# Patient Record
Sex: FEMALE | Race: ASIAN | NOT HISPANIC OR LATINO | Employment: OTHER | URBAN - METROPOLITAN AREA
[De-identification: names, ages, dates, MRNs, and addresses within clinical notes are randomized per-mention and may not be internally consistent; named-entity substitution may affect disease eponyms.]

---

## 2017-03-27 ENCOUNTER — GENERIC CONVERSION - ENCOUNTER (OUTPATIENT)
Dept: OTHER | Facility: OTHER | Age: 77
End: 2017-03-27

## 2017-04-04 ENCOUNTER — ALLSCRIPTS OFFICE VISIT (OUTPATIENT)
Dept: OTHER | Facility: OTHER | Age: 77
End: 2017-04-04

## 2017-05-22 ENCOUNTER — GENERIC CONVERSION - ENCOUNTER (OUTPATIENT)
Dept: OTHER | Facility: OTHER | Age: 77
End: 2017-05-22

## 2017-06-29 ENCOUNTER — GENERIC CONVERSION - ENCOUNTER (OUTPATIENT)
Dept: OTHER | Facility: OTHER | Age: 77
End: 2017-06-29

## 2017-07-13 ENCOUNTER — GENERIC CONVERSION - ENCOUNTER (OUTPATIENT)
Dept: OTHER | Facility: OTHER | Age: 77
End: 2017-07-13

## 2017-08-01 ENCOUNTER — GENERIC CONVERSION - ENCOUNTER (OUTPATIENT)
Dept: OTHER | Facility: OTHER | Age: 77
End: 2017-08-01

## 2017-08-05 LAB
A/G RATIO (HISTORICAL): 1.4 (CALC) (ref 1–2.5)
ALBUMIN SERPL BCP-MCNC: 4.4 G/DL (ref 3.6–5.1)
ALP SERPL-CCNC: 57 U/L (ref 33–130)
ALT SERPL W P-5'-P-CCNC: 35 U/L (ref 6–29)
AST SERPL W P-5'-P-CCNC: 29 U/L (ref 10–35)
BASOPHILS # BLD AUTO: 1 %
BASOPHILS # BLD AUTO: 39 CELLS/UL (ref 0–200)
BILIRUB SERPL-MCNC: 1 MG/DL (ref 0.2–1.2)
BUN SERPL-MCNC: 17 MG/DL (ref 7–25)
BUN/CREA RATIO (HISTORICAL): ABNORMAL (CALC) (ref 6–22)
CALCIUM (ADJUSTED FOR ALBUMIN) (HISTORICAL): 9.8 MG/DL (CALC) (ref 8.6–10.2)
CALCIUM SERPL-MCNC: 9.8 MG/DL (ref 8.6–10.4)
CHLORIDE SERPL-SCNC: 102 MMOL/L (ref 98–110)
CHOLEST SERPL-MCNC: 181 MG/DL (ref 125–200)
CHOLEST/HDLC SERPL: 1.4 (CALC)
CO2 SERPL-SCNC: 27 MMOL/L (ref 20–31)
CREAT SERPL-MCNC: 0.72 MG/DL (ref 0.6–0.93)
DEPRECATED RDW RBC AUTO: 13.2 % (ref 11–15)
EGFR AFRICAN AMERICAN (HISTORICAL): 94 ML/MIN/1.73M2
EGFR-AMERICAN CALC (HISTORICAL): 81 ML/MIN/1.73M2
EOSINOPHIL # BLD AUTO: 199 CELLS/UL (ref 15–500)
EOSINOPHIL # BLD AUTO: 5.1 %
EST. AVERAGE GLUCOSE BLD GHB EST-MCNC: 134 (CALC)
EST. AVERAGE GLUCOSE BLD GHB EST-MCNC: 7.4 (CALC)
GAMMA GLOBULIN (HISTORICAL): 3.1 G/DL (CALC) (ref 1.9–3.7)
GLUCOSE (HISTORICAL): 135 MG/DL (ref 65–99)
HBA1C MFR BLD HPLC: 6.3 % OF TOTAL HGB
HCT VFR BLD AUTO: 40 % (ref 35–45)
HDLC SERPL-MCNC: 125 MG/DL
HGB BLD-MCNC: 13.1 G/DL (ref 11.7–15.5)
LDL CHOLESTEROL (HISTORICAL): 31 MG/DL (CALC)
LYMPHOCYTES # BLD AUTO: 1357 CELLS/UL (ref 850–3900)
LYMPHOCYTES # BLD AUTO: 34.8 %
MCH RBC QN AUTO: 32.5 PG (ref 27–33)
MCHC RBC AUTO-ENTMCNC: 32.8 G/DL (ref 32–36)
MCV RBC AUTO: 99.3 FL (ref 80–100)
MONOCYTES # BLD AUTO: 390 CELLS/UL (ref 200–950)
MONOCYTES (HISTORICAL): 10 %
NEUTROPHILS # BLD AUTO: 1915 CELLS/UL (ref 1500–7800)
NEUTROPHILS # BLD AUTO: 49.1 %
NON-HDL-CHOL (CHOL-HDL) (HISTORICAL): 56 MG/DL (CALC)
PLATELET # BLD AUTO: 182 THOUSAND/UL (ref 140–400)
PMV BLD AUTO: 10.4 FL (ref 7.5–12.5)
POTASSIUM SERPL-SCNC: 4.2 MMOL/L (ref 3.5–5.3)
RBC # BLD AUTO: 4.03 MILLION/UL (ref 3.8–5.1)
SODIUM SERPL-SCNC: 139 MMOL/L (ref 135–146)
TOTAL PROTEIN (HISTORICAL): 7.5 G/DL (ref 6.1–8.1)
TRIGL SERPL-MCNC: 126 MG/DL
WBC # BLD AUTO: 3.9 THOUSAND/UL (ref 3.8–10.8)

## 2017-08-07 ENCOUNTER — GENERIC CONVERSION - ENCOUNTER (OUTPATIENT)
Dept: OTHER | Facility: OTHER | Age: 77
End: 2017-08-07

## 2017-08-09 ENCOUNTER — ALLSCRIPTS OFFICE VISIT (OUTPATIENT)
Dept: OTHER | Facility: OTHER | Age: 77
End: 2017-08-09

## 2017-08-25 ENCOUNTER — GENERIC CONVERSION - ENCOUNTER (OUTPATIENT)
Dept: OTHER | Facility: OTHER | Age: 77
End: 2017-08-25

## 2017-09-05 ENCOUNTER — GENERIC CONVERSION - ENCOUNTER (OUTPATIENT)
Dept: OTHER | Facility: OTHER | Age: 77
End: 2017-09-05

## 2017-10-11 ENCOUNTER — GENERIC CONVERSION - ENCOUNTER (OUTPATIENT)
Dept: OTHER | Facility: OTHER | Age: 77
End: 2017-10-11

## 2017-12-01 ENCOUNTER — TRANSCRIBE ORDERS (OUTPATIENT)
Dept: ADMINISTRATIVE | Facility: HOSPITAL | Age: 77
End: 2017-12-01

## 2017-12-01 ENCOUNTER — APPOINTMENT (OUTPATIENT)
Dept: LAB | Facility: MEDICAL CENTER | Age: 77
End: 2017-12-01
Payer: COMMERCIAL

## 2017-12-01 DIAGNOSIS — I10 ESSENTIAL (PRIMARY) HYPERTENSION: ICD-10-CM

## 2017-12-01 DIAGNOSIS — E78.00 PURE HYPERCHOLESTEROLEMIA: ICD-10-CM

## 2017-12-01 DIAGNOSIS — E11.9 TYPE 2 DIABETES MELLITUS WITHOUT COMPLICATIONS (HCC): ICD-10-CM

## 2017-12-01 LAB
ALBUMIN SERPL BCP-MCNC: 3.6 G/DL (ref 3.5–5)
ALP SERPL-CCNC: 54 U/L (ref 46–116)
ALT SERPL W P-5'-P-CCNC: 27 U/L (ref 12–78)
ANION GAP SERPL CALCULATED.3IONS-SCNC: 7 MMOL/L (ref 4–13)
AST SERPL W P-5'-P-CCNC: 23 U/L (ref 5–45)
BASOPHILS # BLD AUTO: 0.03 THOUSANDS/ΜL (ref 0–0.1)
BASOPHILS NFR BLD AUTO: 1 % (ref 0–1)
BILIRUB SERPL-MCNC: 0.86 MG/DL (ref 0.2–1)
BUN SERPL-MCNC: 14 MG/DL (ref 5–25)
CALCIUM SERPL-MCNC: 9.3 MG/DL (ref 8.3–10.1)
CHLORIDE SERPL-SCNC: 102 MMOL/L (ref 100–108)
CHOLEST SERPL-MCNC: 166 MG/DL (ref 50–200)
CO2 SERPL-SCNC: 29 MMOL/L (ref 21–32)
CREAT SERPL-MCNC: 0.71 MG/DL (ref 0.6–1.3)
EOSINOPHIL # BLD AUTO: 0.08 THOUSAND/ΜL (ref 0–0.61)
EOSINOPHIL NFR BLD AUTO: 3 % (ref 0–6)
ERYTHROCYTE [DISTWIDTH] IN BLOOD BY AUTOMATED COUNT: 13.8 % (ref 11.6–15.1)
EST. AVERAGE GLUCOSE BLD GHB EST-MCNC: 126 MG/DL
GFR SERPL CREATININE-BSD FRML MDRD: 83 ML/MIN/1.73SQ M
GLUCOSE P FAST SERPL-MCNC: 113 MG/DL (ref 65–99)
HBA1C MFR BLD: 6 % (ref 4.2–6.3)
HCT VFR BLD AUTO: 39 % (ref 34.8–46.1)
HDLC SERPL-MCNC: 120 MG/DL (ref 40–60)
HGB BLD-MCNC: 13.1 G/DL (ref 11.5–15.4)
LDLC SERPL CALC-MCNC: 30 MG/DL (ref 0–100)
LYMPHOCYTES # BLD AUTO: 1.13 THOUSANDS/ΜL (ref 0.6–4.47)
LYMPHOCYTES NFR BLD AUTO: 39 % (ref 14–44)
MCH RBC QN AUTO: 34.5 PG (ref 26.8–34.3)
MCHC RBC AUTO-ENTMCNC: 33.6 G/DL (ref 31.4–37.4)
MCV RBC AUTO: 103 FL (ref 82–98)
MONOCYTES # BLD AUTO: 0.28 THOUSAND/ΜL (ref 0.17–1.22)
MONOCYTES NFR BLD AUTO: 10 % (ref 4–12)
NEUTROPHILS # BLD AUTO: 1.37 THOUSANDS/ΜL (ref 1.85–7.62)
NEUTS SEG NFR BLD AUTO: 47 % (ref 43–75)
NRBC BLD AUTO-RTO: 0 /100 WBCS
PLATELET # BLD AUTO: 194 THOUSANDS/UL (ref 149–390)
PMV BLD AUTO: 10.2 FL (ref 8.9–12.7)
POTASSIUM SERPL-SCNC: 3.5 MMOL/L (ref 3.5–5.3)
PROT SERPL-MCNC: 7.5 G/DL (ref 6.4–8.2)
RBC # BLD AUTO: 3.8 MILLION/UL (ref 3.81–5.12)
SODIUM SERPL-SCNC: 138 MMOL/L (ref 136–145)
TRIGL SERPL-MCNC: 81 MG/DL
TSH SERPL DL<=0.05 MIU/L-ACNC: 1.42 UIU/ML (ref 0.36–3.74)
WBC # BLD AUTO: 2.89 THOUSAND/UL (ref 4.31–10.16)

## 2017-12-01 PROCEDURE — 80053 COMPREHEN METABOLIC PANEL: CPT

## 2017-12-01 PROCEDURE — 85025 COMPLETE CBC W/AUTO DIFF WBC: CPT

## 2017-12-01 PROCEDURE — 83036 HEMOGLOBIN GLYCOSYLATED A1C: CPT

## 2017-12-01 PROCEDURE — 84443 ASSAY THYROID STIM HORMONE: CPT

## 2017-12-01 PROCEDURE — 36415 COLL VENOUS BLD VENIPUNCTURE: CPT

## 2017-12-01 PROCEDURE — 80061 LIPID PANEL: CPT

## 2017-12-02 ENCOUNTER — GENERIC CONVERSION - ENCOUNTER (OUTPATIENT)
Dept: OTHER | Facility: OTHER | Age: 77
End: 2017-12-02

## 2017-12-04 DIAGNOSIS — E78.00 PURE HYPERCHOLESTEROLEMIA: ICD-10-CM

## 2017-12-04 DIAGNOSIS — Z12.31 ENCOUNTER FOR SCREENING MAMMOGRAM FOR MALIGNANT NEOPLASM OF BREAST: ICD-10-CM

## 2017-12-04 DIAGNOSIS — I10 ESSENTIAL (PRIMARY) HYPERTENSION: ICD-10-CM

## 2017-12-04 DIAGNOSIS — D72.819 DECREASED WHITE BLOOD CELL COUNT: ICD-10-CM

## 2017-12-04 DIAGNOSIS — E11.9 TYPE 2 DIABETES MELLITUS WITHOUT COMPLICATIONS (HCC): ICD-10-CM

## 2017-12-06 ENCOUNTER — ALLSCRIPTS OFFICE VISIT (OUTPATIENT)
Dept: OTHER | Facility: OTHER | Age: 77
End: 2017-12-06

## 2017-12-18 ENCOUNTER — TRANSCRIBE ORDERS (OUTPATIENT)
Dept: ADMINISTRATIVE | Facility: HOSPITAL | Age: 77
End: 2017-12-18

## 2017-12-18 ENCOUNTER — APPOINTMENT (OUTPATIENT)
Dept: LAB | Facility: MEDICAL CENTER | Age: 77
End: 2017-12-18
Payer: COMMERCIAL

## 2017-12-18 DIAGNOSIS — Z15.89 SUSCEPTIBILITY TO RESTLESS LEGS SYNDROME DUE TO SEQUENCE VARIATION AT CHROMOSOME 12Q12-Q21: Primary | ICD-10-CM

## 2017-12-18 DIAGNOSIS — Z95.0 CARDIAC PACEMAKER IN SITU: ICD-10-CM

## 2017-12-18 DIAGNOSIS — E78.00 PURE HYPERCHOLESTEROLEMIA: ICD-10-CM

## 2017-12-18 DIAGNOSIS — E11.9 TYPE 2 DIABETES MELLITUS WITHOUT COMPLICATIONS (HCC): ICD-10-CM

## 2017-12-18 DIAGNOSIS — I48.92 ATRIAL FLUTTER, UNSPECIFIED TYPE (HCC): ICD-10-CM

## 2017-12-18 DIAGNOSIS — Z15.89 SUSCEPTIBILITY TO RESTLESS LEGS SYNDROME DUE TO SEQUENCE VARIATION AT CHROMOSOME 12Q12-Q21: ICD-10-CM

## 2017-12-18 DIAGNOSIS — I10 ESSENTIAL HYPERTENSION, MALIGNANT: ICD-10-CM

## 2017-12-18 DIAGNOSIS — I10 ESSENTIAL (PRIMARY) HYPERTENSION: ICD-10-CM

## 2017-12-18 LAB
ALBUMIN SERPL BCP-MCNC: 3.7 G/DL (ref 3.5–5)
ALP SERPL-CCNC: 54 U/L (ref 46–116)
ALT SERPL W P-5'-P-CCNC: 33 U/L (ref 12–78)
ANION GAP SERPL CALCULATED.3IONS-SCNC: 6 MMOL/L (ref 4–13)
AST SERPL W P-5'-P-CCNC: 28 U/L (ref 5–45)
BASOPHILS # BLD AUTO: 0.03 THOUSANDS/ΜL (ref 0–0.1)
BASOPHILS NFR BLD AUTO: 1 % (ref 0–1)
BILIRUB SERPL-MCNC: 0.9 MG/DL (ref 0.2–1)
BUN SERPL-MCNC: 14 MG/DL (ref 5–25)
CALCIUM SERPL-MCNC: 9.8 MG/DL (ref 8.3–10.1)
CHLORIDE SERPL-SCNC: 102 MMOL/L (ref 100–108)
CHOLEST SERPL-MCNC: 183 MG/DL (ref 50–200)
CO2 SERPL-SCNC: 28 MMOL/L (ref 21–32)
CREAT SERPL-MCNC: 0.65 MG/DL (ref 0.6–1.3)
EOSINOPHIL # BLD AUTO: 0.09 THOUSAND/ΜL (ref 0–0.61)
EOSINOPHIL NFR BLD AUTO: 3 % (ref 0–6)
ERYTHROCYTE [DISTWIDTH] IN BLOOD BY AUTOMATED COUNT: 13.1 % (ref 11.6–15.1)
GFR SERPL CREATININE-BSD FRML MDRD: 86 ML/MIN/1.73SQ M
GLUCOSE P FAST SERPL-MCNC: 136 MG/DL (ref 65–99)
HCT VFR BLD AUTO: 40.2 % (ref 34.8–46.1)
HDLC SERPL-MCNC: 117 MG/DL (ref 40–60)
HGB BLD-MCNC: 13.5 G/DL (ref 11.5–15.4)
LDLC SERPL CALC-MCNC: 36 MG/DL (ref 0–100)
LYMPHOCYTES # BLD AUTO: 1.12 THOUSANDS/ΜL (ref 0.6–4.47)
LYMPHOCYTES NFR BLD AUTO: 34 % (ref 14–44)
MCH RBC QN AUTO: 34.4 PG (ref 26.8–34.3)
MCHC RBC AUTO-ENTMCNC: 33.6 G/DL (ref 31.4–37.4)
MCV RBC AUTO: 102 FL (ref 82–98)
MONOCYTES # BLD AUTO: 0.33 THOUSAND/ΜL (ref 0.17–1.22)
MONOCYTES NFR BLD AUTO: 10 % (ref 4–12)
NEUTROPHILS # BLD AUTO: 1.76 THOUSANDS/ΜL (ref 1.85–7.62)
NEUTS SEG NFR BLD AUTO: 52 % (ref 43–75)
NRBC BLD AUTO-RTO: 0 /100 WBCS
PLATELET # BLD AUTO: 199 THOUSANDS/UL (ref 149–390)
PMV BLD AUTO: 9.9 FL (ref 8.9–12.7)
POTASSIUM SERPL-SCNC: 4.4 MMOL/L (ref 3.5–5.3)
PROT SERPL-MCNC: 7.8 G/DL (ref 6.4–8.2)
RBC # BLD AUTO: 3.93 MILLION/UL (ref 3.81–5.12)
SODIUM SERPL-SCNC: 136 MMOL/L (ref 136–145)
TRIGL SERPL-MCNC: 152 MG/DL
WBC # BLD AUTO: 3.33 THOUSAND/UL (ref 4.31–10.16)

## 2017-12-18 PROCEDURE — 80053 COMPREHEN METABOLIC PANEL: CPT

## 2017-12-18 PROCEDURE — 36415 COLL VENOUS BLD VENIPUNCTURE: CPT

## 2017-12-18 PROCEDURE — 85025 COMPLETE CBC W/AUTO DIFF WBC: CPT

## 2017-12-18 PROCEDURE — 80061 LIPID PANEL: CPT

## 2017-12-27 ENCOUNTER — GENERIC CONVERSION - ENCOUNTER (OUTPATIENT)
Dept: FAMILY MEDICINE CLINIC | Facility: CLINIC | Age: 77
End: 2017-12-27

## 2018-01-02 NOTE — PROGRESS NOTES
Assessment   1  Controlled diabetes mellitus (250 00) (E11 9)   2  Hypertension (401 9) (I10)   3  Decreased white blood cell count (288 50) (D72 819)   4  Need for influenza vaccination (V04 81) (Z23)    Plan   Atrial flutter with controlled response    · Metoprolol Tartrate 25 MG Oral Tablet; Take 1 tablet twice daily  Controlled diabetes mellitus    · MetFORMIN HCl  MG Oral Tablet Extended Release 24 Hour; TAKE 1    TABLET BY MOUTH EVERY DAY  Controlled diabetes mellitus, Hypercholesterolemia, Hypertension    · (1) CBC/PLT/DIFF; Status:Resulted - Requires Verification;   Done: 99QGB0127 09:28AM   · (1) COMPREHENSIVE METABOLIC PANEL; Status:Active; Requested for:06Apr2018;    · (1) HEMOGLOBIN A1C; Status:Active; Requested for:06Apr2018;    · (1) LIPID PANEL, FASTING; Status:Active; Requested for:06Apr2018;    · (1) TSH WITH FT4 REFLEX; Status:Active; Requested RZQ:26QIP9312; Controlled diabetes mellitus, Hypertension    · Losartan Potassium 50 MG Oral Tablet; TAKE HALF A TABLET DAILY  Decreased white blood cell count    · (1) CBC/PLT/DIFF; Status:Active; Requested for:06Wjz3758;   Hypercholesterolemia    · Simvastatin 20 MG Oral Tablet; Take 1 tablet daily  Need for influenza vaccination    · Fluzone High-Dose 0 5 ML Intramuscular Suspension Prefilled Syringe  PMH: Lower leg edema    · Furosemide 20 MG Oral Tablet; One tablet as needed for swelling    Discussion/Summary      Patient is a 68year old female with type 2 diabetes  Her diabetes control is good  Cholesterol also was good with a very high HDL  She does follow with Cardiology regarding her atrial flutter  I renewed her medications for diabetes, hypertension and and she asked for a water pill to take for when she flies to [facility]  She will be leaving in January and visits her relatives for 2 months  On her CBC, her white blood cell count was mildly decreased so I asked her to go for a repeat CBC in 2 weeks  She was given a flu shot today   I will see her back in 4 months  Fasting blood work orders are in the EHR to be done prior to that visit  The treatment plan was reviewed with the patient/guardian  The patient/guardian understands and agrees with the treatment plan      Chief Complaint   pt here for her f/u to her chronic medical conditions    Patient is here today for follow up of chronic conditions described in HPI  History of Present Illness   Patient is seen for follow p of diabetes  The patient states she has been doing well with her Type II Diabetes control since the last visit  Symptoms: Due For: pTzwX5b is 6 0  Review of Systems        Constitutional: No fever, no chills, feels well, no tiredness, no recent weight gain or weight loss  Active Problems   1  Arrhythmia, sinus node (427 81) (I49 9)   2  Artificial pacemaker (V45 01) (Z95 0)   3  ASD (atrial septal defect) (745 5) (Q21 1)   4  Atrial flutter with controlled response (427 32) (I48 92)   5  Atrial Septal Defect   6  Bilateral nonexudative age-related macular degeneration (362 51) (H35 3130)   7  Cataract, bilateral (366 9) (H26 9)   8  Controlled diabetes mellitus (250 00) (E11 9)   9  Hypercholesterolemia (272 0) (E78 00)   10  Hypertension (401 9) (I10)   11  Insomnia (780 52) (G47 00)   12  Osteopenia (733 90) (M85 80)   13  Seborrhea (706 3) (L21 9)    Past Medical History   1  History of Abdominal pain (789 00) (R10 9)   2  History of Breast self examination education, encounter for (V65 49) (Z71 89)   3  History of Denial Of Any Significant Medical History   4  History of Depression screen (V79 0) (Z13 89)   5  History of Encounter for annual routine gynecological examination (V72 31) (Z01 419)   6  History of Encounter for Medicare annual wellness exam (V70 0) (Z00 00)   7  History of Encounter for screening mammogram for malignant neoplasm of breast     (V76 12) (Z12 31)   8  History of  4 (V22 2) (Z33 1)   9   History of allergic rhinitis (V12 69) (Z87 09)   10  History of allergic rhinitis (V12 69) (Z87 09)   11  History of atrial flutter (V12 59) (Z86 79)   12  History of fatigue (V13 89) (Z87 898)   13  History of self breast exam   14  History of self breast exam   15  History of vaginal discharge (V13 29) (Z87 42)   16  History of Hornet/Wasp/Bee Sting (989 5)   17  History of Injury caused by lifting with sudden strenuous movement, initial encounter      (E927 0) (X50 0XXA)   18  History of Lower leg edema (782 3) (R60 0)   19  History of Medicare annual wellness visit, subsequent (V70 0) (Z00 00)   20  History of Neck pain (723 1) (M54 2)   21  History of Need for pneumococcal vaccine (V03 82) (Z23)   22  History of Need for prophylactic vaccination and inoculation against influenza (V04 81)      (Z23)   23  History of Need for revaccination (V05 9) (Z23)   24  History of Need for vaccine for TD (tetanus-diphtheria) (V06 5) (Z23)   25  History of Preop examination (V72 84) (Z01 818)   26  History of Screening for breast cancer (V76 10) (Z12 31)   27  History of Screening for depression (V79 0) (Z13 89)   28  History of Screening for other and unspecified genitourinary condition (V81 6) (Z13 89)   29  History of Screening for other and unspecified genitourinary condition (V81 6) (Z13 89)   30  History of Special screening for other neurological conditions (V80 09) (Z13 89)   31  History of Special screening for other neurological conditions (V80 09) (Z13 89)   32  History of Strain of lumbar region, initial encounter (847 2) (S39 012A)   33  History of Subconjunctival hemorrhage, unspecified laterality (372 72) (H11 30)    Surgical History   1  History of Eye Surgery   2  History of Pacemaker Placement   3  History of Repair Of Atrial Secundum Septal Defect   4  History of Total Abdominal Hysterectomy    Family History   Mother    1  Family history of   Father    2  Family history unknown (V49 89) (Z78 9)  Brother    3   Family history of gout (V18 19) (Z82 69)    Social History    · Being A Social Drinker   · Caffeine Use   · Denied: History of Drug use   · Four children   · Quaker   · Never A Smoker   · Uses Safety Equipment - Seatbelts   ·   The social history was reviewed and updated today  The social history was reviewed and is unchanged  Current Meds    1  FreeStyle Lancets Miscellaneous; USE AS DIRECTED; Therapy: 61FYH2505 to (Last RN:68ABP9781)  Requested for: 10NJJ6657 Ordered   2  FreeStyle Lite Test In [employer]; Test Four Times per Week as directed; Therapy: 54THE5771 to (Last Rx:17Jun2014)  Requested for: 73DOT7138 Ordered   3  Ketoconazole 2 % External Shampoo; Use to shampoo hair twice a week; Therapy: 41BGL8740 to (Last Rx:86Dvw2584)  Requested for: 22Coo7080 Ordered   4  Losartan Potassium 50 MG Oral Tablet; TAKE HALF A TABLET DAILY; Therapy: 40Ihg4845 to (Evaluate:01Oct2017)  Requested for: 04Apr2017; Last     Rx:99Lbs0915 Ordered   5  MetFORMIN HCl  MG Oral Tablet Extended Release 24 Hour; Take 1 tablet by     mouth  daily; Therapy: 70HZL2081 to (Evaluate:30Mar2018)  Requested for: 04Apr2017; Last     Rx:43Sdp5692 Ordered   6  Metoprolol Tartrate 25 MG Oral Tablet; Take 1 tablet twice daily; Therapy: 33LKQ8246 to (Evaluate:17Mar2018)  Requested for: 56Zps5455; Last     Rx:13Bdg5334 Ordered   7  Multivitamins CAPS; Therapy: (Estiven Backers) to Recorded   8  Simvastatin 20 MG Oral Tablet; TAKE 1 TABLET DAILY AS DIRECTED; Therapy: 91Szl2035 to (Evaluate:30Mar2018)  Requested for: 03Fzl4030; Last     Rx:73Bzp0539 Ordered   9  Sotalol HCl - 80 MG Oral Tablet; Take 1 tablet twice daily; Therapy: 65RCZ0759 to (Evaluate:17Mar2018)  Requested for: 92Rbq0083; Last     Rx:24Ghg2658 Ordered   10  Xarelto 20 MG Oral Tablet; Take 1 tablet daily; Therapy: 77Avt3150 to Recorded     The medication list was reviewed and updated today  Allergies   1  Penicillins  2   Shellfish  Denied 3  Iodine OINT    Vitals   Vital Signs    Recorded: 80NVN4797 11:13AM   Temperature 96 3 F, Tympanic   Heart Rate 68   Pulse Quality Normal   Respiration Quality Normal   Respiration 16   Systolic 900, LUE, Sitting   Diastolic 80, LUE, Sitting   Height 4 ft 10 3 in   Weight 120 lb 8 oz   BMI Calculated 24 93   BSA Calculated 1 47   O2 Saturation 99   Pain Scale 0     Physical Exam        Constitutional      General appearance: No acute distress, well appearing and well nourished  Pulmonary      Respiratory effort: No increased work of breathing or signs of respiratory distress  Auscultation of lungs: Clear to auscultation  Cardiovascular      Auscultation of heart: Normal rate and rhythm, normal S1 and S2, without murmurs  Examination of extremities for edema and/or varicosities: Normal        Carotid pulses: Normal        Lymphatic      Palpation of lymph nodes in neck: No lymphadenopathy  Musculoskeletal      Gait and station: Normal        Psychiatric      Orientation to person, place, and time: Normal        Mood and affect: Normal           Results/Data   (1) CBC/PLT/DIFF 06YAD7883 09:28AM Hamilton Mabry      Test Name Result Flag Reference   WBC COUNT 3 33 Thousand/uL L 4 31-10 16   RBC COUNT 3 93 Million/uL  3 81-5 12   HEMOGLOBIN 13 5 g/dL  11 5-15 4   HEMATOCRIT 40 2 %  34 8-46  1    fL H 82-98   MCH 34 4 pg H 26 8-34 3   MCHC 33 6 g/dL  31 4-37 4   RDW 13 1 %  11 6-15 1   MPV 9 9 fL  8 9-12 7   PLATELET COUNT 742 Thousands/uL  149-390   nRBC AUTOMATED 0 /100 WBCs     NEUTROPHILS RELATIVE PERCENT 52 %  43-75   LYMPHOCYTES RELATIVE PERCENT 34 %  14-44   MONOCYTES RELATIVE PERCENT 10 %  4-12   EOSINOPHILS RELATIVE PERCENT 3 %  0-6   BASOPHILS RELATIVE PERCENT 1 %  0-1   NEUTROPHILS ABSOLUTE COUNT 1 76 Thousands/? ??L L 1 85-7 62   LYMPHOCYTES ABSOLUTE COUNT 1 12 Thousands/? ??L  0 60-4 47   MONOCYTES ABSOLUTE COUNT 0 33 Thousand/? ??L  0 17-1 22   EOSINOPHILS ABSOLUTE COUNT 0 09 Thousand/? ??L  0 00-0 61   BASOPHILS ABSOLUTE COUNT 0 03 Thousands/? ??L  0 00-0 10        Health Management   Controlled diabetes mellitus   (every) HEMOGLOBIN A1c; every 6 months; Last 93CFX9657; Next Due: 82Xco5997; Overdue  (every) MICROALBUMIN, RANDOM URINE (W/CREATININE); every 1 year; Last 39IFP5110; Next    Due: 31ICU5120; Active  *VB - Eye Exam; every 1 year; Last 63Nph5244; Next Due: 57Lcy0542; Active  *VB - Foot Exam; every 1 year; Last 54KJX0765; Next Due: 14IKJ2470; Overdue  History of Screening for breast cancer   Digital Bilateral Screening Mammogram With CAD; every 1 year; Last 49ZCM9536; Next Due:    99TSF0691; Overdue  Osteopenia   * Dexa Scan Axial Skel (Hip, Pelvis, Spine); every 2 years; Last 08EXP7740; Next Due: 28EGR5283; Overdue  Health Maintenance   Medicare Annual Wellness Visit; every 1 year; Last 61IOE3228; Next Due: 28DTH8286;  Overdue    Future Appointments      Date/Time Provider Specialty Site   04/06/2018 09:15 AM Mei Shoemaker DO Family Medicine [address]    Electronically signed by : Colletta Goring, DO; Jan 2 2018  1:22AM EST                       (Author)

## 2018-01-10 NOTE — RESULT NOTES
Verified Results  (Q) CBC (INCLUDES DIFF/PLT) (REFL) 17VBE5210 10:09AM Elizabet Wyldfire     Test Name Result Flag Reference   WHITE BLOOD CELL COUNT 3 9 Thousand/uL  3 8-10 8   RED BLOOD CELL COUNT 4 03 Million/uL  3 80-5 10   HEMOGLOBIN 13 1 g/dL  11 7-15 5   HEMATOCRIT 40 0 %  35 0-45 0   MCV 99 3 fL  80 0-100 0   MCH 32 5 pg  27 0-33 0   MCHC 32 8 g/dL  32 0-36 0   RDW 13 2 %  11 0-15 0   PLATELET COUNT 107 Thousand/uL  140-400   MPV 10 4 fL  7 5-12 5   ABSOLUTE NEUTROPHILS 1915 cells/uL  5756-7900   ABSOLUTE LYMPHOCYTES 1357 cells/uL  850-3900   ABSOLUTE MONOCYTES 390 cells/uL  200-950   ABSOLUTE EOSINOPHILS 199 cells/uL     ABSOLUTE BASOPHILS 39 cells/uL  0-200   NEUTROPHILS 49 1 %     LYMPHOCYTES 34 8 %     MONOCYTES 10 0 %     EOSINOPHILS 5 1 %     BASOPHILS 1 0 %       (Q) COMPREHENSIVE METABOLIC PNL W/ADJUSTED CALCIUM 38Efu3487 10:09AM Elizabet Wyldfire     Test Name Result Flag Reference   GLUCOSE 135 mg/dL H 65-99   Fasting reference interval     For someone without known diabetes, a glucose  value >125 mg/dL indicates that they may have  diabetes and this should be confirmed with a  follow-up test    UREA NITROGEN (BUN) 17 mg/dL  7-25   CREATININE 0 72 mg/dL  0 60-0 93   For patients >52years of age, the reference limit  for Creatinine is approximately 13% higher for people  identified as -American  eGFR NON-AFR   AMERICAN 81 mL/min/1 73m2  > OR = 60   eGFR AFRICAN AMERICAN 94 mL/min/1 73m2  > OR = 60   BUN/CREATININE RATIO   4-71   NOT APPLICABLE (calc)   SODIUM 139 mmol/L  135-146   POTASSIUM 4 2 mmol/L  3 5-5 3   CHLORIDE 102 mmol/L     CARBON DIOXIDE 27 mmol/L  20-31   CALCIUM 9 8 mg/dL  8 6-10 4   CALCIUM (ADJUSTED FOR$ALBUMIN) 9 8 mg/dL (calc)  8 6-10 2   PROTEIN, TOTAL 7 5 g/dL  6 1-8 1   ALBUMIN 4 4 g/dL  3 6-5 1   GLOBULIN 3 1 g/dL (calc)  1 9-3 7   ALBUMIN/GLOBULIN RATIO 1 4 (calc)  1 0-2 5   BILIRUBIN, TOTAL 1 0 mg/dL  0 2-1 2   ALKALINE PHOSPHATASE 57 U/L     AST 29 U/L 10-35   ALT 35 U/L H 6-29     (Q) HEMOGLOBIN A1c WITH eAG 27Wbr3456 10:09AM Ian Abreu   REPORT COMMENT:  FASTING:YES     Test Name Result Flag Reference   HEMOGLOBIN A1c 6 3 % of total Hgb H <5 7   For someone without known diabetes, a hemoglobin   A1c value between 5 7% and 6 4% is consistent with  prediabetes and should be confirmed with a   follow-up test      For someone with known diabetes, a value <7%  indicates that their diabetes is well controlled  A1c  targets should be individualized based on duration of  diabetes, age, comorbid conditions, and other  considerations  This assay result is consistent with an increased risk  of diabetes  Currently, no consensus exists regarding use of  hemoglobin A1c for diagnosis of diabetes for children  eAG (mg/dL) 134 (calc)     eAG (mmol/L) 7 4 (calc)       (Q) LIPID PANEL WITH REFLEX TO DIRECT LDL 79CHU1804 10:09AM Ian Abreu     Test Name Result Flag Reference   CHOLESTEROL, TOTAL 181 mg/dL  125-200   HDL CHOLESTEROL 125 mg/dL  > OR = 46   Verified by repeat analysis  TRIGLICERIDES 285 mg/dL  <150   LDL-CHOLESTEROL 31 mg/dL (calc)  <130   Desirable range <100 mg/dL for patients with CHD or  diabetes and <70 mg/dL for diabetic patients with  known heart disease  CHOL/HDLC RATIO 1 4 (calc)  < OR = 5 0   NON HDL CHOLESTEROL 56 mg/dL (calc)     Target for non-HDL cholesterol is 30 mg/dL higher than   LDL cholesterol target  Plan  Controlled diabetes mellitus    · (Q) HEMOGLOBIN A1c ; every 6 months;  Last 72KPS2405; Next 46VKI0677;  Status:Active

## 2018-01-12 VITALS
BODY MASS INDEX: 24.39 KG/M2 | DIASTOLIC BLOOD PRESSURE: 64 MMHG | HEART RATE: 87 BPM | TEMPERATURE: 97.6 F | WEIGHT: 124.25 LBS | HEIGHT: 60 IN | RESPIRATION RATE: 16 BRPM | OXYGEN SATURATION: 99 % | SYSTOLIC BLOOD PRESSURE: 118 MMHG

## 2018-01-13 ENCOUNTER — ALLSCRIPTS OFFICE VISIT (OUTPATIENT)
Dept: OTHER | Facility: OTHER | Age: 78
End: 2018-01-13

## 2018-01-13 LAB — S PYO AG THROAT QL: NEGATIVE

## 2018-01-13 NOTE — RESULT NOTES
Verified Results  (Q) CBC (INCLUDES DIFF/PLT) (REFL) [id number] 08:50AM Yunier Ley     Test Name Result Flag Reference   WHITE BLOOD CELL COUNT 4 0 Thousand/uL  3 8-10 8   RED BLOOD CELL COUNT 3 70 Million/uL L 3 80-5 10   HEMOGLOBIN 12 6 g/dL  11 7-15 5   HEMATOCRIT 37 7 %  35 0-45 0    7 fL H 80 0-100 0   MCH 33 9 pg H 27 0-33 0   MCHC 33 4 g/dL  32 0-36 0   RDW 15 1 % H 11 0-15 0   PLATELET COUNT 812 Thousand/uL  140-400   MPV 8 8 fL  7 5-12 5   ABSOLUTE NEUTROPHILS 2104 cells/uL  9231-5428   ABSOLUTE LYMPHOCYTES 1396 cells/uL  850-3900   ABSOLUTE MONOCYTES 312 cells/uL  200-950   ABSOLUTE EOSINOPHILS 168 cells/uL     ABSOLUTE BASOPHILS 20 cells/uL  0-200   NEUTROPHILS 52 6 %     LYMPHOCYTES 34 9 %     MONOCYTES 7 8 %     EOSINOPHILS 4 2 %     BASOPHILS 0 5 %       (Q) COMPREHENSIVE METABOLIC PNL W/ADJUSTED CALCIUM [id number] 08:50AM Yunier Ley     Test Name Result Flag Reference   GLUCOSE 111 mg/dL H 65-99   Fasting reference interval   UREA NITROGEN (BUN) 17 mg/dL  7-25   CREATININE 0 66 mg/dL  0 60-0 93   For patients >52years of age, the reference limit  for Creatinine is approximately 13% higher for people  identified as -American  eGFR NON-AFR   AMERICAN 86 mL/min/1 73m2  > OR = 60   eGFR AFRICAN AMERICAN 99 mL/min/1 73m2  > OR = 60   BUN/CREATININE RATIO   6-64   NOT APPLICABLE (calc)   SODIUM 138 mmol/L  135-146   POTASSIUM 4 0 mmol/L  3 5-5 3   CHLORIDE 101 mmol/L     CARBON DIOXIDE 30 mmol/L  20-31   CALCIUM 9 6 mg/dL  8 6-10 4   CALCIUM (ADJUSTED FOR$ALBUMIN) 9 9 mg/dL (calc)  8 6-10 2   PROTEIN, TOTAL 7 1 g/dL  6 1-8 1   ALBUMIN 4 0 g/dL  3 6-5 1   GLOBULIN 3 1 g/dL (calc)  1 9-3 7   ALBUMIN/GLOBULIN RATIO 1 3 (calc)  1 0-2 5   BILIRUBIN, TOTAL 0 9 mg/dL  0 2-1 2   ALKALINE PHOSPHATASE 62 U/L     AST 22 U/L  10-35   ALT 27 U/L  6-29     (Q) HEMOGLOBIN A1c WITH eAG [id number] 08:50AM Yunier Ley   REPORT COMMENT:  FASTING:YES     Test Name Result Flag Reference HEMOGLOBIN A1c 6 3 % of total Hgb H <5 7   For someone without known diabetes, a hemoglobin   A1c value between 5 7% and 6 4% is consistent with  prediabetes and should be confirmed with a   follow-up test      For someone with known diabetes, a value <7%  indicates that their diabetes is well controlled  A1c  targets should be individualized based on duration of  diabetes, age, comorbid conditions, and other  considerations  This assay result is consistent with an increased risk  of diabetes  Currently, no consensus exists regarding use of  hemoglobin A1c for diagnosis of diabetes for children  eAG (mg/dL) 134 (calc)     eAG (mmol/L) 7 4 (calc)       (Q) MICROALBUMIN, RANDOM URINE (W/CREATININE) [id number] 08:50AM Sarai Richie     Test Name Result Flag Reference   CREATININE, RANDOM URINE 12 mg/dL L    MICROALBUMIN <0 2 mg/dL     Reference Range  Not established   MICROALBUMIN/CREATININE$RATIO, RANDOM URINE NOTE mcg/mg creat  <30   The microalbumin value is less than 0 2 mg/dL  therefore we are unable to calculate excretion  and/or creatinine ratio  The ADA defines abnormalities in albumin  excretion as follows:     Category         Result (mcg/mg creatinine)     Normal                    <30  Microalbuminuria            Clinical albuminuria   > OR = 300     The ADA recommends that at least two of three  specimens collected within a 3-6 month period be  abnormal before considering a patient to be  within a diagnostic category  (Q) TSH, 3RD GENERATION W/REFLEX TO FT4 [id number] 08:50AM Sarai Quiroz     Test Name Result Flag Reference   TSH W/REFLEX TO FT4 1 99 mIU/L  0 40-4 50       Plan  DMII (diabetes mellitus, type 2)    · (Q) HEMOGLOBIN A1c ; every 6 months; Last 52FIZ7106; Next 29BZC3694;  Status:Active   · (Q) MICROALBUMIN, RANDOM URINE (W/CREATININE) ; every 1 year;  Last  47DBN1327; Next 52ZVY4345; Status:Active

## 2018-01-13 NOTE — RESULT NOTES
Verified Results  (Q) COMPREHENSIVE METABOLIC PNL W/ADJUSTED CALCIUM 93COB0837 08:53AM Deonte Park     Test Name Result Flag Reference   GLUCOSE 133 mg/dL H 65-99   Fasting reference interval   UREA NITROGEN (BUN) 14 mg/dL  7-25   CREATININE 0 67 mg/dL  0 60-0 93   For patients >52years of age, the reference limit  for Creatinine is approximately 13% higher for people  identified as -American  eGFR NON-AFR  AMERICAN 86 mL/min/1 73m2  > OR = 60   eGFR AFRICAN AMERICAN 100 mL/min/1 73m2  > OR = 60   BUN/CREATININE RATIO   3-46   NOT APPLICABLE (calc)   AST 30 U/L  10-35   ALT 30 U/L H 6-29   PROTEIN, TOTAL 7 3 g/dL  6 1-8 1   ALBUMIN 4 2 g/dL  3 6-5 1   GLOBULIN 3 1 g/dL (calc)  1 9-3 7   ALBUMIN/GLOBULIN RATIO 1 4 (calc)  1 0-2 5   BILIRUBIN, TOTAL 0 8 mg/dL  0 2-1 2   ALKALINE PHOSPHATASE 57 U/L     SODIUM 138 mmol/L  135-146   POTASSIUM 4 2 mmol/L  3 5-5 3   CHLORIDE 101 mmol/L     CARBON DIOXIDE 26 mmol/L  20-31   CALCIUM 9 7 mg/dL  8 6-10 4   CALCIUM (ADJUSTED FOR$ALBUMIN) 9 8 mg/dL (calc)  8 6-10 2     (Q) HEMOGLOBIN A1c WITH eAG 21Nov2016 08:53AM Deonte Park   REPORT COMMENT:  FASTING:YES     Test Name Result Flag Reference   HEMOGLOBIN A1c 6 4 % of total Hgb H <5 7   According to ADA guidelines, hemoglobin A1c <7 0%  represents optimal control in non-pregnant diabetic  patients  Different metrics may apply to specific  patient populations  Standards of Medical Care in    Diabetes Care  2013;36:s11-s66     For the purpose of screening for the presence of  diabetes  <5 7%       Consistent with the absence of diabetes  5 7-6 4%    Consistent with increased risk for diabetes              (prediabetes)  >or=6 5%    Consistent with diabetes     This assay result is consistent with a higher risk  of diabetes  Currently, no consensus exists for use of hemoglobin  A1c for diagnosis of diabetes for children     eAG (mg/dL) 137 (calc)     eAG (mmol/L) 7 6 (calc)       (Q) LIPID PANEL WITH REFLEX TO DIRECT LDL [id number] 08:53AM Roseanne Ready     Test Name Result Flag Reference   CHOLESTEROL, TOTAL 179 mg/dL  125-200   HDL CHOLESTEROL 112 mg/dL  > OR = 46   TRIGLICERIDES 012 mg/dL  <150   LDL-CHOLESTEROL 43 mg/dL (calc)  <130   Desirable range <100 mg/dL for patients with CHD or  diabetes and <70 mg/dL for diabetic patients with  known heart disease  CHOL/HDLC RATIO 1 6 (calc)  < OR = 5 0   NON HDL CHOLESTEROL 67 mg/dL (calc)     Target for non-HDL cholesterol is 30 mg/dL higher than   LDL cholesterol target  (Q) TSH, 3RD GENERATION W/REFLEX TO FT4 [id number] 08:53AM Roseanne Ready     Test Name Result Flag Reference   TSH W/REFLEX TO FT4 2 98 mIU/L  0 40-4 50       Plan  DMII (diabetes mellitus, type 2)    · (Q) HEMOGLOBIN A1c ; every 6 months;  Last 10QFF9715; Next 01XWH7401;  Status:Active

## 2018-01-14 VITALS
HEIGHT: 60 IN | SYSTOLIC BLOOD PRESSURE: 128 MMHG | WEIGHT: 123.56 LBS | BODY MASS INDEX: 24.26 KG/M2 | HEART RATE: 86 BPM | TEMPERATURE: 98 F | OXYGEN SATURATION: 98 % | DIASTOLIC BLOOD PRESSURE: 80 MMHG

## 2018-01-14 NOTE — PROGRESS NOTES
Assessment   1  Acute bronchitis (466 0) (J20 9)   2  Sore throat (462) (J02 9)    Plan   Acute bronchitis    · Sulfamethoxazole-Trimethoprim 800-160 MG Oral Tablet (Bactrim DS); TAKE 1    TABLET Every twelve hours  Sore throat    · Rapid StrepA- POC; Source:Throat; Status:Complete - Retrospective By Protocol    Authorization;   Done: 95XZT4952 10:44AM    Discussion/Summary      Patient is a 68-year-old female acute bronchitis - rapid strep today negative  Continue supportive care  Maintain hydration  Take over-the-counter Mucinex for symptom relief  Start treatment Bactrim DS b i d  for 7 days  Follow up if any symptoms persist       Chief Complaint   1  Sore Throat  Pt c/o ST with trouble swallowing  Pt states it is a sharp pain right down the middle with PND, cough and headaches  Pt denies fevers, N/V/D and body aches or chills  Pt states her pain scale for her throat pain is a 5/10  History of Present Illness   Sore Throat:    Elissa Stoddard presents with complaints of gradual onset of constant episodes of mild sore throat, non-radiating  Episodes started 2 days ago  Associated symptoms include nasal congestion-- and-- cough, but-- no fever,-- no chills,-- no neck stiffness,-- no ear pain,-- no facial pain-- and-- no rash  Review of Systems        Constitutional: feeling poorly-- and-- feeling tired  ENT: sore throat-- and-- nasal discharge  Cardiovascular: no chest pain-- and-- no palpitations  Respiratory: cough, but-- no shortness of breath during exertion  Gastrointestinal: no nausea-- and-- no diarrhea  Active Problems   1  Arrhythmia, sinus node (427 81) (I49 9)   2  Artificial pacemaker (V45 01) (Z95 0)   3  ASD (atrial septal defect) (745 5) (Q21 1)   4  Atrial flutter with controlled response (427 32) (I48 92)   5  Atrial Septal Defect   6  Bilateral nonexudative age-related macular degeneration (362 51) (H35 3130)   7  Cataract, bilateral (366 9) (H26 9)   8  Controlled diabetes mellitus (250 00) (E11 9)   9  Decreased white blood cell count (288 50) (D72 819)   10  Encounter for screening mammogram for breast cancer (V76 12) (Z12 31)   11  Hypercholesterolemia (272 0) (E78 00)   12  Hypertension (401 9) (I10)   13  Insomnia (780 52) (G47 00)   14  Medicare annual wellness visit, subsequent (V70 0) (Z00 00)   15  Need for influenza vaccination (V04 81) (Z23)   16  Osteopenia (733 90) (M85 80)   17  Seborrhea (706 3) (L21 9)    Past Medical History   1  History of Abdominal pain (789 00) (R10 9)   2  History of Breast self examination education, encounter for (V65 49) (Z71 89)   3  History of Denial Of Any Significant Medical History   4  History of Depression screen (V79 0) (Z13 89)   5  History of Encounter for annual routine gynecological examination (V72 31) (Z01 419)   6  History of Encounter for Medicare annual wellness exam (V70 0) (Z00 00)   7  History of Encounter for screening mammogram for malignant neoplasm of breast     (V76 12) (Z12 31)   8  History of  4 (V22 2) (Z33 1)   9  History of allergic rhinitis (V12 69) (Z87 09)   10  History of allergic rhinitis (V12 69) (Z87 09)   11  History of atrial flutter (V12 59) (Z86 79)   12  History of fatigue (V13 89) (Z87 898)   13  History of self breast exam   14  History of self breast exam   15  History of sick sinus syndrome (V12 59) (Z86 79)   16  History of vaginal discharge (V13 29) (Z87 42)   17  History of Hornet/Wasp/Bee Sting (989 5)   18  History of Injury caused by lifting with sudden strenuous movement, initial encounter      (E927 0) (X50 0XXA)   19  History of Lower leg edema (782 3) (R60 0)   20  History of Neck pain (723 1) (M54 2)   21  History of Need for pneumococcal vaccine (V03 82) (Z23)   22  History of Need for prophylactic vaccination and inoculation against influenza (V04 81)      (Z23)   23  History of Need for revaccination (V05 9) (Z23)   24   History of Need for vaccine for TD (tetanus-diphtheria) (V06 5) (Z23)   25  History of Preop examination (V72 84) (Z01 818)   26  History of Screening for breast cancer (V76 10) (Z12 31)   27  History of Screening for depression (V79 0) (Z13 89)   28  History of Screening for other and unspecified genitourinary condition (V81 6) (Z13 89)   29  History of Screening for other and unspecified genitourinary condition (V81 6) (Z13 89)   30  History of Special screening for other neurological conditions (V80 09) (Z13 89)   31  History of Special screening for other neurological conditions (V80 09) (Z13 89)   32  History of Strain of lumbar region, initial encounter (847 2) (S39 012A)   33  History of Subconjunctival hemorrhage, unspecified laterality (372 72) (H11 30)  Active Problems And Past Medical History Reviewed: The active problems and past medical history were reviewed and updated today  Family History   Mother    1  Family history of    2  Denied: Family history of substance abuse   3  Denied: Family history of Mental health problem  Father    4  Denied: Family history of substance abuse   5  Family history unknown (V49 89) (Z78 9)   6  Denied: Family history of Mental health problem  Brother    7  Family history of gout (V18 19) (Z82 69)  Family History Reviewed: The family history was reviewed and updated today  Social History    · Being A Social Drinker   · Caffeine Use   · Denied: History of Drug use   · Four children   · [employer]   · Never A Smoker   · Uses Safety Equipment - Seatbelts   ·   The social history was reviewed and updated today  The social history was reviewed and is unchanged  Surgical History   1  History of Eye Surgery   2  History of Pacemaker Placement   3  History of Repair Of Atrial Secundum Septal Defect   4  History of Total Abdominal Hysterectomy  Surgical History Reviewed: The surgical history was reviewed and updated today  Current Meds    1   FreeStyle Lancets Miscellaneous; USE AS DIRECTED; Therapy: 21WSL6167 to (Last TY:01DZF8174)  Requested for: 17RPN3865 Ordered   2  FreeStyle Lite Test In [employer]; Test Four Times per Week as directed; Therapy: 93WEB8271 to (Last Rx:17Jun2014)  Requested for: 39ZJQ1104 Ordered   3  Furosemide 20 MG Oral Tablet; One tablet as needed for swelling; Therapy: 36TKK0076 to (Last Rx:88Mcj9146)  Requested for: 94Ykg0544 Ordered   4  Ketoconazole 2 % External Shampoo; Use to shampoo hair twice a week; Therapy: 45EGQ8765 to (Last Rx:82Rcc7012)  Requested for: 97Cfr1337 Ordered   5  Losartan Potassium 50 MG Oral Tablet; TAKE HALF A TABLET DAILY; Therapy: 88Rfb6568 to (Evaluate:04Jun2018)  Requested for: 70CXC7293; Last     Rx:07Rop5758 Ordered   6  MetFORMIN HCl  MG Oral Tablet Extended Release 24 Hour; TAKE 1 TABLET BY     MOUTH EVERY DAY; Therapy: 12RJC4267 to (January Pope)  Requested for: 86TFE3237; Last     Rx:14Iwr8485 Ordered   7  Metoprolol Tartrate 25 MG Oral Tablet; Take 1 tablet twice daily; Therapy: 39EHK7941 to (January Pope)  Requested for: 43CHN7428; Last     Rx:13Ihc1543 Ordered   8  Multivitamins CAPS; Therapy: (Gaby Herring) to Recorded   9  Simvastatin 20 MG Oral Tablet; Take 1 tablet daily; Therapy: 61COY6937 to (January Pope)  Requested for: 84JKK9550; Last     Rx:96Gzt2674 Ordered   10  Sotalol HCl - 80 MG Oral Tablet; Take 1 tablet twice daily; Therapy: 53COB2493 to (Evaluate:17Mar2018)  Requested for: 05Dvl3480; Last      Rx:96Khd3747 Ordered   11  Xarelto 20 MG Oral Tablet; Take 1 tablet daily; Therapy: 43Kmi6789 to Recorded     The medication list was reviewed and updated today  Allergies   1  Penicillins  2  Shellfish  Denied    3   Iodine OINT    Vitals    Recorded: 68UXV4298 10:33AM   Temperature 98 8 F, Tympanic   Heart Rate 86   Pulse Quality Normal   Respiration Quality Normal   Respiration 15   Systolic 742, LUE, Sitting Diastolic 80, LUE, Sitting   Height 4 ft 10 3 in   Weight 122 lb 8 oz   BMI Calculated 25 34   BSA Calculated 1 48   O2 Saturation 97   Pain Scale 5     Physical Exam        Constitutional      General appearance: No acute distress, well appearing and well nourished  Eyes      Conjunctiva and lids: No swelling, erythema or discharge  Pupils and irises: Equal, round and reactive to light  Ears, Nose, Mouth, and Throat      External inspection of ears and nose: Normal        Nasal mucosa, septum, and turbinates: Normal without edema or erythema  Oropharynx: Normal with no erythema, edema, exudate or lesions  Pulmonary      Respiratory effort: No increased work of breathing or signs of respiratory distress  Auscultation of lungs: Clear to auscultation  Cardiovascular      Auscultation of heart: Normal rate and rhythm, normal S1 and S2, without murmurs  Examination of extremities for edema and/or varicosities: Normal        Abdomen      Abdomen: Non-tender, no masses  Liver and spleen: No hepatomegaly or splenomegaly  Musculoskeletal      Gait and station: Normal        Inspection/palpation of joints, bones, and muscles: Normal        Skin      Skin and subcutaneous tissue: Normal without rashes or lesions  Neurologic      Cranial nerves: Cranial nerves 2-12 intact  Sensation: No sensory loss  Psychiatric      Orientation to person, place, and time: Normal        Mood and affect: Normal           Results/Data   Rapid StrepA- POC 12QME4041 10:44AM Lazarus Em      Test Name Result Flag Reference   Rapid Strep Negative          Future Appointments      Date/Time Provider Specialty Site   04/06/2018 09:15 AM Robert Scott DO [facility] [address]     Signatures    Electronically signed by :  Tila Perez DO; Jan 13 2018 12:52PM EST                       (Author)

## 2018-01-15 NOTE — MISCELLANEOUS
Message   Recorded as Task   Date: 09/15/2016 08:34 AM, Created By: Juan Jose Munroe   Task Name: Go to Result   Assigned To: Anisha White   Regarding Patient: Lele Moore, Status: Active   Comment:    Juan Jose Munroe - 15 Sep 2016 8:34 AM     TASK CREATED  please let pt know her DEXA shows stable osteopenia, she does not need the Fosamax anymore but should continue her CA, vit D and stay active        Active Problems    1  Arrhythmia, sinus node (427 81) (I49 5)   2  Artificial pacemaker (V45 01) (Z95 0)   3  ASD (atrial septal defect) (745 5) (Q21 1)   4  Atrial flutter with controlled response (427 32) (I48 92)   5  Atrial Septal Defect   6  Bilateral nonexudative age-related macular degeneration (362 51) (H35 31)   7  Cataract, bilateral (366 9) (H26 9)   8  DMII (diabetes mellitus, type 2) (250 00) (E11 9)   9  Encounter for annual routine gynecological examination (V72 31) (Z01 419)   10  Encounter for screening mammogram for malignant neoplasm of breast (V76 12)    (Z12 31)   11  History of self breast exam   12  Hypercholesterolemia (272 0) (E78 0)   13  Hypertension (401 9) (I10)   14  Injury caused by lifting with sudden strenuous movement, initial encounter (E927 0)    (T73  2PLD,L37 1)   15  Insomnia (780 52) (G47 00)   16  Medicare annual wellness visit, subsequent (V70 0) (Z00 00)   17  Osteopenia (733 90) (M85 80)   18  Preop examination (V72 84) (Z01 818)   19  Screening for depression (V79 0) (Z13 89)   20  Screening for other and unspecified genitourinary condition (V81 6) (Z13 89)   21  Special screening for other neurological conditions (V80 09) (Z13 89)   22  Strain of lumbar region, initial encounter (847 2) (S39 012A)    Current Meds   1  Alendronate Sodium 35 MG Oral Tablet; TAKE 1 TABLET WEEKLY ON AN EMPTY   STOMACH 30-60 MINUTES BEFORE BREAKFAST WITH 8-12 OUNCES OF WATER  DO NOT LIE DOWN AFTER TAKING PILL;    Therapy: 11AKI5568 to (Evaluate:88Fnh5214)  Requested for: 86QEN8305; Last   Rx:18Jun2015 Ordered   2  FreeStyle Lancets Miscellaneous; USE AS DIRECTED; Therapy: 88GMH9468 to (Last PK:71OHG1690)  Requested for: 58YUQ7495 Ordered   3  FreeStyle Lite Test In [employer]; Test Four Times per Week as directed; Therapy: 74WBS0798 to (Last Rx:17Jun2014)  Requested for: 70FWZ8596 Ordered   4  Losartan Potassium 50 MG Oral Tablet; TAKE HALF A TABLET DAILY; Therapy: 84Vlk7580 to (Evaluate:60Gcc6365)  Requested for: 52KKQ4658; Last   Rx:64Uft4385 Ordered   5  MetFORMIN HCl  MG Oral Tablet Extended Release 24 Hour; Take 1 tablet by   mouth  daily; Therapy: 37YVU1997 to (Evaluate:22Qti7844)  Requested for: 29Skm2478; Last   Rx:42Ibc3616 Ordered   6  Metoprolol Tartrate 25 MG Oral Tablet; Therapy: 71NDW9059 to Recorded   7  Multivitamins CAPS; Therapy: (Iveth Main) to Recorded   8  Simvastatin 20 MG Oral Tablet; TAKE 1 TABLET DAILY AS DIRECTED; Therapy: 96TVS5434 to (Taffy Hidden)  Requested for: 64UEO7595; Last   Rx:97Fna5926 Ordered   9  Sotalol HCl - 80 MG Oral Tablet; Therapy: 09IXK9754 to Recorded   10  Xarelto 20 MG Oral Tablet; Take 1 tablet daily; Therapy: 83Agv1091 to Recorded    Allergies    1  Penicillins    2  Shellfish  Denied    3   Iodine OINT    Signatures   Electronically signed by : Theresa July, ; Sep 15 2016  9:03AM EST                       (Author)

## 2018-01-22 VITALS
HEART RATE: 86 BPM | TEMPERATURE: 98.8 F | SYSTOLIC BLOOD PRESSURE: 152 MMHG | DIASTOLIC BLOOD PRESSURE: 80 MMHG | RESPIRATION RATE: 15 BRPM | BODY MASS INDEX: 25.72 KG/M2 | HEIGHT: 58 IN | WEIGHT: 122.5 LBS | OXYGEN SATURATION: 97 %

## 2018-01-22 VITALS
WEIGHT: 120.5 LBS | HEIGHT: 58 IN | TEMPERATURE: 96.3 F | HEART RATE: 68 BPM | SYSTOLIC BLOOD PRESSURE: 122 MMHG | BODY MASS INDEX: 25.3 KG/M2 | RESPIRATION RATE: 16 BRPM | OXYGEN SATURATION: 99 % | DIASTOLIC BLOOD PRESSURE: 80 MMHG

## 2018-01-23 NOTE — RESULT NOTES
Verified Results  (1) CBC/PLT/DIFF 23MYA2719 09:28AM Lavonne Howe     Test Name Result Flag Reference   WBC COUNT 3 33 Thousand/uL L 4 31-10 16   RBC COUNT 3 93 Million/uL  3 81-5 12   HEMOGLOBIN 13 5 g/dL  11 5-15 4   HEMATOCRIT 40 2 %  34 8-46  1    fL H 82-98   MCH 34 4 pg H 26 8-34 3   MCHC 33 6 g/dL  31 4-37 4   RDW 13 1 %  11 6-15 1   MPV 9 9 fL  8 9-12 7   PLATELET COUNT 892 Thousands/uL  149-390   nRBC AUTOMATED 0 /100 WBCs     NEUTROPHILS RELATIVE PERCENT 52 %  43-75   LYMPHOCYTES RELATIVE PERCENT 34 %  14-44   MONOCYTES RELATIVE PERCENT 10 %  4-12   EOSINOPHILS RELATIVE PERCENT 3 %  0-6   BASOPHILS RELATIVE PERCENT 1 %  0-1   NEUTROPHILS ABSOLUTE COUNT 1 76 Thousands/? ??L L 1 85-7 62   LYMPHOCYTES ABSOLUTE COUNT 1 12 Thousands/? ??L  0 60-4 47   MONOCYTES ABSOLUTE COUNT 0 33 Thousand/? ??L  0 17-1 22   EOSINOPHILS ABSOLUTE COUNT 0 09 Thousand/? ??L  0 00-0 61   BASOPHILS ABSOLUTE COUNT 0 03 Thousands/? ??L  0 00-0 10       Plan  Atrial flutter with controlled response    · Metoprolol Tartrate 25 MG Oral Tablet; Take 1 tablet twice daily  Controlled diabetes mellitus    · MetFORMIN HCl  MG Oral Tablet Extended Release 24 Hour; TAKE 1  TABLET BY MOUTH EVERY DAY  Controlled diabetes mellitus, Hypercholesterolemia, Hypertension    · (1) CBC/PLT/DIFF; Status:Complete;   Done: 19EVU7712 09:28AM   · (1) COMPREHENSIVE METABOLIC PANEL; Status:Active; Requested for:06Apr2018;    · (1) HEMOGLOBIN A1C; Status:Active; Requested for:88Pjs7716;    · (1) LIPID PANEL, FASTING; Status:Active; Requested for:68Wsi3285;    · (1) TSH WITH FT4 REFLEX; Status:Active; Requested SJT:24YIJ4086; Controlled diabetes mellitus, Hypertension    · Losartan Potassium 50 MG Oral Tablet; TAKE HALF A TABLET DAILY  Decreased white blood cell count    · (1) CBC/PLT/DIFF; Status:Active; Requested for:40Zho1207;   Encounter for screening mammogram for breast cancer    · * MAMMO SCREENING BILATERAL W CAD; Status:Active;  Requested for:71Cqh3969;   Hypercholesterolemia    · Simvastatin 20 MG Oral Tablet; Take 1 tablet daily  Need for influenza vaccination    · Fluzone High-Dose 0 5 ML Intramuscular Suspension Prefilled Syringe  PMH: Lower leg edema    · Furosemide 20 MG Oral Tablet;  One tablet as needed for swelling

## 2018-01-23 NOTE — PROGRESS NOTES
Assessment   1  Controlled diabetes mellitus (250 00) (E11 9)  2  Hypertension (401 9) (I10)  3  Decreased white blood cell count (288 50) (D72 819)  4  Need for influenza vaccination (V04 81) (Z23)  5  Medicare annual wellness visit, subsequent (V70 0) (Z00 00)1      1 Amended By: Marla Correa; Jan 02 2018 5:01 AM EST    Plan  Atrial flutter with controlled response    · Renew: Metoprolol Tartrate 25 MG Oral Tablet; Take 1 tablet twice daily  Controlled diabetes mellitus    · Renew: MetFORMIN HCl  MG Oral Tablet Extended Release 24 Hour; TAKE 1  TABLET BY MOUTH EVERY DAY  Controlled diabetes mellitus, Hypercholesterolemia, Hypertension    · (1) CBC/PLT/DIFF; Status:Resulted - Requires Verification;   Done: 74DQI9580 09:28AM   · (1) COMPREHENSIVE METABOLIC PANEL; Status:Active; Requested for:06Apr2018;    · (1) HEMOGLOBIN A1C; Status:Active; Requested for:06Apr2018;    · (1) LIPID PANEL, FASTING; Status:Active; Requested for:06Apr2018;    · (1) TSH WITH FT4 REFLEX; Status:Active; Requested [facility]:[id number]; Controlled diabetes mellitus, Hypertension    · Renew: Losartan Potassium 50 MG Oral Tablet; TAKE HALF A TABLET DAILY  Decreased white blood cell count    · (1) CBC/PLT/DIFF; Status:Active; Requested for:85Cdr2988;   Hypercholesterolemia    · Renew: Simvastatin 20 MG Oral Tablet; Take 1 tablet daily  Need for influenza vaccination    · Administered: Fluzone High-Dose 0 5 ML Intramuscular Suspension Prefilled Syringe  PMH: Lower leg edema    · Renew: Furosemide 20 MG Oral Tablet; One tablet as needed for swelling    Discussion/Summary    Patient is seen for Medicare Wellness and is up to date on screening exams and immunizations  She does have a living will  She was born [birth year], so I did not order a Hep C antibody  Impression: Subsequent Annual Wellness Visit, with preventive exam as well as age and risk appropriate counseling completed       Cardiovascular screening and counseling: the risks and benefits of screening were discussed and screening is current  Immunizations: the risks and benefits of influenza vaccination were discussed with the patient and influenza vaccine is up to date this year  Advance Directive Planning: complete and up to date  Patient Discussion: plan discussed with the patient, follow-up visit needed in one year  Chief Complaint  Pt presents for a subsequent AWV  History of Present Illness  HPI: Patient is here for wellness visit  She had a visit from nurse from [place]  They told her that she should have a Tdap - I told patient that she may be responsible for the bill  Welcome to [employer] and Wellness Visits: The patient is being seen for the subsequent annual wellness visit  Medicare Screening and Risk Factors   Hospitalizations: no previous hospitalizations and she has been hospitalized 0 times  Medicare Screening Tests Risk Questions   Abdominal aortic aneurysm risk assessment: none indicated  Osteoporosis risk assessment:  descent, female gender and the patient's weight is too low (<127 lbs)  HIV risk assessment: none indicated  Drug and Alcohol Use: The patient has never smoked cigarettes and has never used smokeless tobacco  The patient reports occasional alcohol use  Alcohol concern:   The patient has no concerns about alcohol abuse  She has never used illicit drugs  Diet and Physical Activity: Current diet includes well balanced meals, 1 servings of fruit per day, 3-4 servings of vegetables per day, 1 servings of meat per day, 1 servings of whole grains per day, 1 servings of simple carbohydrates per day and 1 cups of coffee per day  She exercises daily  Exercise: walking, stretching  Mood Disorder and Cognitive Impairment Screening: PHQ-9 Depression Scale   Over the past 2 weeks, how often have you been bothered by the following problems? 1 ) Little interest or pleasure in doing things? Not at all    2 ) Feeling down, depressed or hopeless? Not at all    3 ) Trouble falling asleep or sleeping too much? Half the days or more  4 ) Feeling tired or having little energy? Not at all    5 ) Poor appetite or overeating? Not at all    6 ) Feeling bad about yourself, or that you are a failure, or have let yourself or your family down? Not at all    7 ) Trouble concentrating on things, such as reading a newspaper or watching television? Not at all    8 ) Moving or speaking so slowly that other people could have noticed, or the opposite, moving or speaking faster than usual? Not at all  TOTAL SCORE: 2  How difficult have these problems made it for you to do your work, take care of things at home, or get along with people? Not at all  Depression screening score was 2     negative for symptoms  She denies feeling down, depressed, or hopeless over the past two weeks  She denies feeling little interest or pleasure in doing things over the past two weeks  Cognitive impairment screening: denies difficulty learning/retaining new information, denies difficulty handling complex tasks, denies difficulty with reasoning, denies difficulty with spatial ability and orientation, denies difficulty with language and denies difficulty with behavior  Functional Ability/Level of Safety: Hearing is normal bilaterally, normal in the right ear and normal in the left ear  She denies hearing difficulties  She does not use a hearing aid  The patient is currently able to do activities of daily living without limitations, able to do instrumental activities of daily living without limitations, able to participate in social activities without limitations and able to drive without limitations   Activities of daily living details: does not need help using the phone, no transportation help needed, does not need help shopping, no meal preparation help needed, does not need help doing housework, does not need help doing laundry, does not need help managing medications and does not need help managing money  Fall risk factors: The patient fell 0 times in the past 12 months  Injury History: polypharmacy, no alcohol use, no mobility impairment, no antidepressant use, no deconditioning, no postural hypotension, no sedative use, no visual impairment, no urinary incontinence, no antihypertensive use, no cognitive impairment, up and go test was normal and no previous fall  Home safety risk factors:  no unfamiliar surroundings, no loose rugs, no poor household lighting, no uneven floors, no household clutter, grab bars in the bathroom and handrails on the stairs  Advance Directives: Advance directives: living will and durable power of  for health care directives  end of life decisions were reviewed with the patient and I agree with the patient's decisions  Co-Managers and Medical Equipment/Suppliers: See Patient Care Team   Reviewed Updated [address]:   Last Medicare Wellness Visit Information was reviewed, patient interviewed and updates made to the record today  Patient Care Team    Care Team Member Role Specialty Office Number   [facility] DO  Obstetrics/Gynecology [phone]   Sam Galvan 1679  Cardiology [phone]   Abelardo Cage MD  Ophthalmology [phone]     Active Problems   1  Arrhythmia, sinus node (427 81) (I49 9)  2  Artificial pacemaker (V45 01) (Z95 0)  3  ASD (atrial septal defect) (745 5) (Q21 1)  4  Atrial flutter with controlled response (427 32) (I48 92)  5  Atrial Septal Defect  6  Bilateral nonexudative age-related macular degeneration (362 51) (H35 3130)  7  Cataract, bilateral (366 9) (H26 9)  8  Controlled diabetes mellitus (250 00) (E11 9)  9  Encounter for screening mammogram for breast cancer (V76 12) (Z12 31)  10  Hypercholesterolemia (272 0) (E78 00)  11  Hypertension (401 9) (I10)  12  Insomnia (780 52) (G47 00)  13  Osteopenia (733 90) (M85 80)  14   Seborrhea (706 3) (L21 9)    Past Medical History    · History of Abdominal pain (789 00) (R10 9)   · History of Breast self examination education, encounter for (V65 49) (Z71 89)   · History of Denial Of Any Significant Medical History   · History of Depression screen (V79 0) (Z13 89)   · History of Encounter for annual routine gynecological examination (V72 31) (Z01 419)   · History of Encounter for Medicare annual wellness exam (V70 0) (Z00 00)   · History of Encounter for screening mammogram for malignant neoplasm of breast  (V76 12) (Z12 31)   · History of  4 (V22 2) (Z33 1)   · History of allergic rhinitis (V12 69) (Z87 09)   · History of allergic rhinitis (V12 69) (Z87 09)   · History of atrial flutter (V12 59) (Z86 79)   · History of fatigue (V13 89) (Q39 121)   · History of self breast exam   · History of self breast exam   · History of vaginal discharge (V13 29) (Z87 42)   · History of Hornet/Wasp/Bee Sting (989 5)   · History of Injury caused by lifting with sudden strenuous movement, initial encounter  (E927 0) (X50 0XXA)   · History of Lower leg edema (782 3) (R60 0)   · History of Medicare annual wellness visit, subsequent (V70 0) (Z00 00)   · History of Neck pain (723 1) (M54 2)   · History of Need for pneumococcal vaccine (V03 82) (Z23)   · History of Need for prophylactic vaccination and inoculation against influenza (V04 81)  (Z23)   · History of Need for revaccination (V05 9) (Z23)   · History of Need for vaccine for TD (tetanus-diphtheria) (V06 5) (Z23)   · History of Preop examination (V72 84) (Z01 818)   · History of Screening for breast cancer (V76 10) (Z12 31)   · History of Screening for depression (V79 0) (Z13 89)   · History of Screening for other and unspecified genitourinary condition (V81 6) (Z13 89)   · History of Screening for other and unspecified genitourinary condition (V81 6) (Z13 89)   · History of Special screening for other neurological conditions (V80 09) (Z13 89)   · History of Special screening for other neurological conditions (V80 09) (Z13 89) · History of Strain of lumbar region, initial encounter (847 2) (S39 012A)   · History of Subconjunctival hemorrhage, unspecified laterality (372 72) (H11 30)    Surgical History    · History of Eye Surgery   · History of Pacemaker Placement   · History of Repair Of Atrial Secundum Septal Defect   · History of Total Abdominal Hysterectomy    Family History  Mother    · Family history of    · Denied: Family history of substance abuse   · Denied: Family history of Mental health problem  Father    · Denied: Family history of substance abuse   · Family history unknown (V49 89) (Z78 9)   · Denied: Family history of Mental health problem  Brother    · Family history of gout (V18 19) (Z82 69)    Social History    · Being A Social Drinker   · Caffeine Use   · Denied: History of Drug use   · Four children   · Uatsdin   · Never A Smoker   · Uses Safety Equipment - Seatbelts   ·     Current Meds  1  FreeStyle Lancets Miscellaneous; USE AS DIRECTED; Therapy: 64WXL9285 to (Last LF:55MQS9945)  Requested for: 45WJO3035 Ordered  2  FreeStyle Lite Test In [employer]; Test Four Times per Week as directed; Therapy: 08HZP8650 to (Last Rx:2014)  Requested for: 18ZZL3496 Ordered  3  Ketoconazole 2 % External Shampoo; Use to shampoo hair twice a week; Therapy: 93OJW0609 to (Last Rx:12Zvk6839)  Requested for: 27Aio4036 Ordered  4  Losartan Potassium 50 MG Oral Tablet; TAKE HALF A TABLET DAILY; Therapy: 04Xng0515 to (Evaluate:2017)  Requested for: 2017; Last   Rx:93Kah2999 Ordered  5  MetFORMIN HCl  MG Oral Tablet Extended Release 24 Hour; Take 1 tablet by   mouth  daily; Therapy: 38AIC7549 to (Evaluate:2018)  Requested for: 53Vfq6839; Last   Rx:49Vos3349 Ordered  6  Metoprolol Tartrate 25 MG Oral Tablet; Take 1 tablet twice daily; Therapy: 53IWA7510 to (Evaluate:2018)  Requested for: 94Xrn5329; Last   Rx:35Cno3306 Ordered  7  Multivitamins CAPS;    Therapy: (Estiven Backers) to Recorded  8  Simvastatin 20 MG Oral Tablet; TAKE 1 TABLET DAILY AS DIRECTED; Therapy: 80Rwl5822 to (Evaluate:30Mar2018)  Requested for: 04Apr2017; Last   Rx:04Apr2017 Ordered  9  Sotalol HCl - 80 MG Oral Tablet; Take 1 tablet twice daily; Therapy: 82UYG9914 to (Evaluate:17Mar2018)  Requested for: 18Sep2017; Last   Rx:61Cif0300 Ordered  10  Xarelto 20 MG Oral Tablet; Take 1 tablet daily; Therapy: 55Zfv5969 to Recorded    Allergies   1  Penicillins   2  Shellfish  Denied   3  Iodine OINT    Immunizations   ** Printed in Appendix #1 below  Vitals  Signs    Temperature: 96 3 F, Tympanic  Heart Rate: 68  Pulse Quality: Normal  Respiration Quality: Normal  Respiration: 16  Systolic: 665, LUE, Sitting  Diastolic: 80, LUE, Sitting  Height: 4 ft 10 3 in  Weight: 120 lb 8 oz  BMI Calculated: 24 93  BSA Calculated: 1 47  O2 Saturation: 99  Pain Scale: 0    Results/Data  (1) CBC/PLT/DIFF 32LYA0884 09:28AM Karen Chamber     Test Name Result Flag Reference   WBC COUNT 3 33 Thousand/uL L 4 31-10 16   RBC COUNT 3 93 Million/uL  3 81-5 12   HEMOGLOBIN 13 5 g/dL  11 5-15 4   HEMATOCRIT 40 2 %  34 8-46  1    fL H 82-98   MCH 34 4 pg H 26 8-34 3   MCHC 33 6 g/dL  31 4-37 4   RDW 13 1 %  11 6-15 1   MPV 9 9 fL  8 9-12 7   PLATELET COUNT 618 Thousands/uL  149-390   nRBC AUTOMATED 0 /100 WBCs     NEUTROPHILS RELATIVE PERCENT 52 %  43-75   LYMPHOCYTES RELATIVE PERCENT 34 %  14-44   MONOCYTES RELATIVE PERCENT 10 %  4-12   EOSINOPHILS RELATIVE PERCENT 3 %  0-6   BASOPHILS RELATIVE PERCENT 1 %  0-1   NEUTROPHILS ABSOLUTE COUNT 1 76 Thousands/? ??L L 1 85-7 62   LYMPHOCYTES ABSOLUTE COUNT 1 12 Thousands/? ??L  0 60-4 47   MONOCYTES ABSOLUTE COUNT 0 33 Thousand/? ??L  0 17-1 22   EOSINOPHILS ABSOLUTE COUNT 0 09 Thousand/? ??L  0 00-0 61   BASOPHILS ABSOLUTE COUNT 0 03 Thousands/? ??L  0 00-0 10       Health Management  Controlled diabetes mellitus   (every) HEMOGLOBIN A1c; every 6 months;  Last 04Aug2017; Next Due: 14EUT0313; Overdue  (every) MICROALBUMIN, RANDOM URINE (W/CREATININE); every 1 year; Last  88TRM6528; Next Due: 25XKI1518; Active  *VB - Eye Exam; every 1 year; Last 72Psn0295; Next Due: 06Dry3932; Active  *VB - Foot Exam; every 1 year; Last 98HBO3233; Next Due: 57ZAZ9774; Overdue  History of Screening for breast cancer   Digital Bilateral Screening Mammogram With CAD; every 1 year; Last 93HOR9379; Next  Due: 40HRM5411; Overdue  Osteopenia   * Dexa Scan Axial Skel (Hip, Pelvis, Spine); every 2 years; Last 53QXK9592; Next Due:  08JJM3173; Overdue  Health Maintenance   Medicare Annual Wellness Visit; every 1 year; Last 77OUQ4862; Next Due: 66OZM8423;   Overdue    Future Appointments    Date/Time Provider Specialty Site   2018 09:15 AM Lavonne Howe DO Family Medicine [address]   Electronically signed by : Abhishek Vasquez DO; 2018  5:01AM EST                       (Author)    Appendix #1     Patient: Yoly Forrest ; : 1940; MRN: [id number]      1 2 3 4 5    Influenza  27-Nov-2012 01-Oct-2013 25-Sep-2014 06-YCO-7966 2016    PCV  2015        PPSV  2004        Td/DT  09-Nov-2015 07-Mar-2017       Zoster  02-Aug-2012

## 2018-01-23 NOTE — RESULT NOTES
Verified Results  (1) CBC/PLT/DIFF 13FOA1451 09:03AM Jay Morin   TW Order Number: [id number]     Test Name Result Flag Reference   WBC COUNT 2 89 Thousand/uL L 4 31-10 16   RBC COUNT 3 80 Million/uL L 3 81-5 12   HEMOGLOBIN 13 1 g/dL  11 5-15 4   HEMATOCRIT 39 0 %  34 8-46  1    fL H 82-98   MCH 34 5 pg H 26 8-34 3   MCHC 33 6 g/dL  31 4-37 4   RDW 13 8 %  11 6-15 1   MPV 10 2 fL  8 9-12 7   PLATELET COUNT 008 Thousands/uL  149-390   nRBC AUTOMATED 0 /100 WBCs     NEUTROPHILS RELATIVE PERCENT 47 %  43-75   LYMPHOCYTES RELATIVE PERCENT 39 %  14-44   MONOCYTES RELATIVE PERCENT 10 %  4-12   EOSINOPHILS RELATIVE PERCENT 3 %  0-6   BASOPHILS RELATIVE PERCENT 1 %  0-1   NEUTROPHILS ABSOLUTE COUNT 1 37 Thousands/? ??L L 1 85-7 62   LYMPHOCYTES ABSOLUTE COUNT 1 13 Thousands/? ??L  0 60-4 47   MONOCYTES ABSOLUTE COUNT 0 28 Thousand/? ??L  0 17-1 22   EOSINOPHILS ABSOLUTE COUNT 0 08 Thousand/? ??L  0 00-0 61   BASOPHILS ABSOLUTE COUNT 0 03 Thousands/? ??L  0 00-0 10     (1) COMPREHENSIVE METABOLIC PANEL 15HHA5957 88:56RO Jay Morin    Order Number: [id number]     Test Name Result Flag Reference   SODIUM 138 mmol/L  136-145   POTASSIUM 3 5 mmol/L  3 5-5 3   CHLORIDE 102 mmol/L  100-108   CARBON DIOXIDE 29 mmol/L  21-32   ANION GAP (CALC) 7 mmol/L  4-13   BLOOD UREA NITROGEN 14 mg/dL  5-25   CREATININE 0 71 mg/dL  0 60-1 30   Standardized to IDMS reference method   CALCIUM 9 3 mg/dL  8 3-10 1   BILI, TOTAL 0 86 mg/dL  0 20-1 00   ALK PHOSPHATAS 54 U/L     ALT (SGPT) 27 U/L  12-78   Specimen collection should occur prior to Sulfasalazine and/or Sulfapyridine administration due to the potential for falsely depressed results  AST(SGOT) 23 U/L  5-45   Specimen collection should occur prior to Sulfasalazine administration due to the potential for falsely depressed results     ALBUMIN 3 6 g/dL  3 5-5 0   TOTAL PROTEIN 7 5 g/dL  6 4-8 2   eGFR 83 ml/min/1 73sq m     National Kidney Disease Education Program recommendations are as follows:  GFR calculation is accurate only with a steady state creatinine  Chronic Kidney disease less than 60 ml/min/1 73 sq  meters  Kidney failure less than 15 ml/min/1 73 sq  meters  GLUCOSE FASTING 113 mg/dL H 65-99   Specimen collection should occur prior to Sulfasalazine administration due to the potential for falsely depressed results  Specimen collection should occur prior to Sulfapyridine administration due to the potential for falsely elevated results  (1) HEMOGLOBIN A1C 04GPO0209 09:03AM Linette Haynes   [employer] Order Number: [id number]     Test Name Result Flag Reference   HEMOGLOBIN A1C 6 0 %  4 2-6 3   EST  AVG  GLUCOSE 126 mg/dl       (1) LIPID PANEL, FASTING 80Cix1328 09:03AM Linette Haynes   [facility] Order Number: [id number]     Test Name Result Flag Reference   CHOLESTEROL 166 mg/dL     HDL,DIRECT 120 mg/dL H 40-60   Specimen collection should occur prior to Metamizole administration due to the potential for falsley depressed results  LDL CHOLESTEROL CALCULATED 30 mg/dL  0-100   Triglyceride:        Normal <150 mg/dl   Borderline High 150-199 mg/dl   High 200-499 mg/dl   Very High >499 mg/dl      Cholesterol:       Desirable <200 mg/dl    Borderline High 200-239 mg/dl    High >239 mg/dl      HDL Cholesterol:       High>59 mg/dL    Low <41 mg/dL      This screening LDL is a calculated result  It does not have the accuracy of the Direct Measured LDL in the monitoring of patients with hyperlipidemia and/or statin therapy  Direct Measure LDL (DBL251) must be ordered separately in these patients  TRIGLYCERIDES 81 mg/dL  <=150   Specimen collection should occur prior to N-Acetylcysteine or Metamizole administration due to the potential for falsely depressed results       (1) TSH WITH FT4 REFLEX 57SVJ3343 09:03AM Linette Haynes   [facility] Order Number: [id number]     Test Name Result Flag Reference   TSH 1 420 uIU/mL  0 358-3 740   Patients undergoing fluorescein dye angiography may retain small amounts of fluorescein in the body for 48-72 hours post procedure  Samples containing fluorescein can produce falsely depressed TSH values  If the patient had this procedure,a specimen should be resubmitted post fluorescein clearance  The recommended reference ranges for TSH during pregnancy are as follows:  First trimester 0 1 to 2 5 uIU/mL  Second trimester  0 2 to 3 0 uIU/mL  Third trimester 0 3 to 3 0 uIU/m       Plan  Controlled diabetes mellitus    · (Q) HEMOGLOBIN A1c ; every 6 months;  Last 91IPR3396; Next 01BOS0891;  Status:Active

## 2018-03-19 DIAGNOSIS — I48.92 ATRIAL FLUTTER WITH CONTROLLED RESPONSE (HCC): Primary | ICD-10-CM

## 2018-03-20 RX ORDER — SOTALOL HYDROCHLORIDE 80 MG/1
40 TABLET ORAL 2 TIMES DAILY
Qty: 90 TABLET | Refills: 1 | Status: SHIPPED | OUTPATIENT
Start: 2018-03-20 | End: 2018-04-05

## 2018-03-20 NOTE — TELEPHONE ENCOUNTER
I have a prescription request from [facility] for patient's sotalol  They request submitted says that patient takes 1 pill twice a day  But when I look at the cardiologist's letter its is 1/2 tablet twice a day  Please check with patient what she is taking

## 2018-04-04 ENCOUNTER — APPOINTMENT (OUTPATIENT)
Dept: LAB | Facility: MEDICAL CENTER | Age: 78
End: 2018-04-04
Payer: COMMERCIAL

## 2018-04-04 DIAGNOSIS — E78.00 PURE HYPERCHOLESTEROLEMIA: ICD-10-CM

## 2018-04-04 DIAGNOSIS — D72.819 DECREASED WHITE BLOOD CELL COUNT: ICD-10-CM

## 2018-04-04 DIAGNOSIS — E11.9 TYPE 2 DIABETES MELLITUS WITHOUT COMPLICATIONS (HCC): ICD-10-CM

## 2018-04-04 DIAGNOSIS — I10 ESSENTIAL (PRIMARY) HYPERTENSION: ICD-10-CM

## 2018-04-04 LAB
ALBUMIN SERPL BCP-MCNC: 3.7 G/DL (ref 3.5–5)
ALP SERPL-CCNC: 57 U/L (ref 46–116)
ALT SERPL W P-5'-P-CCNC: 37 U/L (ref 12–78)
ANION GAP SERPL CALCULATED.3IONS-SCNC: 4 MMOL/L (ref 4–13)
AST SERPL W P-5'-P-CCNC: 29 U/L (ref 5–45)
BASOPHILS # BLD AUTO: 0.02 THOUSANDS/ΜL (ref 0–0.1)
BASOPHILS NFR BLD AUTO: 0 % (ref 0–1)
BILIRUB SERPL-MCNC: 1.03 MG/DL (ref 0.2–1)
BUN SERPL-MCNC: 14 MG/DL (ref 5–25)
CALCIUM SERPL-MCNC: 9.1 MG/DL (ref 8.3–10.1)
CHLORIDE SERPL-SCNC: 105 MMOL/L (ref 100–108)
CHOLEST SERPL-MCNC: 173 MG/DL (ref 50–200)
CO2 SERPL-SCNC: 29 MMOL/L (ref 21–32)
CREAT SERPL-MCNC: 0.72 MG/DL (ref 0.6–1.3)
EOSINOPHIL # BLD AUTO: 0.1 THOUSAND/ΜL (ref 0–0.61)
EOSINOPHIL NFR BLD AUTO: 2 % (ref 0–6)
ERYTHROCYTE [DISTWIDTH] IN BLOOD BY AUTOMATED COUNT: 15.2 % (ref 11.6–15.1)
EST. AVERAGE GLUCOSE BLD GHB EST-MCNC: 126 MG/DL
GFR SERPL CREATININE-BSD FRML MDRD: 81 ML/MIN/1.73SQ M
GLUCOSE P FAST SERPL-MCNC: 127 MG/DL (ref 65–99)
HBA1C MFR BLD: 6 % (ref 4.2–6.3)
HCT VFR BLD AUTO: 37.5 % (ref 34.8–46.1)
HDLC SERPL-MCNC: 117 MG/DL (ref 40–60)
HGB BLD-MCNC: 12.4 G/DL (ref 11.5–15.4)
LDLC SERPL CALC-MCNC: 29 MG/DL (ref 0–100)
LYMPHOCYTES # BLD AUTO: 1.63 THOUSANDS/ΜL (ref 0.6–4.47)
LYMPHOCYTES NFR BLD AUTO: 35 % (ref 14–44)
MCH RBC QN AUTO: 34.1 PG (ref 26.8–34.3)
MCHC RBC AUTO-ENTMCNC: 33.1 G/DL (ref 31.4–37.4)
MCV RBC AUTO: 103 FL (ref 82–98)
MONOCYTES # BLD AUTO: 0.34 THOUSAND/ΜL (ref 0.17–1.22)
MONOCYTES NFR BLD AUTO: 7 % (ref 4–12)
NEUTROPHILS # BLD AUTO: 2.51 THOUSANDS/ΜL (ref 1.85–7.62)
NEUTS SEG NFR BLD AUTO: 56 % (ref 43–75)
NRBC BLD AUTO-RTO: 0 /100 WBCS
PLATELET # BLD AUTO: 167 THOUSANDS/UL (ref 149–390)
PMV BLD AUTO: 10.4 FL (ref 8.9–12.7)
POTASSIUM SERPL-SCNC: 3.9 MMOL/L (ref 3.5–5.3)
PROT SERPL-MCNC: 7.7 G/DL (ref 6.4–8.2)
RBC # BLD AUTO: 3.64 MILLION/UL (ref 3.81–5.12)
SODIUM SERPL-SCNC: 138 MMOL/L (ref 136–145)
TRIGL SERPL-MCNC: 133 MG/DL
TSH SERPL DL<=0.05 MIU/L-ACNC: 2.72 UIU/ML (ref 0.36–3.74)
WBC # BLD AUTO: 4.61 THOUSAND/UL (ref 4.31–10.16)

## 2018-04-04 PROCEDURE — 84443 ASSAY THYROID STIM HORMONE: CPT

## 2018-04-04 PROCEDURE — 80061 LIPID PANEL: CPT

## 2018-04-04 PROCEDURE — 36415 COLL VENOUS BLD VENIPUNCTURE: CPT

## 2018-04-04 PROCEDURE — 85025 COMPLETE CBC W/AUTO DIFF WBC: CPT

## 2018-04-04 PROCEDURE — 83036 HEMOGLOBIN GLYCOSYLATED A1C: CPT

## 2018-04-04 PROCEDURE — 80053 COMPREHEN METABOLIC PANEL: CPT

## 2018-04-05 RX ORDER — FUROSEMIDE 20 MG/1
20 TABLET ORAL AS NEEDED
COMMUNITY
Start: 2015-11-09 | End: 2018-10-12

## 2018-04-05 RX ORDER — SOTALOL HYDROCHLORIDE 80 MG/1
1 TABLET ORAL 2 TIMES DAILY
COMMUNITY
Start: 2014-05-23 | End: 2018-08-28 | Stop reason: SDUPTHER

## 2018-04-05 RX ORDER — LANCETS 28 GAUGE
EACH MISCELLANEOUS DAILY
COMMUNITY
Start: 2014-05-30

## 2018-04-05 RX ORDER — KETOCONAZOLE 20 MG/ML
2 SHAMPOO TOPICAL 2 TIMES WEEKLY
COMMUNITY
Start: 2017-08-09 | End: 2018-04-25 | Stop reason: ALTCHOICE

## 2018-04-06 ENCOUNTER — OFFICE VISIT (OUTPATIENT)
Dept: FAMILY MEDICINE CLINIC | Facility: CLINIC | Age: 78
End: 2018-04-06
Payer: COMMERCIAL

## 2018-04-06 VITALS
HEIGHT: 57 IN | TEMPERATURE: 96.7 F | RESPIRATION RATE: 16 BRPM | SYSTOLIC BLOOD PRESSURE: 156 MMHG | OXYGEN SATURATION: 98 % | WEIGHT: 122.13 LBS | DIASTOLIC BLOOD PRESSURE: 80 MMHG | BODY MASS INDEX: 26.35 KG/M2 | HEART RATE: 67 BPM

## 2018-04-06 DIAGNOSIS — I10 ESSENTIAL HYPERTENSION: Primary | ICD-10-CM

## 2018-04-06 DIAGNOSIS — E11.9 TYPE 2 DIABETES MELLITUS WITHOUT COMPLICATION, WITHOUT LONG-TERM CURRENT USE OF INSULIN (HCC): ICD-10-CM

## 2018-04-06 DIAGNOSIS — E78.2 MIXED HYPERLIPIDEMIA: ICD-10-CM

## 2018-04-06 DIAGNOSIS — E78.00 PURE HYPERCHOLESTEROLEMIA: ICD-10-CM

## 2018-04-06 DIAGNOSIS — M81.6 LOCALIZED OSTEOPOROSIS WITHOUT CURRENT PATHOLOGICAL FRACTURE: ICD-10-CM

## 2018-04-06 DIAGNOSIS — E11.9 TYPE 2 DIABETES MELLITUS WITHOUT COMPLICATIONS (HCC): ICD-10-CM

## 2018-04-06 DIAGNOSIS — I10 ESSENTIAL (PRIMARY) HYPERTENSION: ICD-10-CM

## 2018-04-06 DIAGNOSIS — I48.92 ATRIAL FLUTTER WITH CONTROLLED RESPONSE (HCC): ICD-10-CM

## 2018-04-06 PROBLEM — I49.5 SICK SINUS SYNDROME (HCC): Status: ACTIVE | Noted: 2017-04-05

## 2018-04-06 PROCEDURE — 3725F SCREEN DEPRESSION PERFORMED: CPT | Performed by: FAMILY MEDICINE

## 2018-04-06 PROCEDURE — 99214 OFFICE O/P EST MOD 30 MIN: CPT | Performed by: FAMILY MEDICINE

## 2018-04-06 RX ORDER — LOSARTAN POTASSIUM 100 MG/1
100 TABLET ORAL DAILY
Qty: 90 TABLET | Refills: 1 | Status: SHIPPED | OUTPATIENT
Start: 2018-04-06 | End: 2018-07-25 | Stop reason: SDUPTHER

## 2018-04-06 NOTE — PROGRESS NOTES
Assessment/Plan:  Essential Hypertension - her blood pressure is elevated today and wonder if this is a cause for her headaches  I increased her Losartan  I will see her back in a month to recheck  Her diabetes is well controlled with HGBa1C of 6 0  She will continue present mediaction  Her cholesterol is 173 with an LDL of 26  She will continue present medication  She is following with Dr Stella Pleitez for her vision  - macular degeneration and chorionretopathy  She is due for a DEXA this year to check up on the osteoporosis of her forearm  She [facility] - regarding her a flutter, pacemaker  No problem-specific Assessment & Plan notes found for this encounter  Diagnoses and all orders for this visit:    Essential hypertension  -     losartan (COZAAR) 100 MG tablet; Take 1 tablet (100 mg total) by mouth daily    Atrial flutter with controlled response (HCC)    Type 2 diabetes mellitus without complication, without long-term current use of insulin (HCC)    Localized osteoporosis without current pathological fracture    Mixed hyperlipidemia          Subjective:      Patient ID: Ruthann Giordano is a 68 y o  female  Patient is here for follow up of diabetes and blood pressure  She complains of headaches  Has headache all the time  She cannot tell me anyplace in particular - it is the whole head  Feels congestion in her sinuses  The following portions of the patient's history were reviewed and updated as appropriate: allergies, current medications, past family history, past medical history, past social history, past surgical history and problem list     Review of Systems   HENT: Positive for congestion  Respiratory: Negative for cough, chest tightness and shortness of breath  Cardiovascular: Negative for chest pain, palpitations and leg swelling  Gastrointestinal: Negative  Skin: Negative            Objective:      /80 (BP Location: Left arm, Patient Position: Sitting, Cuff Size: Adult)   Pulse 67   Temp (!) 96 7 °F (35 9 °C) (Tympanic)   Resp 16   Ht 4' 9" (1 448 m)   Wt 55 4 kg (122 lb 2 oz)   SpO2 98%   Breastfeeding? No   BMI 26 43 kg/m²          Physical Exam   Constitutional: She is oriented to person, place, and time  She appears well-developed and well-nourished  Neck: Carotid bruit is not present  No thyromegaly present  Cardiovascular: Normal rate, regular rhythm and normal heart sounds  Pulses:       Dorsalis pedis pulses are 1+ on the right side, and 1+ on the left side  Posterior tibial pulses are 1+ on the right side, and 1+ on the left side  BP when I took it was 180/86   Pulmonary/Chest: Effort normal and breath sounds normal    Musculoskeletal: She exhibits no edema  Feet:   Right Foot:   Skin Integrity: Negative for ulcer, skin breakdown, erythema, warmth, callus or dry skin  Left Foot:   Skin Integrity: Negative for ulcer, skin breakdown, erythema, warmth, callus or dry skin  Lymphadenopathy:     She has no cervical adenopathy  Neurological: She is alert and oriented to person, place, and time  Skin: Skin is warm and dry  Psychiatric: She has a normal mood and affect  Nursing note and vitals reviewed  Patient's shoes and socks removed  Right Foot/Ankle   Right Foot Inspection  Skin Exam: skin normal and skin intact no dry skin, no warmth, no callus, no erythema, no maceration, no abnormal color, no pre-ulcer, no ulcer and no callus                          Toe Exam: ROM and strength within normal limits  Sensory       Monofilament testing: intact  Vascular    The right DP pulse is 1+  The right PT pulse is 1+       Left Foot/Ankle  Left Foot Inspection  Skin Exam: skin normal and skin intactno dry skin, no warmth, no erythema, no maceration, normal color, no pre-ulcer, no ulcer and no callus                         Toe Exam: ROM and strength within normal limits                   Sensory Monofilament: intact  Vascular    The left DP pulse is 1+  The left PT pulse is 1+  Assign Risk Category:  No deformity present;  No loss of protective sensation;        Risk: 0

## 2018-04-06 NOTE — PATIENT INSTRUCTIONS
Use the Flonase nasal spray every day for sinus congestion  Increasing Losartan to 100 mg daily (take two of the 50 mg tabs that you have at home)  The new RX I sent was for the 100 mg tablet and then you will only take one of those

## 2018-04-25 ENCOUNTER — OFFICE VISIT (OUTPATIENT)
Dept: FAMILY MEDICINE CLINIC | Facility: CLINIC | Age: 78
End: 2018-04-25
Payer: COMMERCIAL

## 2018-04-25 VITALS
OXYGEN SATURATION: 98 % | DIASTOLIC BLOOD PRESSURE: 80 MMHG | WEIGHT: 121.5 LBS | TEMPERATURE: 98.7 F | SYSTOLIC BLOOD PRESSURE: 132 MMHG | HEIGHT: 60 IN | BODY MASS INDEX: 23.85 KG/M2 | HEART RATE: 73 BPM

## 2018-04-25 DIAGNOSIS — M81.0 AGE-RELATED OSTEOPOROSIS WITHOUT CURRENT PATHOLOGICAL FRACTURE: ICD-10-CM

## 2018-04-25 DIAGNOSIS — E78.2 MIXED HYPERLIPIDEMIA: ICD-10-CM

## 2018-04-25 DIAGNOSIS — I10 ESSENTIAL HYPERTENSION: ICD-10-CM

## 2018-04-25 DIAGNOSIS — I48.92 ATRIAL FLUTTER WITH CONTROLLED RESPONSE (HCC): ICD-10-CM

## 2018-04-25 DIAGNOSIS — E11.9 TYPE 2 DIABETES MELLITUS WITHOUT COMPLICATION, WITHOUT LONG-TERM CURRENT USE OF INSULIN (HCC): Primary | ICD-10-CM

## 2018-04-25 PROBLEM — L21.9 SEBORRHEA: Status: ACTIVE | Noted: 2017-08-09

## 2018-04-25 PROCEDURE — 99213 OFFICE O/P EST LOW 20 MIN: CPT | Performed by: FAMILY MEDICINE

## 2018-04-25 NOTE — PROGRESS NOTES
Assessment/Plan:    Hypertension  Patient's blood pressure is much better on increased medication  Her headaches have lessened  She will return in three months for her regular check up o f diabetes  She was given an order for a DEXA scan  She is due to see her opthalmologist soon and then we can better now her true disease state  Diagnoses and all orders for this visit:    Type 2 diabetes mellitus without complication, without long-term current use of insulin (HCC)  -     CBC and differential; Future  -     Comprehensive metabolic panel; Future  -     HEMOGLOBIN A1C W/ EAG ESTIMATION; Future  -     Lipid Panel with Direct LDL reflex; Future    Essential hypertension  -     CBC and differential; Future  -     Comprehensive metabolic panel; Future  -     HEMOGLOBIN A1C W/ EAG ESTIMATION; Future  -     Lipid Panel with Direct LDL reflex; Future    Age-related osteoporosis without current pathological fracture    Atrial flutter with controlled response ([facility] [address] )    Mixed hyperlipidemia          Subjective:      Patient ID: Paulina Velásquez is a 68 y o  female  Patient is here for follow up of blood pressure  I had increased her Losartan at last visit  She did check it at home this morning was 132/78  The following portions of the patient's history were reviewed and updated as appropriate: allergies, current medications, past family history, past medical history, past social history, past surgical history and problem list     Review of Systems   Constitutional: Negative for chills and fever  HENT: Negative  Respiratory: Negative  Cardiovascular: Negative for chest pain and palpitations  Neurological: Positive for headaches (have lessened)  Psychiatric/Behavioral: Negative            Objective:      /80 (BP Location: Left arm, Patient Position: Sitting, Cuff Size: Adult)   Pulse 73   Temp 98 7 °F (37 1 °C) (Tympanic)   Ht 5' (1 524 m)   Wt 55 1 kg (121 lb 8 oz)   SpO2 98%   BMI 23 73 kg/m²          Physical Exam   Constitutional: She is oriented to person, place, and time  She appears well-developed and well-nourished  Neck: No thyromegaly present  Cardiovascular: Normal rate and regular rhythm  Pulmonary/Chest: Effort normal and breath sounds normal    Musculoskeletal: She exhibits no edema  Lymphadenopathy:     She has no cervical adenopathy  Neurological: She is alert and oriented to person, place, and time  Skin: Skin is warm and dry  Psychiatric: She has a normal mood and affect

## 2018-06-22 ENCOUNTER — TRANSCRIBE ORDERS (OUTPATIENT)
Dept: ADMINISTRATIVE | Facility: HOSPITAL | Age: 78
End: 2018-06-22

## 2018-06-22 ENCOUNTER — APPOINTMENT (OUTPATIENT)
Dept: LAB | Facility: MEDICAL CENTER | Age: 78
End: 2018-06-22
Payer: COMMERCIAL

## 2018-06-22 DIAGNOSIS — I48.92 ATRIAL FLUTTER, UNSPECIFIED TYPE (HCC): ICD-10-CM

## 2018-06-22 DIAGNOSIS — I48.92 ATRIAL FLUTTER, UNSPECIFIED TYPE (HCC): Primary | ICD-10-CM

## 2018-06-22 DIAGNOSIS — Q21.1 OSTIUM SECUNDUM TYPE ATRIAL SEPTAL DEFECT: ICD-10-CM

## 2018-06-22 DIAGNOSIS — I49.5 SINOATRIAL NODE DYSFUNCTION (HCC): ICD-10-CM

## 2018-06-22 DIAGNOSIS — Z95.0 CARDIAC PACEMAKER IN SITU: ICD-10-CM

## 2018-06-22 DIAGNOSIS — E78.00 PURE HYPERCHOLESTEROLEMIA: ICD-10-CM

## 2018-06-22 DIAGNOSIS — I48.0 PAROXYSMAL ATRIAL FIBRILLATION (HCC): ICD-10-CM

## 2018-06-22 LAB
ALBUMIN SERPL BCP-MCNC: 3.5 G/DL (ref 3.5–5)
ALP SERPL-CCNC: 56 U/L (ref 46–116)
ALT SERPL W P-5'-P-CCNC: 42 U/L (ref 12–78)
ANION GAP SERPL CALCULATED.3IONS-SCNC: 8 MMOL/L (ref 4–13)
AST SERPL W P-5'-P-CCNC: 31 U/L (ref 5–45)
BILIRUB SERPL-MCNC: 0.96 MG/DL (ref 0.2–1)
BUN SERPL-MCNC: 15 MG/DL (ref 5–25)
CALCIUM SERPL-MCNC: 9.2 MG/DL (ref 8.3–10.1)
CHLORIDE SERPL-SCNC: 101 MMOL/L (ref 100–108)
CHOLEST SERPL-MCNC: 165 MG/DL (ref 50–200)
CO2 SERPL-SCNC: 29 MMOL/L (ref 21–32)
CREAT SERPL-MCNC: 0.77 MG/DL (ref 0.6–1.3)
GFR SERPL CREATININE-BSD FRML MDRD: 75 ML/MIN/1.73SQ M
GLUCOSE P FAST SERPL-MCNC: 114 MG/DL (ref 65–99)
HDLC SERPL-MCNC: 121 MG/DL (ref 40–60)
LDLC SERPL CALC-MCNC: 17 MG/DL (ref 0–100)
NONHDLC SERPL-MCNC: 44 MG/DL
POTASSIUM SERPL-SCNC: 4 MMOL/L (ref 3.5–5.3)
PROT SERPL-MCNC: 7.5 G/DL (ref 6.4–8.2)
SODIUM SERPL-SCNC: 138 MMOL/L (ref 136–145)
TRIGL SERPL-MCNC: 134 MG/DL

## 2018-06-22 PROCEDURE — 80053 COMPREHEN METABOLIC PANEL: CPT

## 2018-06-22 PROCEDURE — 80061 LIPID PANEL: CPT

## 2018-06-22 PROCEDURE — 36415 COLL VENOUS BLD VENIPUNCTURE: CPT

## 2018-07-23 ENCOUNTER — TRANSCRIBE ORDERS (OUTPATIENT)
Dept: ADMINISTRATIVE | Facility: HOSPITAL | Age: 78
End: 2018-07-23

## 2018-07-23 ENCOUNTER — APPOINTMENT (OUTPATIENT)
Dept: LAB | Facility: MEDICAL CENTER | Age: 78
End: 2018-07-23
Payer: COMMERCIAL

## 2018-07-23 DIAGNOSIS — E11.9 TYPE 2 DIABETES MELLITUS WITHOUT COMPLICATION, WITHOUT LONG-TERM CURRENT USE OF INSULIN (HCC): ICD-10-CM

## 2018-07-23 DIAGNOSIS — I10 ESSENTIAL HYPERTENSION: ICD-10-CM

## 2018-07-23 DIAGNOSIS — R53.83 FATIGUE, UNSPECIFIED TYPE: ICD-10-CM

## 2018-07-23 LAB
ALBUMIN SERPL BCP-MCNC: 3.5 G/DL (ref 3.5–5)
ALP SERPL-CCNC: 52 U/L (ref 46–116)
ALT SERPL W P-5'-P-CCNC: 37 U/L (ref 12–78)
ANION GAP SERPL CALCULATED.3IONS-SCNC: 3 MMOL/L (ref 4–13)
AST SERPL W P-5'-P-CCNC: 29 U/L (ref 5–45)
BASOPHILS # BLD AUTO: 0.03 THOUSANDS/ΜL (ref 0–0.1)
BASOPHILS NFR BLD AUTO: 1 % (ref 0–1)
BILIRUB SERPL-MCNC: 1.2 MG/DL (ref 0.2–1)
BUN SERPL-MCNC: 13 MG/DL (ref 5–25)
CALCIUM SERPL-MCNC: 8.8 MG/DL (ref 8.3–10.1)
CHLORIDE SERPL-SCNC: 104 MMOL/L (ref 100–108)
CHOLEST SERPL-MCNC: 161 MG/DL (ref 50–200)
CO2 SERPL-SCNC: 29 MMOL/L (ref 21–32)
CREAT SERPL-MCNC: 0.78 MG/DL (ref 0.6–1.3)
EOSINOPHIL # BLD AUTO: 0.1 THOUSAND/ΜL (ref 0–0.61)
EOSINOPHIL NFR BLD AUTO: 3 % (ref 0–6)
ERYTHROCYTE [DISTWIDTH] IN BLOOD BY AUTOMATED COUNT: 15 % (ref 11.6–15.1)
EST. AVERAGE GLUCOSE BLD GHB EST-MCNC: 134 MG/DL
GFR SERPL CREATININE-BSD FRML MDRD: 74 ML/MIN/1.73SQ M
GLUCOSE P FAST SERPL-MCNC: 116 MG/DL (ref 65–99)
HBA1C MFR BLD: 6.3 % (ref 4.2–6.3)
HCT VFR BLD AUTO: 35.5 % (ref 34.8–46.1)
HDLC SERPL-MCNC: 104 MG/DL (ref 40–60)
HGB BLD-MCNC: 11.6 G/DL (ref 11.5–15.4)
IMM GRANULOCYTES # BLD AUTO: 0.01 THOUSAND/UL (ref 0–0.2)
IMM GRANULOCYTES NFR BLD AUTO: 0 % (ref 0–2)
LDLC SERPL CALC-MCNC: 28 MG/DL (ref 0–100)
LYMPHOCYTES # BLD AUTO: 1.43 THOUSANDS/ΜL (ref 0.6–4.47)
LYMPHOCYTES NFR BLD AUTO: 40 % (ref 14–44)
MCH RBC QN AUTO: 34.7 PG (ref 26.8–34.3)
MCHC RBC AUTO-ENTMCNC: 32.7 G/DL (ref 31.4–37.4)
MCV RBC AUTO: 106 FL (ref 82–98)
MONOCYTES # BLD AUTO: 0.36 THOUSAND/ΜL (ref 0.17–1.22)
MONOCYTES NFR BLD AUTO: 10 % (ref 4–12)
NEUTROPHILS # BLD AUTO: 1.68 THOUSANDS/ΜL (ref 1.85–7.62)
NEUTS SEG NFR BLD AUTO: 46 % (ref 43–75)
NRBC BLD AUTO-RTO: 0 /100 WBCS
PLATELET # BLD AUTO: 173 THOUSANDS/UL (ref 149–390)
PMV BLD AUTO: 10.4 FL (ref 8.9–12.7)
POTASSIUM SERPL-SCNC: 3.6 MMOL/L (ref 3.5–5.3)
PROT SERPL-MCNC: 7.2 G/DL (ref 6.4–8.2)
RBC # BLD AUTO: 3.34 MILLION/UL (ref 3.81–5.12)
SODIUM SERPL-SCNC: 136 MMOL/L (ref 136–145)
TRIGL SERPL-MCNC: 146 MG/DL
WBC # BLD AUTO: 3.61 THOUSAND/UL (ref 4.31–10.16)

## 2018-07-23 PROCEDURE — 80061 LIPID PANEL: CPT

## 2018-07-23 PROCEDURE — 36415 COLL VENOUS BLD VENIPUNCTURE: CPT

## 2018-07-23 PROCEDURE — 82728 ASSAY OF FERRITIN: CPT | Performed by: FAMILY MEDICINE

## 2018-07-23 PROCEDURE — 83540 ASSAY OF IRON: CPT

## 2018-07-23 PROCEDURE — 80053 COMPREHEN METABOLIC PANEL: CPT

## 2018-07-23 PROCEDURE — 85025 COMPLETE CBC W/AUTO DIFF WBC: CPT

## 2018-07-23 PROCEDURE — 83550 IRON BINDING TEST: CPT

## 2018-07-23 PROCEDURE — 83036 HEMOGLOBIN GLYCOSYLATED A1C: CPT

## 2018-07-25 ENCOUNTER — OFFICE VISIT (OUTPATIENT)
Dept: FAMILY MEDICINE CLINIC | Facility: CLINIC | Age: 78
End: 2018-07-25
Payer: COMMERCIAL

## 2018-07-25 VITALS
DIASTOLIC BLOOD PRESSURE: 64 MMHG | BODY MASS INDEX: 23.62 KG/M2 | WEIGHT: 120.3 LBS | HEART RATE: 71 BPM | SYSTOLIC BLOOD PRESSURE: 130 MMHG | TEMPERATURE: 97.5 F | RESPIRATION RATE: 15 BRPM | HEIGHT: 60 IN | OXYGEN SATURATION: 98 %

## 2018-07-25 DIAGNOSIS — E78.2 MIXED HYPERLIPIDEMIA: ICD-10-CM

## 2018-07-25 DIAGNOSIS — E11.9 TYPE 2 DIABETES MELLITUS WITHOUT COMPLICATION, WITHOUT LONG-TERM CURRENT USE OF INSULIN (HCC): Primary | ICD-10-CM

## 2018-07-25 DIAGNOSIS — I10 ESSENTIAL HYPERTENSION: ICD-10-CM

## 2018-07-25 DIAGNOSIS — R53.83 FATIGUE, UNSPECIFIED TYPE: ICD-10-CM

## 2018-07-25 LAB
FERRITIN SERPL-MCNC: 81 NG/ML (ref 8–388)
IRON SATN MFR SERPL: 58 %
IRON SERPL-MCNC: 204 UG/DL (ref 50–170)
IRON SERPL-MCNC: 204 UG/DL (ref 50–170)
TIBC SERPL-MCNC: 350 UG/DL (ref 250–450)

## 2018-07-25 PROCEDURE — 3078F DIAST BP <80 MM HG: CPT | Performed by: FAMILY MEDICINE

## 2018-07-25 PROCEDURE — 3075F SYST BP GE 130 - 139MM HG: CPT | Performed by: FAMILY MEDICINE

## 2018-07-25 PROCEDURE — 99214 OFFICE O/P EST MOD 30 MIN: CPT | Performed by: FAMILY MEDICINE

## 2018-07-25 RX ORDER — BLOOD-GLUCOSE METER
1 KIT MISCELLANEOUS DAILY
Qty: 1 EACH | Refills: 0 | Status: SHIPPED | OUTPATIENT
Start: 2018-07-25 | End: 2020-04-27 | Stop reason: SDUPTHER

## 2018-07-25 RX ORDER — LOSARTAN POTASSIUM 100 MG/1
100 TABLET ORAL DAILY
Qty: 90 TABLET | Refills: 1 | Status: SHIPPED | OUTPATIENT
Start: 2018-07-25 | End: 2018-08-30 | Stop reason: SDUPTHER

## 2018-07-25 RX ORDER — SIMVASTATIN 10 MG
10 TABLET ORAL
Start: 2018-07-25 | End: 2019-03-27 | Stop reason: SDUPTHER

## 2018-07-25 NOTE — ASSESSMENT & PLAN NOTE
Lab Results   Component Value Date    HGBA1C 6 3 07/23/2018     Is slightly higher, but is still good control

## 2018-07-25 NOTE — PROGRESS NOTES
Assessment/Plan:  Patient is seen for diabetes and blood rpessure control  Type 2 diabetes mellitus without complication, without long-term current use of insulin ([facility])  Lab Results   Component Value Date    HGBA1C 6 3 07/23/2018     Is slightly higher, but is still good control  Diagnoses and all orders for this visit:    Type 2 diabetes mellitus without complication, without long-term current use of insulin (HCC)  -     glucose monitoring kit (FREESTYLE) monitoring kit; 1 each by Does not apply route daily E11 9 Patient is type 2 diabetic  Test once daily    Essential hypertension  -     losartan (COZAAR) 100 MG tablet; Take 1 tablet (100 mg total) by mouth daily    Mixed hyperlipidemia  -     simvastatin (ZOCOR) 10 mg tablet; Take 1 tablet (10 mg total) by mouth daily at bedtime    Fatigue, unspecified type  -     Iron; Future  -     Iron Saturation %; Future  -     Ferritin          Subjective:      Patient ID: Arian Riggs is a 68 y o  female  Patient is here for diabetes check up  She had a fall - she fell when getting up from sleep  Probably moved too fast         The following portions of the patient's history were reviewed and updated as appropriate: allergies, current medications, past family history, past medical history, past social history, past surgical history and problem list     Review of Systems   Constitutional: Positive for fatigue  Negative for chills and fever  HENT: Negative for congestion and sore throat  Respiratory: Negative for chest tightness  Cardiovascular: Negative for chest pain and palpitations  Gastrointestinal: Negative for abdominal pain, constipation, diarrhea and nausea  Genitourinary: Negative for difficulty urinating  Skin: Negative  Neurological: Negative for dizziness and headaches  Psychiatric/Behavioral: Negative            Objective:      /64 (BP Location: Left arm, Patient Position: Sitting, Cuff Size: Adult)   Pulse 71   Temp 97 5 °F (36 4 °C) (Tympanic)   Resp 15   Ht 5' (1 524 m)   Wt 54 6 kg (120 lb 4 8 oz)   SpO2 98%   Breastfeeding? No   BMI 23 49 kg/m²          Physical Exam   Constitutional: She is oriented to person, place, and time  She appears well-developed  No distress  Neck: Carotid bruit is not present  No thyromegaly present  Cardiovascular: Normal rate, regular rhythm and normal heart sounds  Pulses are no weak pulses  Pulmonary/Chest: Effort normal and breath sounds normal    Musculoskeletal: She exhibits no edema  Feet:   Right Foot:   Skin Integrity: Positive for callus  Left Foot:   Skin Integrity: Positive for callus  Lymphadenopathy:     She has no cervical adenopathy  Neurological: She is alert and oriented to person, place, and time  Skin: Skin is warm and dry  Psychiatric: She has a normal mood and affect  Nursing note and vitals reviewed  Patient's shoes and socks removed  Right Foot/Ankle   Right Foot Inspection  Skin Exam: callus and callus                            Sensory       Monofilament testing: intact      Left Foot/Ankle  Left Foot Inspection  Skin Exam: callus                                         Sensory       Monofilament: intact    Assign Risk Category:  Deformity present; No loss of protective sensation;  No weak pulses       Risk: 1   foot

## 2018-08-28 DIAGNOSIS — I48.92 ATRIAL FLUTTER, UNSPECIFIED TYPE (HCC): Primary | ICD-10-CM

## 2018-08-28 RX ORDER — METOPROLOL TARTRATE 50 MG/1
50 TABLET, FILM COATED ORAL 2 TIMES DAILY
Qty: 180 TABLET | Refills: 1 | Status: SHIPPED | OUTPATIENT
Start: 2018-08-28 | End: 2018-08-30 | Stop reason: SDUPTHER

## 2018-08-28 RX ORDER — SOTALOL HYDROCHLORIDE 80 MG/1
80 TABLET ORAL 2 TIMES DAILY
Qty: 180 TABLET | Refills: 1 | Status: SHIPPED | OUTPATIENT
Start: 2018-08-28 | End: 2019-03-27 | Stop reason: SDUPTHER

## 2018-08-29 DIAGNOSIS — I48.92 ATRIAL FLUTTER, UNSPECIFIED TYPE (HCC): ICD-10-CM

## 2018-08-29 DIAGNOSIS — I10 ESSENTIAL HYPERTENSION: ICD-10-CM

## 2018-08-29 RX ORDER — LOSARTAN POTASSIUM 100 MG/1
100 TABLET ORAL DAILY
Qty: 90 TABLET | Refills: 1 | Status: CANCELLED | OUTPATIENT
Start: 2018-08-29

## 2018-08-29 RX ORDER — METOPROLOL TARTRATE 50 MG/1
50 TABLET, FILM COATED ORAL 2 TIMES DAILY
Qty: 180 TABLET | Refills: 1 | Status: CANCELLED | OUTPATIENT
Start: 2018-08-29

## 2018-08-29 NOTE — TELEPHONE ENCOUNTER
I sent Losartan at the end of July and metoprolol yesterday to [facility]  Have they not received them?

## 2018-08-30 DIAGNOSIS — I48.92 ATRIAL FLUTTER, UNSPECIFIED TYPE (HCC): ICD-10-CM

## 2018-08-30 DIAGNOSIS — I10 ESSENTIAL HYPERTENSION: ICD-10-CM

## 2018-08-30 RX ORDER — METOPROLOL TARTRATE 50 MG/1
50 TABLET, FILM COATED ORAL 2 TIMES DAILY
Qty: 180 TABLET | Refills: 0 | Status: SHIPPED | OUTPATIENT
Start: 2018-08-30 | End: 2019-03-27 | Stop reason: SDUPTHER

## 2018-08-30 RX ORDER — LOSARTAN POTASSIUM 50 MG/1
50 TABLET ORAL DAILY
Qty: 90 TABLET | Refills: 1 | Status: SHIPPED | OUTPATIENT
Start: 2018-08-30 | End: 2019-03-27 | Stop reason: SDUPTHER

## 2018-08-30 RX ORDER — LOSARTAN POTASSIUM 50 MG/1
TABLET ORAL
Qty: 90 TABLET | Refills: 1 | OUTPATIENT
Start: 2018-08-30

## 2018-08-30 NOTE — TELEPHONE ENCOUNTER
I did call patient - she was not at home - she said that she would caitlyn back to let me know what dose of Losartan she is taking - this request has two different doses

## 2018-10-12 ENCOUNTER — ANNUAL EXAM (OUTPATIENT)
Dept: OBGYN CLINIC | Facility: CLINIC | Age: 78
End: 2018-10-12
Payer: COMMERCIAL

## 2018-10-12 VITALS
WEIGHT: 119 LBS | HEIGHT: 59 IN | SYSTOLIC BLOOD PRESSURE: 150 MMHG | DIASTOLIC BLOOD PRESSURE: 84 MMHG | BODY MASS INDEX: 23.99 KG/M2

## 2018-10-12 DIAGNOSIS — Z12.31 ENCOUNTER FOR SCREENING MAMMOGRAM FOR BREAST CANCER: ICD-10-CM

## 2018-10-12 DIAGNOSIS — Z01.419 ENCOUNTER FOR ANNUAL ROUTINE GYNECOLOGICAL EXAMINATION: Primary | ICD-10-CM

## 2018-10-12 DIAGNOSIS — M85.80 OSTEOPENIA, UNSPECIFIED LOCATION: ICD-10-CM

## 2018-10-12 PROCEDURE — S0612 ANNUAL GYNECOLOGICAL EXAMINA: HCPCS | Performed by: OBSTETRICS & GYNECOLOGY

## 2018-10-12 RX ORDER — LANSOPRAZOLE 30 MG/1
30 CAPSULE, DELAYED RELEASE ORAL DAILY
COMMUNITY
End: 2020-09-15 | Stop reason: SDUPTHER

## 2018-10-12 NOTE — PROGRESS NOTES
Assessment/Plan:    Mild osteopenia - DEXA ordered, she is quite active, exercises regularly    Mammogram reviewed with her and RX given so she can schedule  Discussed self breast exams    Colonoscopy is up to date    No problem-specific Assessment & Plan notes found for this encounter  Diagnoses and all orders for this visit:    Encounter for annual routine gynecological examination    Encounter for screening mammogram for breast cancer  -     Mammo screening bilateral w cad; Future    Osteopenia, unspecified location  -     DXA bone density spine hip and pelvis; Future    Other orders  -     lansoprazole (PREVACID) 30 mg capsule; Take 30 mg by mouth daily          Subjective:      Patient ID: Talat Lo is a 68 y o  female  Pt here for yearly  She is due for a mammogram, does not have dense breast tissue  She is also due for a DEXA  She finished her Fosamax and had improvement in her osteopenia and fracture risk  She has no gyn complaints  She prefers to come in every 2 years  The following portions of the patient's history were reviewed and updated as appropriate: allergies, current medications, past family history, past medical history, past social history, past surgical history and problem list     Review of Systems   Constitutional: Negative  HENT: Negative  Eyes: Negative  Respiratory: Negative  Cardiovascular: Negative  Gastrointestinal: Negative  Endocrine: Negative  Genitourinary: Negative  Musculoskeletal: Negative  Skin: Negative  Allergic/Immunologic: Negative  Neurological: Negative  Hematological: Negative  Psychiatric/Behavioral: Negative  Objective:      /84 (BP Location: Left arm, Patient Position: Sitting, Cuff Size: Standard)   Ht 4' 11" (1 499 m)   Wt 54 kg (119 lb)   BMI 24 04 kg/m²          Physical Exam   Constitutional: She appears well-developed  Neck: No tracheal deviation present  No thyromegaly present  Cardiovascular: Normal rate and regular rhythm  Pulmonary/Chest: Effort normal and breath sounds normal  Right breast exhibits no inverted nipple, no mass, no nipple discharge, no skin change and no tenderness  Left breast exhibits no inverted nipple, no mass, no nipple discharge, no skin change and no tenderness  Breasts are symmetrical    Examined seated and supine   Abdominal: Soft  She exhibits no distension and no mass  There is no tenderness  Genitourinary: Rectum normal, vagina normal and uterus normal  No labial fusion  There is no rash, tenderness, lesion or injury on the right labia  There is no rash, tenderness, lesion or injury on the left labia  Cervix exhibits no motion tenderness, no discharge and no friability  Right adnexum displays no mass, no tenderness and no fullness  Left adnexum displays no mass, no tenderness and no fullness  Vitals reviewed

## 2018-10-26 ENCOUNTER — TELEPHONE (OUTPATIENT)
Dept: OBGYN CLINIC | Facility: CLINIC | Age: 78
End: 2018-10-26

## 2018-11-07 LAB — SEVERITY (EYE EXAM): NORMAL

## 2018-11-09 ENCOUNTER — TELEPHONE (OUTPATIENT)
Dept: FAMILY MEDICINE CLINIC | Facility: CLINIC | Age: 78
End: 2018-11-09

## 2018-11-09 NOTE — TELEPHONE ENCOUNTER
Called pt to follow up from ER visit  Pt stated she doesn't think she needs to come in right now, she does have an appointment scheduled for 11/27  Pt stated wants to wait for the appointment on 11/27 and will call if needs to come in sooner  Pt did state she thinks she is supposed to have blood work done before she comes into office on 11/27  Pt wants to go to [facility] for the blood work  Advised pt I didn't see any orders placed and would send a message to Dr Yeison Horta  Advised I would call her back and let her know when orders are placed or let her know if she doesn't need it for this appointment once I hear back from Dr Yeison Horta  Please advise

## 2018-11-09 NOTE — TELEPHONE ENCOUNTER
----- Message from Shiva Orozco sent at 11/5/2018  8:35 AM EST -----  Regarding: ER Follow Up  [facility] ER 11/4/18 for Chest pain, unspecified type (Primary Dx); Hypertensive emergency; Nausea;    Diaphoresis

## 2018-11-27 ENCOUNTER — OFFICE VISIT (OUTPATIENT)
Dept: FAMILY MEDICINE CLINIC | Facility: CLINIC | Age: 78
End: 2018-11-27
Payer: COMMERCIAL

## 2018-11-27 VITALS
TEMPERATURE: 98.6 F | BODY MASS INDEX: 24.07 KG/M2 | OXYGEN SATURATION: 98 % | RESPIRATION RATE: 14 BRPM | WEIGHT: 119.4 LBS | SYSTOLIC BLOOD PRESSURE: 134 MMHG | DIASTOLIC BLOOD PRESSURE: 74 MMHG | HEIGHT: 59 IN | HEART RATE: 83 BPM

## 2018-11-27 DIAGNOSIS — Z23 NEED FOR VACCINATION AGAINST STREPTOCOCCUS PNEUMONIAE: ICD-10-CM

## 2018-11-27 DIAGNOSIS — I10 ESSENTIAL HYPERTENSION: ICD-10-CM

## 2018-11-27 DIAGNOSIS — Z79.01 LONG TERM CURRENT USE OF ANTICOAGULANT THERAPY: ICD-10-CM

## 2018-11-27 DIAGNOSIS — E11.9 TYPE 2 DIABETES MELLITUS WITHOUT COMPLICATION, WITHOUT LONG-TERM CURRENT USE OF INSULIN (HCC): ICD-10-CM

## 2018-11-27 DIAGNOSIS — Z23 NEED FOR PROPHYLACTIC VACCINATION AND INOCULATION AGAINST INFLUENZA: Primary | ICD-10-CM

## 2018-11-27 DIAGNOSIS — E78.2 MIXED HYPERLIPIDEMIA: ICD-10-CM

## 2018-11-27 DIAGNOSIS — R74.01 TRANSAMINITIS: ICD-10-CM

## 2018-11-27 PROBLEM — Z79.899 LONG TERM CURRENT USE OF ANTIARRHYTHMIC DRUG: Status: ACTIVE | Noted: 2018-06-27

## 2018-11-27 LAB
ALBUMIN SERPL BCP-MCNC: 3.9 G/DL (ref 3.5–5)
ALP SERPL-CCNC: 61 U/L (ref 46–116)
ALT SERPL W P-5'-P-CCNC: 73 U/L (ref 12–78)
ANION GAP SERPL CALCULATED.3IONS-SCNC: 7 MMOL/L (ref 4–13)
AST SERPL W P-5'-P-CCNC: 44 U/L (ref 5–45)
BASOPHILS # BLD AUTO: 0.03 THOUSANDS/ΜL (ref 0–0.1)
BASOPHILS NFR BLD AUTO: 1 % (ref 0–1)
BILIRUB SERPL-MCNC: 1.16 MG/DL (ref 0.2–1)
BUN SERPL-MCNC: 15 MG/DL (ref 5–25)
CALCIUM ALBUM COR SERPL-MCNC: 10.3 MG/DL (ref 8.3–10.1)
CALCIUM SERPL-MCNC: 10.2 MG/DL (ref 8.3–10.1)
CHLORIDE SERPL-SCNC: 105 MMOL/L (ref 100–108)
CO2 SERPL-SCNC: 25 MMOL/L (ref 21–32)
CREAT SERPL-MCNC: 0.78 MG/DL (ref 0.6–1.3)
CREAT UR-MCNC: 59.8 MG/DL
EOSINOPHIL # BLD AUTO: 0.08 THOUSAND/ΜL (ref 0–0.61)
EOSINOPHIL NFR BLD AUTO: 2 % (ref 0–6)
ERYTHROCYTE [DISTWIDTH] IN BLOOD BY AUTOMATED COUNT: 12.8 % (ref 11.6–15.1)
GFR SERPL CREATININE-BSD FRML MDRD: 74 ML/MIN/1.73SQ M
GLUCOSE P FAST SERPL-MCNC: 162 MG/DL (ref 65–99)
HCT VFR BLD AUTO: 40.4 % (ref 34.8–46.1)
HGB BLD-MCNC: 13.6 G/DL (ref 11.5–15.4)
IMM GRANULOCYTES # BLD AUTO: 0.01 THOUSAND/UL (ref 0–0.2)
IMM GRANULOCYTES NFR BLD AUTO: 0 % (ref 0–2)
LYMPHOCYTES # BLD AUTO: 1.37 THOUSANDS/ΜL (ref 0.6–4.47)
LYMPHOCYTES NFR BLD AUTO: 34 % (ref 14–44)
MCH RBC QN AUTO: 34.5 PG (ref 26.8–34.3)
MCHC RBC AUTO-ENTMCNC: 33.7 G/DL (ref 31.4–37.4)
MCV RBC AUTO: 103 FL (ref 82–98)
MICROALBUMIN UR-MCNC: 6.6 MG/L (ref 0–20)
MICROALBUMIN/CREAT 24H UR: 11 MG/G CREATININE (ref 0–30)
MONOCYTES # BLD AUTO: 0.32 THOUSAND/ΜL (ref 0.17–1.22)
MONOCYTES NFR BLD AUTO: 8 % (ref 4–12)
NEUTROPHILS # BLD AUTO: 2.26 THOUSANDS/ΜL (ref 1.85–7.62)
NEUTS SEG NFR BLD AUTO: 55 % (ref 43–75)
NRBC BLD AUTO-RTO: 0 /100 WBCS
PLATELET # BLD AUTO: 186 THOUSANDS/UL (ref 149–390)
PMV BLD AUTO: 11.4 FL (ref 8.9–12.7)
POTASSIUM SERPL-SCNC: 4.1 MMOL/L (ref 3.5–5.3)
PROT SERPL-MCNC: 7.9 G/DL (ref 6.4–8.2)
RBC # BLD AUTO: 3.94 MILLION/UL (ref 3.81–5.12)
SODIUM SERPL-SCNC: 137 MMOL/L (ref 136–145)
WBC # BLD AUTO: 4.07 THOUSAND/UL (ref 4.31–10.16)

## 2018-11-27 PROCEDURE — G0008 ADMIN INFLUENZA VIRUS VAC: HCPCS | Performed by: FAMILY MEDICINE

## 2018-11-27 PROCEDURE — 82570 ASSAY OF URINE CREATININE: CPT | Performed by: FAMILY MEDICINE

## 2018-11-27 PROCEDURE — 80053 COMPREHEN METABOLIC PANEL: CPT | Performed by: FAMILY MEDICINE

## 2018-11-27 PROCEDURE — 4040F PNEUMOC VAC/ADMIN/RCVD: CPT | Performed by: FAMILY MEDICINE

## 2018-11-27 PROCEDURE — 83036 HEMOGLOBIN GLYCOSYLATED A1C: CPT | Performed by: FAMILY MEDICINE

## 2018-11-27 PROCEDURE — 90662 IIV NO PRSV INCREASED AG IM: CPT | Performed by: FAMILY MEDICINE

## 2018-11-27 PROCEDURE — 99214 OFFICE O/P EST MOD 30 MIN: CPT | Performed by: FAMILY MEDICINE

## 2018-11-27 PROCEDURE — 82043 UR ALBUMIN QUANTITATIVE: CPT | Performed by: FAMILY MEDICINE

## 2018-11-27 PROCEDURE — 36415 COLL VENOUS BLD VENIPUNCTURE: CPT | Performed by: FAMILY MEDICINE

## 2018-11-27 PROCEDURE — 85025 COMPLETE CBC W/AUTO DIFF WBC: CPT | Performed by: FAMILY MEDICINE

## 2018-11-27 RX ORDER — METFORMIN HYDROCHLORIDE 500 MG/1
500 TABLET, FILM COATED, EXTENDED RELEASE ORAL
Qty: 90 TABLET | Refills: 1 | Status: SHIPPED | OUTPATIENT
Start: 2018-11-27 | End: 2018-12-07 | Stop reason: SDUPTHER

## 2018-11-27 NOTE — PROGRESS NOTES
Assessment/Plan:  Patient is a 77-year-old female seen for chronic medical conditions  She was seen in the emergency room in early the member with dizziness and elevated blood pressure  Type 2 diabetes mellitus without complication, without long-term current use of insulin (HCC)  Lab Results   Component Value Date    HGBA1C 6 3 07/23/2018       HGBA1C in ER this month was 6 6  Blood sugar control is still fine and will continue present medications  Need to schedule pneumovax  Patient has had nausea and vomiting  The liver enzymes were mildly elevated when she was in the emergency room  We will check an ultrasound of the abdomen to rule out gallbladder disease  She did see Cardiology at [facility] after her trip to the emergency room  There was no change in her medications  Her blood pressure is controlled today  I will see her in four months for a regular check up  Diagnoses and all orders for this visit:    Need for prophylactic vaccination and inoculation against influenza  -     influenza vaccine, 6986-1308, high-dose, PF 0 5 mL, for patients 65 yr+ (FLUZONE HIGH-DOSE)    Need for vaccination against Streptococcus pneumoniae  -     Cancel: PNEUMOCOCCAL POLYSACCHARIDE VACCINE 23-VALENT =>3YO SQ IM    Type 2 diabetes mellitus without complication, without long-term current use of insulin (HCC)  -     metFORMIN (GLUMETZA) 500 MG (MOD) 24 hr tablet; Take 1 tablet (500 mg total) by mouth daily with breakfast  -     CBC and differential  -     Comprehensive metabolic panel  -     Hemoglobin A1C  -     Microalbumin / creatinine urine ratio    Essential hypertension  -     CBC and differential    Mixed hyperlipidemia  -     CBC and differential    Long term current use of anticoagulant therapy  -     CBC and differential    Transaminitis  -     US abdomen complete;  Future  -     CBC and differential    Other orders  -     Cancel: influenza vaccine, [id number], high-dose, PF 0 5 mL, for patients 65 yr+ (FLUZONE HIGH-DOSE)          Subjective:   Chief Complaint   Patient presents with    Follow-up     4 month           Patient ID: Megan Pérez is a 68 y o  female  Patient is here for follow up to diabetes  At the beginning of November - patient went to ER for pressure, dizzy, and elevated BP  Had episode in middle of October where she was dizzy, nauseous and then vomited  Then same thing happened again  The following portions of the patient's history were reviewed and updated as appropriate: allergies, current medications, past family history, past medical history, past social history, past surgical history and problem list     Review of Systems   Constitutional: Negative for chills and fever  HENT: Negative for congestion and sore throat  Respiratory: Negative for chest tightness  Cardiovascular: Negative for chest pain and palpitations  Gastrointestinal: Positive for nausea and vomiting  Negative for abdominal pain, constipation and diarrhea  Genitourinary: Negative for difficulty urinating  Skin: Negative  Neurological: Positive for dizziness  Negative for headaches  Psychiatric/Behavioral: Negative  Objective:      /74 (BP Location: Right arm, Patient Position: Sitting, Cuff Size: Adult)   Pulse 83   Temp 98 6 °F (37 °C) (Tympanic)   Resp 14   Ht 4' 11" (1 499 m)   Wt 54 2 kg (119 lb 6 4 oz)   SpO2 98%   BMI 24 12 kg/m²          Physical Exam   Constitutional: She is oriented to person, place, and time  She appears well-developed  No distress  Neck: Carotid bruit is not present  No thyromegaly present  Cardiovascular: Normal rate, regular rhythm and normal heart sounds  Pulmonary/Chest: Effort normal and breath sounds normal    Abdominal: Soft  There is no tenderness  Musculoskeletal: She exhibits no edema  Lymphadenopathy:     She has no cervical adenopathy  Neurological: She is alert and oriented to person, place, and time     Skin: Skin is warm and dry  Psychiatric: She has a normal mood and affect  Nursing note and vitals reviewed

## 2018-11-28 LAB
EST. AVERAGE GLUCOSE BLD GHB EST-MCNC: 148 MG/DL
HBA1C MFR BLD: 6.8 % (ref 4.2–6.3)

## 2018-12-03 DIAGNOSIS — E11.9 TYPE 2 DIABETES MELLITUS WITHOUT COMPLICATION, WITHOUT LONG-TERM CURRENT USE OF INSULIN (HCC): Primary | ICD-10-CM

## 2018-12-04 RX ORDER — METFORMIN HYDROCHLORIDE 500 MG/1
TABLET, EXTENDED RELEASE ORAL
Qty: 90 TABLET | Refills: 3 | Status: SHIPPED | OUTPATIENT
Start: 2018-12-04 | End: 2019-03-27 | Stop reason: SDUPTHER

## 2018-12-05 ENCOUNTER — HOSPITAL ENCOUNTER (OUTPATIENT)
Dept: ULTRASOUND IMAGING | Facility: MEDICAL CENTER | Age: 78
Discharge: HOME | End: 2018-12-05
Payer: COMMERCIAL

## 2018-12-05 DIAGNOSIS — R74.01 TRANSAMINITIS: ICD-10-CM

## 2018-12-05 PROCEDURE — 76700 US EXAM ABDOM COMPLETE: CPT

## 2018-12-10 ENCOUNTER — HOSPITAL ENCOUNTER (OUTPATIENT)
Dept: BONE DENSITY | Facility: MEDICAL CENTER | Age: 78
Discharge: HOME | End: 2018-12-10
Payer: COMMERCIAL

## 2018-12-10 ENCOUNTER — HOSPITAL ENCOUNTER (OUTPATIENT)
Dept: MAMMOGRAPHY | Facility: MEDICAL CENTER | Age: 78
Discharge: HOME | End: 2018-12-10
Payer: COMMERCIAL

## 2018-12-10 VITALS — HEIGHT: 59 IN | WEIGHT: 119 LBS | BODY MASS INDEX: 23.99 KG/M2

## 2018-12-10 DIAGNOSIS — M85.80 OSTEOPENIA, UNSPECIFIED LOCATION: ICD-10-CM

## 2018-12-10 DIAGNOSIS — Z12.31 ENCOUNTER FOR SCREENING MAMMOGRAM FOR BREAST CANCER: ICD-10-CM

## 2018-12-10 PROCEDURE — 77067 SCR MAMMO BI INCL CAD: CPT

## 2018-12-10 PROCEDURE — 77080 DXA BONE DENSITY AXIAL: CPT

## 2018-12-13 ENCOUNTER — HOSPITAL ENCOUNTER (OUTPATIENT)
Dept: MAMMOGRAPHY | Facility: MEDICAL CENTER | Age: 78
Discharge: HOME | End: 2018-12-13

## 2018-12-13 VITALS — BODY MASS INDEX: 23.99 KG/M2 | HEIGHT: 59 IN | WEIGHT: 119 LBS

## 2018-12-13 DIAGNOSIS — Z12.39 SCREENING BREAST EXAMINATION: ICD-10-CM

## 2018-12-21 ENCOUNTER — TELEPHONE (OUTPATIENT)
Dept: OBGYN CLINIC | Facility: CLINIC | Age: 78
End: 2018-12-21

## 2018-12-21 NOTE — TELEPHONE ENCOUNTER
----- Message from Cherelle Tamez DO sent at 12/21/2018  7:01 AM EST -----  Mild osteopenia, continue Ca, vit D and exercise

## 2018-12-26 ENCOUNTER — APPOINTMENT (OUTPATIENT)
Dept: LAB | Facility: MEDICAL CENTER | Age: 78
End: 2018-12-26
Payer: COMMERCIAL

## 2018-12-26 ENCOUNTER — TRANSCRIBE ORDERS (OUTPATIENT)
Dept: ADMINISTRATIVE | Facility: HOSPITAL | Age: 78
End: 2018-12-26

## 2018-12-26 DIAGNOSIS — I10 ESSENTIAL HYPERTENSION, MALIGNANT: ICD-10-CM

## 2018-12-26 DIAGNOSIS — I48.92 ATRIAL FLUTTER, UNSPECIFIED TYPE (HCC): ICD-10-CM

## 2018-12-26 DIAGNOSIS — I49.5 SINOATRIAL NODE DYSFUNCTION (HCC): Primary | ICD-10-CM

## 2018-12-26 DIAGNOSIS — E78.5 HYPERLIPIDEMIA, UNSPECIFIED HYPERLIPIDEMIA TYPE: ICD-10-CM

## 2018-12-26 DIAGNOSIS — Z95.0 CARDIAC PACEMAKER IN SITU: ICD-10-CM

## 2018-12-26 DIAGNOSIS — Q93.88 CHROMOSOME 12Q15-Q21.1 MICRODELETION SYNDROME: ICD-10-CM

## 2018-12-26 DIAGNOSIS — Z79.01 LONG TERM (CURRENT) USE OF ANTICOAGULANTS: ICD-10-CM

## 2018-12-26 LAB
ALBUMIN SERPL BCP-MCNC: 3.5 G/DL (ref 3.5–5)
ALP SERPL-CCNC: 60 U/L (ref 46–116)
ALT SERPL W P-5'-P-CCNC: 49 U/L (ref 12–78)
ANION GAP SERPL CALCULATED.3IONS-SCNC: 5 MMOL/L (ref 4–13)
AST SERPL W P-5'-P-CCNC: 34 U/L (ref 5–45)
BILIRUB SERPL-MCNC: 0.95 MG/DL (ref 0.2–1)
BUN SERPL-MCNC: 14 MG/DL (ref 5–25)
CALCIUM SERPL-MCNC: 9.5 MG/DL (ref 8.3–10.1)
CHLORIDE SERPL-SCNC: 104 MMOL/L (ref 100–108)
CHOLEST SERPL-MCNC: 184 MG/DL (ref 50–200)
CO2 SERPL-SCNC: 28 MMOL/L (ref 21–32)
CREAT SERPL-MCNC: 0.74 MG/DL (ref 0.6–1.3)
ERYTHROCYTE [DISTWIDTH] IN BLOOD BY AUTOMATED COUNT: 13.7 % (ref 11.6–15.1)
GFR SERPL CREATININE-BSD FRML MDRD: 78 ML/MIN/1.73SQ M
GLUCOSE SERPL-MCNC: 116 MG/DL (ref 65–140)
HCT VFR BLD AUTO: 38.9 % (ref 34.8–46.1)
HDLC SERPL-MCNC: 127 MG/DL (ref 40–60)
HGB BLD-MCNC: 12.7 G/DL (ref 11.5–15.4)
LDLC SERPL CALC-MCNC: 31 MG/DL (ref 0–100)
MCH RBC QN AUTO: 34.2 PG (ref 26.8–34.3)
MCHC RBC AUTO-ENTMCNC: 32.6 G/DL (ref 31.4–37.4)
MCV RBC AUTO: 105 FL (ref 82–98)
NONHDLC SERPL-MCNC: 57 MG/DL
PLATELET # BLD AUTO: 168 THOUSANDS/UL (ref 149–390)
PMV BLD AUTO: 10.7 FL (ref 8.9–12.7)
POTASSIUM SERPL-SCNC: 4.3 MMOL/L (ref 3.5–5.3)
PROT SERPL-MCNC: 7.6 G/DL (ref 6.4–8.2)
RBC # BLD AUTO: 3.71 MILLION/UL (ref 3.81–5.12)
SODIUM SERPL-SCNC: 137 MMOL/L (ref 136–145)
TRIGL SERPL-MCNC: 132 MG/DL
WBC # BLD AUTO: 3.23 THOUSAND/UL (ref 4.31–10.16)

## 2018-12-26 PROCEDURE — 36415 COLL VENOUS BLD VENIPUNCTURE: CPT

## 2018-12-26 PROCEDURE — 85027 COMPLETE CBC AUTOMATED: CPT

## 2018-12-26 PROCEDURE — 80053 COMPREHEN METABOLIC PANEL: CPT

## 2018-12-26 PROCEDURE — 80061 LIPID PANEL: CPT

## 2018-12-27 ENCOUNTER — TELEPHONE (OUTPATIENT)
Dept: OBGYN CLINIC | Facility: CLINIC | Age: 78
End: 2018-12-27

## 2018-12-27 NOTE — TELEPHONE ENCOUNTER
----- Message from Claudeen Cables, DO sent at 12/21/2018  7:01 AM EST -----  Mild osteopenia, continue Ca, vit D and exercise

## 2019-01-03 DIAGNOSIS — I48.92 ATRIAL FLUTTER, UNSPECIFIED TYPE (HCC): Primary | ICD-10-CM

## 2019-03-27 ENCOUNTER — OFFICE VISIT (OUTPATIENT)
Dept: FAMILY MEDICINE CLINIC | Facility: CLINIC | Age: 79
End: 2019-03-27
Payer: COMMERCIAL

## 2019-03-27 VITALS
TEMPERATURE: 96.2 F | WEIGHT: 123.9 LBS | SYSTOLIC BLOOD PRESSURE: 140 MMHG | HEIGHT: 59 IN | OXYGEN SATURATION: 99 % | BODY MASS INDEX: 24.98 KG/M2 | DIASTOLIC BLOOD PRESSURE: 90 MMHG | HEART RATE: 89 BPM

## 2019-03-27 DIAGNOSIS — E78.2 MIXED HYPERLIPIDEMIA: ICD-10-CM

## 2019-03-27 DIAGNOSIS — I48.92 ATRIAL FLUTTER, UNSPECIFIED TYPE (HCC): ICD-10-CM

## 2019-03-27 DIAGNOSIS — Z00.00 MEDICARE ANNUAL WELLNESS VISIT, SUBSEQUENT: Primary | ICD-10-CM

## 2019-03-27 DIAGNOSIS — Z95.0 PACEMAKER: ICD-10-CM

## 2019-03-27 DIAGNOSIS — E11.36 CATARACT, DIABETIC (HCC): ICD-10-CM

## 2019-03-27 DIAGNOSIS — I10 ESSENTIAL HYPERTENSION: ICD-10-CM

## 2019-03-27 DIAGNOSIS — H35.3130 BILATERAL NONEXUDATIVE AGE-RELATED MACULAR DEGENERATION, UNSPECIFIED STAGE: ICD-10-CM

## 2019-03-27 DIAGNOSIS — H26.9 CATARACT OF BOTH EYES, UNSPECIFIED CATARACT TYPE: ICD-10-CM

## 2019-03-27 DIAGNOSIS — I49.5 SSS (SICK SINUS SYNDROME) (HCC): ICD-10-CM

## 2019-03-27 DIAGNOSIS — E11.39 TYPE 2 DIABETES MELLITUS WITH OTHER OPHTHALMIC COMPLICATION, WITHOUT LONG-TERM CURRENT USE OF INSULIN (HCC): ICD-10-CM

## 2019-03-27 LAB — SL AMB POCT HEMOGLOBIN AIC: 6.3 (ref ?–6.5)

## 2019-03-27 PROCEDURE — 1170F FXNL STATUS ASSESSED: CPT | Performed by: FAMILY MEDICINE

## 2019-03-27 PROCEDURE — G0439 PPPS, SUBSEQ VISIT: HCPCS | Performed by: FAMILY MEDICINE

## 2019-03-27 PROCEDURE — 1125F AMNT PAIN NOTED PAIN PRSNT: CPT | Performed by: FAMILY MEDICINE

## 2019-03-27 PROCEDURE — 83036 HEMOGLOBIN GLYCOSYLATED A1C: CPT | Performed by: FAMILY MEDICINE

## 2019-03-27 PROCEDURE — 99214 OFFICE O/P EST MOD 30 MIN: CPT | Performed by: FAMILY MEDICINE

## 2019-03-27 PROCEDURE — 1160F RVW MEDS BY RX/DR IN RCRD: CPT | Performed by: FAMILY MEDICINE

## 2019-03-27 RX ORDER — METFORMIN HYDROCHLORIDE 500 MG/1
500 TABLET, EXTENDED RELEASE ORAL DAILY
Qty: 90 TABLET | Refills: 1 | Status: SHIPPED | OUTPATIENT
Start: 2019-03-27 | End: 2019-09-17 | Stop reason: SDUPTHER

## 2019-03-27 RX ORDER — LOSARTAN POTASSIUM 50 MG/1
50 TABLET ORAL DAILY
Qty: 90 TABLET | Refills: 1 | Status: SHIPPED | OUTPATIENT
Start: 2019-03-27 | End: 2019-07-31

## 2019-03-27 RX ORDER — SIMVASTATIN 10 MG
10 TABLET ORAL
Qty: 90 TABLET | Refills: 1 | Status: SHIPPED | OUTPATIENT
Start: 2019-03-27 | End: 2019-09-17 | Stop reason: SDUPTHER

## 2019-03-27 RX ORDER — METOPROLOL TARTRATE 50 MG/1
50 TABLET, FILM COATED ORAL 2 TIMES DAILY
Qty: 180 TABLET | Refills: 1 | Status: SHIPPED | OUTPATIENT
Start: 2019-03-27 | End: 2019-09-17 | Stop reason: SDUPTHER

## 2019-03-27 RX ORDER — SOTALOL HYDROCHLORIDE 80 MG/1
80 TABLET ORAL 2 TIMES DAILY
Qty: 180 TABLET | Refills: 1 | Status: SHIPPED | OUTPATIENT
Start: 2019-03-27 | End: 2019-09-17 | Stop reason: SDUPTHER

## 2019-03-27 NOTE — PROGRESS NOTES
Assessment and Plan:  Patient is seen for well medicare visit  Problem List Items Addressed This Visit        Endocrine    Type 2 diabetes mellitus without complication, without long-term current use of insulin ([facility] )       Cardiovascular and Mediastinum    Atrial flutter ([facility] )    Essential hypertension       Other    Hyperlipidemia      Other Visit Diagnoses     Medicare annual wellness visit, subsequent    -  Primary        Health Maintenance Due   Topic Date Due    SLP PLAN OF CARE  1940    BMI: Followup Plan  12/03/1958    HEPATITIS B VACCINES (1 of 3 - Risk 3-dose series) 12/03/1959    Fall Risk  12/03/2005    Urinary Incontinence Screening  12/03/2005    Pneumococcal PPSV23/PCV13 65+ Years / Low and Medium Risk (2 of 2 - PPSV23) 07/08/2016    DTaP,Tdap,and Td Vaccines (1 - Tdap) 03/08/2017    Medicare Annual Wellness Visit (AWV)  12/06/2018         HPI:  Patient Active Problem List   Diagnosis    Atrial flutter ([facility] )    Atrial septal defect    Type 2 diabetes mellitus without complication, without long-term current use of insulin (HCC)    SSS (sick sinus syndrome) ([facility] )    Pacemaker    Age-related osteoporosis without current pathological fracture    Bilateral nonexudative age-related macular degeneration    Cataract, bilateral    Essential hypertension    Insomnia    Seborrhea    Hyperlipidemia    Long term current use of antiarrhythmic drug    Long term current use of anticoagulant therapy    Transaminitis     Past Medical History:   Diagnosis Date    Allergic rhinitis     last assessed - [id number]    Arthritis     lower back    Atrial flutter ([facility] )     last assessed - [id number]    Diabetes mellitus ([facility] )     Type II    Fatigue     last assessed - [id number]    Heart murmur     tricupid reguritation, SSS    Hyperlipidemia     Hypertension     Irregular heart beat     Cardioversion, pacemaker, severe tricuspid regurgitation, atrial septal defect      Lower leg edema last assessed - [id number]    Osteoporosis     Shortness of breath     prior to heart surgery    Sick sinus syndrome [facility])     last assessed - [id number]    Sting from hornet, wasp, or bee     last assessed - [id number]    Subconjunctival hemorrhage     unspecified laterality; last assessed - [id number]     Past Surgical History:   Procedure Laterality Date    ASD REPAIR, SECUNDUM      CARDIAC PACEMAKER PLACEMENT      CARDIAC SURGERY      Atrial septal defect repaired, pacemaker    COLONOSCOPY N/A 4/21/2016    Procedure: COLONOSCOPY;  Surgeon: Ariane Morrow MD;  Location: [facility]; Service:     COLONOSCOPY W/ BIOPSIES AND POLYPECTOMY      ESOPHAGOGASTRODUODENOSCOPY      EYE SURGERY      HYSTERECTOMY      at age 40 or 45    OOPHORECTOMY Left     TOTAL ABDOMINAL HYSTERECTOMY      VEIN SURGERY Left     vericose vein left leg    WISDOM TOOTH EXTRACTION       Family History   Problem Relation Age of Onset    No Known Problems Mother     Gout Brother     No Known Problems Father     Alcohol abuse Neg Hx     Substance Abuse Neg Hx      Social History     Tobacco Use   Smoking Status Never Smoker   Smokeless Tobacco Never Used     Social History     Substance and Sexual Activity   Alcohol Use Yes    Comment: occas none recently; social drinker - per [employer]      Social History     Substance and Sexual Activity   Drug Use No         Current Outpatient Medications   Medication Sig Dispense Refill    Calcium Citrate 250 MG TABS Take 1 tablet by mouth 2 (two) times a day   Cholecalciferol (VITAMIN D-3 PO) Take 1,000 Units by mouth daily   glucose blood (FREESTYLE LITE) test strip by In Vitro route 4 (four) times a day      glucose monitoring kit (FREESTYLE) monitoring kit 1 each by Does not apply route daily E11 9 Patient is type 2 diabetic   Test once daily 1 each 0    Lancets (FREESTYLE) lancets by Does not apply route daily      lansoprazole (PREVACID) 30 mg capsule Take 30 mg by mouth daily      losartan (COZAAR) 50 mg tablet Take 1 tablet (50 mg total) by mouth daily 90 tablet 1    Magnesium 250 MG TABS Take 1 tablet by mouth daily   metFORMIN (GLUCOPHAGE-XR) 500 mg 24 hr tablet TAKE 1 TABLET DAILY 90 tablet 3    metoprolol tartrate (LOPRESSOR) 25 mg tablet TAKE 1 TABLET TWICE A  tablet 3    metoprolol tartrate (LOPRESSOR) 50 mg tablet Take 1 tablet (50 mg total) by mouth 2 (two) times a day 180 tablet 0    metoprolol tartrate 50 mg TABS 50 mg, hydrochlorothiazide 25 mg TABS 25 mg Take 0 5 tablets by mouth 2 (two) times a day   Multiple Vitamins-Minerals (CENTRUM SILVER PO) Take 1 tablet by mouth daily   rivaroxaban (XARELTO) 20 mg tablet Take 1 tablet by mouth daily      simvastatin (ZOCOR) 10 mg tablet Take 1 tablet (10 mg total) by mouth daily at bedtime      sotalol (BETAPACE) 80 mg tablet Take 1 tablet (80 mg total) by mouth 2 (two) times a day 180 tablet 1     No current facility-administered medications for this visit  Allergies   Allergen Reactions    Penicillins Anaphylaxis    Shellfish Allergy Anaphylaxis and Hives     Immunization History   Administered Date(s) Administered    DT (pediatric) 11/09/2015    INFLUENZA 11/09/2015, 11/29/2016, 12/06/2017    Influenza Split High Dose Preservative Free IM 09/25/2014, 11/09/2015, 11/29/2016, 12/06/2017    Influenza TIV (IM) 11/27/2012, 10/01/2013    Influenza, high dose seasonal 0 5 mL 11/27/2018    Pneumococcal Conjugate 13-Valent 07/08/2015    Pneumococcal Polysaccharide PPV23 01/01/2004    TD (adult) Preservative Free 03/07/2017    Td (adult), adsorbed 11/09/2015    Tdap 03/07/2017    Zoster 08/02/2012       Patient Care Team:  Caro Snider DO as PCP - General  DO Norman Buitrago MD Dyanna , DO    Medicare Screening Tests and Risk Assessments:  Sarah is here for her Subsequent Wellness visit    Last Medicare Wellness visit information reviewed, patient interviewed and updates made to the record today  Health Risk Assessment:  Patient rates overall health as good  Patient feels that their physical health rating is Same  Eyesight was rated as Same  Hearing was rated as Same  Patient feels that their emotional and mental health rating is Same  Pain experienced by patient in the last 7 days has been None  Emotional/Mental Health:  Patient has been feeling nervous/anxious  PHQ-9 Depression Screening:    Frequency of the following problems over the past two weeks:      1  Little interest or pleasure in doing things: 0 - not at all      2  Feeling down, depressed, or hopeless: 0 - not at all  PHQ-2 Score: 0          Broken Bones/Falls: Fall Risk Assessment:    In the past year, patient has experienced: No history of falling in past year          Bladder/Bowel:  Patient has leaked urine accidently in the last six months  Patient reports no loss of bowel control  Immunizations:  Patient has had a flu vaccination within the last year  Patient has received a pneumonia shot  Patient has received a shingles shot  Patient has received tetanus/diphtheria shot  Home Safety:  Patient does not have trouble with stairs inside or outside of their home  Patient currently reports that there are no safety hazards present in home, working smoke alarms, working carbon monoxide detectors  Preventative Screenings:   Breast cancer screening performed, colon cancer screen completed, cholesterol screen completed, glaucoma eye exam completed,     Nutrition:  Current diet: Regular with servings of the following:    Medications:  Patient is currently taking over-the-counter supplements  Patient is able to manage medications  Lifestyle Choices:  Patient reports no tobacco use  Patient has not smoked or used tobacco in the past   Patient reports alcohol use  Patient drives a vehicle  Patient wears seat belt          Activities of Daily Living:  Can get out of bed by his or her self, able to dress self, able to make own meals, able to do own shopping, able to bathe self, can do own laundry/housekeeping, can manage own money, pay bills and track expenses    Previous Hospitalizations:  Hospitalization or ED visit in past 12 months  Number of hospitalizations within the last year: 1-2        Advanced Directives:  Patient has decided on a power of   Patient has spoken to designated power of   Patient has completed advanced directive  Preventative Screening/Counseling:      Cardiovascular:      General: Risks and Benefits Discussed and Screening Current          Diabetes:      General: Risks and Benefits Discussed and Screening Current          Breast Cancer:      General: Screening Current          Cervical Cancer:      General: Screening Not Indicated          Osteoporosis:      General: Screening Current      Comments: Osteopenia on scan in December 2018  AAA:      General: Screening Current          Glaucoma:      General: Screening Current          HIV:      General: Screening Not Indicated          Hepatitis C:      General: Screening Not Indicated        Advanced Directives:   Patient has living will for healthcare, has durable POA for healthcare, patient has an advanced directive  End of life assessment reviewed with patient  Physical Exam:  Review of Systems   Gastrointestinal: Negative for bowel incontinence  Genitourinary: Positive for urgency  Psychiatric/Behavioral: The patient is not nervous/anxious  Vitals:    03/27/19 0930   BP: 140/90   BP Location: Left arm   Patient Position: Sitting   Pulse: 89   Temp: (!) 96 2 °F (35 7 °C)   TempSrc: Tympanic   SpO2: 99%   Weight: 56 2 kg (123 lb 14 4 oz)   Height: 4' 10 5" (1 486 m)   Body mass index is 25 45 kg/m²      Physical Exam

## 2019-03-28 ENCOUNTER — TELEPHONE (OUTPATIENT)
Dept: FAMILY MEDICINE CLINIC | Facility: CLINIC | Age: 79
End: 2019-03-28

## 2019-03-28 NOTE — TELEPHONE ENCOUNTER
Call patient  We called [facility] and the medications that was dispensed to her on December 3rd and March 27th were not involved in the recall

## 2019-03-28 NOTE — TELEPHONE ENCOUNTER
Salma came into the office today and dropped off her bottle of Losartan 50 mg she stated you wanted to see the bottle for the lot number  Sarah does have the medication  She does not need bottle back we can shred label and recycle bottle  I have pt's bottle

## 2019-05-09 ENCOUNTER — ANESTHESIA EVENT (OUTPATIENT)
Dept: GASTROENTEROLOGY | Facility: HOSPITAL | Age: 79
End: 2019-05-09
Payer: COMMERCIAL

## 2019-05-10 ENCOUNTER — ANESTHESIA (OUTPATIENT)
Dept: GASTROENTEROLOGY | Facility: HOSPITAL | Age: 79
End: 2019-05-10
Payer: COMMERCIAL

## 2019-05-10 ENCOUNTER — HOSPITAL ENCOUNTER (OUTPATIENT)
Facility: HOSPITAL | Age: 79
Setting detail: OUTPATIENT SURGERY
Discharge: HOME | End: 2019-05-10
Attending: INTERNAL MEDICINE | Admitting: INTERNAL MEDICINE
Payer: COMMERCIAL

## 2019-05-10 VITALS
DIASTOLIC BLOOD PRESSURE: 58 MMHG | SYSTOLIC BLOOD PRESSURE: 123 MMHG | HEART RATE: 60 BPM | WEIGHT: 120 LBS | BODY MASS INDEX: 25.19 KG/M2 | TEMPERATURE: 98.3 F | OXYGEN SATURATION: 95 % | RESPIRATION RATE: 18 BRPM | HEIGHT: 58 IN

## 2019-05-10 DIAGNOSIS — Z86.010 HISTORY OF COLONIC POLYPS: ICD-10-CM

## 2019-05-10 DIAGNOSIS — Z12.11 ENCOUNTER FOR SCREENING FOR MALIGNANT NEOPLASM OF COLON: ICD-10-CM

## 2019-05-10 LAB
GLUCOSE SERPL-MCNC: 139 MG/DL (ref 65–140)
GLUCOSE SERPL-MCNC: 164 MG/DL (ref 65–140)

## 2019-05-10 PROCEDURE — 88305 TISSUE EXAM BY PATHOLOGIST: CPT | Performed by: PATHOLOGY

## 2019-05-10 PROCEDURE — 82948 REAGENT STRIP/BLOOD GLUCOSE: CPT

## 2019-05-10 RX ORDER — PROPOFOL 10 MG/ML
INJECTION, EMULSION INTRAVENOUS AS NEEDED
Status: DISCONTINUED | OUTPATIENT
Start: 2019-05-10 | End: 2019-05-10 | Stop reason: SURG

## 2019-05-10 RX ORDER — MEPERIDINE HYDROCHLORIDE 50 MG/ML
12.5 INJECTION INTRAMUSCULAR; INTRAVENOUS; SUBCUTANEOUS AS NEEDED
Status: DISCONTINUED | OUTPATIENT
Start: 2019-05-10 | End: 2019-05-10 | Stop reason: HOSPADM

## 2019-05-10 RX ORDER — ALBUTEROL SULFATE 2.5 MG/3ML
2.5 SOLUTION RESPIRATORY (INHALATION) ONCE AS NEEDED
Status: DISCONTINUED | OUTPATIENT
Start: 2019-05-10 | End: 2019-05-10 | Stop reason: HOSPADM

## 2019-05-10 RX ORDER — SODIUM CHLORIDE 9 MG/ML
125 INJECTION, SOLUTION INTRAVENOUS CONTINUOUS
Status: DISCONTINUED | OUTPATIENT
Start: 2019-05-10 | End: 2019-05-10 | Stop reason: HOSPADM

## 2019-05-10 RX ADMIN — PROPOFOL 50 MG: 10 INJECTION, EMULSION INTRAVENOUS at 11:33

## 2019-05-10 RX ADMIN — PROPOFOL 50 MG: 10 INJECTION, EMULSION INTRAVENOUS at 11:29

## 2019-05-10 RX ADMIN — PROPOFOL 50 MG: 10 INJECTION, EMULSION INTRAVENOUS at 11:16

## 2019-05-10 RX ADMIN — SODIUM CHLORIDE 125 ML/HR: 0.9 INJECTION, SOLUTION INTRAVENOUS at 10:10

## 2019-05-10 RX ADMIN — PROPOFOL 50 MG: 10 INJECTION, EMULSION INTRAVENOUS at 11:22

## 2019-05-10 RX ADMIN — PROPOFOL 50 MG: 10 INJECTION, EMULSION INTRAVENOUS at 11:17

## 2019-05-10 RX ADMIN — PROPOFOL 50 MG: 10 INJECTION, EMULSION INTRAVENOUS at 11:37

## 2019-05-24 ENCOUNTER — TELEPHONE (OUTPATIENT)
Dept: FAMILY MEDICINE CLINIC | Facility: CLINIC | Age: 79
End: 2019-05-24

## 2019-07-31 ENCOUNTER — OFFICE VISIT (OUTPATIENT)
Dept: FAMILY MEDICINE CLINIC | Facility: CLINIC | Age: 79
End: 2019-07-31
Payer: COMMERCIAL

## 2019-07-31 VITALS
WEIGHT: 120.8 LBS | HEIGHT: 58 IN | DIASTOLIC BLOOD PRESSURE: 76 MMHG | RESPIRATION RATE: 16 BRPM | TEMPERATURE: 96.5 F | HEART RATE: 62 BPM | SYSTOLIC BLOOD PRESSURE: 136 MMHG | BODY MASS INDEX: 25.36 KG/M2 | OXYGEN SATURATION: 99 %

## 2019-07-31 DIAGNOSIS — E78.2 MIXED HYPERLIPIDEMIA: ICD-10-CM

## 2019-07-31 DIAGNOSIS — Z23 NEED FOR VACCINATION AGAINST STREPTOCOCCUS PNEUMONIAE: ICD-10-CM

## 2019-07-31 DIAGNOSIS — Z79.01 LONG TERM CURRENT USE OF ANTICOAGULANT THERAPY: ICD-10-CM

## 2019-07-31 DIAGNOSIS — Z95.0 PACEMAKER: ICD-10-CM

## 2019-07-31 DIAGNOSIS — I10 ESSENTIAL HYPERTENSION: ICD-10-CM

## 2019-07-31 DIAGNOSIS — E11.9 TYPE 2 DIABETES MELLITUS WITHOUT COMPLICATION, WITHOUT LONG-TERM CURRENT USE OF INSULIN (HCC): Primary | ICD-10-CM

## 2019-07-31 PROCEDURE — 4040F PNEUMOC VAC/ADMIN/RCVD: CPT | Performed by: FAMILY MEDICINE

## 2019-07-31 PROCEDURE — 3075F SYST BP GE 130 - 139MM HG: CPT | Performed by: FAMILY MEDICINE

## 2019-07-31 PROCEDURE — G0009 ADMIN PNEUMOCOCCAL VACCINE: HCPCS | Performed by: FAMILY MEDICINE

## 2019-07-31 PROCEDURE — 1160F RVW MEDS BY RX/DR IN RCRD: CPT | Performed by: FAMILY MEDICINE

## 2019-07-31 PROCEDURE — 99214 OFFICE O/P EST MOD 30 MIN: CPT | Performed by: FAMILY MEDICINE

## 2019-07-31 PROCEDURE — 90732 PPSV23 VACC 2 YRS+ SUBQ/IM: CPT | Performed by: FAMILY MEDICINE

## 2019-07-31 PROCEDURE — 1036F TOBACCO NON-USER: CPT | Performed by: FAMILY MEDICINE

## 2019-07-31 RX ORDER — LOSARTAN POTASSIUM 25 MG/1
25 TABLET ORAL DAILY
Qty: 90 TABLET | Refills: 1
Start: 2019-07-31 | End: 2019-09-17 | Stop reason: SDUPTHER

## 2019-07-31 NOTE — PROGRESS NOTES
Assessment/Plan:  Patient is seen for diabetes  She checks sugars at home once a week  BS was 115  Has eye appt on 8/2  She has appt to see Dr Iwona Phillips in August regarding her atrial flutter and atrial septal defect  Patient has a pacemaker  She takes Xarelto  Patient had colonoscopy in April - had biopsies - tubular adenoma - due back in three years  She did not any blood prior to this visit and so I gave her orders to she goes blood for the cardiologist next week  Patient is going to [facility] in October and so I will see her back towards the end of December  She was given a pneumonia vaccine this visit  Diagnoses and all orders for this visit:    Type 2 diabetes mellitus without complication, without long-term current use of insulin (HCC)  -     HEMOGLOBIN A1C W/ EAG ESTIMATION; Future  -     TSH, 3rd generation with Free T4 reflex; Future  -     CBC and differential; Future  -     Microalbumin / creatinine urine ratio; Future    Essential hypertension  -     Comprehensive metabolic panel; Future  -     TSH, 3rd generation with Free T4 reflex; Future  -     CBC and differential; Future  -     losartan (COZAAR) 25 mg tablet; Take 1 tablet (25 mg total) by mouth daily    Mixed hyperlipidemia  -     Lipid Panel with Direct LDL reflex; Future  -     TSH, 3rd generation with Free T4 reflex; Future  -     CBC and differential; Future    Pacemaker    Long term current use of anticoagulant therapy    Need for vaccination against Streptococcus pneumoniae  -     PNEUMOCOCCAL POLYSACCHARIDE VACCINE 23-VALENT =>1YO SQ IM          Subjective:   Chief Complaint   Patient presents with    Follow-up     4 month check        Patient ID: Renee Woods is a 66 y o  female  Patient is seen for follow up of chronic medical conditions  She is feeling well  She is compliant with her medications  She does check her sugars a couple times a week home  Planning on traveling in October        The following portions of the patient's history were reviewed and updated as appropriate: allergies, current medications, past family history, past medical history, past social history, past surgical history and problem list     Review of Systems   Constitutional: Negative for chills and fever  HENT: Negative for congestion and sore throat  Respiratory: Negative for chest tightness  Cardiovascular: Negative for chest pain and palpitations  Gastrointestinal: Negative for abdominal pain, constipation, diarrhea and nausea  Genitourinary: Negative for difficulty urinating  Skin: Negative  Neurological: Negative for dizziness and headaches  Psychiatric/Behavioral: Negative  Objective:      /76 (BP Location: Left arm, Patient Position: Sitting)   Pulse 62   Temp (!) 96 5 °F (35 8 °C) (Tympanic)   Resp 16   Ht 4' 10" (1 473 m)   Wt 54 8 kg (120 lb 12 8 oz)   SpO2 99%   BMI 25 25 kg/m²          Physical Exam   Constitutional: She is oriented to person, place, and time  She appears well-developed  No distress  Neck: Carotid bruit is not present  No thyromegaly present  Cardiovascular: Normal rate, regular rhythm and normal heart sounds  Pulmonary/Chest: Effort normal and breath sounds normal    Musculoskeletal: She exhibits no edema  Lymphadenopathy:     She has no cervical adenopathy  Neurological: She is alert and oriented to person, place, and time  Skin: Skin is warm and dry  Psychiatric: She has a normal mood and affect  Nursing note and vitals reviewed

## 2019-08-05 ENCOUNTER — TRANSCRIBE ORDERS (OUTPATIENT)
Dept: ADMINISTRATIVE | Facility: HOSPITAL | Age: 79
End: 2019-08-05

## 2019-08-05 ENCOUNTER — APPOINTMENT (OUTPATIENT)
Dept: LAB | Facility: MEDICAL CENTER | Age: 79
End: 2019-08-05
Payer: COMMERCIAL

## 2019-08-05 DIAGNOSIS — E78.2 MIXED HYPERLIPIDEMIA: ICD-10-CM

## 2019-08-05 DIAGNOSIS — I10 ESSENTIAL HYPERTENSION: ICD-10-CM

## 2019-08-05 DIAGNOSIS — E11.9 TYPE 2 DIABETES MELLITUS WITHOUT COMPLICATION, WITHOUT LONG-TERM CURRENT USE OF INSULIN (HCC): ICD-10-CM

## 2019-08-05 DIAGNOSIS — Z79.899 ENCOUNTER FOR LONG-TERM (CURRENT) USE OF OTHER MEDICATIONS: ICD-10-CM

## 2019-08-05 DIAGNOSIS — Z79.01 LONG TERM (CURRENT) USE OF ANTICOAGULANTS: ICD-10-CM

## 2019-08-05 DIAGNOSIS — Z79.899 ENCOUNTER FOR LONG-TERM (CURRENT) USE OF OTHER MEDICATIONS: Primary | ICD-10-CM

## 2019-08-05 LAB
ALBUMIN SERPL BCP-MCNC: 3.7 G/DL (ref 3.5–5)
ALP SERPL-CCNC: 69 U/L (ref 46–116)
ALT SERPL W P-5'-P-CCNC: 82 U/L (ref 12–78)
ANION GAP SERPL CALCULATED.3IONS-SCNC: 5 MMOL/L (ref 4–13)
AST SERPL W P-5'-P-CCNC: 48 U/L (ref 5–45)
BASOPHILS # BLD AUTO: 0.02 THOUSANDS/ΜL (ref 0–0.1)
BASOPHILS NFR BLD AUTO: 1 % (ref 0–1)
BILIRUB SERPL-MCNC: 0.91 MG/DL (ref 0.2–1)
BUN SERPL-MCNC: 15 MG/DL (ref 5–25)
CALCIUM SERPL-MCNC: 9.6 MG/DL (ref 8.3–10.1)
CHLORIDE SERPL-SCNC: 104 MMOL/L (ref 100–108)
CHOLEST SERPL-MCNC: 173 MG/DL (ref 50–200)
CO2 SERPL-SCNC: 30 MMOL/L (ref 21–32)
CREAT SERPL-MCNC: 0.78 MG/DL (ref 0.6–1.3)
CREAT UR-MCNC: 57.5 MG/DL
EOSINOPHIL # BLD AUTO: 0.08 THOUSAND/ΜL (ref 0–0.61)
EOSINOPHIL NFR BLD AUTO: 2 % (ref 0–6)
ERYTHROCYTE [DISTWIDTH] IN BLOOD BY AUTOMATED COUNT: 13.4 % (ref 11.6–15.1)
EST. AVERAGE GLUCOSE BLD GHB EST-MCNC: 143 MG/DL
GFR SERPL CREATININE-BSD FRML MDRD: 73 ML/MIN/1.73SQ M
GLUCOSE P FAST SERPL-MCNC: 112 MG/DL (ref 65–99)
HBA1C MFR BLD: 6.6 % (ref 4.2–6.3)
HCT VFR BLD AUTO: 38.5 % (ref 34.8–46.1)
HDLC SERPL-MCNC: 115 MG/DL (ref 40–60)
HGB BLD-MCNC: 12.8 G/DL (ref 11.5–15.4)
IMM GRANULOCYTES # BLD AUTO: 0.01 THOUSAND/UL (ref 0–0.2)
IMM GRANULOCYTES NFR BLD AUTO: 0 % (ref 0–2)
LDLC SERPL CALC-MCNC: 37 MG/DL (ref 0–100)
LYMPHOCYTES # BLD AUTO: 1.29 THOUSANDS/ΜL (ref 0.6–4.47)
LYMPHOCYTES NFR BLD AUTO: 38 % (ref 14–44)
MCH RBC QN AUTO: 34.9 PG (ref 26.8–34.3)
MCHC RBC AUTO-ENTMCNC: 33.2 G/DL (ref 31.4–37.4)
MCV RBC AUTO: 105 FL (ref 82–98)
MICROALBUMIN UR-MCNC: <5 MG/L (ref 0–20)
MICROALBUMIN/CREAT 24H UR: <9 MG/G CREATININE (ref 0–30)
MONOCYTES # BLD AUTO: 0.39 THOUSAND/ΜL (ref 0.17–1.22)
MONOCYTES NFR BLD AUTO: 12 % (ref 4–12)
NEUTROPHILS # BLD AUTO: 1.58 THOUSANDS/ΜL (ref 1.85–7.62)
NEUTS SEG NFR BLD AUTO: 47 % (ref 43–75)
NRBC BLD AUTO-RTO: 0 /100 WBCS
PLATELET # BLD AUTO: 167 THOUSANDS/UL (ref 149–390)
PMV BLD AUTO: 10.2 FL (ref 8.9–12.7)
POTASSIUM SERPL-SCNC: 4.3 MMOL/L (ref 3.5–5.3)
PROT SERPL-MCNC: 7.7 G/DL (ref 6.4–8.2)
RBC # BLD AUTO: 3.67 MILLION/UL (ref 3.81–5.12)
SODIUM SERPL-SCNC: 139 MMOL/L (ref 136–145)
T4 FREE SERPL-MCNC: 0.79 NG/DL (ref 0.76–1.46)
TRIGL SERPL-MCNC: 105 MG/DL
TSH SERPL DL<=0.05 MIU/L-ACNC: 3.91 UIU/ML (ref 0.36–3.74)
WBC # BLD AUTO: 3.37 THOUSAND/UL (ref 4.31–10.16)

## 2019-08-05 PROCEDURE — 85025 COMPLETE CBC W/AUTO DIFF WBC: CPT

## 2019-08-05 PROCEDURE — 80061 LIPID PANEL: CPT

## 2019-08-05 PROCEDURE — 36415 COLL VENOUS BLD VENIPUNCTURE: CPT

## 2019-08-05 PROCEDURE — 84443 ASSAY THYROID STIM HORMONE: CPT

## 2019-08-05 PROCEDURE — 82043 UR ALBUMIN QUANTITATIVE: CPT

## 2019-08-05 PROCEDURE — 80053 COMPREHEN METABOLIC PANEL: CPT

## 2019-08-05 PROCEDURE — 84439 ASSAY OF FREE THYROXINE: CPT

## 2019-08-05 PROCEDURE — 82570 ASSAY OF URINE CREATININE: CPT

## 2019-08-05 PROCEDURE — 83036 HEMOGLOBIN GLYCOSYLATED A1C: CPT

## 2019-09-17 DIAGNOSIS — E11.39 TYPE 2 DIABETES MELLITUS WITH OTHER OPHTHALMIC COMPLICATION, WITHOUT LONG-TERM CURRENT USE OF INSULIN (HCC): ICD-10-CM

## 2019-09-17 DIAGNOSIS — I10 ESSENTIAL HYPERTENSION: ICD-10-CM

## 2019-09-17 DIAGNOSIS — E78.2 MIXED HYPERLIPIDEMIA: ICD-10-CM

## 2019-09-17 DIAGNOSIS — I48.92 ATRIAL FLUTTER, UNSPECIFIED TYPE (HCC): ICD-10-CM

## 2019-09-17 RX ORDER — SIMVASTATIN 10 MG
10 TABLET ORAL
Qty: 90 TABLET | Refills: 1 | Status: SHIPPED | OUTPATIENT
Start: 2019-09-17 | End: 2020-04-22

## 2019-09-17 RX ORDER — LOSARTAN POTASSIUM 25 MG/1
25 TABLET ORAL DAILY
Qty: 90 TABLET | Refills: 1 | Status: SHIPPED | OUTPATIENT
Start: 2019-09-17 | End: 2020-04-22

## 2019-09-17 RX ORDER — SOTALOL HYDROCHLORIDE 80 MG/1
80 TABLET ORAL 2 TIMES DAILY
Qty: 180 TABLET | Refills: 1 | Status: SHIPPED | OUTPATIENT
Start: 2019-09-17 | End: 2020-04-22

## 2019-09-17 RX ORDER — METFORMIN HYDROCHLORIDE 500 MG/1
500 TABLET, EXTENDED RELEASE ORAL DAILY
Qty: 90 TABLET | Refills: 1 | Status: SHIPPED | OUTPATIENT
Start: 2019-09-17 | End: 2020-04-22

## 2019-12-18 ENCOUNTER — OFFICE VISIT (OUTPATIENT)
Dept: FAMILY MEDICINE CLINIC | Facility: CLINIC | Age: 79
End: 2019-12-18
Payer: COMMERCIAL

## 2019-12-18 VITALS
SYSTOLIC BLOOD PRESSURE: 140 MMHG | DIASTOLIC BLOOD PRESSURE: 80 MMHG | TEMPERATURE: 97.5 F | BODY MASS INDEX: 24.44 KG/M2 | OXYGEN SATURATION: 97 % | WEIGHT: 121.2 LBS | HEART RATE: 69 BPM | HEIGHT: 59 IN

## 2019-12-18 DIAGNOSIS — I10 ESSENTIAL HYPERTENSION: ICD-10-CM

## 2019-12-18 DIAGNOSIS — E11.9 TYPE 2 DIABETES MELLITUS WITHOUT COMPLICATION, WITHOUT LONG-TERM CURRENT USE OF INSULIN (HCC): Primary | ICD-10-CM

## 2019-12-18 DIAGNOSIS — R74.01 TRANSAMINITIS: ICD-10-CM

## 2019-12-18 DIAGNOSIS — Z95.0 PACEMAKER: ICD-10-CM

## 2019-12-18 DIAGNOSIS — E78.2 MIXED HYPERLIPIDEMIA: ICD-10-CM

## 2019-12-18 PROCEDURE — 99214 OFFICE O/P EST MOD 30 MIN: CPT | Performed by: FAMILY MEDICINE

## 2019-12-18 NOTE — PROGRESS NOTES
Assessment/Plan:    Patient is 55-year-old female seen for follow-up chronic medical conditions  She recently returned from [facility]  She will have blood work to check her diabetes  Diagnoses and all orders for this visit:    Type 2 diabetes mellitus without complication, without long-term current use of insulin (HCC)  -     Comprehensive metabolic panel; Future  -     HEMOGLOBIN A1C W/ EAG ESTIMATION; Future  -     Lipid Panel with Direct LDL reflex; Future  -     Comprehensive metabolic panel; Future  -     CBC and differential; Future  -     TSH, 3rd generation with Free T4 reflex; Future  -     HEMOGLOBIN A1C W/ EAG ESTIMATION; Future    Pacemaker    Essential hypertension  -     Comprehensive metabolic panel; Future  -     CBC and differential; Future  -     TSH, 3rd generation with Free T4 reflex; Future    Mixed hyperlipidemia  -     Lipid Panel with Direct LDL reflex; Future    Transaminitis  -     Comprehensive metabolic panel; Future          Subjective:   Chief Complaint   Patient presents with    Follow-up     5 month  Patient ID: Rafal Blanco is a 78 y o  female  Patient is here for follow up of chronic medical conditions  Patient was traveling - has some jet lag  Was in [facility]  The following portions of the patient's history were reviewed and updated as appropriate: allergies, current medications, past family history, past medical history, past social history, past surgical history and problem list     Review of Systems   Constitutional: Negative for chills and fever  HENT: Negative for congestion and sore throat  Respiratory: Negative for chest tightness  Cardiovascular: Negative for chest pain and palpitations  Gastrointestinal: Negative for abdominal pain, constipation, diarrhea and nausea  Genitourinary: Negative for difficulty urinating  Skin: Negative  Neurological: Negative for dizziness and headaches  Psychiatric/Behavioral: Negative  Objective:      /80 (BP Location: Left arm, Patient Position: Sitting, Cuff Size: Adult)   Pulse 69   Temp 97 5 °F (36 4 °C) (Tympanic)   Ht 4' 10 9" (1 496 m)   Wt 55 kg (121 lb 3 2 oz)   SpO2 97%   BMI 24 56 kg/m²          Physical Exam   Constitutional: She is oriented to person, place, and time  She appears well-developed  No distress  Neck: Carotid bruit is not present  No thyromegaly present  Cardiovascular: Normal rate and normal heart sounds  An irregularly irregular rhythm present  Pulmonary/Chest: Effort normal and breath sounds normal    Musculoskeletal: She exhibits no edema  Lymphadenopathy:     She has no cervical adenopathy  Neurological: She is alert and oriented to person, place, and time  Skin: Skin is warm and dry  Psychiatric: She has a normal mood and affect  Nursing note and vitals reviewed  BMI Counseling: Body mass index is 24 56 kg/m²   The BMI is normal

## 2019-12-19 ENCOUNTER — APPOINTMENT (OUTPATIENT)
Dept: LAB | Facility: MEDICAL CENTER | Age: 79
End: 2019-12-19
Payer: COMMERCIAL

## 2019-12-19 DIAGNOSIS — E11.9 TYPE 2 DIABETES MELLITUS WITHOUT COMPLICATION, WITHOUT LONG-TERM CURRENT USE OF INSULIN (HCC): ICD-10-CM

## 2019-12-19 DIAGNOSIS — R74.01 TRANSAMINITIS: ICD-10-CM

## 2019-12-19 LAB
ALBUMIN SERPL BCP-MCNC: 3.8 G/DL (ref 3.5–5)
ALP SERPL-CCNC: 68 U/L (ref 46–116)
ALT SERPL W P-5'-P-CCNC: 48 U/L (ref 12–78)
ANION GAP SERPL CALCULATED.3IONS-SCNC: 3 MMOL/L (ref 4–13)
AST SERPL W P-5'-P-CCNC: 31 U/L (ref 5–45)
BILIRUB SERPL-MCNC: 0.82 MG/DL (ref 0.2–1)
BUN SERPL-MCNC: 15 MG/DL (ref 5–25)
CALCIUM SERPL-MCNC: 9.7 MG/DL (ref 8.3–10.1)
CHLORIDE SERPL-SCNC: 102 MMOL/L (ref 100–108)
CO2 SERPL-SCNC: 31 MMOL/L (ref 21–32)
CREAT SERPL-MCNC: 0.74 MG/DL (ref 0.6–1.3)
EST. AVERAGE GLUCOSE BLD GHB EST-MCNC: 131 MG/DL
GFR SERPL CREATININE-BSD FRML MDRD: 77 ML/MIN/1.73SQ M
GLUCOSE P FAST SERPL-MCNC: 144 MG/DL (ref 65–99)
HBA1C MFR BLD: 6.2 % (ref 4.2–6.3)
POTASSIUM SERPL-SCNC: 4.2 MMOL/L (ref 3.5–5.3)
PROT SERPL-MCNC: 8.2 G/DL (ref 6.4–8.2)
SODIUM SERPL-SCNC: 136 MMOL/L (ref 136–145)

## 2019-12-19 PROCEDURE — 36415 COLL VENOUS BLD VENIPUNCTURE: CPT

## 2019-12-19 PROCEDURE — 83036 HEMOGLOBIN GLYCOSYLATED A1C: CPT

## 2019-12-19 PROCEDURE — 80053 COMPREHEN METABOLIC PANEL: CPT

## 2019-12-27 ENCOUNTER — TELEPHONE (OUTPATIENT)
Dept: FAMILY MEDICINE CLINIC | Facility: CLINIC | Age: 79
End: 2019-12-27

## 2020-02-11 LAB
LEFT EYE DIABETIC RETINOPATHY: NORMAL
RIGHT EYE DIABETIC RETINOPATHY: NORMAL

## 2020-02-12 ENCOUNTER — TRANSCRIBE ORDERS (OUTPATIENT)
Dept: LAB | Facility: MEDICAL CENTER | Age: 80
End: 2020-02-12

## 2020-02-12 ENCOUNTER — APPOINTMENT (OUTPATIENT)
Dept: LAB | Facility: MEDICAL CENTER | Age: 80
End: 2020-02-12
Payer: COMMERCIAL

## 2020-02-12 DIAGNOSIS — Z79.01 LONG TERM (CURRENT) USE OF ANTICOAGULANTS: ICD-10-CM

## 2020-02-12 DIAGNOSIS — I49.5 SICK SINUS SYNDROME (HCC): ICD-10-CM

## 2020-02-12 DIAGNOSIS — Q21.1 ASD (ATRIAL SEPTAL DEFECT): ICD-10-CM

## 2020-02-12 DIAGNOSIS — Z79.899 ENCOUNTER FOR LONG-TERM (CURRENT) USE OF OTHER MEDICATIONS: ICD-10-CM

## 2020-02-12 DIAGNOSIS — E78.00 PURE HYPERCHOLESTEROLEMIA: ICD-10-CM

## 2020-02-12 DIAGNOSIS — Z95.0 PACEMAKER: ICD-10-CM

## 2020-02-12 DIAGNOSIS — I48.92 ATRIAL FLUTTER, UNSPECIFIED TYPE (HCC): Primary | ICD-10-CM

## 2020-02-12 DIAGNOSIS — I10 ESSENTIAL HYPERTENSION, BENIGN: ICD-10-CM

## 2020-02-12 DIAGNOSIS — E11.8 DIABETIC COMPLICATION (HCC): ICD-10-CM

## 2020-02-12 DIAGNOSIS — I48.92 ATRIAL FLUTTER, UNSPECIFIED TYPE (HCC): ICD-10-CM

## 2020-02-12 LAB
ALBUMIN SERPL BCP-MCNC: 3.7 G/DL (ref 3.5–5)
ALP SERPL-CCNC: 64 U/L (ref 46–116)
ALT SERPL W P-5'-P-CCNC: 53 U/L (ref 12–78)
ANION GAP SERPL CALCULATED.3IONS-SCNC: 2 MMOL/L (ref 4–13)
AST SERPL W P-5'-P-CCNC: 40 U/L (ref 5–45)
BILIRUB SERPL-MCNC: 0.97 MG/DL (ref 0.2–1)
BUN SERPL-MCNC: 13 MG/DL (ref 5–25)
CALCIUM SERPL-MCNC: 9.8 MG/DL (ref 8.3–10.1)
CHLORIDE SERPL-SCNC: 106 MMOL/L (ref 100–108)
CHOLEST SERPL-MCNC: 152 MG/DL (ref 50–200)
CO2 SERPL-SCNC: 29 MMOL/L (ref 21–32)
CREAT SERPL-MCNC: 0.82 MG/DL (ref 0.6–1.3)
ERYTHROCYTE [DISTWIDTH] IN BLOOD BY AUTOMATED COUNT: 14.5 % (ref 11.6–15.1)
GFR SERPL CREATININE-BSD FRML MDRD: 68 ML/MIN/1.73SQ M
GLUCOSE P FAST SERPL-MCNC: 91 MG/DL (ref 65–99)
HCT VFR BLD AUTO: 39.2 % (ref 34.8–46.1)
HDLC SERPL-MCNC: 109 MG/DL
HGB BLD-MCNC: 12.6 G/DL (ref 11.5–15.4)
LDLC SERPL CALC-MCNC: 25 MG/DL (ref 0–100)
MCH RBC QN AUTO: 34.4 PG (ref 26.8–34.3)
MCHC RBC AUTO-ENTMCNC: 32.1 G/DL (ref 31.4–37.4)
MCV RBC AUTO: 107 FL (ref 82–98)
NONHDLC SERPL-MCNC: 43 MG/DL
PLATELET # BLD AUTO: 132 THOUSANDS/UL (ref 149–390)
PMV BLD AUTO: 10.4 FL (ref 8.9–12.7)
POTASSIUM SERPL-SCNC: 3.9 MMOL/L (ref 3.5–5.3)
PROT SERPL-MCNC: 7.5 G/DL (ref 6.4–8.2)
RBC # BLD AUTO: 3.66 MILLION/UL (ref 3.81–5.12)
SODIUM SERPL-SCNC: 137 MMOL/L (ref 136–145)
TRIGL SERPL-MCNC: 92 MG/DL
WBC # BLD AUTO: 4.68 THOUSAND/UL (ref 4.31–10.16)

## 2020-02-12 PROCEDURE — 85027 COMPLETE CBC AUTOMATED: CPT

## 2020-02-12 PROCEDURE — 36415 COLL VENOUS BLD VENIPUNCTURE: CPT

## 2020-02-12 PROCEDURE — 80053 COMPREHEN METABOLIC PANEL: CPT

## 2020-02-12 PROCEDURE — 80061 LIPID PANEL: CPT

## 2020-03-03 ENCOUNTER — APPOINTMENT (OUTPATIENT)
Dept: LAB | Facility: MEDICAL CENTER | Age: 80
End: 2020-03-03
Payer: COMMERCIAL

## 2020-03-03 ENCOUNTER — APPOINTMENT (OUTPATIENT)
Dept: RADIOLOGY | Facility: CLINIC | Age: 80
End: 2020-03-03
Payer: COMMERCIAL

## 2020-03-03 ENCOUNTER — OFFICE VISIT (OUTPATIENT)
Dept: FAMILY MEDICINE CLINIC | Facility: CLINIC | Age: 80
End: 2020-03-03
Payer: COMMERCIAL

## 2020-03-03 VITALS
OXYGEN SATURATION: 99 % | RESPIRATION RATE: 16 BRPM | TEMPERATURE: 97.6 F | BODY MASS INDEX: 26.41 KG/M2 | SYSTOLIC BLOOD PRESSURE: 132 MMHG | DIASTOLIC BLOOD PRESSURE: 88 MMHG | HEIGHT: 58 IN | WEIGHT: 125.8 LBS | HEART RATE: 89 BPM

## 2020-03-03 DIAGNOSIS — R06.00 DOE (DYSPNEA ON EXERTION): ICD-10-CM

## 2020-03-03 DIAGNOSIS — R06.00 DOE (DYSPNEA ON EXERTION): Primary | ICD-10-CM

## 2020-03-03 LAB
ALBUMIN SERPL BCP-MCNC: 3.9 G/DL (ref 3.5–5)
ALP SERPL-CCNC: 84 U/L (ref 46–116)
ALT SERPL W P-5'-P-CCNC: 53 U/L (ref 12–78)
ANION GAP SERPL CALCULATED.3IONS-SCNC: 6 MMOL/L (ref 4–13)
AST SERPL W P-5'-P-CCNC: 38 U/L (ref 5–45)
BILIRUB SERPL-MCNC: 0.71 MG/DL (ref 0.2–1)
BUN SERPL-MCNC: 9 MG/DL (ref 5–25)
CALCIUM SERPL-MCNC: 10 MG/DL (ref 8.3–10.1)
CHLORIDE SERPL-SCNC: 108 MMOL/L (ref 100–108)
CO2 SERPL-SCNC: 28 MMOL/L (ref 21–32)
CREAT SERPL-MCNC: 0.84 MG/DL (ref 0.6–1.3)
ERYTHROCYTE [DISTWIDTH] IN BLOOD BY AUTOMATED COUNT: 13.7 % (ref 11.6–15.1)
GFR SERPL CREATININE-BSD FRML MDRD: 66 ML/MIN/1.73SQ M
GLUCOSE SERPL-MCNC: 118 MG/DL (ref 65–140)
HCT VFR BLD AUTO: 41.9 % (ref 34.8–46.1)
HGB BLD-MCNC: 13.5 G/DL (ref 11.5–15.4)
MCH RBC QN AUTO: 34.4 PG (ref 26.8–34.3)
MCHC RBC AUTO-ENTMCNC: 32.2 G/DL (ref 31.4–37.4)
MCV RBC AUTO: 107 FL (ref 82–98)
NT-PROBNP SERPL-MCNC: 1112 PG/ML
PLATELET # BLD AUTO: 126 THOUSANDS/UL (ref 149–390)
PMV BLD AUTO: 10.8 FL (ref 8.9–12.7)
POTASSIUM SERPL-SCNC: 4.4 MMOL/L (ref 3.5–5.3)
PROT SERPL-MCNC: 8 G/DL (ref 6.4–8.2)
RBC # BLD AUTO: 3.93 MILLION/UL (ref 3.81–5.12)
SODIUM SERPL-SCNC: 142 MMOL/L (ref 136–145)
WBC # BLD AUTO: 4.39 THOUSAND/UL (ref 4.31–10.16)

## 2020-03-03 PROCEDURE — 85027 COMPLETE CBC AUTOMATED: CPT

## 2020-03-03 PROCEDURE — 71046 X-RAY EXAM CHEST 2 VIEWS: CPT

## 2020-03-03 PROCEDURE — 36415 COLL VENOUS BLD VENIPUNCTURE: CPT

## 2020-03-03 PROCEDURE — 4040F PNEUMOC VAC/ADMIN/RCVD: CPT | Performed by: FAMILY MEDICINE

## 2020-03-03 PROCEDURE — 2022F DILAT RTA XM EVC RTNOPTHY: CPT | Performed by: FAMILY MEDICINE

## 2020-03-03 PROCEDURE — 1036F TOBACCO NON-USER: CPT | Performed by: FAMILY MEDICINE

## 2020-03-03 PROCEDURE — 93000 ELECTROCARDIOGRAM COMPLETE: CPT | Performed by: FAMILY MEDICINE

## 2020-03-03 PROCEDURE — 99214 OFFICE O/P EST MOD 30 MIN: CPT | Performed by: FAMILY MEDICINE

## 2020-03-03 PROCEDURE — 3075F SYST BP GE 130 - 139MM HG: CPT | Performed by: FAMILY MEDICINE

## 2020-03-03 PROCEDURE — 80053 COMPREHEN METABOLIC PANEL: CPT

## 2020-03-03 PROCEDURE — 1160F RVW MEDS BY RX/DR IN RCRD: CPT | Performed by: FAMILY MEDICINE

## 2020-03-03 PROCEDURE — 3008F BODY MASS INDEX DOCD: CPT | Performed by: FAMILY MEDICINE

## 2020-03-03 PROCEDURE — 83880 ASSAY OF NATRIURETIC PEPTIDE: CPT

## 2020-03-03 PROCEDURE — 3079F DIAST BP 80-89 MM HG: CPT | Performed by: FAMILY MEDICINE

## 2020-03-03 NOTE — PROGRESS NOTES
Assessment/Plan:   1  MARKHAM (dyspnea on exertion)  Reviewed patient's symptoms today  At this time, is unclear as to the exact cause of her dyspnea on exertion  At this time, reviewed the differential with her  She does have a significant cardiac history sick sinus syndrome as well as an atrial septal defect  States that he has been taking regularly states that she is overdue for her cardiology  Her EKG today appear to show a ventricular paced rhythm however no significant abnormalities  Given her symptoms today as well as the acute nature of her symptoms, will check chest x-ray, will also check blood work including CMP, CBC, as well as BNP  Will call and schedule appointment Cardiology for this patient today  If any of her symptoms should worsen, she must go directly to the ED   - NT-BNP PRO; Future  - Comprehensive metabolic panel; Future  - CBC and Platelet; Future  - XR chest pa & lateral; Future           There are no diagnoses linked to this encounter  Subjective:       Chief Complaint   Patient presents with    Shortness of Breath     feels fatigue nd SOB after walking a short distance started about 3 weeks ago       Patient ID: Francisco Cerda is a 78 y o  female  Shortness of Breath   This is a new problem  Episode onset: 3 weeks  The problem occurs intermittently  The problem has been gradually worsening  Associated symptoms include abdominal pain  Pertinent negatives include no chest pain, ear pain, fever, headaches, rhinorrhea, sore throat, sputum production or wheezing  Associated symptoms comments: Epigastric abdominal pain  Treatments tried: Gasex  Review of Systems   Constitutional: Negative for activity change, chills, fatigue and fever  HENT: Negative for congestion, ear pain, rhinorrhea, sinus pressure and sore throat  Eyes: Negative for redness, itching and visual disturbance  Respiratory: Positive for shortness of breath   Negative for cough, sputum production and wheezing  Cardiovascular: Negative for chest pain and palpitations  Gastrointestinal: Positive for abdominal pain  Negative for diarrhea and nausea  Endocrine: Negative for cold intolerance and heat intolerance  Genitourinary: Negative for dysuria, flank pain and frequency  Musculoskeletal: Negative for arthralgias, back pain, gait problem and myalgias  Skin: Negative for color change  Allergic/Immunologic: Negative for environmental allergies  Neurological: Negative for dizziness, numbness and headaches  Psychiatric/Behavioral: Negative for behavioral problems and sleep disturbance  The following portions of the patient's history were reviewed and updated as appropriate : past family history, past medical history, past social history and past surgical history  Current Outpatient Medications:     Calcium Citrate 250 MG TABS, Take 1 tablet by mouth 2 (two) times a day , Disp: , Rfl:     Cholecalciferol (VITAMIN D-3 PO), Take 1,000 Units by mouth daily  , Disp: , Rfl:     glucose blood (FREESTYLE LITE) test strip, by In Vitro route 4 (four) times a day, Disp: , Rfl:     glucose monitoring kit (FREESTYLE) monitoring kit, 1 each by Does not apply route daily E11 9 Patient is type 2 diabetic  Test once daily, Disp: 1 each, Rfl: 0    Lancets (FREESTYLE) lancets, by Does not apply route daily, Disp: , Rfl:     lansoprazole (PREVACID) 30 mg capsule, Take 30 mg by mouth daily, Disp: , Rfl:     losartan (COZAAR) 25 mg tablet, Take 1 tablet (25 mg total) by mouth daily, Disp: 90 tablet, Rfl: 1    Magnesium 250 MG TABS, Take 1 tablet by mouth daily  , Disp: , Rfl:     metFORMIN (GLUCOPHAGE-XR) 500 mg 24 hr tablet, Take 1 tablet (500 mg total) by mouth daily, Disp: 90 tablet, Rfl: 1    metoprolol tartrate (LOPRESSOR) 25 mg tablet, Take 1 tablet (25 mg total) by mouth 2 (two) times a day, Disp: 180 tablet, Rfl: 1    Multiple Vitamins-Minerals (CENTRUM SILVER PO), Take 1 tablet by mouth daily  , Disp: , Rfl:     rivaroxaban (XARELTO) 20 mg tablet, Take 1 tablet (20 mg total) by mouth daily, Disp: 90 tablet, Rfl: 1    simvastatin (ZOCOR) 10 mg tablet, Take 1 tablet (10 mg total) by mouth daily at bedtime, Disp: 90 tablet, Rfl: 1    sotalol (BETAPACE) 80 mg tablet, Take 1 tablet (80 mg total) by mouth 2 (two) times a day, Disp: 180 tablet, Rfl: 1         Objective:         Vitals:    03/03/20 1440   BP: 132/88   BP Location: Left arm   Patient Position: Sitting   Cuff Size: Adult   Pulse: 89   Resp: 16   Temp: 97 6 °F (36 4 °C)   TempSrc: Tympanic   SpO2: 99%   Weight: 57 1 kg (125 lb 12 8 oz)   Height: 4' 10" (1 473 m)     Physical Exam   Constitutional: She is oriented to person, place, and time  She appears well-developed and well-nourished  HENT:   Head: Normocephalic and atraumatic  Nose: Nose normal    Mouth/Throat: No oropharyngeal exudate  Eyes: Pupils are equal, round, and reactive to light  Right eye exhibits no discharge  Left eye exhibits no discharge  Neck: Normal range of motion  Neck supple  No tracheal deviation present  Cardiovascular: Normal rate, regular rhythm and intact distal pulses  Exam reveals no gallop and no friction rub  No murmur heard  Pulses:       Dorsalis pedis pulses are 2+ on the right side, and 2+ on the left side  Posterior tibial pulses are 2+ on the right side, and 2+ on the left side  Pulmonary/Chest: Effort normal and breath sounds normal  No respiratory distress  She has no wheezes  She has no rales  Abdominal: Soft  Bowel sounds are normal  She exhibits no distension  There is no tenderness  There is no rebound and no guarding  Musculoskeletal: Normal range of motion  She exhibits no edema  Lymphadenopathy:        Head (right side): No submental and no submandibular adenopathy present  Head (left side): No submental and no submandibular adenopathy present  She has no cervical adenopathy          Right cervical: No superficial cervical, no deep cervical and no posterior cervical adenopathy present  Left cervical: No superficial cervical, no deep cervical and no posterior cervical adenopathy present  Neurological: She is alert and oriented to person, place, and time  No cranial nerve deficit or sensory deficit  Skin: Skin is warm, dry and intact  Psychiatric: Her speech is normal and behavior is normal  Judgment normal  Her mood appears not anxious  Cognition and memory are normal  She does not exhibit a depressed mood  Vitals reviewed

## 2020-03-04 ENCOUNTER — TELEPHONE (OUTPATIENT)
Dept: FAMILY MEDICINE CLINIC | Facility: CLINIC | Age: 80
End: 2020-03-04

## 2020-03-04 NOTE — TELEPHONE ENCOUNTER
Spoke with patients daughter and reviewed results  Daughter was advised that her mother symptoms appear likely cardiac  She has taken diuretics in the past p r n  for swelling in her legs  Her daughter was advised to check at home he has this diuretic  She may take a dose tonight  Patient does have a evaluation by Cardiology tomorrow morning  Daughter was given ER precautions, any of her symptoms should worsen, she must go directly to the ED

## 2020-03-04 NOTE — TELEPHONE ENCOUNTER
Patients daughter called back and says you can call her back between now and 1:30  Says she does have a meeting between 1:30-2pm but you can call after that as well

## 2020-03-04 NOTE — TELEPHONE ENCOUNTER
Patients daughter Susanna Martines returned your phone call  States you had just called patient and she wanted to have her daughter on the phone to speak with you  States you can call her back at [phone] and she can conference patient in to call      States you can call her before noon, and after 1:30pm

## 2020-04-20 DIAGNOSIS — E11.39 TYPE 2 DIABETES MELLITUS WITH OTHER OPHTHALMIC COMPLICATION, WITHOUT LONG-TERM CURRENT USE OF INSULIN (HCC): ICD-10-CM

## 2020-04-20 DIAGNOSIS — E78.2 MIXED HYPERLIPIDEMIA: ICD-10-CM

## 2020-04-20 DIAGNOSIS — I10 ESSENTIAL HYPERTENSION: ICD-10-CM

## 2020-04-20 DIAGNOSIS — I48.92 ATRIAL FLUTTER, UNSPECIFIED TYPE (HCC): ICD-10-CM

## 2020-04-22 RX ORDER — LOSARTAN POTASSIUM 25 MG/1
TABLET ORAL
Qty: 90 TABLET | Refills: 3 | Status: SHIPPED | OUTPATIENT
Start: 2020-04-22 | End: 2020-06-30 | Stop reason: SDUPTHER

## 2020-04-22 RX ORDER — SOTALOL HYDROCHLORIDE 80 MG/1
TABLET ORAL
Qty: 180 TABLET | Refills: 3 | Status: SHIPPED | OUTPATIENT
Start: 2020-04-22 | End: 2020-09-15 | Stop reason: ALTCHOICE

## 2020-04-22 RX ORDER — RIVAROXABAN 20 MG/1
TABLET, FILM COATED ORAL
Qty: 90 TABLET | Refills: 3 | Status: SHIPPED | OUTPATIENT
Start: 2020-04-22 | End: 2020-06-30 | Stop reason: SDUPTHER

## 2020-04-22 RX ORDER — METFORMIN HYDROCHLORIDE 500 MG/1
TABLET, EXTENDED RELEASE ORAL
Qty: 90 TABLET | Refills: 3 | Status: SHIPPED | OUTPATIENT
Start: 2020-04-22 | End: 2020-06-30 | Stop reason: SDUPTHER

## 2020-04-22 RX ORDER — SIMVASTATIN 10 MG
TABLET ORAL
Qty: 90 TABLET | Refills: 3 | Status: SHIPPED | OUTPATIENT
Start: 2020-04-22 | End: 2020-06-30 | Stop reason: SDUPTHER

## 2020-04-23 LAB — HBA1C MFR BLD HPLC: 7 %

## 2020-04-27 ENCOUNTER — TELEMEDICINE (OUTPATIENT)
Dept: FAMILY MEDICINE CLINIC | Facility: CLINIC | Age: 80
End: 2020-04-27
Payer: COMMERCIAL

## 2020-04-27 DIAGNOSIS — E78.2 MIXED HYPERLIPIDEMIA: ICD-10-CM

## 2020-04-27 DIAGNOSIS — M81.0 AGE-RELATED OSTEOPOROSIS WITHOUT CURRENT PATHOLOGICAL FRACTURE: ICD-10-CM

## 2020-04-27 DIAGNOSIS — E11.9 TYPE 2 DIABETES MELLITUS WITHOUT COMPLICATION, WITHOUT LONG-TERM CURRENT USE OF INSULIN (HCC): Primary | ICD-10-CM

## 2020-04-27 DIAGNOSIS — I10 ESSENTIAL HYPERTENSION: ICD-10-CM

## 2020-04-27 DIAGNOSIS — Z79.01 LONG TERM CURRENT USE OF ANTICOAGULANT THERAPY: ICD-10-CM

## 2020-04-27 DIAGNOSIS — Z95.0 PACEMAKER: ICD-10-CM

## 2020-04-27 DIAGNOSIS — I49.5 SSS (SICK SINUS SYNDROME) (HCC): ICD-10-CM

## 2020-04-27 PROCEDURE — 99442 PR PHYS/QHP TELEPHONE EVALUATION 11-20 MIN: CPT | Performed by: FAMILY MEDICINE

## 2020-04-27 RX ORDER — BLOOD-GLUCOSE METER
KIT MISCELLANEOUS
Qty: 1 EACH | Refills: 0 | Status: SHIPPED | OUTPATIENT
Start: 2020-04-27

## 2020-04-27 RX ORDER — METOPROLOL TARTRATE 50 MG/1
50 TABLET, FILM COATED ORAL 2 TIMES DAILY
COMMUNITY
Start: 2020-04-08 | End: 2021-01-08

## 2020-05-14 ENCOUNTER — TELEPHONE (OUTPATIENT)
Dept: FAMILY MEDICINE CLINIC | Facility: CLINIC | Age: 80
End: 2020-05-14

## 2020-06-17 DIAGNOSIS — I10 ESSENTIAL HYPERTENSION: ICD-10-CM

## 2020-06-17 RX ORDER — LOSARTAN POTASSIUM 50 MG/1
TABLET ORAL
Qty: 90 TABLET | Refills: 3 | OUTPATIENT
Start: 2020-06-17

## 2020-06-17 RX ORDER — LOSARTAN POTASSIUM 25 MG/1
25 TABLET ORAL DAILY
Qty: 90 TABLET | Refills: 3 | Status: CANCELLED | OUTPATIENT
Start: 2020-06-17

## 2020-06-17 NOTE — TELEPHONE ENCOUNTER
Spoke with Roseanna Steele and they are aware of the discontinuation of the the 50 mg Losartan  States that you can send in rx for Losartan 25 mg and they will refill  Please advise

## 2020-06-17 NOTE — TELEPHONE ENCOUNTER
We sent an RX for 25 mg in April to [address]  The 50 mg dose was discontinued  Please call [facility] and see if they have that  Rx

## 2020-06-30 DIAGNOSIS — E11.39 TYPE 2 DIABETES MELLITUS WITH OTHER OPHTHALMIC COMPLICATION, WITHOUT LONG-TERM CURRENT USE OF INSULIN (HCC): ICD-10-CM

## 2020-06-30 DIAGNOSIS — E78.2 MIXED HYPERLIPIDEMIA: ICD-10-CM

## 2020-06-30 DIAGNOSIS — I10 ESSENTIAL HYPERTENSION: ICD-10-CM

## 2020-06-30 DIAGNOSIS — I48.92 ATRIAL FLUTTER, UNSPECIFIED TYPE (HCC): ICD-10-CM

## 2020-06-30 RX ORDER — METFORMIN HYDROCHLORIDE 500 MG/1
500 TABLET, EXTENDED RELEASE ORAL DAILY
Qty: 90 TABLET | Refills: 1 | Status: SHIPPED | OUTPATIENT
Start: 2020-06-30 | End: 2021-06-02

## 2020-06-30 RX ORDER — SIMVASTATIN 10 MG
10 TABLET ORAL
Qty: 90 TABLET | Refills: 1 | Status: SHIPPED | OUTPATIENT
Start: 2020-06-30

## 2020-06-30 RX ORDER — LOSARTAN POTASSIUM 25 MG/1
25 TABLET ORAL DAILY
Qty: 90 TABLET | Refills: 1 | Status: SHIPPED | OUTPATIENT
Start: 2020-06-30 | End: 2021-11-22 | Stop reason: ALTCHOICE

## 2020-07-16 NOTE — PROGRESS NOTES
Assessment/Plan:  Patient is 66-year-old female seen for follow-up of chronic medical conditions  Her last cholesterol was done in December and was 184  She continues to see Cardiology regarding her arrhythmia  Patient is a type 2 diabetes  And we did a hemoglobin A1c in the office at the visit and it was 6 3  Patient thinks she is going to need more laser surgery in right eye  Diagnoses and all orders for this visit:    Medicare annual wellness visit, subsequent    Mixed hyperlipidemia  -     simvastatin (ZOCOR) 10 mg tablet; Take 1 tablet (10 mg total) by mouth daily at bedtime    Atrial flutter, unspecified type (HCC)  -     metoprolol tartrate (LOPRESSOR) 50 mg tablet; Take 1 tablet (50 mg total) by mouth 2 (two) times a day  -     sotalol (BETAPACE) 80 mg tablet; Take 1 tablet (80 mg total) by mouth 2 (two) times a day  -     rivaroxaban (XARELTO) 20 mg tablet; Take 1 tablet (20 mg total) by mouth daily    Essential hypertension  -     losartan (COZAAR) 50 mg tablet; Take 1 tablet (50 mg total) by mouth daily    Type 2 diabetes mellitus without complication, without long-term current use of insulin (HCC)  -     metFORMIN (GLUCOPHAGE-XR) 500 mg 24 hr tablet; Take 1 tablet (500 mg total) by mouth daily  -     POCT hemoglobin A1c          Subjective:   Chief Complaint   Patient presents with    Follow-up    Medicare Wellness Visit        Patient ID: Nathaly Bruce is a 66 y o  female  HPI    The following portions of the patient's history were reviewed and updated as appropriate: allergies, current medications, past family history, past medical history, past social history, past surgical history and problem list     Review of Systems   Constitutional: Negative for chills and fever  HENT: Negative for congestion and sore throat  Eyes: Positive for visual disturbance  Respiratory: Negative for chest tightness  Cardiovascular: Negative for chest pain and palpitations     Gastrointestinal: Negative for abdominal pain, constipation, diarrhea and nausea  Genitourinary: Negative for difficulty urinating  Skin: Negative  Neurological: Negative for dizziness and headaches  Psychiatric/Behavioral: Negative  Objective:      /90 (BP Location: Left arm, Patient Position: Sitting)   Pulse 89   Temp (!) 96 2 °F (35 7 °C) (Tympanic)   Ht 4' 10 5" (1 486 m)   Wt 56 2 kg (123 lb 14 4 oz)   SpO2 99%   BMI 25 45 kg/m²          Physical Exam   Constitutional: She is oriented to person, place, and time  She appears well-developed  No distress  Neck: Carotid bruit is not present  No thyromegaly present  Cardiovascular: Normal rate, regular rhythm and normal heart sounds  Pulses are weak pulses  Pulses:       Dorsalis pedis pulses are 1+ on the right side, and 1+ on the left side  Posterior tibial pulses are 1+ on the right side, and 1+ on the left side  Pulmonary/Chest: Effort normal and breath sounds normal    Musculoskeletal: She exhibits no edema  Feet:   Right Foot:   Skin Integrity: Positive for callus  Left Foot:   Skin Integrity: Positive for callus  Lymphadenopathy:     She has no cervical adenopathy  Neurological: She is alert and oriented to person, place, and time  Skin: Skin is warm and dry  Psychiatric: She has a normal mood and affect  Nursing note and vitals reviewed  Patient's shoes and socks removed  Right Foot/Ankle   Right Foot Inspection  Skin Exam: callus and callus                          Toe Exam: ROM and strength within normal limits  Sensory       Monofilament testing: intact  Vascular    The right DP pulse is 1+  The right PT pulse is 1+  Left Foot/Ankle  Left Foot Inspection  Skin Exam: callus                         Toe Exam: ROM and strength within normal limits                   Sensory       Monofilament: intact  Vascular    The left DP pulse is 1+  The left PT pulse is 1+     Assign Risk Category:  Deformity present; No loss of protective sensation; Weak pulses       Risk: 1 Purse String (Simple) Text: Given the location of the defect and the characteristics of the surrounding skin a purse string simple closure was deemed most appropriate.  Undermining was performed circumferentially around the surgical defect.  A purse string suture was then placed and tightened.

## 2020-08-11 LAB
LEFT EYE DIABETIC RETINOPATHY: NORMAL
RIGHT EYE DIABETIC RETINOPATHY: NORMAL

## 2020-09-14 RX ORDER — FLECAINIDE ACETATE 50 MG/1
50 TABLET ORAL 2 TIMES DAILY
COMMUNITY
Start: 2020-06-05 | End: 2021-01-08

## 2020-09-14 RX ORDER — POTASSIUM CHLORIDE 750 MG/1
20 TABLET, EXTENDED RELEASE ORAL DAILY
COMMUNITY
Start: 2020-07-31

## 2020-09-14 RX ORDER — FUROSEMIDE 20 MG/1
40 TABLET ORAL DAILY
COMMUNITY
Start: 2020-05-29 | End: 2020-12-15 | Stop reason: SDUPTHER

## 2020-09-15 ENCOUNTER — APPOINTMENT (OUTPATIENT)
Dept: LAB | Facility: CLINIC | Age: 80
End: 2020-09-15
Payer: COMMERCIAL

## 2020-09-15 ENCOUNTER — OFFICE VISIT (OUTPATIENT)
Dept: FAMILY MEDICINE CLINIC | Facility: CLINIC | Age: 80
End: 2020-09-15
Payer: COMMERCIAL

## 2020-09-15 ENCOUNTER — TELEPHONE (OUTPATIENT)
Dept: ADMINISTRATIVE | Facility: OTHER | Age: 80
End: 2020-09-15

## 2020-09-15 ENCOUNTER — TELEPHONE (OUTPATIENT)
Dept: FAMILY MEDICINE CLINIC | Facility: CLINIC | Age: 80
End: 2020-09-15

## 2020-09-15 VITALS
WEIGHT: 129.4 LBS | OXYGEN SATURATION: 98 % | HEIGHT: 59 IN | SYSTOLIC BLOOD PRESSURE: 106 MMHG | BODY MASS INDEX: 26.08 KG/M2 | RESPIRATION RATE: 17 BRPM | DIASTOLIC BLOOD PRESSURE: 64 MMHG | HEART RATE: 89 BPM | TEMPERATURE: 97.6 F

## 2020-09-15 DIAGNOSIS — E11.8 TYPE 2 DIABETES MELLITUS WITH COMPLICATION, WITHOUT LONG-TERM CURRENT USE OF INSULIN (HCC): ICD-10-CM

## 2020-09-15 DIAGNOSIS — R74.8 ELEVATED AMYLASE: Primary | ICD-10-CM

## 2020-09-15 DIAGNOSIS — Z23 NEED FOR VACCINATION: ICD-10-CM

## 2020-09-15 DIAGNOSIS — R10.13 EPIGASTRIC PAIN: ICD-10-CM

## 2020-09-15 DIAGNOSIS — R74.8 ELEVATED AMYLASE: ICD-10-CM

## 2020-09-15 DIAGNOSIS — E11.9 TYPE 2 DIABETES MELLITUS WITHOUT COMPLICATION, WITHOUT LONG-TERM CURRENT USE OF INSULIN (HCC): Primary | ICD-10-CM
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
## 2020-09-15 DIAGNOSIS — Z00.00 MEDICARE ANNUAL WELLNESS VISIT, SUBSEQUENT: ICD-10-CM
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
## 2020-09-15 LAB
ALBUMIN SERPL BCP-MCNC: 3 G/DL (ref 3.5–5)
ALP SERPL-CCNC: 171 U/L (ref 46–116)
ALT SERPL W P-5'-P-CCNC: 33 U/L (ref 12–78)
AMYLASE SERPL-CCNC: 621 IU/L (ref 25–115)
ANION GAP SERPL CALCULATED.3IONS-SCNC: 4 MMOL/L (ref 4–13)
AST SERPL W P-5'-P-CCNC: 46 U/L (ref 5–45)
BASOPHILS # BLD AUTO: 0.04 THOUSANDS/ΜL (ref 0–0.1)
BASOPHILS NFR BLD AUTO: 1 % (ref 0–1)
BILIRUB SERPL-MCNC: 1.54 MG/DL (ref 0.2–1)
BUN SERPL-MCNC: 14 MG/DL (ref 5–25)
CALCIUM SERPL-MCNC: 9.4 MG/DL (ref 8.3–10.1)
CHLORIDE SERPL-SCNC: 108 MMOL/L (ref 100–108)
CO2 SERPL-SCNC: 31 MMOL/L (ref 21–32)
CREAT SERPL-MCNC: 1 MG/DL (ref 0.6–1.3)
EOSINOPHIL # BLD AUTO: 0.1 THOUSAND/ΜL (ref 0–0.61)
EOSINOPHIL NFR BLD AUTO: 3 % (ref 0–6)
ERYTHROCYTE [DISTWIDTH] IN BLOOD BY AUTOMATED COUNT: 18.7 % (ref 11.6–15.1)
GFR SERPL CREATININE-BSD FRML MDRD: 54 ML/MIN/1.73SQ M
GLUCOSE SERPL-MCNC: 126 MG/DL (ref 65–140)
HCT VFR BLD AUTO: 36.7 % (ref 34.8–46.1)
HGB BLD-MCNC: 12.2 G/DL (ref 11.5–15.4)
IMM GRANULOCYTES # BLD AUTO: 0.02 THOUSAND/UL (ref 0–0.2)
IMM GRANULOCYTES NFR BLD AUTO: 1 % (ref 0–2)
LIPASE SERPL-CCNC: 199 U/L (ref 73–393)
LYMPHOCYTES # BLD AUTO: 1.15 THOUSANDS/ΜL (ref 0.6–4.47)
LYMPHOCYTES NFR BLD AUTO: 33 % (ref 14–44)
MCH RBC QN AUTO: 32.7 PG (ref 26.8–34.3)
MCHC RBC AUTO-ENTMCNC: 33.2 G/DL (ref 31.4–37.4)
MCV RBC AUTO: 98 FL (ref 82–98)
MONOCYTES # BLD AUTO: 0.53 THOUSAND/ΜL (ref 0.17–1.22)
MONOCYTES NFR BLD AUTO: 15 % (ref 4–12)
NEUTROPHILS # BLD AUTO: 1.61 THOUSANDS/ΜL (ref 1.85–7.62)
NEUTS SEG NFR BLD AUTO: 47 % (ref 43–75)
NRBC BLD AUTO-RTO: 0 /100 WBCS
PLATELET # BLD AUTO: 117 THOUSANDS/UL (ref 149–390)
PMV BLD AUTO: 11.2 FL (ref 8.9–12.7)
POTASSIUM SERPL-SCNC: 4 MMOL/L (ref 3.5–5.3)
PROT SERPL-MCNC: 7.4 G/DL (ref 6.4–8.2)
RBC # BLD AUTO: 3.73 MILLION/UL (ref 3.81–5.12)
SL AMB POCT HEMOGLOBIN AIC: 7 (ref ?–6.5)
SODIUM SERPL-SCNC: 143 MMOL/L (ref 136–145)
WBC # BLD AUTO: 3.45 THOUSAND/UL (ref 4.31–10.16)

## 2020-09-15 PROCEDURE — 85025 COMPLETE CBC W/AUTO DIFF WBC: CPT

## 2020-09-15 PROCEDURE — G0008 ADMIN INFLUENZA VIRUS VAC: HCPCS | Performed by: FAMILY MEDICINE

## 2020-09-15 PROCEDURE — 90662 IIV NO PRSV INCREASED AG IM: CPT | Performed by: FAMILY MEDICINE

## 2020-09-15 PROCEDURE — 83690 ASSAY OF LIPASE: CPT

## 2020-09-15 PROCEDURE — G0439 PPPS, SUBSEQ VISIT: HCPCS | Performed by: FAMILY MEDICINE

## 2020-09-15 PROCEDURE — 36415 COLL VENOUS BLD VENIPUNCTURE: CPT

## 2020-09-15 PROCEDURE — 83036 HEMOGLOBIN GLYCOSYLATED A1C: CPT | Performed by: FAMILY MEDICINE

## 2020-09-15 PROCEDURE — 80053 COMPREHEN METABOLIC PANEL: CPT

## 2020-09-15 PROCEDURE — 1160F RVW MEDS BY RX/DR IN RCRD: CPT | Performed by: FAMILY MEDICINE

## 2020-09-15 PROCEDURE — 1170F FXNL STATUS ASSESSED: CPT | Performed by: FAMILY MEDICINE

## 2020-09-15 PROCEDURE — 1125F AMNT PAIN NOTED PAIN PRSNT: CPT | Performed by: FAMILY MEDICINE

## 2020-09-15 PROCEDURE — 82150 ASSAY OF AMYLASE: CPT

## 2020-09-15 PROCEDURE — 3725F SCREEN DEPRESSION PERFORMED: CPT | Performed by: FAMILY MEDICINE

## 2020-09-15 PROCEDURE — 1036F TOBACCO NON-USER: CPT | Performed by: FAMILY MEDICINE

## 2020-09-15 PROCEDURE — 99214 OFFICE O/P EST MOD 30 MIN: CPT | Performed by: FAMILY MEDICINE

## 2020-09-15 RX ORDER — METOPROLOL SUCCINATE 100 MG/1
TABLET, EXTENDED RELEASE ORAL
COMMUNITY
Start: 2020-09-04 | End: 2021-07-19 | Stop reason: ALTCHOICE

## 2020-09-15 RX ORDER — OMEPRAZOLE 40 MG/1
40 CAPSULE, DELAYED RELEASE ORAL DAILY
Qty: 30 CAPSULE | Refills: 1 | Status: SHIPPED | OUTPATIENT
Start: 2020-09-15 | End: 2020-10-07

## 2020-09-15 NOTE — PROGRESS NOTES
Assessment and Plan:    patient is seen for subsequent Medicare exam   Problem List Items Addressed This Visit        Endocrine    Type 2 diabetes mellitus with complication, without long-term current use of insulin ([address] ) - Primary      Other Visit Diagnoses     Need for vaccination        Relevant Orders    influenza vaccine, high-dose, PF 0 7 mL (FLUZONE HIGH-DOSE) (Completed)    Epigastric pain        Relevant Medications    omeprazole (PriLOSEC) 40 MG capsule    Other Relevant Orders    Ambulatory referral to Gastroenterology    CBC and differential (Completed)    Comprehensive metabolic panel (Completed)    Amylase (Completed)    Fall in home, initial encounter        Relevant Orders    Ambulatory referral to Physical Therapy    Medicare annual wellness visit, subsequent            BMI Counseling: Body mass index is 25 92 kg/m²  The BMI is above normal  Nutrition recommendations include encouraging healthy choices of fruits and vegetables, moderation in carbohydrate intake, increasing intake of lean protein and reducing intake of saturated and trans fat  Exercise recommendations include strength training exercises  No pharmacotherapy was ordered  Preventive health issues were discussed with patient, and age appropriate screening tests were ordered as noted in patient's After Visit Summary  Personalized health advice and appropriate referrals for health education or preventive services given if needed, as noted in patient's After Visit Summary  History of Present Illness:     Patient presents for Welcome to Medicare visit       Patient Care Team:  Cathie Griffin DO as PCP - General  DO Earl Stack MD Franchot Heath, DO Carmell Sloan, MD as Endoscopist     Review of Systems:        Problem List:     Patient Active Problem List   Diagnosis    Atrial flutter [facility])    Atrial septal defect    Type 2 diabetes mellitus with complication, without long-term current use of insulin ([facility] )    SSS (sick sinus syndrome) ([facility] )    Pacemaker    Age-related osteoporosis without current pathological fracture    Bilateral nonexudative age-related macular degeneration    Cataract, bilateral    Essential hypertension    Insomnia    Seborrhea    Hyperlipidemia    Long term current use of antiarrhythmic drug    Long term current use of anticoagulant therapy    Transaminitis      Past Medical and Surgical History:     Past Medical History:   Diagnosis Date    Allergic rhinitis     last assessed - [id number]    Arthritis     lower back    Atrial flutter ([place] [address] )     last assessed - [id number]    Diabetes mellitus ([place] [address] )     Type II    Fatigue     last assessed - [id number]    Heart murmur     tricupid reguritation, SSS    Hyperlipidemia     Hypertension     Insomnia     Irregular heart beat     Cardioversion, pacemaker, severe tricuspid regurgitation, atrial septal defect   Lower leg edema     last assessed - [id number]    Macular degeneration     b/l    Osteoporosis     Osteoporosis     Shortness of breath     prior to heart surgery    Sick sinus syndrome [facility])     last assessed - [id number]    Sting from hornet, wasp, or bee     last assessed - [id number]    Subconjunctival hemorrhage     unspecified laterality; last assessed - [id number]     Past Surgical History:   Procedure Laterality Date    ASD REPAIR, SECUNDUM      CARDIAC PACEMAKER PLACEMENT      CARDIAC SURGERY      Atrial septal defect repaired, pacemaker    COLONOSCOPY N/A 4/21/2016    Procedure: COLONOSCOPY;  Surgeon: Nadeem Harvey MD;  Location: [facility]; Service:     COLONOSCOPY W/ BIOPSIES AND POLYPECTOMY      ESOPHAGOGASTRODUODENOSCOPY      EYE SURGERY      lid lift    HYSTERECTOMY      at age 40 or 45    OOPHORECTOMY Left     DE COLONOSCOPY FLX DX W/COLLJ SPEC WHEN PFRMD N/A 5/10/2019    Procedure: COLONOSCOPY with polypectomy;  Surgeon: Nadeem Harvey MD;  Location: [facility];   Service: Gastroenterology  TOTAL ABDOMINAL HYSTERECTOMY      VEIN SURGERY Left     vericose vein left leg    WISDOM TOOTH EXTRACTION        Family History:     Family History   Problem Relation Age of Onset    No Known Problems Mother     Gout Brother     No Known Problems Father     Alcohol abuse Neg Hx     Substance Abuse Neg Hx       Social History:        Social History     Socioeconomic History    Marital status:      Spouse name: None    Number of children: 4    Years of education: None    Highest education level: None   Occupational History    None   Social Needs    Financial resource strain: None    Food insecurity     Worry: None     Inability: None    Transportation needs     Medical: None     Non-medical: None   Tobacco Use    Smoking status: Never Smoker    Smokeless tobacco: Never Used   Substance and Sexual Activity    Alcohol use: Yes     Frequency: 2-4 times a month     Drinks per session: 1 or 2     Binge frequency: Never     Comment: occas none recently; social drinker - per [employer]    Drug use: No    Sexual activity: None   Lifestyle    Physical activity     Days per week: None     Minutes per session: None    Stress: None   Relationships    Social connections     Talks on phone: None     Gets together: None     Attends Alevism service: None     Active member of club or organization: None     Attends meetings of clubs or organizations: None     Relationship status: None    Intimate partner violence     Fear of current or ex partner: None     Emotionally abused: None     Physically abused: None     Forced sexual activity: None   Other Topics Concern    None   Social History Narrative    Caffeine use    Episcopalian    Uses safety equipment - Seatbelts          Medications and Allergies:     Current Outpatient Medications   Medication Sig Dispense Refill    Calcium Citrate 250 MG TABS Take 1 tablet by mouth 2 (two) times a day        Cholecalciferol (VITAMIN D-3 PO) Take 1,000 Units by mouth daily   flecainide (TAMBOCOR) 50 mg tablet Take 50 mg by mouth 2 (two) times a day      furosemide (LASIX) 20 mg tablet Take 40 mg by mouth daily      glucose blood (FREESTYLE LITE) test strip by In Vitro route 4 (four) times a day      glucose monitoring kit (FREESTYLE) monitoring kit E11 9 Patient is type 2 diabetic  Test once daily 1 each 0    Lancets (FREESTYLE) lancets by Does not apply route daily      losartan (COZAAR) 25 mg tablet Take 1 tablet (25 mg total) by mouth daily 90 tablet 1    Magnesium 250 MG TABS Take 1 tablet by mouth daily   metFORMIN (GLUCOPHAGE-XR) 500 mg 24 hr tablet Take 1 tablet (500 mg total) by mouth daily 90 tablet 1    metoprolol succinate (TOPROL-XL) 100 mg 24 hr tablet       metoprolol tartrate (LOPRESSOR) 25 mg tablet Take 1 tablet (25 mg total) by mouth 2 (two) times a day 180 tablet 1    metoprolol tartrate (LOPRESSOR) 50 mg tablet Take 50 mg by mouth 2 (two) times a day      Multiple Vitamins-Minerals (CENTRUM SILVER PO) Take 1 tablet by mouth daily   potassium chloride (K-DUR,KLOR-CON) 10 mEq tablet Take 2 tablets (20 mEq total) by mouth every Mon/Wed/Fri and take 1 tablet (10 mEq total) every Tues/Thurs/Sat/Sun   rivaroxaban (Xarelto) 20 mg tablet Take 1 tablet (20 mg total) by mouth daily 90 tablet 1    simvastatin (ZOCOR) 10 mg tablet Take 1 tablet (10 mg total) by mouth daily at bedtime 90 tablet 1    omeprazole (PriLOSEC) 40 MG capsule Take 1 capsule (40 mg total) by mouth daily 30 capsule 1     No current facility-administered medications for this visit        Allergies   Allergen Reactions    Penicillins Anaphylaxis    Shellfish Allergy Anaphylaxis and Hives      Immunizations:     Immunization History   Administered Date(s) Administered    DT (pediatric) 11/09/2015    INFLUENZA 11/09/2015, 11/29/2016, 12/06/2017    Influenza Split High Dose Preservative Free IM 09/25/2014, 11/09/2015, 11/29/2016, 12/06/2017, 09/17/2019    Influenza TIV (IM) 11/27/2012, 10/01/2013    Influenza, high dose seasonal 0 7 mL 11/27/2018, 09/15/2020    Pneumococcal Conjugate 13-Valent 07/08/2015    Pneumococcal Polysaccharide PPV23 01/01/2004, 07/31/2019    TD (adult) Preservative Free 03/07/2017    Td (adult), adsorbed 11/09/2015    Tdap 03/07/2017    Zoster 08/02/2012      Health Maintenance:         Topic Date Due    DXA SCAN  12/10/2020         Topic Date Due    DTaP,Tdap,and Td Vaccines (1 - Tdap) 12/03/1961      Medicare Screening Tests and Risk Assessments:     Sarah is here for her Subsequent Wellness visit  Last Medicare Wellness visit information reviewed, patient interviewed and updates made to the record today  Health Risk Assessment:   Patient rates overall health as very good  Patient feels that their physical health rating is same  Eyesight was rated as same  Hearing was rated as same  Patient feels that their emotional and mental health rating is same  Pain experienced in the last 7 days has been none  Patient states that she has experienced no weight loss or gain in last 6 months  Depression Screening:   PHQ-2 Score: 0      Fall Risk Screening: In the past year, patient has experienced: history of falling in past year      Urinary Incontinence Screening:   Patient has leaked urine accidently in the last six months  Home Safety:  Patient does not have trouble with stairs inside or outside of their home  Patient has working smoke alarms and has working carbon monoxide detector  Home safety hazards include: none  Nutrition:   Current diet is Regular  Medications:   Patient is currently taking over-the-counter supplements  OTC medications include: see medication list  Patient is able to manage medications       Activities of Daily Living (ADLs)/Instrumental Activities of Daily Living (IADLs):   Walk and transfer into and out of bed and chair?: Yes  Dress and groom yourself?: Yes    Bathe or shower yourself?: Yes Feed yourself? Yes  Do your laundry/housekeeping?: Yes  Manage your money, pay your bills and track your expenses?: Yes  Make your own meals?: Yes    Do your own shopping?: Yes    Previous Hospitalizations:   Any hospitalizations or ED visits within the last 12 months?: No      Advance Care Planning:   Living will: Yes    Durable POA for healthcare:  Yes    Advanced directive: Yes    End of Life Decisions reviewed with patient: Yes      Cognitive Screening:   Provider or family/friend/caregiver concerned regarding cognition?: No    PREVENTIVE SCREENINGS      Cardiovascular Screening:    General: Screening Not Indicated and History Lipid Disorder      Diabetes Screening:     General: Screening Not Indicated and History Diabetes      Breast Cancer Screening:     General: Screening Current      Cervical Cancer Screening:    General: Screening Not Indicated      Osteoporosis Screening:    General: Screening Not Indicated and History Osteoporosis      Abdominal Aortic Aneurysm (AAA) Screening:        General: Screening Current      Lung Cancer Screening:     General: Screening Not Indicated      Hepatitis C Screening:    General: Screening Not Indicated         Physical Exam:     /64 (BP Location: Left arm, Patient Position: Sitting)   Pulse 89   Temp 97 6 °F (36 4 °C) (Tympanic)   Resp 17   Ht 4' 11 25" (1 505 m)   Wt 58 7 kg (129 lb 6 4 oz)   SpO2 98%   BMI 25 92 kg/m²       Marlea Mealing, DO

## 2020-09-15 NOTE — TELEPHONE ENCOUNTER
----- Message from Hannah Becker sent at 9/14/2020  8:04 PM EDT -----  Regarding: Austin Hey [phone]  09/14/20 8:04 PM    Hello, our patient Sahra Lowe has had Diabetic Eye Exam completed/performed  Please assist in updating the patient chart by pulling the document from the Media Tab  The date of service is 8/11/2020       Thank you,  Raina Merritt MA  [facility]

## 2020-09-15 NOTE — PROGRESS NOTES
Assessment/Plan:    Patient is a 72-year-old female with type 2 diabetes  She is accompanied by her daughter today  Her diabetes blood work shows an A1c of 7 0  we discussed her diet  She says she eats a lot of popcorn  I reviewed the letter from her cardiologist, Dr Mary Schumacher  We went over her medications with her daughter  I did order a blood count and chemistries including pancreatic enzymes  I did refer her to Gastroenterology, Dr Doreen Patel  She has seen him in the past      Diagnoses and all orders for this visit:    Type 2 diabetes mellitus without complication, without long-term current use of insulin ([facility])  -     POCT hemoglobin A1c    Need for vaccination  -     influenza vaccine, high-dose, PF 0 7 mL (FLUZONE HIGH-DOSE)    Type 2 diabetes mellitus with complication, without long-term current use of insulin ([address] )    Epigastric pain  -     Ambulatory referral to Gastroenterology; Future  -     CBC and differential; Future  -     Comprehensive metabolic panel; Future  -     Amylase; Future  -     omeprazole (PriLOSEC) 40 MG capsule; Take 1 capsule (40 mg total) by mouth daily    Fall in home, initial encounter  -     Ambulatory referral to Physical Therapy; Future    Other orders  -     furosemide (LASIX) 20 mg tablet; Take 40 mg by mouth daily  -     flecainide (TAMBOCOR) 50 mg tablet; Take 50 mg by mouth 2 (two) times a day  -     potassium chloride (K-DUR,KLOR-CON) 10 mEq tablet; Take 2 tablets (20 mEq total) by mouth every Mon/Wed/Fri and take 1 tablet (10 mEq total) every Tues/Thurs/Sat/Sun   -     metoprolol succinate (TOPROL-XL) 100 mg 24 hr tablet          Subjective:   Chief Complaint   Patient presents with    Follow-up     6 month check    Medicare Wellness Visit        Patient ID: Kaiser Azul is a 78 y o  female  Patient is here fro follow up of chronic medical conditions - she is still having swelling  She saw cardiology last week and had an aortic ultrasound today    Patient is here with daughter, [place]  Cardiology had recommended she see GI  Pain gets pain in epigastric pain , no taste and has pressure and relieved by TUMS which causes her to belch  Has less bowel  Movements  She did have an ultrasound in 2014 which was negative for gallstones  Fasting sugars running 113 - 118  Is fatigues by about 2 or 3 pm       The following portions of the patient's history were reviewed and updated as appropriate: allergies, current medications, past family history, past medical history, past social history, past surgical history and problem list     Review of Systems   Constitutional: Negative for chills and fever  HENT: Negative for congestion and sore throat  Respiratory: Positive for shortness of breath  Negative for chest tightness  Cardiovascular: Positive for leg swelling  Negative for chest pain and palpitations  Gastrointestinal: Positive for abdominal pain and nausea  Negative for constipation and diarrhea  Genitourinary: Negative for difficulty urinating  Skin: Negative  Neurological: Negative for dizziness and headaches  Psychiatric/Behavioral: Negative  Objective:      /64 (BP Location: Left arm, Patient Position: Sitting)   Pulse 89   Temp 97 6 °F (36 4 °C) (Tympanic)   Resp 17   Ht 4' 11 25" (1 505 m)   Wt 58 7 kg (129 lb 6 4 oz)   SpO2 98%   BMI 25 92 kg/m²          Physical Exam  Vitals signs and nursing note reviewed  Constitutional:       General: She is not in acute distress  HENT:      Right Ear: Tympanic membrane normal       Left Ear: Tympanic membrane normal    Eyes:      General: No scleral icterus  Neck:      Thyroid: No thyromegaly  Cardiovascular:      Rate and Rhythm: Normal rate and regular rhythm  Heart sounds: Normal heart sounds  Pulmonary:      Effort: Pulmonary effort is normal       Breath sounds: Normal breath sounds  Abdominal:      General: Bowel sounds are normal       Palpations: Abdomen is soft  Tenderness: There is abdominal tenderness  Musculoskeletal:      Right lower leg: Edema present  Left lower leg: Edema present  Lymphadenopathy:      Cervical: No cervical adenopathy  Skin:     General: Skin is warm and dry  Comments: stasis   Neurological:      Mental Status: She is alert and oriented to person, place, and time     Psychiatric:         Mood and Affect: Mood normal

## 2020-09-15 NOTE — TELEPHONE ENCOUNTER
----- Message from Hannah Laureano sent at 9/14/2020  8:06 PM EDT -----  Regarding: Mehul Richardson [phone]  09/14/20 8:06 PM    Hello, our patient Celine Hay has had Immunization(s) TDaPcompleted/performed  Please assist in updating the patient chart by pulling the document from Immunization Tab within Chart Review  The date of service is 2017       Thank you,  Dereje Boles MA  [facility]

## 2020-09-15 NOTE — TELEPHONE ENCOUNTER
I spoke to daughter about results  Jaylin, I did put the order in for a lipase and Epic gave me the option of adding it to today's blood draw  Can you check with lab that they are able to do that>?

## 2020-09-15 NOTE — PATIENT INSTRUCTIONS
Medicare Preventive Visit Patient Instructions  Thank you for completing your Welcome to Medicare Visit or Medicare Annual Wellness Visit today  Your next wellness visit will be due in one year (9/15/2021)  The screening/preventive services that you may require over the next 5-10 years are detailed below  Some tests may not apply to you based off risk factors and/or age  Screening tests ordered at today's visit but not completed yet may show as past due  Also, please note that scanned in results may not display below  Preventive Screenings:  Service Recommendations Previous Testing/Comments   Colorectal Cancer Screening  * Colonoscopy    * Fecal Occult Blood Test (FOBT)/Fecal Immunochemical Test (FIT)  * Fecal DNA/Cologuard Test  * Flexible Sigmoidoscopy Age: 54-65 years old   Colonoscopy: every 10 years (may be performed more frequently if at higher risk)  OR  FOBT/FIT: every 1 year  OR  Cologuard: every 3 years  OR  Sigmoidoscopy: every 5 years  Screening may be recommended earlier than age 48 if at higher risk for colorectal cancer  Also, an individualized decision between you and your healthcare provider will decide whether screening between the ages of 74-80 would be appropriate  Colonoscopy: Not on file  FOBT/FIT: Not on file  Cologuard: Not on file  Sigmoidoscopy: Not on file         Breast Cancer Screening Age: 36 years old  Frequency: every 1-2 years  Not required if history of left and right mastectomy Mammogram: 12/10/2018    Screening Current   Cervical Cancer Screening Between the ages of 21-29, pap smear recommended once every 3 years  Between the ages of 33-67, can perform pap smear with HPV co-testing every 5 years     Recommendations may differ for women with a history of total hysterectomy, cervical cancer, or abnormal pap smears in past  Pap Smear: Not on file    Screening Not Indicated   Hepatitis C Screening Once for adults born between 1945 and 1965  More frequently in patients at high risk for Hepatitis C Hep C Antibody: Not on file       Diabetes Screening 1-2 times per year if you're at risk for diabetes or have pre-diabetes Fasting glucose: 91 mg/dL   A1C: 7 0 %    Screening Not Indicated  History Diabetes   Cholesterol Screening Once every 5 years if you don't have a lipid disorder  May order more often based on risk factors  Lipid panel: 02/12/2020    Screening Not Indicated  History Lipid Disorder     Other Preventive Screenings Covered by Medicare:  1  Abdominal Aortic Aneurysm (AAA) Screening: covered once if your at risk  You're considered to be at risk if you have a family history of AAA  2  Lung Cancer Screening: covers low dose CT scan once per year if you meet all of the following conditions: (1) Age 50-69; (2) No signs or symptoms of lung cancer; (3) Current smoker or have quit smoking within the last 15 years; (4) You have a tobacco smoking history of at least 30 pack years (packs per day multiplied by number of years you smoked); (5) You get a written order from a healthcare provider  3  Glaucoma Screening: covered annually if you're considered high risk: (1) You have diabetes OR (2) Family history of glaucoma OR (3)  aged 48 and older OR (3)  American aged 72 and older  3  Osteoporosis Screening: covered every 2 years if you meet one of the following conditions: (1) You're estrogen deficient and at risk for osteoporosis based off medical history and other findings; (2) Have a vertebral abnormality; (3) On glucocorticoid therapy for more than 3 months; (4) Have primary hyperparathyroidism; (5) On osteoporosis medications and need to assess response to drug therapy  · Last bone density test (DXA Scan): 12/10/2018  5  HIV Screening: covered annually if you're between the age of 12-76  Also covered annually if you are younger than 13 and older than 72 with risk factors for HIV infection   For pregnant patients, it is covered up to 3 times per pregnancy  Immunizations:  Immunization Recommendations   Influenza Vaccine Annual influenza vaccination during flu season is recommended for all persons aged >= 6 months who do not have contraindications   Pneumococcal Vaccine (Prevnar and Pneumovax)  * Prevnar = PCV13  * Pneumovax = PPSV23   Adults 25-60 years old: 1-3 doses may be recommended based on certain risk factors  Adults 72 years old: Prevnar (PCV13) vaccine recommended followed by Pneumovax (PPSV23) vaccine  If already received PPSV23 since turning 65, then PCV13 recommended at least one year after PPSV23 dose  Hepatitis B Vaccine 3 dose series if at intermediate or high risk (ex: diabetes, end stage renal disease, liver disease)   Tetanus (Td) Vaccine - COST NOT COVERED BY MEDICARE PART B Following completion of primary series, a booster dose should be given every 10 years to maintain immunity against tetanus  Td may also be given as tetanus wound prophylaxis  Tdap Vaccine - COST NOT COVERED BY MEDICARE PART B Recommended at least once for all adults  For pregnant patients, recommended with each pregnancy  Shingles Vaccine (Shingrix) - COST NOT COVERED BY MEDICARE PART B  2 shot series recommended in those aged 48 and above     Health Maintenance Due:      Topic Date Due    DXA SCAN  12/10/2020     Immunizations Due:      Topic Date Due    DTaP,Tdap,and Td Vaccines (1 - Tdap) 12/03/1961    Influenza Vaccine  07/01/2020     Advance Directives   What are advance directives? Advance directives are legal documents that state your wishes and plans for medical care  These plans are made ahead of time in case you lose your ability to make decisions for yourself  Advance directives can apply to any medical decision, such as the treatments you want, and if you want to donate organs  What are the types of advance directives? There are many types of advance directives, and each state has rules about how to use them   You may choose a combination of any of the following:  · Living will: This is a written record of the treatment you want  You can also choose which treatments you do not want, which to limit, and which to stop at a certain time  This includes surgery, medicine, IV fluid, and tube feedings  · Durable power of  for healthcare [facility]): This is a written record that states who you want to make healthcare choices for you when you are unable to make them for yourself  This person, called a proxy, is usually a family member or a friend  You may choose more than 1 proxy  · Do not resuscitate (DNR) order:  A DNR order is used in case your heart stops beating or you stop breathing  It is a request not to have certain forms of treatment, such as CPR  A DNR order may be included in other types of advance directives  · Medical directive: This covers the care that you want if you are in a coma, near death, or unable to make decisions for yourself  You can list the treatments you want for each condition  Treatment may include pain medicine, surgery, blood transfusions, dialysis, IV or tube feedings, and a ventilator (breathing machine)  · Values history: This document has questions about your views, beliefs, and how you feel and think about life  This information can help others choose the care that you would choose  Why are advance directives important? An advance directive helps you control your care  Although spoken wishes may be used, it is better to have your wishes written down  Spoken wishes can be misunderstood, or not followed  Treatments may be given even if you do not want them  An advance directive may make it easier for your family to make difficult choices about your care  Urinary Incontinence   Urinary incontinence (UI)  is when you lose control of your bladder  UI develops because your bladder cannot store or empty urine properly   The 3 most common types of UI are stress incontinence, urge incontinence, or both   Medicines:   · May be given to help strengthen your bladder control  Report any side effects of medication to your healthcare provider  Do pelvic muscle exercises often:  Your pelvic muscles help you stop urinating  Squeeze these muscles tight for 5 seconds, then relax for 5 seconds  Gradually work up to squeezing for 10 seconds  Do 3 sets of 15 repetitions a day, or as directed  This will help strengthen your pelvic muscles and improve bladder control  Train your bladder:  Go to the bathroom at set times, such as every 2 hours, even if you do not feel the urge to go  You can also try to hold your urine when you feel the urge to go  For example, hold your urine for 5 minutes when you feel the urge to go  As that becomes easier, hold your urine for 10 minutes  Self-care:   · Keep a UI record  Write down how often you leak urine and how much you leak  Make a note of what you were doing when you leaked urine  · Drink liquids as directed  You may need to limit the amount of liquid you drink to help control your urine leakage  Do not drink any liquid right before you go to bed  Limit or do not have drinks that contain caffeine or alcohol  · Prevent constipation  Eat a variety of high-fiber foods  Good examples are high-fiber cereals, beans, vegetables, and whole-grain breads  Walking is the best way to trigger your intestines to have a bowel movement  · Exercise regularly and maintain a healthy weight  Weight loss and exercise will decrease pressure on your bladder and help you control your leakage  · Use a catheter as directed  to help empty your bladder  A catheter is a tiny, plastic tube that is put into your bladder to drain your urine  · Go to behavior therapy as directed  Behavior therapy may be used to help you learn to control your urge to urinate      Weight Management   Why it is important to manage your weight:  Being overweight increases your risk of health conditions such as heart disease, high blood pressure, type 2 diabetes, and certain types of cancer  It can also increase your risk for osteoarthritis, sleep apnea, and other respiratory problems  Aim for a slow, steady weight loss  Even a small amount of weight loss can lower your risk of health problems  How to lose weight safely:  A safe and healthy way to lose weight is to eat fewer calories and get regular exercise  You can lose up about 1 pound a week by decreasing the number of calories you eat by 500 calories each day  Healthy meal plan for weight management:  A healthy meal plan includes a variety of foods, contains fewer calories, and helps you stay healthy  A healthy meal plan includes the following:  · Eat whole-grain foods more often  A healthy meal plan should contain fiber  Fiber is the part of grains, fruits, and vegetables that is not broken down by your body  Whole-grain foods are healthy and provide extra fiber in your diet  Some examples of whole-grain foods are whole-wheat breads and pastas, oatmeal, brown rice, and bulgur  · Eat a variety of vegetables every day  Include dark, leafy greens such as spinach, kale, fabiola greens, and mustard greens  Eat yellow and orange vegetables such as carrots, sweet potatoes, and winter squash  · Eat a variety of fruits every day  Choose fresh or canned fruit (canned in its own juice or light syrup) instead of juice  Fruit juice has very little or no fiber  · Eat low-fat dairy foods  Drink fat-free (skim) milk or 1% milk  Eat fat-free yogurt and low-fat cottage cheese  Try low-fat cheeses such as mozzarella and other reduced-fat cheeses  · Choose meat and other protein foods that are low in fat  Choose beans or other legumes such as split peas or lentils  Choose fish, skinless poultry (chicken or turkey), or lean cuts of red meat (beef or pork)  Before you cook meat or poultry, cut off any visible fat  · Use less fat and oil  Try baking foods instead of frying them  Add less fat, such as margarine, sour cream, regular salad dressing and mayonnaise to foods  Eat fewer high-fat foods  Some examples of high-fat foods include french fries, doughnuts, ice cream, and cakes  · Eat fewer sweets  Limit foods and drinks that are high in sugar  This includes candy, cookies, regular soda, and sweetened drinks  Exercise:  Exercise at least 30 minutes per day on most days of the week  Some examples of exercise include walking, biking, dancing, and swimming  You can also fit in more physical activity by taking the stairs instead of the elevator or parking farther away from stores  Ask your healthcare provider about the best exercise plan for you  © Copyright [employer] 2018 Information is for End User's use only and may not be sold, redistributed or otherwise used for commercial purposes   All illustrations and images included in CareNotes® are the copyrighted property of [employer]  or [address] [place] [address]

## 2020-09-15 NOTE — TELEPHONE ENCOUNTER
Upon review of your request/inquiry, we have found this is a duplicate request and no further action is needed  This request was completed upon initial request, the patient chart is up to date, and this message will now be closed  Any additional questions or concerns should be emailed to the Practice Liaisons via [email or web address] email, please do not reply via In Basket       Thank you  Luciana Dubois

## 2020-09-16 ENCOUNTER — HOSPITAL ENCOUNTER (OUTPATIENT)
Dept: ULTRASOUND IMAGING | Facility: HOSPITAL | Age: 80
Discharge: HOME | End: 2020-09-16
Payer: COMMERCIAL

## 2020-09-16 DIAGNOSIS — R10.13 EPIGASTRIC PAIN: Primary | ICD-10-CM

## 2020-09-16 DIAGNOSIS — R10.13 EPIGASTRIC PAIN: ICD-10-CM

## 2020-09-16 PROCEDURE — 76700 US EXAM ABDOM COMPLETE: CPT

## 2020-09-21 ENCOUNTER — EVALUATION (OUTPATIENT)
Dept: PHYSICAL THERAPY | Facility: CLINIC | Age: 80
End: 2020-09-21
Payer: COMMERCIAL
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
## 2020-09-21 DIAGNOSIS — R26.89 BALANCE DISORDER: ICD-10-CM

## 2020-09-21 PROCEDURE — 97161 PT EVAL LOW COMPLEX 20 MIN: CPT | Performed by: PHYSICAL THERAPIST

## 2020-09-21 NOTE — PROGRESS NOTES
PT Evaluation       Today's date: 2020  Patient name: Suri Neal  : 1940  MRN: [id number]  Referring provider: Mei Shoemaker DO  Dx:   Encounter Diagnosis     ICD-10-CM    1  Fall in home, subsequent encounter  W19  XXXD Ambulatory referral to Physical Therapy    Y92 009    2  Balance disorder  R26 89                   Assessment  Assessment details: Pt is a 78year old female presenting to PT with MD diagnosis of balance dysfunction and fall risk  She presents with bilateral leg swelling/weakness, balance dysfunction, and decreased endurance  Patient would benefit from skilled PT services to address these issues and to maximize function  Thank you for the referral      Understanding of Dx/Px/POC: good   Prognosis: good    Goals  STG  1  Increase strength 1/2 grade in 4 weeks  2  Balance & endurance & gait & locomotion are improved by 50% in 4 weeks    LTG  1  Ambulation is improved to maximal level of function  2  Patient is independent with HEP      Plan  Patient would benefit from: skilled physical therapy  Other planned modality interventions: prn  Planned therapy interventions: home exercise program, therapeutic exercise, therapeutic activities and neuromuscular re-education  Frequency: 2x week  Duration in weeks: 8  Treatment plan discussed with: patient        Subjective Evaluation    History of Present Illness  Mechanism of injury: Pt is a 78year old female presenting to PT with MD diagnosis of balance dysfunction and fall risk  Pt had recent fall at home  Pt states she got out of bed too quickly and her left knee gave out  Denies any injury  She states difficulty getting up due to weakness  Denies pain, reports main complaints of leg weakness  Reports she has swelling in her ankles, not a result of the fall  She is on medication for fluid build up in her legs   Discussed wearing compression socks to manage swelling and help with fatigue, patient reporting she tried them but they are too difficult to put on/off  Denies numbness/tingling into lower extremity  Follow up with MD scheduled for 3 months  Denies recent imaging  Pt reports she walks with a cane sometimes if she is feeling weak or on uneven surfaces  States she is quick to fatigue, reports she is able to ambulate 100 ft before needing rest break  Pt states she needs railing on both sides to walk up stairs  Denies dizziness/nausea       Pain  No pain reported    Social Support  Steps to enter house: yes  Stairs in house: yes   Lives in: multiple-level home  Lives with: adult children (son lives with her)    Employment status: not working  Hand dominance: right      Diagnostic Tests  No diagnostic tests performed  Treatments  No previous or current treatments  Patient Goals  Patient goals for therapy: decreased edema, improved balance, increased strength, independence with ADLs/IADLs and return to sport/leisure activities          Objective     General Comments:      Lumbar Comments  Lumbar AROM: wfl  Elevated pelvis on right  Hip MMT: 4/5 flexion, IR/ER on right                  3+/5 flexion, IR/ER on left  Knee MMT: 4/5 extension/flexion on right                     3+/5 extension/flexion on left  DF: 4/5 on right, 3/5 on left  PF: able to perform 10 DL heel raises with some fatigue reported  Hip PROM: wnl, limited hamstring length bilaterally    FT eyes open: mild sway, 30 seconds  Modified Rhomberg: moderate sway, 30 seconds    Flowsheet Rows      Most Recent Value   PT/OT G-Codes   Current Score  78   Projected Score  78           Precautions: *PACEMAKER*, DMII, HTN, osteoporosis       Manuals 9/21                                                                Neuro Re-Ed             Resisted hip iso flexion OR t-band marches NV            clamshells NV            bridges NV                                                                Ther Ex             Bike warm up NV            Standing gastroc block stretch NV Supine hamstring glides with strap/ankle pumps NV                                                                             Ther Activity             Standing balance:  -FT eyes open  -Modified tandem eyes open  -FT on foam NV             Standing DL heel raises NV            Standing functional marches NV            Side stepping at railing NV            stairs As able                         Gait Training                                       Modalities             prn

## 2020-09-25 ENCOUNTER — OFFICE VISIT (OUTPATIENT)
Dept: PHYSICAL THERAPY | Facility: CLINIC | Age: 80
End: 2020-09-25
Payer: COMMERCIAL
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
## 2020-09-25 DIAGNOSIS — R26.89 BALANCE DISORDER: ICD-10-CM
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
## 2020-09-25 PROCEDURE — 97112 NEUROMUSCULAR REEDUCATION: CPT

## 2020-09-25 PROCEDURE — 97110 THERAPEUTIC EXERCISES: CPT

## 2020-09-25 PROCEDURE — 97530 THERAPEUTIC ACTIVITIES: CPT

## 2020-09-25 NOTE — PROGRESS NOTES
Daily Note     Today's date: 2020  Patient name: Ana M Lemus  : 1940  MRN: [id number]  Referring provider: Brett Pantoja DO  Dx:   Encounter Diagnosis     ICD-10-CM    1  Fall in home, subsequent encounter  W19  XXXD     Y92 009    2  Balance disorder  R26 89                   Subjective: Pt reports feeling ok with no new c/o's since 1st visit  Objective: See treatment diary below  Issued pt HEP  Assessment: Tolerated treatment well  Verbal/tactile cues provided with exercise for proper technique  Pt reported minimal low back discomfort post exercise  Minimal CG assist required with modified tandem balance exercise  Patient would benefit from continued PT      Plan: Continue per plan of care        Precautions: *PACEMAKER*, DMII, HTN, osteoporosis       Manuals                                                                Neuro Re-Ed             Resisted hip iso flexion OR t-band marches NV tband march RTB x10 ea*           clamshells NV supine RTB 5"x10*           bridges NV 5"x10*                                                               Ther Ex             Bike warm up NV 5 min           Standing gastroc block stretch NV 10"x5 each 1/2 foam*           Supine hamstring glides with strap/ankle pumps NV 10"x5 each manual asist with strap                                                                            Ther Activity             Standing balance:  -FT eyes open  -Modified tandem eyes open  -FT on foam NV  FT 30"x2  modified tandem 30"x2           Standing DL heel raises NV NV           Standing functional marches NV NV           Side stepping at railing NV NV           stairs As able                         Gait Training                                       Modalities             prn                             HEP*

## 2020-09-28 ENCOUNTER — OFFICE VISIT (OUTPATIENT)
Dept: PHYSICAL THERAPY | Facility: CLINIC | Age: 80
End: 2020-09-28
Payer: COMMERCIAL

## 2020-09-28 DIAGNOSIS — I48.92 ATRIAL FLUTTER, UNSPECIFIED TYPE (HCC): ICD-10-CM
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
## 2020-09-28 DIAGNOSIS — R26.89 BALANCE DISORDER: ICD-10-CM

## 2020-09-28 PROCEDURE — 97112 NEUROMUSCULAR REEDUCATION: CPT

## 2020-09-28 PROCEDURE — 97530 THERAPEUTIC ACTIVITIES: CPT

## 2020-09-28 PROCEDURE — 97110 THERAPEUTIC EXERCISES: CPT

## 2020-09-28 NOTE — PROGRESS NOTES
Daily Note     Today's date: 2020  Patient name: Alondra Gomez  : 1940  MRN: [id number]  Referring provider: Marla Correa DO  Dx:   Encounter Diagnosis     ICD-10-CM    1  Fall in home, subsequent encounter  W19  XXXD     Y92 009    2  Balance disorder  R26 89                   Subjective: Patient stated that she did not fall since last visit, balance is till so so  Objective: See treatment diary below      Assessment: Tolerated treatment well  Patient presented with decreased vickie and stride length  Low tolerance to bike warm up due to knee painPatient demonstrated good TE form throughout session  Good tolerance to new standing TE  Moderate swaying on foam tandem stance, 1 LOB and patient is able to self correct with railing  LLE weakness noted with stair training and is unable to ascend stairs reciprocally, initiated 4'' step ups  B/L hamstring tightness noted  Patient would benefit from continued PT        Plan: continue per plan of care     Precautions: *PACEMAKER*, DMII, HTN, osteoporosis       Manuals                                                               Neuro Re-Ed             Resisted hip iso flexion OR t-band marches NV tband march RTB x10 ea* tband march RTB x10 ea*          clamshells NV supine RTB 5"x10* supine RTB 5"x10*          bridges NV 5"x10* 10x5"                                                              Ther Ex             Bike warm up NV 5 min 5 min L1          Standing gastroc block stretch NV 10"x5 each 1/2 foam* 10"x5 each 1/2 foam*          Supine hamstring glides with strap/ankle pumps NV 10"x5 each manual asist with strap 10"x5 each manual asist with strap          Step ups    4"x15 LLE                                                              Ther Activity             Standing balance:  -FT eyes open  -Modified tandem eyes open  -FT on foam NV  FT 30"x2  modified tandem 30"x2 FT 30"x2  modified tandem 30"x2          Standing DL heel raises NV NV 2x10          Standing functional marches NV NV 15x          Side stepping at railing NV NV 20'x2          stairs As able  5'                       Gait Training                                       Modalities             prn

## 2020-09-28 NOTE — TELEPHONE ENCOUNTER
Pt's daughter Ron Cherry would like to try using a good RX coupon, please send script to [address]

## 2020-10-02 ENCOUNTER — OFFICE VISIT (OUTPATIENT)
Dept: PHYSICAL THERAPY | Facility: CLINIC | Age: 80
End: 2020-10-02
Payer: COMMERCIAL
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
## 2020-10-02 DIAGNOSIS — R26.89 BALANCE DISORDER: ICD-10-CM

## 2020-10-02 PROCEDURE — 97110 THERAPEUTIC EXERCISES: CPT

## 2020-10-02 PROCEDURE — 97530 THERAPEUTIC ACTIVITIES: CPT

## 2020-10-02 PROCEDURE — 97112 NEUROMUSCULAR REEDUCATION: CPT

## 2020-10-06 ENCOUNTER — OFFICE VISIT (OUTPATIENT)
Dept: PHYSICAL THERAPY | Facility: CLINIC | Age: 80
End: 2020-10-06
Payer: COMMERCIAL
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
## 2020-10-06 DIAGNOSIS — R26.89 BALANCE DISORDER: ICD-10-CM
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
## 2020-10-06 PROCEDURE — 97110 THERAPEUTIC EXERCISES: CPT

## 2020-10-06 PROCEDURE — 97112 NEUROMUSCULAR REEDUCATION: CPT

## 2020-10-06 PROCEDURE — 97530 THERAPEUTIC ACTIVITIES: CPT

## 2020-10-07 DIAGNOSIS — R10.13 EPIGASTRIC PAIN: ICD-10-CM

## 2020-10-07 RX ORDER — OMEPRAZOLE 40 MG/1
CAPSULE, DELAYED RELEASE ORAL
Qty: 30 CAPSULE | Refills: 1 | Status: SHIPPED | OUTPATIENT
Start: 2020-10-07 | End: 2020-10-31

## 2020-10-08 ENCOUNTER — OFFICE VISIT (OUTPATIENT)
Dept: PHYSICAL THERAPY | Facility: CLINIC | Age: 80
End: 2020-10-08
Payer: COMMERCIAL

## 2020-10-08 DIAGNOSIS — R26.89 BALANCE DISORDER: ICD-10-CM
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
## 2020-10-08 PROCEDURE — 97530 THERAPEUTIC ACTIVITIES: CPT

## 2020-10-08 PROCEDURE — 97112 NEUROMUSCULAR REEDUCATION: CPT

## 2020-10-08 PROCEDURE — 97110 THERAPEUTIC EXERCISES: CPT

## 2020-10-12 ENCOUNTER — APPOINTMENT (OUTPATIENT)
Dept: PHYSICAL THERAPY | Facility: CLINIC | Age: 80
End: 2020-10-12
Payer: COMMERCIAL

## 2020-10-15 ENCOUNTER — OFFICE VISIT (OUTPATIENT)
Dept: PHYSICAL THERAPY | Facility: CLINIC | Age: 80
End: 2020-10-15
Payer: COMMERCIAL

## 2020-10-15 DIAGNOSIS — R26.89 BALANCE DISORDER: ICD-10-CM
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
## 2020-10-15 PROCEDURE — 97530 THERAPEUTIC ACTIVITIES: CPT | Performed by: PHYSICAL THERAPIST

## 2020-10-15 PROCEDURE — 97112 NEUROMUSCULAR REEDUCATION: CPT | Performed by: PHYSICAL THERAPIST

## 2020-10-15 PROCEDURE — 97110 THERAPEUTIC EXERCISES: CPT | Performed by: PHYSICAL THERAPIST

## 2020-10-19 ENCOUNTER — OFFICE VISIT (OUTPATIENT)
Dept: PHYSICAL THERAPY | Facility: CLINIC | Age: 80
End: 2020-10-19
Payer: COMMERCIAL
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
## 2020-10-19 DIAGNOSIS — R26.89 BALANCE DISORDER: ICD-10-CM

## 2020-10-19 PROCEDURE — 97530 THERAPEUTIC ACTIVITIES: CPT

## 2020-10-19 PROCEDURE — 97112 NEUROMUSCULAR REEDUCATION: CPT

## 2020-10-19 PROCEDURE — 97110 THERAPEUTIC EXERCISES: CPT

## 2020-10-22 ENCOUNTER — APPOINTMENT (OUTPATIENT)
Dept: PHYSICAL THERAPY | Facility: CLINIC | Age: 80
End: 2020-10-22
Payer: COMMERCIAL

## 2020-10-28 ENCOUNTER — HOSPITAL ENCOUNTER (OUTPATIENT)
Dept: CT IMAGING | Facility: HOSPITAL | Age: 80
Discharge: HOME | End: 2020-10-28
Attending: INTERNAL MEDICINE
Payer: COMMERCIAL

## 2020-10-28 DIAGNOSIS — R10.13 EPIGASTRIC PAIN: ICD-10-CM

## 2020-10-28 PROCEDURE — G1004 CDSM NDSC: HCPCS

## 2020-10-28 PROCEDURE — 74177 CT ABD & PELVIS W/CONTRAST: CPT

## 2020-10-28 RX ADMIN — IOHEXOL 100 ML: 350 INJECTION, SOLUTION INTRAVENOUS at 13:50

## 2020-10-31 DIAGNOSIS — R10.13 EPIGASTRIC PAIN: ICD-10-CM

## 2020-10-31 RX ORDER — OMEPRAZOLE 40 MG/1
CAPSULE, DELAYED RELEASE ORAL
Qty: 30 CAPSULE | Refills: 1 | Status: SHIPPED | OUTPATIENT
Start: 2020-10-31 | End: 2020-11-24

## 2020-11-24 DIAGNOSIS — R10.13 EPIGASTRIC PAIN: ICD-10-CM

## 2020-11-24 RX ORDER — OMEPRAZOLE 40 MG/1
CAPSULE, DELAYED RELEASE ORAL
Qty: 30 CAPSULE | Refills: 1 | Status: SHIPPED | OUTPATIENT
Start: 2020-11-24 | End: 2020-12-22

## 2020-12-15 ENCOUNTER — APPOINTMENT (OUTPATIENT)
Dept: RADIOLOGY | Facility: CLINIC | Age: 80
End: 2020-12-15
Payer: COMMERCIAL

## 2020-12-15 ENCOUNTER — LAB (OUTPATIENT)
Dept: LAB | Facility: CLINIC | Age: 80
End: 2020-12-15
Payer: COMMERCIAL

## 2020-12-15 ENCOUNTER — OFFICE VISIT (OUTPATIENT)
Dept: FAMILY MEDICINE CLINIC | Facility: CLINIC | Age: 80
End: 2020-12-15
Payer: COMMERCIAL

## 2020-12-15 VITALS
HEART RATE: 54 BPM | WEIGHT: 137.4 LBS | SYSTOLIC BLOOD PRESSURE: 122 MMHG | RESPIRATION RATE: 18 BRPM | HEIGHT: 60 IN | OXYGEN SATURATION: 98 % | TEMPERATURE: 98.5 F | BODY MASS INDEX: 26.97 KG/M2 | DIASTOLIC BLOOD PRESSURE: 82 MMHG

## 2020-12-15 DIAGNOSIS — I50.33 DIASTOLIC CHF, ACUTE ON CHRONIC (HCC): ICD-10-CM

## 2020-12-15 DIAGNOSIS — R06.02 SHORTNESS OF BREATH: ICD-10-CM

## 2020-12-15 DIAGNOSIS — R60.0 LOWER EXTREMITY EDEMA: ICD-10-CM

## 2020-12-15 DIAGNOSIS — D69.6 PLATELETS DECREASED (HCC): ICD-10-CM

## 2020-12-15 DIAGNOSIS — Z78.0 POST-MENOPAUSAL: ICD-10-CM

## 2020-12-15 DIAGNOSIS — R74.8 ELEVATED LIVER ENZYMES: ICD-10-CM

## 2020-12-15 DIAGNOSIS — R06.02 SHORTNESS OF BREATH: Primary | ICD-10-CM

## 2020-12-15 DIAGNOSIS — I73.9 CLAUDICATION (HCC): ICD-10-CM

## 2020-12-15 DIAGNOSIS — I48.4 ATYPICAL ATRIAL FLUTTER (HCC): ICD-10-CM

## 2020-12-15 DIAGNOSIS — M81.0 AGE-RELATED OSTEOPOROSIS WITHOUT CURRENT PATHOLOGICAL FRACTURE: ICD-10-CM

## 2020-12-15 PROBLEM — I36.1 NONRHEUMATIC TRICUSPID VALVE REGURGITATION: Status: ACTIVE | Noted: 2020-09-17

## 2020-12-15 PROBLEM — H43.819 POSTERIOR VITREOUS DETACHMENT: Status: ACTIVE | Noted: 2017-03-24

## 2020-12-15 PROBLEM — H35.719 CENTRAL SEROUS CHORIORETINOPATHY: Status: ACTIVE | Noted: 2017-03-24

## 2020-12-15 PROBLEM — R06.83 SNORING: Status: ACTIVE | Noted: 2020-07-28

## 2020-12-15 PROBLEM — R40.0 DAYTIME SLEEPINESS: Status: ACTIVE | Noted: 2020-06-26

## 2020-12-15 LAB
ALBUMIN SERPL BCP-MCNC: 3 G/DL (ref 3.5–5)
ALP SERPL-CCNC: 162 U/L (ref 46–116)
ALT SERPL W P-5'-P-CCNC: 25 U/L (ref 12–78)
AMYLASE SERPL-CCNC: 706 IU/L (ref 25–115)
ANION GAP SERPL CALCULATED.3IONS-SCNC: 4 MMOL/L (ref 4–13)
AST SERPL W P-5'-P-CCNC: 44 U/L (ref 5–45)
BILIRUB SERPL-MCNC: 2.4 MG/DL (ref 0.2–1)
BUN SERPL-MCNC: 24 MG/DL (ref 5–25)
CALCIUM ALBUM COR SERPL-MCNC: 10.4 MG/DL (ref 8.3–10.1)
CALCIUM SERPL-MCNC: 9.6 MG/DL (ref 8.3–10.1)
CHLORIDE SERPL-SCNC: 105 MMOL/L (ref 100–108)
CO2 SERPL-SCNC: 30 MMOL/L (ref 21–32)
CREAT SERPL-MCNC: 1.41 MG/DL (ref 0.6–1.3)
GFR SERPL CREATININE-BSD FRML MDRD: 35 ML/MIN/1.73SQ M
GLUCOSE SERPL-MCNC: 150 MG/DL (ref 65–140)
LIPASE SERPL-CCNC: 199 U/L (ref 73–393)
NT-PROBNP SERPL-MCNC: 1480 PG/ML
POTASSIUM SERPL-SCNC: 5.2 MMOL/L (ref 3.5–5.3)
PROT SERPL-MCNC: 8.1 G/DL (ref 6.4–8.2)
SODIUM SERPL-SCNC: 139 MMOL/L (ref 136–145)

## 2020-12-15 PROCEDURE — 80053 COMPREHEN METABOLIC PANEL: CPT

## 2020-12-15 PROCEDURE — 99214 OFFICE O/P EST MOD 30 MIN: CPT | Performed by: FAMILY MEDICINE

## 2020-12-15 PROCEDURE — 3074F SYST BP LT 130 MM HG: CPT | Performed by: FAMILY MEDICINE

## 2020-12-15 PROCEDURE — 3079F DIAST BP 80-89 MM HG: CPT | Performed by: FAMILY MEDICINE

## 2020-12-15 PROCEDURE — 1036F TOBACCO NON-USER: CPT | Performed by: FAMILY MEDICINE

## 2020-12-15 PROCEDURE — 1160F RVW MEDS BY RX/DR IN RCRD: CPT | Performed by: FAMILY MEDICINE

## 2020-12-15 PROCEDURE — 36415 COLL VENOUS BLD VENIPUNCTURE: CPT

## 2020-12-15 PROCEDURE — 71046 X-RAY EXAM CHEST 2 VIEWS: CPT

## 2020-12-15 PROCEDURE — 83880 ASSAY OF NATRIURETIC PEPTIDE: CPT

## 2020-12-15 PROCEDURE — 83690 ASSAY OF LIPASE: CPT

## 2020-12-15 PROCEDURE — 82150 ASSAY OF AMYLASE: CPT

## 2020-12-15 RX ORDER — FUROSEMIDE 20 MG/1
TABLET ORAL
Qty: 60 TABLET | Refills: 11
Start: 2020-12-15 | End: 2021-01-08

## 2020-12-16 DIAGNOSIS — J18.9 PNEUMONIA OF RIGHT MIDDLE LOBE DUE TO INFECTIOUS ORGANISM: Primary | ICD-10-CM

## 2020-12-16 RX ORDER — DOXYCYCLINE HYCLATE 100 MG/1
100 CAPSULE ORAL EVERY 12 HOURS SCHEDULED
Qty: 20 CAPSULE | Refills: 0 | Status: SHIPPED | OUTPATIENT
Start: 2020-12-16 | End: 2020-12-26

## 2020-12-22 DIAGNOSIS — R10.13 EPIGASTRIC PAIN: ICD-10-CM

## 2020-12-22 RX ORDER — OMEPRAZOLE 40 MG/1
CAPSULE, DELAYED RELEASE ORAL
Qty: 30 CAPSULE | Refills: 1 | Status: SHIPPED | OUTPATIENT
Start: 2020-12-22 | End: 2021-01-08

## 2021-01-08 ENCOUNTER — ANNUAL EXAM (OUTPATIENT)
Dept: OBGYN CLINIC | Facility: CLINIC | Age: 81
End: 2021-01-08
Payer: COMMERCIAL

## 2021-01-08 VITALS — BODY MASS INDEX: 22.85 KG/M2 | SYSTOLIC BLOOD PRESSURE: 100 MMHG | WEIGHT: 117 LBS | DIASTOLIC BLOOD PRESSURE: 50 MMHG

## 2021-01-08 DIAGNOSIS — Z12.31 ENCOUNTER FOR SCREENING MAMMOGRAM FOR BREAST CANCER: ICD-10-CM

## 2021-01-08 DIAGNOSIS — M85.88 OSTEOPENIA OF OTHER SITE: ICD-10-CM

## 2021-01-08 DIAGNOSIS — Z01.419 ENCOUNTER FOR ANNUAL ROUTINE GYNECOLOGICAL EXAMINATION: Primary | ICD-10-CM

## 2021-01-08 DIAGNOSIS — Z78.0 ASYMPTOMATIC MENOPAUSE: ICD-10-CM

## 2021-01-08 PROCEDURE — G0101 CA SCREEN;PELVIC/BREAST EXAM: HCPCS | Performed by: OBSTETRICS & GYNECOLOGY

## 2021-01-08 RX ORDER — FUROSEMIDE 40 MG/1
80 TABLET ORAL DAILY
COMMUNITY

## 2021-01-08 NOTE — PROGRESS NOTES
Assessment/Plan:    She does not require a pap    mammogram reviewed with her including breast density  RX given so she can schedule     Discussed self breast exams    colon cancer screening - up to date, colonoscopy done May 2019    Right ovarian cyst - seems to be simple, US ordered    DEXA ordered    discussed preventive care, regular exercise and a healthy diet      No problem-specific Assessment & Plan notes found for this encounter  Diagnoses and all orders for this visit:    Encounter for annual routine gynecological examination    Encounter for screening mammogram for breast cancer  -     Mammo screening bilateral w 3d & cad; Future    Asymptomatic menopause  -     DXA bone density spine hip and pelvis; Future    Osteopenia of other site  -     DXA bone density spine hip and pelvis; Future          Subjective:      Patient ID: Chidi Escamilla is a [de-identified] y o  female  Pt here for yearly  She is doing well  She was hospitalized in December for heart failure  She had a CT of her abdomen and pelvis in October and she has a small cyst on her right ovary, it had a benign appearance on CT  She had the CT due to pain but this has since resolved  Normal mammogram in  with dense tissue and average risk  DEXA in 2018 showed improvement in her osteopenia and fracture risk  She was on Fosamax in the past       The following portions of the patient's history were reviewed and updated as appropriate: allergies, current medications, past family history, past medical history, past social history, past surgical history and problem list     Review of Systems   Constitutional: Negative  Gastrointestinal: Negative  Genitourinary: Negative  Objective: There were no vitals taken for this visit  Physical Exam  Vitals signs reviewed  Constitutional:       Appearance: She is well-developed  Neck:      Thyroid: No thyromegaly  Trachea: No tracheal deviation     Cardiovascular: Rate and Rhythm: Normal rate and regular rhythm  Pulmonary:      Effort: Pulmonary effort is normal       Breath sounds: Normal breath sounds  Chest:      Breasts: Breasts are symmetrical          Right: No inverted nipple, mass, nipple discharge, skin change or tenderness  Left: No inverted nipple, mass, nipple discharge, skin change or tenderness  Comments:  Pacemaker in place  Abdominal:      General: There is no distension  Palpations: Abdomen is soft  There is no mass  Tenderness: There is no abdominal tenderness  Genitourinary:     Labia:         Right: No rash, tenderness, lesion or injury  Left: No rash, tenderness, lesion or injury  Vagina: Normal       Uterus: Absent  Adnexa:         Right: No mass, tenderness or fullness  Left: No mass, tenderness or fullness          Rectum: Normal       Comments: Uterus and cervix are surgically absent  Ovarian cyst not palpated

## 2021-02-26 LAB
LEFT EYE DIABETIC RETINOPATHY: NORMAL
RIGHT EYE DIABETIC RETINOPATHY: NORMAL

## 2021-03-11 ENCOUNTER — RA CDI HCC (OUTPATIENT)
Dept: OTHER | Facility: HOSPITAL | Age: 81
End: 2021-03-11

## 2021-03-11 NOTE — PROGRESS NOTES
Based on clinical documentation indicated in your record, it appears that the patient may have the following conditions:    I73 9 Claudication    I49 5 Sick sinus syndrome    If this is correct, please document and assess at your next visit March 18th  [place] [id number]  coding oppertunities             Chart reviewed, (number of) suggestions sent to provider: 2                   [place] [address]  coding oppertunities             Chart reviewed, (number of) suggestions sent to provider: 2     Problem listed updated   Provider Accepted, (number of) suggestions accepted: 2

## 2021-03-18 ENCOUNTER — OFFICE VISIT (OUTPATIENT)
Dept: FAMILY MEDICINE CLINIC | Facility: CLINIC | Age: 81
End: 2021-03-18
Payer: COMMERCIAL

## 2021-03-18 VITALS
HEIGHT: 59 IN | WEIGHT: 117 LBS | OXYGEN SATURATION: 99 % | BODY MASS INDEX: 23.59 KG/M2 | TEMPERATURE: 97.7 F | SYSTOLIC BLOOD PRESSURE: 110 MMHG | HEART RATE: 88 BPM | DIASTOLIC BLOOD PRESSURE: 64 MMHG

## 2021-03-18 DIAGNOSIS — I73.9 PERIPHERAL VASCULAR DISEASE, UNSPECIFIED (HCC): ICD-10-CM

## 2021-03-18 DIAGNOSIS — I49.5 SSS (SICK SINUS SYNDROME) (HCC): ICD-10-CM

## 2021-03-18 DIAGNOSIS — E11.8 TYPE 2 DIABETES MELLITUS WITH COMPLICATION, WITHOUT LONG-TERM CURRENT USE OF INSULIN (HCC): Primary | ICD-10-CM

## 2021-03-18 DIAGNOSIS — I50.33 DIASTOLIC CHF, ACUTE ON CHRONIC (HCC): ICD-10-CM

## 2021-03-18 PROCEDURE — 1160F RVW MEDS BY RX/DR IN RCRD: CPT | Performed by: FAMILY MEDICINE

## 2021-03-18 PROCEDURE — 99213 OFFICE O/P EST LOW 20 MIN: CPT | Performed by: FAMILY MEDICINE

## 2021-03-18 PROCEDURE — 1036F TOBACCO NON-USER: CPT | Performed by: FAMILY MEDICINE

## 2021-03-18 NOTE — PROGRESS NOTES
Assessment/Plan:    Patient is 80-year-old female with type 2 diabetes  She does check her blood sugars at home  We did a diabetic foot exam today  She had an A1c when she was in the hospital in December it was 7 0  I will see her back in 4 months  A1C in office next visit  She tells me that she has an appointment coming up with Dr Gibran Steve  We discussed the COVID vaccine and will help her to schedule  Subjective:   Chief Complaint   Patient presents with    Follow-up        Patient ID: Stefan Barrios is a [de-identified] y o  female  Patient  Is being seen for follow-up of chronic medical conditions  She has type 2 diabetes and is checking sugar and they run in the low 100's  She is going to eye doctor with macular degeneration  Dr Sujit Talley  she was hospitalized [facility] for heart failure  She is following with her cardiologist, Dr Arn Goldberg and Dr Gibran Steve  She is accompanied by her daughter  The following portions of the patient's history were reviewed and updated as appropriate: allergies, current medications, past family history, past medical history, past social history, past surgical history and problem list     Review of Systems   Constitutional: Negative for chills and fever  HENT: Negative for congestion and sore throat  Respiratory: Negative for chest tightness  Cardiovascular: Positive for leg swelling  Negative for chest pain and palpitations  Gastrointestinal: Negative for abdominal pain, constipation, diarrhea and nausea  Genitourinary: Negative for difficulty urinating  Skin: Negative  Neurological: Negative for dizziness and headaches  Psychiatric/Behavioral: Negative  Objective:      /64 (BP Location: Right arm, Patient Position: Sitting, Cuff Size: Standard)   Pulse 88   Temp 97 7 °F (36 5 °C) (Temporal)   Ht 4' 11" (1 499 m)   Wt 53 1 kg (117 lb)   SpO2 99%   BMI 23 63 kg/m²          Physical Exam  Vitals signs and nursing note reviewed  Constitutional:       General: She is not in acute distress  HENT:      Head: Normocephalic  Neck:      Thyroid: No thyromegaly  Cardiovascular:      Rate and Rhythm: Normal rate  Pulses: Pulses are weak  Dorsalis pedis pulses are 2+ on the right side and 2+ on the left side  Posterior tibial pulses are 1+ on the right side and 1+ on the left side  Heart sounds: Normal heart sounds  Comments: BP 96/78  Pulmonary:      Effort: Pulmonary effort is normal       Breath sounds: Normal breath sounds  Musculoskeletal:         General: Swelling (left ankle) present  Feet:      Right foot:      Skin integrity: Dry skin present  Left foot:      Skin integrity: Dry skin present  Lymphadenopathy:      Cervical: No cervical adenopathy  Skin:     General: Skin is warm and dry  Neurological:      Mental Status: She is alert and oriented to person, place, and time  Psychiatric:         Mood and Affect: Mood normal          Patient's shoes and socks removed  Right Foot/Ankle   Right Foot Inspection  Skin Exam: skin intact, dry skin and abnormal color                          Toe Exam: right toe deformity  Sensory       Monofilament testing: intact  Vascular    The right DP pulse is 2+  The right PT pulse is 1+  Left Foot/Ankle  Left Foot Inspection  Skin Exam: skin intact, dry skin and abnormal color                         Toe Exam: left toe deformity                   Sensory       Monofilament: intact  Vascular    The left DP pulse is 2+  The left PT pulse is 1+  Assign Risk Category:  Deformity present;  No loss of protective sensation; Weak pulses       Risk: 1   ff

## 2021-03-19 PROBLEM — N18.30 STAGE 3 CHRONIC KIDNEY DISEASE (HCC): Status: ACTIVE | Noted: 2020-12-24

## 2021-04-08 ENCOUNTER — ULTRASOUND (OUTPATIENT)
Dept: OBGYN CLINIC | Facility: CLINIC | Age: 81
End: 2021-04-08
Payer: COMMERCIAL

## 2021-04-08 DIAGNOSIS — N83.201 RIGHT OVARIAN CYST: Primary | ICD-10-CM

## 2021-04-08 PROCEDURE — 76830 TRANSVAGINAL US NON-OB: CPT | Performed by: OBSTETRICS & GYNECOLOGY

## 2021-04-08 NOTE — PROGRESS NOTES
AMB US Pelvic Non OB    Date/Time: 4/8/2021 8:41 AM  Performed by: Juliet Leung  Authorized by: Van Snell DO   Universal Protocol:  Patient identity confirmed: verbally with patient      Procedure details:     Indications: ovarian cysts      Technique:  Transvaginal US, Non-OB    Position: lithotomy exam    Left ovary findings:     Left ovary:  Not visualized  Right ovary findings:     Right ovary:  Visualized    Length (cm): 3 5    Height (cm): 2 29    Width (cm): 2 4  Other findings:     Free pelvic fluid: not identified      Free peritoneal fluid: not identified    Post-Procedure Details:     Impression:  The uterus and left ovary have been surgically removed  The right ovary contains a 2 4cm septate cyst, stable from CT scan performed in October of 2020  No free fluid  Tolerance: Tolerated well, no immediate complications    Complications: no complications    Additional Procedure Comments:      CodeEval F8 E8C-RS transvaginal transducer Serial # [id number] was used to perform the examination today and subsequently followed with high level disinfection utilizing Trophon EPR procedure  Ultrasound performed at:     [address] [address]  Phone:  [phone]  Fax:  [phone]

## 2021-06-02 DIAGNOSIS — E11.39 TYPE 2 DIABETES MELLITUS WITH OTHER OPHTHALMIC COMPLICATION, WITHOUT LONG-TERM CURRENT USE OF INSULIN (HCC): ICD-10-CM

## 2021-06-02 RX ORDER — METFORMIN HYDROCHLORIDE 500 MG/1
TABLET, EXTENDED RELEASE ORAL
Qty: 90 TABLET | Refills: 3 | Status: SHIPPED | OUTPATIENT
Start: 2021-06-02 | End: 2022-05-31

## 2021-06-16 ENCOUNTER — HOSPITAL ENCOUNTER (OUTPATIENT)
Dept: MAMMOGRAPHY | Facility: MEDICAL CENTER | Age: 81
Discharge: HOME | End: 2021-06-16
Payer: COMMERCIAL

## 2021-06-16 ENCOUNTER — HOSPITAL ENCOUNTER (OUTPATIENT)
Dept: BONE DENSITY | Facility: MEDICAL CENTER | Age: 81
Discharge: HOME | End: 2021-06-16
Payer: COMMERCIAL

## 2021-06-16 VITALS — WEIGHT: 117 LBS | BODY MASS INDEX: 23.59 KG/M2 | HEIGHT: 59 IN

## 2021-06-16 DIAGNOSIS — Z78.0 ASYMPTOMATIC MENOPAUSE: ICD-10-CM

## 2021-06-16 DIAGNOSIS — M85.88 OSTEOPENIA OF OTHER SITE: ICD-10-CM

## 2021-06-16 DIAGNOSIS — Z12.31 ENCOUNTER FOR SCREENING MAMMOGRAM FOR BREAST CANCER: ICD-10-CM

## 2021-06-16 PROCEDURE — 77063 BREAST TOMOSYNTHESIS BI: CPT

## 2021-06-16 PROCEDURE — 77067 SCR MAMMO BI INCL CAD: CPT

## 2021-06-16 PROCEDURE — 77080 DXA BONE DENSITY AXIAL: CPT

## 2021-06-22 DIAGNOSIS — M85.80 OSTEOPENIA AFTER MENOPAUSE: Primary | ICD-10-CM

## 2021-06-22 DIAGNOSIS — Z78.0 OSTEOPENIA AFTER MENOPAUSE: Primary | ICD-10-CM

## 2021-06-29 ENCOUNTER — APPOINTMENT (OUTPATIENT)
Dept: LAB | Facility: MEDICAL CENTER | Age: 81
End: 2021-06-29
Payer: COMMERCIAL

## 2021-06-29 DIAGNOSIS — Z78.0 OSTEOPENIA AFTER MENOPAUSE: ICD-10-CM

## 2021-06-29 DIAGNOSIS — M85.80 OSTEOPENIA AFTER MENOPAUSE: ICD-10-CM

## 2021-06-29 LAB
25(OH)D3 SERPL-MCNC: 63.2 NG/ML (ref 30–100)
PTH-INTACT SERPL-MCNC: 93.2 PG/ML (ref 18.4–80.1)

## 2021-06-29 PROCEDURE — 82306 VITAMIN D 25 HYDROXY: CPT

## 2021-06-29 PROCEDURE — 36415 COLL VENOUS BLD VENIPUNCTURE: CPT

## 2021-06-29 PROCEDURE — 83970 ASSAY OF PARATHORMONE: CPT

## 2021-07-06 ENCOUNTER — HOSPITAL ENCOUNTER (OUTPATIENT)
Dept: MAMMOGRAPHY | Facility: CLINIC | Age: 81
Discharge: HOME | End: 2021-07-06
Payer: COMMERCIAL

## 2021-07-06 VITALS — BODY MASS INDEX: 23.59 KG/M2 | HEIGHT: 59 IN | WEIGHT: 117 LBS

## 2021-07-06 DIAGNOSIS — R92.8 ABNORMAL MAMMOGRAM: ICD-10-CM

## 2021-07-06 PROCEDURE — 77065 DX MAMMO INCL CAD UNI: CPT

## 2021-07-06 NOTE — PROGRESS NOTES
Met with patient, her daughter Joseluis Bull, and Dr Sophia Fabian at [facility], [facility] regarding recommendation for;      __x___ RIGHT ______LEFT      _____Ultrasound guided  ___x___Stereotactic  Breast biopsy  __x___Verbalized understanding  Blood thinners:  __x___yes _____no Leila Ped    Date stopped: ____n/a_______    Biopsy teaching sheet given and reviewed with patient and Steven Montes with both verbalizing understanding and questions and concerns addressed at this time  Both understand that biopsy is to be performed at [facility] and to arrive at 0830 for an 0900 appointment   Address/phone # given:  ____x___yes ______no      Med Hx: CHF; AFIB; HTN; Hyperlipidemia; NIDDM

## 2021-07-12 ENCOUNTER — RA CDI HCC (OUTPATIENT)
Dept: OTHER | Facility: HOSPITAL | Age: 81
End: 2021-07-12

## 2021-07-12 NOTE — PROGRESS NOTES
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      If this is correct, please document and assess at your next visit July 19th      [place] [address]  coding opportunities             Chart reviewed, (number of) suggestions sent to provider: 3                  Patients insurance company: [employer] (Medicare Advantage and [employer])             [place] [address]  coding opportunities             Chart reviewed, (number of) suggestions sent to provider: 3     Problem listed updated   Provider Accepted, (number of) suggestions accepted: 3         Number of suggestions actually used: 3         Patients insurance company: [employer] (Medicare Advantage and [employer])     Visit status: Patient arrived for their scheduled appointment

## 2021-07-14 ENCOUNTER — HOSPITAL ENCOUNTER (OUTPATIENT)
Dept: MAMMOGRAPHY | Facility: CLINIC | Age: 81
Discharge: HOME | End: 2021-07-14
Payer: COMMERCIAL

## 2021-07-14 VITALS
BODY MASS INDEX: 23.59 KG/M2 | WEIGHT: 117 LBS | HEART RATE: 89 BPM | HEIGHT: 59 IN | DIASTOLIC BLOOD PRESSURE: 68 MMHG | SYSTOLIC BLOOD PRESSURE: 118 MMHG

## 2021-07-14 DIAGNOSIS — R92.8 ABNORMAL MAMMOGRAM: ICD-10-CM

## 2021-07-14 PROCEDURE — 88305 TISSUE EXAM BY PATHOLOGIST: CPT | Performed by: PATHOLOGY

## 2021-07-14 PROCEDURE — 19081 BX BREAST 1ST LESION STRTCTC: CPT

## 2021-07-14 PROCEDURE — A4648 IMPLANTABLE TISSUE MARKER: HCPCS

## 2021-07-14 RX ORDER — LIDOCAINE HYDROCHLORIDE AND EPINEPHRINE 10; 10 MG/ML; UG/ML
10 INJECTION, SOLUTION INFILTRATION; PERINEURAL ONCE
Status: COMPLETED | OUTPATIENT
Start: 2021-07-14 | End: 2021-07-14

## 2021-07-14 RX ORDER — LIDOCAINE HYDROCHLORIDE 10 MG/ML
5 INJECTION, SOLUTION EPIDURAL; INFILTRATION; INTRACAUDAL; PERINEURAL ONCE
Status: COMPLETED | OUTPATIENT
Start: 2021-07-14 | End: 2021-07-14

## 2021-07-14 RX ADMIN — LIDOCAINE HYDROCHLORIDE 5 ML: 10 INJECTION, SOLUTION EPIDURAL; INFILTRATION; INTRACAUDAL; PERINEURAL at 09:17

## 2021-07-14 RX ADMIN — LIDOCAINE HYDROCHLORIDE,EPINEPHRINE BITARTRATE 10 ML: 10; .01 INJECTION, SOLUTION INFILTRATION; PERINEURAL at 09:17

## 2021-07-14 NOTE — PROGRESS NOTES
Procedure type:    _____ultrasound guided __x___stereotactic    Breast:    _____Left __x___Right    Location: 4 oclock 8 cmfn    Needle: 8g Revolve     # of passes: 5 cores (3 cores with calcs; 2 cores without calcs)    Clip: Mammomasharmin Mcarthur    Performed by: Kelli Isbell held for 5 minutes by: Mariam Lara    Steri Strips:    __x___yes _____no    Band aid:    __x___yes_____no    Tape and guaze:    __x___yes _____no    Tolerated procedure:    __x___yes _____no

## 2021-07-14 NOTE — DISCHARGE INSTR - OTHER ORDERS
POST LARGE CORE BREAST BIOPSY PATIENT INFORMATION      1  Place an ice pack inside your bra over the top of the dressing every hour for 20 minutes (20 minutes on, 60 minutes off)  Do this until bedtime  2  Do not shower or bathe until the following morning  3  You may bathe your breast carefully with the steri-strips in place  Be careful    Not to loosen them  The steri-strips will fall off in 3-5 days  4  You may have mild discomfort, and you may have some bruising where the   Needle entered the skin  This should clear within 5-7 days  5  If you need medicine for discomfort, take acetaminophen products such as   Tylenol  You may also take Advil or Motrin products  6  Do not participate in strenuous activities such as-tennis, aerobics, skiing,  Weight lifting, etc  for 24 hours  Refrain from swimming/soaking for 72 hours  7  Wearing a bra for sleeping may be more comfortable for the first 24-48 hours  8  Watch for continued bleeding, pain or fever over 101; please call with any questions or concerns  For procedures done at the [facility] call:  Homer Barrios RN at [facility] RN at [phone]                    *After 4 PM call the Interventional Radiology Department                    [phone] and ask to speak with the nurse on call  For procedures done at the [address] [place] [address] call:         Fernie Pascal RN at   *After 4 PM call the Interventional Radiology Department   [phone] and ask to speak with the nurse on call  For procedures done at [address] [place] [address] call: The Radiology Nurse at [phone]  *After 4 PM call your physician, or go to the Emergency Department  9          The final results of your biopsy are usually available within one week

## 2021-07-15 ENCOUNTER — TELEPHONE (OUTPATIENT)
Dept: MAMMOGRAPHY | Facility: CLINIC | Age: 81
End: 2021-07-15

## 2021-07-15 NOTE — PROGRESS NOTES
Post procedure call completed    Bleeding: _____yes ___x__no    Pain: _____yes __x____no    Redness/Swelling: ______yes __x____no - mild discomfort decreasing    Band aid removed: _____yes __x___no - will remove after shower today    Steri-Strips intact: ___x___yes _____no

## 2021-07-19 ENCOUNTER — OFFICE VISIT (OUTPATIENT)
Dept: FAMILY MEDICINE CLINIC | Facility: CLINIC | Age: 81
End: 2021-07-19
Payer: COMMERCIAL

## 2021-07-19 VITALS
OXYGEN SATURATION: 98 % | BODY MASS INDEX: 24.39 KG/M2 | RESPIRATION RATE: 17 BRPM | SYSTOLIC BLOOD PRESSURE: 102 MMHG | DIASTOLIC BLOOD PRESSURE: 60 MMHG | HEART RATE: 78 BPM | WEIGHT: 121 LBS | HEIGHT: 59 IN | TEMPERATURE: 97.3 F

## 2021-07-19 DIAGNOSIS — I50.32 CHRONIC DIASTOLIC CHF (CONGESTIVE HEART FAILURE) (HCC): ICD-10-CM

## 2021-07-19 DIAGNOSIS — M81.0 AGE-RELATED OSTEOPOROSIS WITHOUT CURRENT PATHOLOGICAL FRACTURE: ICD-10-CM

## 2021-07-19 DIAGNOSIS — E11.22 TYPE 2 DIABETES MELLITUS WITH STAGE 3A CHRONIC KIDNEY DISEASE, WITHOUT LONG-TERM CURRENT USE OF INSULIN (HCC): Primary | ICD-10-CM

## 2021-07-19 DIAGNOSIS — N18.32 STAGE 3B CHRONIC KIDNEY DISEASE (HCC): ICD-10-CM

## 2021-07-19 DIAGNOSIS — N18.31 TYPE 2 DIABETES MELLITUS WITH STAGE 3A CHRONIC KIDNEY DISEASE, WITHOUT LONG-TERM CURRENT USE OF INSULIN (HCC): Primary | ICD-10-CM

## 2021-07-19 DIAGNOSIS — D69.6 PLATELETS DECREASED (HCC): ICD-10-CM

## 2021-07-19 DIAGNOSIS — I13.0 HYPERTENSIVE HEART AND RENAL DISEASE WITH CONGESTIVE HEART FAILURE (HCC): ICD-10-CM

## 2021-07-19 LAB — SL AMB POCT HEMOGLOBIN AIC: 6.4 (ref ?–6.5)

## 2021-07-19 PROCEDURE — 1160F RVW MEDS BY RX/DR IN RCRD: CPT | Performed by: FAMILY MEDICINE

## 2021-07-19 PROCEDURE — 1036F TOBACCO NON-USER: CPT | Performed by: FAMILY MEDICINE

## 2021-07-19 PROCEDURE — 99214 OFFICE O/P EST MOD 30 MIN: CPT | Performed by: FAMILY MEDICINE

## 2021-07-19 PROCEDURE — 83036 HEMOGLOBIN GLYCOSYLATED A1C: CPT | Performed by: FAMILY MEDICINE

## 2021-07-19 RX ORDER — METOPROLOL SUCCINATE 50 MG/1
75 TABLET, EXTENDED RELEASE ORAL DAILY
COMMUNITY
Start: 2021-07-16 | End: 2022-07-26

## 2021-07-19 NOTE — PROGRESS NOTES
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 ([facility])    Platelets decreased ([facility])    Age-related osteoporosis without current pathological fracture    Stage 3a chronic kidney disease ([facility] [address] )    Other orders  -     metoprolol succinate (TOPROL-XL) 50 mg 24 hr tablet; Take 75 mg by mouth daily          Subjective:   Chief Complaint   Patient presents with    Follow-up     4 month         Patient ID: Blanquita Alexander is a [de-identified] y o  female  Patient is here for follow-up of chronic medical conditions  She has been seeing Cardiology regarding her atrial fib / flutter and CHF  We monitor her blood sugars  She is accompanied by her daughter  The following portions of the patient's history were reviewed and updated as appropriate: allergies, current medications, past family history, past medical history, past social history, past surgical history and problem list     Review of Systems   Constitutional: Negative for chills and fever  HENT: Negative for congestion and sore throat  Respiratory: Negative for chest tightness  Cardiovascular: Negative for chest pain and palpitations  Gastrointestinal: Negative for abdominal pain, constipation, diarrhea and nausea  Genitourinary: Negative for difficulty urinating  Skin: Negative  Neurological: Negative for dizziness and headaches  Psychiatric/Behavioral: Negative            Objective:      /60 (BP Location: Right arm, Patient Position: Sitting, Cuff Size: Adult)   Pulse 78   Temp (!) 97 3 °F (36 3 °C) (Temporal)   Resp 17   Ht 4' 11" (1 499 m)   Wt 54 9 kg (121 lb)   SpO2 98%   BMI 24 44 kg/m²          Physical Exam  Vitals and nursing note reviewed  Constitutional:       General: She is not in acute distress  HENT:      Head: Normocephalic  Neck:      Thyroid: No thyromegaly  Vascular: No carotid bruit  Cardiovascular:      Rate and Rhythm: Normal rate and regular rhythm  Heart sounds: Normal heart sounds  Pulmonary:      Effort: Pulmonary effort is normal       Breath sounds: Normal breath sounds  Musculoskeletal:      Right lower leg: Edema (  Slight) present  Left lower leg: Edema ( slight) present  Lymphadenopathy:      Cervical: No cervical adenopathy  Skin:     General: Skin is warm and dry  Neurological:      Mental Status: She is alert and oriented to person, place, and time     Psychiatric:         Mood and Affect: Mood normal

## 2021-07-27 ENCOUNTER — APPOINTMENT (OUTPATIENT)
Dept: LAB | Facility: MEDICAL CENTER | Age: 81
End: 2021-07-27
Payer: COMMERCIAL

## 2021-07-27 DIAGNOSIS — Z79.01 LONG TERM (CURRENT) USE OF ANTICOAGULANTS: ICD-10-CM

## 2021-07-27 DIAGNOSIS — I50.32 CHRONIC DIASTOLIC HEART FAILURE (HCC): ICD-10-CM

## 2021-07-27 DIAGNOSIS — Z79.899 ENCOUNTER FOR LONG-TERM (CURRENT) USE OF OTHER MEDICATIONS: ICD-10-CM

## 2021-07-27 DIAGNOSIS — Q93.88 CHROMOSOME 12Q15-Q21.1 MICRODELETION SYNDROME: ICD-10-CM

## 2021-07-27 DIAGNOSIS — Z95.0 CARDIAC PACEMAKER IN SITU: ICD-10-CM

## 2021-07-27 DIAGNOSIS — I10 ESSENTIAL HYPERTENSION, MALIGNANT: ICD-10-CM

## 2021-07-27 DIAGNOSIS — I36.1 TRICUSPID VALVE INSUFFICIENCY, NON-RHEUMATIC: ICD-10-CM

## 2021-07-27 DIAGNOSIS — E78.00 PURE HYPERCHOLESTEROLEMIA: ICD-10-CM

## 2021-07-27 DIAGNOSIS — I48.4 ATYPICAL ATRIAL FLUTTER (HCC): ICD-10-CM

## 2021-07-27 LAB
ALBUMIN SERPL BCP-MCNC: 3.4 G/DL (ref 3.5–5)
ALP SERPL-CCNC: 114 U/L (ref 46–116)
ALT SERPL W P-5'-P-CCNC: 40 U/L (ref 12–78)
ANION GAP SERPL CALCULATED.3IONS-SCNC: 8 MMOL/L (ref 4–13)
AST SERPL W P-5'-P-CCNC: 36 U/L (ref 5–45)
BILIRUB SERPL-MCNC: 1.01 MG/DL (ref 0.2–1)
BUN SERPL-MCNC: 20 MG/DL (ref 5–25)
CALCIUM ALBUM COR SERPL-MCNC: 9.8 MG/DL (ref 8.3–10.1)
CALCIUM SERPL-MCNC: 9.3 MG/DL (ref 8.3–10.1)
CHLORIDE SERPL-SCNC: 99 MMOL/L (ref 100–108)
CO2 SERPL-SCNC: 28 MMOL/L (ref 21–32)
CREAT SERPL-MCNC: 1.73 MG/DL (ref 0.6–1.3)
GFR SERPL CREATININE-BSD FRML MDRD: 27 ML/MIN/1.73SQ M
GLUCOSE P FAST SERPL-MCNC: 121 MG/DL (ref 65–99)
POTASSIUM SERPL-SCNC: 3.9 MMOL/L (ref 3.5–5.3)
PROT SERPL-MCNC: 7.5 G/DL (ref 6.4–8.2)
SODIUM SERPL-SCNC: 135 MMOL/L (ref 136–145)
TSH SERPL DL<=0.05 MIU/L-ACNC: 4.3 UIU/ML (ref 0.36–3.74)

## 2021-07-27 PROCEDURE — 84443 ASSAY THYROID STIM HORMONE: CPT

## 2021-07-27 PROCEDURE — 80053 COMPREHEN METABOLIC PANEL: CPT

## 2021-07-27 PROCEDURE — 36415 COLL VENOUS BLD VENIPUNCTURE: CPT

## 2021-07-30 ENCOUNTER — APPOINTMENT (OUTPATIENT)
Dept: LAB | Facility: MEDICAL CENTER | Age: 81
End: 2021-07-30
Payer: COMMERCIAL

## 2021-07-30 DIAGNOSIS — I48.4 ATYPICAL ATRIAL FLUTTER (HCC): ICD-10-CM

## 2021-07-30 DIAGNOSIS — Q93.88 CHROMOSOME 12Q15-Q21.1 MICRODELETION SYNDROME: ICD-10-CM

## 2021-07-30 DIAGNOSIS — I10 ESSENTIAL HYPERTENSION, MALIGNANT: ICD-10-CM

## 2021-07-30 PROBLEM — N18.32 STAGE 3B CHRONIC KIDNEY DISEASE (HCC): Status: ACTIVE | Noted: 2020-12-24

## 2021-07-30 LAB
ANION GAP SERPL CALCULATED.3IONS-SCNC: 6 MMOL/L (ref 4–13)
BUN SERPL-MCNC: 19 MG/DL (ref 5–25)
CALCIUM SERPL-MCNC: 9.3 MG/DL (ref 8.3–10.1)
CHLORIDE SERPL-SCNC: 102 MMOL/L (ref 100–108)
CO2 SERPL-SCNC: 28 MMOL/L (ref 21–32)
CREAT SERPL-MCNC: 1.14 MG/DL (ref 0.6–1.3)
GFR SERPL CREATININE-BSD FRML MDRD: 46 ML/MIN/1.73SQ M
GLUCOSE SERPL-MCNC: 75 MG/DL (ref 65–140)
POTASSIUM SERPL-SCNC: 3.9 MMOL/L (ref 3.5–5.3)
SODIUM SERPL-SCNC: 136 MMOL/L (ref 136–145)

## 2021-07-30 PROCEDURE — 80048 BASIC METABOLIC PNL TOTAL CA: CPT

## 2021-07-30 PROCEDURE — 36415 COLL VENOUS BLD VENIPUNCTURE: CPT

## 2021-10-08 ENCOUNTER — TELEPHONE (OUTPATIENT)
Dept: ADMINISTRATIVE | Facility: OTHER | Age: 81
End: 2021-10-08

## 2021-10-11 ENCOUNTER — APPOINTMENT (OUTPATIENT)
Dept: LAB | Facility: MEDICAL CENTER | Age: 81
End: 2021-10-11
Payer: COMMERCIAL

## 2021-10-11 DIAGNOSIS — I36.1 NONRHEUMATIC TRICUSPID VALVE REGURGITATION: ICD-10-CM

## 2021-10-11 DIAGNOSIS — Z79.01 LONG TERM (CURRENT) USE OF ANTICOAGULANTS: ICD-10-CM

## 2021-10-11 DIAGNOSIS — I49.5 SICK SINUS SYNDROME (HCC): ICD-10-CM

## 2021-10-11 DIAGNOSIS — I48.4 ATYPICAL ATRIAL FLUTTER (HCC): ICD-10-CM

## 2021-10-11 DIAGNOSIS — E78.00 PURE HYPERCHOLESTEROLEMIA: ICD-10-CM

## 2021-10-11 DIAGNOSIS — I10 ESSENTIAL HYPERTENSION: ICD-10-CM

## 2021-10-11 DIAGNOSIS — Z79.899 ENCOUNTER FOR LONG-TERM (CURRENT) USE OF OTHER MEDICATIONS: ICD-10-CM

## 2021-10-11 DIAGNOSIS — Z95.0 PACEMAKER: ICD-10-CM

## 2021-10-11 DIAGNOSIS — Q21.1 ASD (ATRIAL SEPTAL DEFECT): ICD-10-CM

## 2021-10-11 DIAGNOSIS — I50.32 CHRONIC DIASTOLIC CHF (CONGESTIVE HEART FAILURE) (HCC): ICD-10-CM

## 2021-10-11 LAB
ALBUMIN SERPL BCP-MCNC: 3.5 G/DL (ref 3.5–5)
ALP SERPL-CCNC: 130 U/L (ref 46–116)
ALT SERPL W P-5'-P-CCNC: 41 U/L (ref 12–78)
ANION GAP SERPL CALCULATED.3IONS-SCNC: 4 MMOL/L (ref 4–13)
AST SERPL W P-5'-P-CCNC: 32 U/L (ref 5–45)
BILIRUB SERPL-MCNC: 0.79 MG/DL (ref 0.2–1)
BUN SERPL-MCNC: 25 MG/DL (ref 5–25)
CALCIUM SERPL-MCNC: 10.2 MG/DL (ref 8.3–10.1)
CHLORIDE SERPL-SCNC: 101 MMOL/L (ref 100–108)
CHOLEST SERPL-MCNC: 170 MG/DL (ref 50–200)
CO2 SERPL-SCNC: 32 MMOL/L (ref 21–32)
CREAT SERPL-MCNC: 1.28 MG/DL (ref 0.6–1.3)
GFR SERPL CREATININE-BSD FRML MDRD: 40 ML/MIN/1.73SQ M
GLUCOSE P FAST SERPL-MCNC: 127 MG/DL (ref 65–99)
HDLC SERPL-MCNC: 123 MG/DL
LDLC SERPL CALC-MCNC: 34 MG/DL (ref 0–100)
NONHDLC SERPL-MCNC: 47 MG/DL
POTASSIUM SERPL-SCNC: 3.8 MMOL/L (ref 3.5–5.3)
PROT SERPL-MCNC: 8.1 G/DL (ref 6.4–8.2)
SODIUM SERPL-SCNC: 137 MMOL/L (ref 136–145)
TRIGL SERPL-MCNC: 64 MG/DL

## 2021-10-11 PROCEDURE — 36415 COLL VENOUS BLD VENIPUNCTURE: CPT

## 2021-10-11 PROCEDURE — 80053 COMPREHEN METABOLIC PANEL: CPT

## 2021-10-11 PROCEDURE — 80061 LIPID PANEL: CPT

## 2021-10-29 DIAGNOSIS — I48.92 ATRIAL FLUTTER, UNSPECIFIED TYPE (HCC): Primary | ICD-10-CM

## 2021-11-15 ENCOUNTER — RA CDI HCC (OUTPATIENT)
Dept: OTHER | Facility: HOSPITAL | Age: 81
End: 2021-11-15

## 2021-11-22 ENCOUNTER — OFFICE VISIT (OUTPATIENT)
Dept: FAMILY MEDICINE CLINIC | Facility: CLINIC | Age: 81
End: 2021-11-22
Payer: COMMERCIAL

## 2021-11-22 VITALS
SYSTOLIC BLOOD PRESSURE: 102 MMHG | TEMPERATURE: 98.4 F | WEIGHT: 120 LBS | HEART RATE: 88 BPM | HEIGHT: 60 IN | OXYGEN SATURATION: 97 % | DIASTOLIC BLOOD PRESSURE: 64 MMHG | BODY MASS INDEX: 23.56 KG/M2 | RESPIRATION RATE: 18 BRPM

## 2021-11-22 DIAGNOSIS — I50.32 CHRONIC DIASTOLIC CHF (CONGESTIVE HEART FAILURE) (HCC): ICD-10-CM

## 2021-11-22 DIAGNOSIS — I13.0 HYPERTENSIVE HEART AND RENAL DISEASE WITH CONGESTIVE HEART FAILURE (HCC): ICD-10-CM

## 2021-11-22 DIAGNOSIS — E11.22 TYPE 2 DIABETES MELLITUS WITH STAGE 3A CHRONIC KIDNEY DISEASE, WITHOUT LONG-TERM CURRENT USE OF INSULIN (HCC): Primary | ICD-10-CM

## 2021-11-22 DIAGNOSIS — N18.31 TYPE 2 DIABETES MELLITUS WITH STAGE 3A CHRONIC KIDNEY DISEASE, WITHOUT LONG-TERM CURRENT USE OF INSULIN (HCC): Primary | ICD-10-CM

## 2021-11-22 DIAGNOSIS — Z00.00 MEDICARE ANNUAL WELLNESS VISIT, SUBSEQUENT: ICD-10-CM

## 2021-11-22 DIAGNOSIS — Z23 NEED FOR VACCINATION: ICD-10-CM

## 2021-11-22 LAB — SL AMB POCT HEMOGLOBIN AIC: 6.8 (ref ?–6.5)

## 2021-11-22 PROCEDURE — G0008 ADMIN INFLUENZA VIRUS VAC: HCPCS | Performed by: FAMILY MEDICINE

## 2021-11-22 PROCEDURE — 99214 OFFICE O/P EST MOD 30 MIN: CPT | Performed by: FAMILY MEDICINE

## 2021-11-22 PROCEDURE — 90662 IIV NO PRSV INCREASED AG IM: CPT | Performed by: FAMILY MEDICINE

## 2021-11-22 PROCEDURE — G0439 PPPS, SUBSEQ VISIT: HCPCS | Performed by: FAMILY MEDICINE

## 2021-11-22 PROCEDURE — 83036 HEMOGLOBIN GLYCOSYLATED A1C: CPT | Performed by: FAMILY MEDICINE

## 2021-12-15 ENCOUNTER — OFFICE VISIT (OUTPATIENT)
Dept: PODIATRY | Facility: CLINIC | Age: 81
End: 2021-12-15
Payer: COMMERCIAL

## 2021-12-15 VITALS
WEIGHT: 116.2 LBS | DIASTOLIC BLOOD PRESSURE: 50 MMHG | SYSTOLIC BLOOD PRESSURE: 110 MMHG | BODY MASS INDEX: 22.81 KG/M2 | HEIGHT: 60 IN

## 2021-12-15 DIAGNOSIS — E11.22 TYPE 2 DIABETES MELLITUS WITH STAGE 3A CHRONIC KIDNEY DISEASE, WITHOUT LONG-TERM CURRENT USE OF INSULIN (HCC): ICD-10-CM

## 2021-12-15 DIAGNOSIS — N18.31 TYPE 2 DIABETES MELLITUS WITH STAGE 3A CHRONIC KIDNEY DISEASE, WITHOUT LONG-TERM CURRENT USE OF INSULIN (HCC): ICD-10-CM

## 2021-12-15 PROCEDURE — 1160F RVW MEDS BY RX/DR IN RCRD: CPT | Performed by: PODIATRIST

## 2021-12-15 PROCEDURE — 1036F TOBACCO NON-USER: CPT | Performed by: PODIATRIST

## 2021-12-15 PROCEDURE — 99202 OFFICE O/P NEW SF 15 MIN: CPT | Performed by: PODIATRIST

## 2022-03-08 ENCOUNTER — RA CDI HCC (OUTPATIENT)
Dept: OTHER | Facility: HOSPITAL | Age: 82
End: 2022-03-08

## 2022-03-08 NOTE — PROGRESS NOTES
[facility]  coding opportunities       Chart reviewed, no opportunity found: CHART REVIEWED, NO OPPORTUNITY FOUND                        Patients insurance company: [employer] (Medicare Advantage and Commercial)

## 2022-03-22 ENCOUNTER — APPOINTMENT (OUTPATIENT)
Dept: LAB | Facility: CLINIC | Age: 82
End: 2022-03-22
Payer: COMMERCIAL

## 2022-03-22 ENCOUNTER — OFFICE VISIT (OUTPATIENT)
Dept: FAMILY MEDICINE CLINIC | Facility: CLINIC | Age: 82
End: 2022-03-22
Payer: COMMERCIAL

## 2022-03-22 VITALS
RESPIRATION RATE: 18 BRPM | HEIGHT: 60 IN | BODY MASS INDEX: 23.44 KG/M2 | DIASTOLIC BLOOD PRESSURE: 88 MMHG | WEIGHT: 119.4 LBS | HEART RATE: 78 BPM | OXYGEN SATURATION: 99 % | SYSTOLIC BLOOD PRESSURE: 134 MMHG | TEMPERATURE: 98.3 F

## 2022-03-22 DIAGNOSIS — D69.6 PLATELETS DECREASED (HCC): ICD-10-CM

## 2022-03-22 DIAGNOSIS — N18.31 TYPE 2 DIABETES MELLITUS WITH STAGE 3A CHRONIC KIDNEY DISEASE, WITHOUT LONG-TERM CURRENT USE OF INSULIN (HCC): Primary | ICD-10-CM

## 2022-03-22 DIAGNOSIS — R94.6 ABNORMAL THYROID FUNCTION TEST: ICD-10-CM

## 2022-03-22 DIAGNOSIS — I10 ESSENTIAL HYPERTENSION: ICD-10-CM

## 2022-03-22 DIAGNOSIS — E11.22 TYPE 2 DIABETES MELLITUS WITH STAGE 3A CHRONIC KIDNEY DISEASE, WITHOUT LONG-TERM CURRENT USE OF INSULIN (HCC): ICD-10-CM

## 2022-03-22 DIAGNOSIS — I73.9 PERIPHERAL VASCULAR DISEASE, UNSPECIFIED (HCC): ICD-10-CM

## 2022-03-22 DIAGNOSIS — N18.31 TYPE 2 DIABETES MELLITUS WITH STAGE 3A CHRONIC KIDNEY DISEASE, WITHOUT LONG-TERM CURRENT USE OF INSULIN (HCC): ICD-10-CM

## 2022-03-22 DIAGNOSIS — E11.22 TYPE 2 DIABETES MELLITUS WITH STAGE 3A CHRONIC KIDNEY DISEASE, WITHOUT LONG-TERM CURRENT USE OF INSULIN (HCC): Primary | ICD-10-CM

## 2022-03-22 DIAGNOSIS — I48.4 ATYPICAL ATRIAL FLUTTER (HCC): ICD-10-CM

## 2022-03-22 DIAGNOSIS — I50.32 CHRONIC DIASTOLIC CHF (CONGESTIVE HEART FAILURE) (HCC): ICD-10-CM

## 2022-03-22 LAB
ALBUMIN SERPL BCP-MCNC: 3.8 G/DL (ref 3.5–5)
ALP SERPL-CCNC: 94 U/L (ref 46–116)
ALT SERPL W P-5'-P-CCNC: 39 U/L (ref 12–78)
ANION GAP SERPL CALCULATED.3IONS-SCNC: 5 MMOL/L (ref 4–13)
AST SERPL W P-5'-P-CCNC: 35 U/L (ref 5–45)
BASOPHILS # BLD AUTO: 0.04 THOUSANDS/ΜL (ref 0–0.1)
BASOPHILS NFR BLD AUTO: 1 % (ref 0–1)
BILIRUB SERPL-MCNC: 1.26 MG/DL (ref 0.2–1)
BUN SERPL-MCNC: 36 MG/DL (ref 5–25)
CALCIUM SERPL-MCNC: 10.5 MG/DL (ref 8.3–10.1)
CHLORIDE SERPL-SCNC: 97 MMOL/L (ref 100–108)
CO2 SERPL-SCNC: 34 MMOL/L (ref 21–32)
CREAT SERPL-MCNC: 1.47 MG/DL (ref 0.6–1.3)
CREAT UR-MCNC: 99.9 MG/DL
EOSINOPHIL # BLD AUTO: 0.11 THOUSAND/ΜL (ref 0–0.61)
EOSINOPHIL NFR BLD AUTO: 3 % (ref 0–6)
ERYTHROCYTE [DISTWIDTH] IN BLOOD BY AUTOMATED COUNT: 16.2 % (ref 11.6–15.1)
EST. AVERAGE GLUCOSE BLD GHB EST-MCNC: 134 MG/DL
GFR SERPL CREATININE-BSD FRML MDRD: 33 ML/MIN/1.73SQ M
GLUCOSE P FAST SERPL-MCNC: 162 MG/DL (ref 65–99)
HBA1C MFR BLD: 6.3 %
HCT VFR BLD AUTO: 36.9 % (ref 34.8–46.1)
HGB BLD-MCNC: 12.4 G/DL (ref 11.5–15.4)
IMM GRANULOCYTES # BLD AUTO: 0.01 THOUSAND/UL (ref 0–0.2)
IMM GRANULOCYTES NFR BLD AUTO: 0 % (ref 0–2)
LYMPHOCYTES # BLD AUTO: 0.95 THOUSANDS/ΜL (ref 0.6–4.47)
LYMPHOCYTES NFR BLD AUTO: 26 % (ref 14–44)
MCH RBC QN AUTO: 33.2 PG (ref 26.8–34.3)
MCHC RBC AUTO-ENTMCNC: 33.6 G/DL (ref 31.4–37.4)
MCV RBC AUTO: 99 FL (ref 82–98)
MICROALBUMIN UR-MCNC: <5 MG/L (ref 0–20)
MICROALBUMIN/CREAT 24H UR: <5 MG/G CREATININE (ref 0–30)
MONOCYTES # BLD AUTO: 0.53 THOUSAND/ΜL (ref 0.17–1.22)
MONOCYTES NFR BLD AUTO: 14 % (ref 4–12)
NEUTROPHILS # BLD AUTO: 2.03 THOUSANDS/ΜL (ref 1.85–7.62)
NEUTS SEG NFR BLD AUTO: 56 % (ref 43–75)
NRBC BLD AUTO-RTO: 0 /100 WBCS
PLATELET # BLD AUTO: 195 THOUSANDS/UL (ref 149–390)
PMV BLD AUTO: 9.3 FL (ref 8.9–12.7)
POTASSIUM SERPL-SCNC: 4.1 MMOL/L (ref 3.5–5.3)
PROT SERPL-MCNC: 8.2 G/DL (ref 6.4–8.2)
RBC # BLD AUTO: 3.74 MILLION/UL (ref 3.81–5.12)
SODIUM SERPL-SCNC: 136 MMOL/L (ref 136–145)
T4 FREE SERPL-MCNC: 0.7 NG/DL (ref 0.76–1.46)
TSH SERPL DL<=0.05 MIU/L-ACNC: 42.3 UIU/ML (ref 0.36–3.74)
WBC # BLD AUTO: 3.67 THOUSAND/UL (ref 4.31–10.16)

## 2022-03-22 PROCEDURE — 82570 ASSAY OF URINE CREATININE: CPT

## 2022-03-22 PROCEDURE — 80053 COMPREHEN METABOLIC PANEL: CPT

## 2022-03-22 PROCEDURE — 83036 HEMOGLOBIN GLYCOSYLATED A1C: CPT

## 2022-03-22 PROCEDURE — 84439 ASSAY OF FREE THYROXINE: CPT

## 2022-03-22 PROCEDURE — 36415 COLL VENOUS BLD VENIPUNCTURE: CPT

## 2022-03-22 PROCEDURE — 84443 ASSAY THYROID STIM HORMONE: CPT

## 2022-03-22 PROCEDURE — 99214 OFFICE O/P EST MOD 30 MIN: CPT | Performed by: FAMILY MEDICINE

## 2022-03-22 PROCEDURE — 85025 COMPLETE CBC W/AUTO DIFF WBC: CPT

## 2022-03-22 PROCEDURE — 82043 UR ALBUMIN QUANTITATIVE: CPT

## 2022-03-22 NOTE — PROGRESS NOTES
Assessment/Plan:  Patient is seen for follow up of diabetes  She also complains of dryness - takes 40 mg - two in AM and one in PM - she is asking to decrease  She says if she does not take her morning dose because she is going somewhere like here today, she will be swollen until she gets home  I told her that she could try to cut down to two daily  Sees cardiology on 3/29  She wants to cut down on Furosemide - will try two daily until she sees cardiology next week  Orders given for blood work  Recheck in four months  Diagnoses and all orders for this visit:    Type 2 diabetes mellitus with stage 3a chronic kidney disease, without long-term current use of insulin (HCC)  -     HEMOGLOBIN A1C W/ EAG ESTIMATION; Future  -     Microalbumin / creatinine urine ratio; Future  -     Comprehensive metabolic panel; Future    Essential hypertension  -     HEMOGLOBIN A1C W/ EAG ESTIMATION; Future  -     Microalbumin / creatinine urine ratio; Future  -     Comprehensive metabolic panel; Future  -     CBC and differential; Future  -     TSH, 3rd generation with Free T4 reflex; Future    Chronic diastolic CHF (congestive heart failure) (HCC)  -     HEMOGLOBIN A1C W/ EAG ESTIMATION; Future  -     Microalbumin / creatinine urine ratio; Future  -     Comprehensive metabolic panel; Future    Peripheral vascular disease, unspecified ([facility] [address] )    Atypical atrial flutter (HCC)    Platelets decreased ([facility])  -     CBC and differential; Future    Abnormal thyroid function test  -     TSH, 3rd generation with Free T4 reflex; Future          Subjective:   Chief Complaint   Patient presents with    Follow-up     4 month check        Patient ID: Fabio Anaya is a 80 y o  female  Patient is seen for follow up of diabetes  She drove herself here  She is feling much better - not short of breath, still with swelling        The following portions of the patient's history were reviewed and updated as appropriate: allergies, current medications, past family history, past medical history, past social history, past surgical history and problem list     Review of Systems   Constitutional: Negative for chills and fever  HENT: Negative for congestion and sore throat  Respiratory: Negative for chest tightness  Cardiovascular: Negative for chest pain and palpitations  Gastrointestinal: Negative for abdominal pain, constipation, diarrhea and nausea  Genitourinary: Negative for difficulty urinating  Skin: Negative  Neurological: Negative for dizziness and headaches  Psychiatric/Behavioral: Negative  Objective:      /88 (BP Location: Left arm, Patient Position: Sitting)   Pulse 78   Temp 98 3 °F (36 8 °C) (Tympanic)   Resp 18   Ht 5' (1 524 m)   Wt 54 2 kg (119 lb 6 4 oz)   SpO2 99%   BMI 23 32 kg/m²          Physical Exam  Vitals and nursing note reviewed  Constitutional:       General: She is not in acute distress  HENT:      Head: Normocephalic  Neck:      Thyroid: No thyromegaly  Cardiovascular:      Rate and Rhythm: Normal rate and regular rhythm  Heart sounds: Normal heart sounds  Pulmonary:      Effort: Pulmonary effort is normal       Breath sounds: Normal breath sounds  Musculoskeletal:      Right lower leg: Edema (trace) present  Left lower leg: No edema (trace)  Lymphadenopathy:      Cervical: No cervical adenopathy  Skin:     General: Skin is warm and dry  Neurological:      Mental Status: She is alert and oriented to person, place, and time     Psychiatric:         Mood and Affect: Mood normal

## 2022-05-30 DIAGNOSIS — E11.39 TYPE 2 DIABETES MELLITUS WITH OTHER OPHTHALMIC COMPLICATION, WITHOUT LONG-TERM CURRENT USE OF INSULIN (HCC): ICD-10-CM

## 2022-05-31 RX ORDER — METFORMIN HYDROCHLORIDE 500 MG/1
TABLET, EXTENDED RELEASE ORAL
Qty: 90 TABLET | Refills: 3 | Status: SHIPPED | OUTPATIENT
Start: 2022-05-31

## 2022-06-28 ENCOUNTER — RA CDI HCC (OUTPATIENT)
Dept: OTHER | Facility: HOSPITAL | Age: 82
End: 2022-06-28

## 2022-06-28 NOTE — PROGRESS NOTES
E11 51, I13 0  [facility]  coding opportunities          Chart Reviewed number of suggestions sent to Provider: 2     Patients Insurance     Medicare Insurance: [employer] Advantage

## 2022-07-05 DIAGNOSIS — E03.9 ACQUIRED HYPOTHYROIDISM: ICD-10-CM

## 2022-07-05 RX ORDER — LEVOTHYROXINE SODIUM 0.03 MG/1
25 TABLET ORAL DAILY
Qty: 90 TABLET | Refills: 0 | Status: SHIPPED | OUTPATIENT
Start: 2022-07-05 | End: 2022-09-26 | Stop reason: SDUPTHER

## 2022-07-25 PROBLEM — H43.811 VITREOUS DEGENERATION OF RIGHT EYE: Status: ACTIVE | Noted: 2021-08-25

## 2022-07-26 ENCOUNTER — OFFICE VISIT (OUTPATIENT)
Dept: FAMILY MEDICINE CLINIC | Facility: CLINIC | Age: 82
End: 2022-07-26
Payer: COMMERCIAL

## 2022-07-26 ENCOUNTER — APPOINTMENT (OUTPATIENT)
Dept: LAB | Facility: CLINIC | Age: 82
End: 2022-07-26
Payer: COMMERCIAL

## 2022-07-26 VITALS
BODY MASS INDEX: 23.99 KG/M2 | HEART RATE: 65 BPM | OXYGEN SATURATION: 98 % | WEIGHT: 122.2 LBS | SYSTOLIC BLOOD PRESSURE: 110 MMHG | HEIGHT: 60 IN | RESPIRATION RATE: 18 BRPM | TEMPERATURE: 97.7 F | DIASTOLIC BLOOD PRESSURE: 70 MMHG

## 2022-07-26 DIAGNOSIS — E03.9 ACQUIRED HYPOTHYROIDISM: ICD-10-CM

## 2022-07-26 DIAGNOSIS — E11.51 TYPE II DIABETES MELLITUS WITH PERIPHERAL CIRCULATORY DISORDER (HCC): ICD-10-CM

## 2022-07-26 DIAGNOSIS — E11.22 TYPE 2 DIABETES MELLITUS WITH STAGE 3A CHRONIC KIDNEY DISEASE, WITHOUT LONG-TERM CURRENT USE OF INSULIN (HCC): Primary | ICD-10-CM

## 2022-07-26 DIAGNOSIS — N18.31 TYPE 2 DIABETES MELLITUS WITH STAGE 3A CHRONIC KIDNEY DISEASE, WITHOUT LONG-TERM CURRENT USE OF INSULIN (HCC): Primary | ICD-10-CM

## 2022-07-26 DIAGNOSIS — I13.0 HYPERTENSIVE HEART AND RENAL DISEASE WITH CONGESTIVE HEART FAILURE (HCC): ICD-10-CM

## 2022-07-26 DIAGNOSIS — Z12.31 ENCOUNTER FOR SCREENING MAMMOGRAM FOR BREAST CANCER: ICD-10-CM

## 2022-07-26 DIAGNOSIS — E11.22 TYPE 2 DIABETES MELLITUS WITH STAGE 3A CHRONIC KIDNEY DISEASE, WITHOUT LONG-TERM CURRENT USE OF INSULIN (HCC): ICD-10-CM

## 2022-07-26 DIAGNOSIS — N18.31 TYPE 2 DIABETES MELLITUS WITH STAGE 3A CHRONIC KIDNEY DISEASE, WITHOUT LONG-TERM CURRENT USE OF INSULIN (HCC): ICD-10-CM

## 2022-07-26 LAB
ALBUMIN SERPL BCP-MCNC: 3.4 G/DL (ref 3.5–5)
ALP SERPL-CCNC: 115 U/L (ref 46–116)
ALT SERPL W P-5'-P-CCNC: 38 U/L (ref 12–78)
ANION GAP SERPL CALCULATED.3IONS-SCNC: 3 MMOL/L (ref 4–13)
AST SERPL W P-5'-P-CCNC: 39 U/L (ref 5–45)
BASOPHILS # BLD AUTO: 0.04 THOUSANDS/ΜL (ref 0–0.1)
BASOPHILS NFR BLD AUTO: 1 % (ref 0–1)
BILIRUB SERPL-MCNC: 1.19 MG/DL (ref 0.2–1)
BUN SERPL-MCNC: 18 MG/DL (ref 5–25)
CALCIUM ALBUM COR SERPL-MCNC: 10.1 MG/DL (ref 8.3–10.1)
CALCIUM SERPL-MCNC: 9.6 MG/DL (ref 8.3–10.1)
CHLORIDE SERPL-SCNC: 101 MMOL/L (ref 96–108)
CO2 SERPL-SCNC: 33 MMOL/L (ref 21–32)
CREAT SERPL-MCNC: 1.33 MG/DL (ref 0.6–1.3)
EOSINOPHIL # BLD AUTO: 0.1 THOUSAND/ΜL (ref 0–0.61)
EOSINOPHIL NFR BLD AUTO: 3 % (ref 0–6)
ERYTHROCYTE [DISTWIDTH] IN BLOOD BY AUTOMATED COUNT: 19.2 % (ref 11.6–15.1)
GFR SERPL CREATININE-BSD FRML MDRD: 37 ML/MIN/1.73SQ M
GLUCOSE P FAST SERPL-MCNC: 150 MG/DL (ref 65–99)
HCT VFR BLD AUTO: 32.6 % (ref 34.8–46.1)
HGB BLD-MCNC: 10.4 G/DL (ref 11.5–15.4)
IMM GRANULOCYTES # BLD AUTO: 0.01 THOUSAND/UL (ref 0–0.2)
IMM GRANULOCYTES NFR BLD AUTO: 0 % (ref 0–2)
LYMPHOCYTES # BLD AUTO: 0.83 THOUSANDS/ΜL (ref 0.6–4.47)
LYMPHOCYTES NFR BLD AUTO: 28 % (ref 14–44)
MCH RBC QN AUTO: 31.8 PG (ref 26.8–34.3)
MCHC RBC AUTO-ENTMCNC: 31.9 G/DL (ref 31.4–37.4)
MCV RBC AUTO: 100 FL (ref 82–98)
MONOCYTES # BLD AUTO: 0.47 THOUSAND/ΜL (ref 0.17–1.22)
MONOCYTES NFR BLD AUTO: 16 % (ref 4–12)
NEUTROPHILS # BLD AUTO: 1.53 THOUSANDS/ΜL (ref 1.85–7.62)
NEUTS SEG NFR BLD AUTO: 52 % (ref 43–75)
NRBC BLD AUTO-RTO: 0 /100 WBCS
PLATELET # BLD AUTO: 164 THOUSANDS/UL (ref 149–390)
PMV BLD AUTO: 9.2 FL (ref 8.9–12.7)
POTASSIUM SERPL-SCNC: 3.6 MMOL/L (ref 3.5–5.3)
PROT SERPL-MCNC: 7.9 G/DL (ref 6.4–8.4)
RBC # BLD AUTO: 3.27 MILLION/UL (ref 3.81–5.12)
SODIUM SERPL-SCNC: 137 MMOL/L (ref 135–147)
T4 FREE SERPL-MCNC: 0.8 NG/DL (ref 0.76–1.46)
TSH SERPL DL<=0.05 MIU/L-ACNC: 15.3 UIU/ML (ref 0.45–4.5)
WBC # BLD AUTO: 2.98 THOUSAND/UL (ref 4.31–10.16)

## 2022-07-26 PROCEDURE — 36415 COLL VENOUS BLD VENIPUNCTURE: CPT

## 2022-07-26 PROCEDURE — 80053 COMPREHEN METABOLIC PANEL: CPT

## 2022-07-26 PROCEDURE — 83036 HEMOGLOBIN GLYCOSYLATED A1C: CPT

## 2022-07-26 PROCEDURE — 84443 ASSAY THYROID STIM HORMONE: CPT

## 2022-07-26 PROCEDURE — 85025 COMPLETE CBC W/AUTO DIFF WBC: CPT

## 2022-07-26 PROCEDURE — 84439 ASSAY OF FREE THYROXINE: CPT

## 2022-07-26 PROCEDURE — 99214 OFFICE O/P EST MOD 30 MIN: CPT | Performed by: FAMILY MEDICINE

## 2022-07-26 NOTE — ASSESSMENT & PLAN NOTE
Lab Results   Component Value Date    HGBA1C 6 3 (H) 03/22/2022   Will get blood work today for A1c  Blood sugar at home, usually in the 90's

## 2022-07-26 NOTE — PROGRESS NOTES
Assessment/Plan:    Patient is an 66-year-old female seen for follow-up of chronic medical conditions  Acquired hypothyroidism  Levothyroixne started at last visit    Type 2 diabetes mellitus with diabetic chronic kidney disease ([facility] [address] )    Lab Results   Component Value Date    HGBA1C 6 3 (H) 03/22/2022   Will get blood work today for A1c  Blood sugar at home, usually in the 90's  Will check with GYN regarding lesion on right labia majora  Diagnoses and all orders for this visit:    Type 2 diabetes mellitus with stage 3a chronic kidney disease, without long-term current use of insulin (HCC)  -     HEMOGLOBIN A1C W/ EAG ESTIMATION; Future  -     Comprehensive metabolic panel; Future  -     CBC and differential; Future    Acquired hypothyroidism  -     TSH, 3rd generation; Future  -     T4, free; Future  -     CBC and differential; Future    Encounter for screening mammogram for breast cancer  -     Mammo screening bilateral w 3d & cad; Future    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 (HCC)  -     CBC and differential; Future    Type II diabetes mellitus with peripheral circulatory disorder (HCC)  -     CBC and differential; Future        Subjective:   Chief Complaint   Patient presents with    Follow-up        Patient ID: Deon Eugene is a 80 y o  female  Patient is seen for follow up of chronic medical conditions  Legs get tired  The following portions of the patient's history were reviewed and updated as appropriate: allergies, current medications, past family history, past medical history, past social history, past surgical history and problem list     Review of Systems   Constitutional: Negative for chills and fever  HENT: Negative for congestion and sore throat  Respiratory: Negative for chest tightness  Cardiovascular: Negative for chest pain and palpitations  Gastrointestinal: Negative for abdominal pain, constipation, diarrhea and nausea  Genitourinary: Negative for difficulty urinating  Musculoskeletal: Positive for arthralgias  Skin: Negative  Neurological: Negative for dizziness and headaches  Psychiatric/Behavioral: Negative  Objective:      /70 (BP Location: Left arm, Patient Position: Sitting)   Pulse 65   Temp 97 7 °F (36 5 °C) (Tympanic)   Resp 18   Ht 5' (1 524 m)   Wt 55 4 kg (122 lb 3 2 oz)   SpO2 98%   BMI 23 87 kg/m²          Physical Exam  Vitals and nursing note reviewed  Constitutional:       General: She is not in acute distress  HENT:      Head: Normocephalic  Neck:      Thyroid: No thyromegaly  Cardiovascular:      Rate and Rhythm: Normal rate and regular rhythm  Heart sounds: Normal heart sounds  Pulmonary:      Effort: Pulmonary effort is normal       Breath sounds: Normal breath sounds  Genitourinary:     Comments: Growth on right labia - slightly hard, raised - ? wart  Musculoskeletal:      Right lower leg: No edema  Left lower leg: No edema  Lymphadenopathy:      Cervical: No cervical adenopathy  Skin:     General: Skin is warm and dry  Neurological:      Mental Status: She is alert and oriented to person, place, and time     Psychiatric:         Mood and Affect: Mood normal

## 2022-07-27 LAB
EST. AVERAGE GLUCOSE BLD GHB EST-MCNC: 140 MG/DL
HBA1C MFR BLD: 6.5 %

## 2022-08-03 NOTE — PROGRESS NOTES
History of Present Illness    Revaccination   Vaccine Information: Vaccine(s) Given (names): Tdap  Spoke with patient regarding vaccine out of temperature range  Action(s): Pt will be revaccinated  Appointment scheduled: [id number]  Pt called (attempt 1): [id number] JLR  Revaccination Completed: [id number]  Active Problems    1  Arrhythmia, sinus node (427 81) (I49 5)   2  Artificial pacemaker (V45 01) (Z95 0)   3  ASD (atrial septal defect) (745 5) (Q21 1)   4  Atrial flutter with controlled response (427 32) (I48 92)   5  Atrial Septal Defect   6  Bilateral nonexudative age-related macular degeneration (362 51) (H35 3130)   7  Cataract, bilateral (366 9) (H26 9)   8  DMII (diabetes mellitus, type 2) (250 00) (E11 9)   9  Hypercholesterolemia (272 0) (E78 00)   10  Hypertension (401 9) (I10)   11  Insomnia (780 52) (G47 00)   12  Need for prophylactic vaccination and inoculation against influenza (V04 81) (Z23)   13  Need for revaccination (V05 9) (Z23)   14  Osteopenia (733 90) (M85 80)   15  Strain of lumbar region, initial encounter (847 2) (G30 450X)    Immunizations  Influenza --- Miguel Sapp: [id number] Session: 25-Sep-2014; Series4:  2015; Series5: 2016   PCV --- Series1: 2015   PPSV --- Miguel Sapp:    Td/DT --- Series1: 2015   Zoster --- Series1: 02-Aug-2012     Current Meds   1  Alendronate Sodium 35 MG Oral Tablet; TAKE 1 TABLET WEEKLY ON AN EMPTY   STOMACH 30-60 MINUTES BEFORE BREAKFAST WITH 8-12 OUNCES OF WATER  DO   NOT LIE DOWN AFTER TAKING PILL   2  FreeStyle Lancets Miscellaneous; USE AS DIRECTED   3  FreeStyle Lite Test In [employer]; Test Four Times per Week as directed   4  Losartan Potassium 50 MG Oral Tablet; TAKE HALF A TABLET DAILY   5  MetFORMIN HCl  MG Oral Tablet Extended Release 24 Hour; Take 1 tablet by   mouth  daily   6  Metoprolol Tartrate 25 MG Oral Tablet   7  Multivitamins CAPS   8   Simvastatin 20 MG Oral [facility] at [address]  [place] [address]  [phone]  Fax [phone]      Patient ID: Anh Rowan                              : 1961    Visit Date: 8/3/2022    Chief Complaint: Follow-up         Patient ID: Anh Rowan is a 61 y.o. female.    Patient Active Problem List   Diagnosis   • Essential hypertension, benign   • Fatty liver   • Morbid obesity with body mass index (BMI) of 45.0 to 49.9 in adult (CMS/HCC)   • Marginal zone lymphoma (CMS/HCC)   • Type 2 diabetes mellitus without complication, without long-term current use of insulin (CMS/HCC)   • Right sciatic notch pain         Current Outpatient Medications:   •  blood sugar diagnostic (ACCU-CHEK GUIDE TEST STRIPS) strip, Test 1-2 times daily, Disp: 100 strip, Rfl: 1  •  blood-glucose meter (ACCU-CHEK GUIDE GLUCOSE METER) misc, Test bid, Disp: 1 each, Rfl: 0  •  fluticasone propionate (FLONASE) 50 mcg/actuation nasal spray, TAKE 1 SPRAY EACH NOSTRIL TWICE DAILY FOR 14 DAYS (AND AS NEEDED FOR NASAL OBSTRUCTION), Disp: , Rfl:   •  lancets (ACCU-CHEK FASTCLIX LANCET DRUM) misc, Test blood sugar one to two times daily, Disp: 100 each, Rfl: 1  •  lidocaine (LIDODERM) 5 % patch, Place 1 patch on the skin daily. Remove & discard patch within 12 hours or as directed by prescriber., Disp: 30 patch, Rfl: 1  •  metFORMIN XR (GLUCOPHAGE-XR) 500 mg 24 hr tablet, TAKE 2 TABLETS TWICE A DAY, Disp: 14 tablet, Rfl: 0  •  RYBELSUS 7 mg tablet, TAKE 1 TABLET BY MOUTH EVERY DAY, Disp: 30 tablet, Rfl: 4  •  azelastine-fluticasone (DYMISTA) 137-50 mcg/spray nasal spray, Administer 1 spray into each nostril 2 (two) times a day., Disp: 23 g, Rfl: 0    Allergies   Allergen Reactions   • Clarithromycin Rash       Social History     Tobacco Use   • Smoking status: Never Smoker   • Smokeless tobacco: Never Used       Health Maintenance   Topic Date Due   • Influenza Vaccine (1) 2022   •  Colonoscopy  08/31/2022 (Originally 5/23/2006)   • Zoster Vaccine (1 of 2) 12/29/2022 (Originally 5/23/1980)   • COVID-19 Vaccine (1) 12/31/2022 (Originally [birth year]/1966)   • Annual Dilated Retinal Exam  11/16/2022   • Diabetes Kidney Health Evaluation: eGFR  12/18/2022   • Diabetes Kidney Health Evaluation:  uACR  12/18/2022   • Urine Protein Screening  12/18/2022   • Diabetic Foot Exam  12/30/2022   • Cervical Cancer Screening  03/07/2023   • Hemoglobin A1C  03/26/2023   • Breast Cancer Screening  08/09/2023   • Hepatitis C Screening  Completed   • Meningococcal ACWY  Aged Out   • HIB Vaccines  Aged Out   • IPV Vaccines  Aged Out   • HPV Vaccines  Aged Out   • DTaP, Tdap, and Td Vaccines  Discontinued   • HIV Screening  Discontinued   • Pneumococcal  Discontinued       HPI  Follow up sciatica  Symptoms resolved.  Started after long plane ride.    Ready to see a weight loss surgeon.  Has tried and failed many diet plans. Most recently Kayy Ulloa.    DM follow up    Diabetes  She presents for her follow-up diabetic visit. She has type 2 diabetes mellitus. Her disease course has been stable. There are no diabetic associated symptoms. Current diabetic treatment includes oral agent (dual therapy). She is compliant with treatment all of the time. She is following a generally healthy diet. Meal planning includes avoidance of concentrated sweets. Exercise: twice a week. There is no change in her home blood glucose trend. An ACE inhibitor/angiotensin II receptor blocker is not being taken. She does not see a podiatrist.Eye exam is current.       The following have been reviewed and updated as appropriate in this visit:   Allergies  Meds  Problems           Review of System  Review of Systems   Constitutional: Negative.    Respiratory: Negative.    Cardiovascular: Negative.        Objective     Vitals  Vitals:    08/03/22 1610   BP: 126/76   BP Location: Left upper arm   Patient Position: Sitting   Pulse: 100   Resp: 18  Tablet; TAKE 1 TABLET DAILY AS DIRECTED   9  Sotalol HCl - 80 MG Oral Tablet   10  Xarelto 20 MG Oral Tablet; Take 1 tablet daily    Allergies    1  Penicillins    2  Shellfish  Denied    3  Iodine OINT    Plan    1   RVAC-Tenivac 5-2 LFU Intramuscular Injectable    Future Appointments    Date/Time Provider Specialty Site   04/04/2017 10:30 AM Lilli Schwartz DO Family Medicine [address]   Electronically signed by : Anastacio Ashton, ; Mar 10 2017  2:33PM EST                       (Author) "  SpO2: 98%   Weight: 133 kg (293 lb)   Height: 1.702 m (5' 7\")     Body mass index is 45.89 kg/m².    Physical Exam  Physical Exam  Vitals reviewed.   Constitutional:       General: She is not in acute distress.     Appearance: Normal appearance. She is not diaphoretic.   Cardiovascular:      Rate and Rhythm: Normal rate and regular rhythm.   Pulmonary:      Effort: Pulmonary effort is normal. No respiratory distress.   Musculoskeletal:      Right lower leg: No edema.      Left lower leg: No edema.   Neurological:      Mental Status: She is alert and oriented to person, place, and time.         Assessment/Plan     Problem List Items Addressed This Visit     Morbid obesity with body mass index (BMI) of 45.0 to 49.9 in adult (CMS/[facility])     Interested in weight loss surgery.  Referred to Dr Bryson.           Relevant Orders    Ambulatory referral to [facility]    Type 2 diabetes mellitus without complication, without long-term current use of insulin (CMS/[facility])     Check A1C           Relevant Orders    Hemoglobin A1c    ABO/RH    Right sciatic notch pain - Primary     Resolved.  Suggested weight loss  Avoid prolonged sitting  Regular exercise and stretches.  Follow up as needed.                     HAYDER James  8/3/2022  "

## 2022-08-16 ENCOUNTER — OFFICE VISIT (OUTPATIENT)
Dept: OBGYN CLINIC | Facility: CLINIC | Age: 82
End: 2022-08-16
Payer: COMMERCIAL

## 2022-08-16 VITALS
WEIGHT: 119.2 LBS | BODY MASS INDEX: 23.4 KG/M2 | DIASTOLIC BLOOD PRESSURE: 60 MMHG | HEIGHT: 60 IN | SYSTOLIC BLOOD PRESSURE: 114 MMHG

## 2022-08-16 DIAGNOSIS — D24.1 FIBROADENOMA OF BREAST, RIGHT: ICD-10-CM

## 2022-08-16 DIAGNOSIS — N83.201 RIGHT OVARIAN CYST: ICD-10-CM

## 2022-08-16 DIAGNOSIS — M85.80 OSTEOPENIA, UNSPECIFIED LOCATION: ICD-10-CM

## 2022-08-16 DIAGNOSIS — R92.2 DENSE BREAST TISSUE ON MAMMOGRAM: ICD-10-CM

## 2022-08-16 DIAGNOSIS — N90.89 VULVAR LESION: ICD-10-CM

## 2022-08-16 DIAGNOSIS — Z90.710 STATUS POST HYSTERECTOMY: ICD-10-CM

## 2022-08-16 DIAGNOSIS — Z98.890 STATUS POST RIGHT BREAST BIOPSY: ICD-10-CM

## 2022-08-16 PROCEDURE — 1160F RVW MEDS BY RX/DR IN RCRD: CPT | Performed by: OBSTETRICS & GYNECOLOGY

## 2022-08-16 PROCEDURE — 99213 OFFICE O/P EST LOW 20 MIN: CPT | Performed by: OBSTETRICS & GYNECOLOGY

## 2022-08-16 NOTE — PROGRESS NOTES
Assessment/Plan:    Diagnoses and all orders for this visit:    Vulvar lesion    Fibroadenoma of breast, right  -     Mammo screening bilateral w 3d & cad; Future    Status post right breast biopsy    Dense breast tissue on mammogram  -     Mammo screening bilateral w 3d & cad; Future    Status post hysterectomy    Right ovarian cyst    Osteopenia, unspecified location        Subjective: painful lump on right labia majora     Patient ID: Megan Pérez is a 80 y o  female  HPI    42-year-old female,  3 para 3 complains of painful lesion on her right labia majora which is been present for approximately 1 year  She states is become more irritated and causes her to itch  I recommend she return in the upcoming weeks for excisional biopsy  She did have a hysterectomy many years ago she has a known right ovarian cyst very small  She is due for annual exam in January, Pap smears no longer indicated  Review of Systems  unchanged from previous visit     Objective: no acute distress  /60   Ht 5' (1 524 m)   Wt 54 1 kg (119 lb 3 2 oz)   BMI 23 28 kg/m²      Physical Exam  Vitals reviewed  Exam conducted with a chaperone present  Constitutional:       Appearance: Normal appearance  She is normal weight  HENT:      Head: Normocephalic  Eyes:      Pupils: Pupils are equal, round, and reactive to light  Pulmonary:      Effort: Pulmonary effort is normal    Abdominal:      Palpations: Abdomen is soft  Genitourinary:         Comments:  Approximate 5 mm condyloma type lesion on her right labia majora with the smaller lesion adjacent  No other lesions seen on her perineum  Normal vaginal discharge, status post hysterectomy  Musculoskeletal:         General: Normal range of motion  Cervical back: Normal range of motion  Skin:     General: Skin is warm and dry  Neurological:      Mental Status: She is alert and oriented to person, place, and time     Psychiatric:         Mood and Affect: Mood normal          Behavior: Behavior normal          Thought Content:  Thought content normal          Judgment: Judgment normal

## 2022-08-24 ENCOUNTER — PROCEDURE VISIT (OUTPATIENT)
Dept: OBGYN CLINIC | Facility: CLINIC | Age: 82
End: 2022-08-24

## 2022-08-24 VITALS
HEIGHT: 60 IN | BODY MASS INDEX: 23.68 KG/M2 | WEIGHT: 120.6 LBS | DIASTOLIC BLOOD PRESSURE: 60 MMHG | SYSTOLIC BLOOD PRESSURE: 108 MMHG

## 2022-08-24 DIAGNOSIS — A63.0 CONDYLOMA: Primary | ICD-10-CM

## 2022-08-24 PROCEDURE — 88305 TISSUE EXAM BY PATHOLOGIST: CPT | Performed by: PATHOLOGY

## 2022-08-24 PROCEDURE — 88312 SPECIAL STAINS GROUP 1: CPT | Performed by: PATHOLOGY

## 2022-08-24 NOTE — PROGRESS NOTES
Biopsy    Date/Time: 8/24/2022 10:22 AM  Performed by: Chantale Loera DO  Authorized by: Chantale Loera DO   Universal Protocol:  Consent: Written consent obtained  Risks and benefits: risks, benefits and alternatives were discussed  Consent given by: patient  Patient understanding: patient states understanding of the procedure being performed  Patient consent: the patient's understanding of the procedure matches consent given  Procedure consent: procedure consent matches procedure scheduled  Required items: required blood products, implants, devices, and special equipment available  Patient identity confirmed: verbally with patient      Procedure Details - Skin Biopsy:     Biopsy method: shave biopsy      Initial size (mm):  5    Final defect size (mm):  5    Malignancy: benign lesion       Raised Condylomatous lesion right labia majora 5 x 5 mm   cleansed with Betadine solution   injected 3 mL 1% plain lidocaine   shave biopsy with # 15 blade scalpel   direct pressure and silver nitrate used to control and stop bleeding   no active bleeding upon completion, patient tolerated well there were no complications  Postoperative instructions given to the patient, patient understands and will follow up here in 2 weeks for postop check  All questions answered

## 2022-09-12 ENCOUNTER — OFFICE VISIT (OUTPATIENT)
Dept: GYNECOLOGY | Facility: CLINIC | Age: 82
End: 2022-09-12
Payer: COMMERCIAL

## 2022-09-12 VITALS
WEIGHT: 121 LBS | BODY MASS INDEX: 23.75 KG/M2 | SYSTOLIC BLOOD PRESSURE: 108 MMHG | HEIGHT: 60 IN | DIASTOLIC BLOOD PRESSURE: 64 MMHG

## 2022-09-12 DIAGNOSIS — N90.89 VULVAR LESION: Primary | ICD-10-CM

## 2022-09-12 DIAGNOSIS — Z09 POSTOP CHECK: ICD-10-CM

## 2022-09-12 PROCEDURE — 1160F RVW MEDS BY RX/DR IN RCRD: CPT | Performed by: OBSTETRICS & GYNECOLOGY

## 2022-09-12 PROCEDURE — 99213 OFFICE O/P EST LOW 20 MIN: CPT | Performed by: OBSTETRICS & GYNECOLOGY

## 2022-09-12 NOTE — PROGRESS NOTES
Assessment/Plan:    Diagnoses and all orders for this visit:    Vulvar lesion    Postop check        Subjective: post excisional biopsy     Patient ID: Judah Anderson is a 80 y o  female  HPI    80-year-old female recently here for evaluation for vulvar lesion she identified on right labia majora  She return for excisional biopsy on 08/24/22  She is healing well without any complications  Follow-up for annual exam which is due in early spring of next year  No other questions or concerns  Review of Systems   Unchanged from previous visit    Objective: no acute distress  /64   Ht 5' (1 524 m)   Wt 54 9 kg (121 lb)   BMI 23 63 kg/m²      Physical Exam  Vitals reviewed  Exam conducted with a chaperone present  Constitutional:       Appearance: Normal appearance  She is normal weight  HENT:      Head: Normocephalic  Eyes:      Extraocular Movements: Extraocular movements intact  Pulmonary:      Effort: Pulmonary effort is normal    Abdominal:      General: Abdomen is flat  Palpations: Abdomen is soft  Genitourinary:     General: Normal vulva  Comments:  Right vulvar lesion excisional biopsy site well healed   no new lesions identified  Musculoskeletal:         General: Normal range of motion  Skin:     General: Skin is warm and dry  Neurological:      Mental Status: She is alert and oriented to person, place, and time  Psychiatric:         Mood and Affect: Mood normal          Behavior: Behavior normal          Thought Content: Thought content normal          Judgment: Judgment normal         Please note    In addition to the time spent discussing the findings and results of today's visit and exam, I spent approximately 20 minutes of face-to-face time with the patient, greater than 50% of which was spent in counseling and coordination of care for this patient

## 2022-09-26 DIAGNOSIS — I10 ESSENTIAL HYPERTENSION: Primary | ICD-10-CM

## 2022-09-26 DIAGNOSIS — E03.9 ACQUIRED HYPOTHYROIDISM: ICD-10-CM

## 2022-09-26 RX ORDER — LEVOTHYROXINE SODIUM 0.03 MG/1
25 TABLET ORAL DAILY
Qty: 90 TABLET | Refills: 1 | Status: SHIPPED | OUTPATIENT
Start: 2022-09-26

## 2022-09-26 RX ORDER — METOPROLOL SUCCINATE 50 MG/1
75 TABLET, EXTENDED RELEASE ORAL DAILY
Qty: 45 TABLET | Refills: 1 | Status: SHIPPED | OUTPATIENT
Start: 2022-09-26 | End: 2022-10-21

## 2022-10-03 ENCOUNTER — HOSPITAL ENCOUNTER (OUTPATIENT)
Dept: MAMMOGRAPHY | Facility: MEDICAL CENTER | Age: 82
Discharge: HOME | End: 2022-10-03
Payer: COMMERCIAL

## 2022-10-03 VITALS — WEIGHT: 121.03 LBS | HEIGHT: 60 IN | BODY MASS INDEX: 23.76 KG/M2

## 2022-10-03 DIAGNOSIS — R92.2 DENSE BREAST TISSUE ON MAMMOGRAM: ICD-10-CM

## 2022-10-03 DIAGNOSIS — D24.1 FIBROADENOMA OF BREAST, RIGHT: ICD-10-CM

## 2022-10-03 PROCEDURE — 77063 BREAST TOMOSYNTHESIS BI: CPT

## 2022-10-03 PROCEDURE — 77067 SCR MAMMO BI INCL CAD: CPT

## 2022-10-19 DIAGNOSIS — I10 ESSENTIAL HYPERTENSION: ICD-10-CM

## 2022-10-21 RX ORDER — METOPROLOL SUCCINATE 50 MG/1
TABLET, EXTENDED RELEASE ORAL
Qty: 135 TABLET | Refills: 1 | Status: SHIPPED | OUTPATIENT
Start: 2022-10-21

## 2022-10-25 ENCOUNTER — TELEPHONE (OUTPATIENT)
Dept: FAMILY MEDICINE CLINIC | Facility: CLINIC | Age: 82
End: 2022-10-25

## 2022-11-17 ENCOUNTER — TELEPHONE (OUTPATIENT)
Dept: FAMILY MEDICINE CLINIC | Facility: CLINIC | Age: 82
End: 2022-11-17

## 2022-11-17 ENCOUNTER — OFFICE VISIT (OUTPATIENT)
Dept: FAMILY MEDICINE CLINIC | Facility: CLINIC | Age: 82
End: 2022-11-17

## 2022-11-17 VITALS
SYSTOLIC BLOOD PRESSURE: 132 MMHG | HEART RATE: 87 BPM | HEIGHT: 60 IN | BODY MASS INDEX: 23.16 KG/M2 | DIASTOLIC BLOOD PRESSURE: 70 MMHG | TEMPERATURE: 96.6 F | WEIGHT: 118 LBS | OXYGEN SATURATION: 99 %

## 2022-11-17 DIAGNOSIS — E11.22 TYPE 2 DIABETES MELLITUS WITH STAGE 3A CHRONIC KIDNEY DISEASE, WITHOUT LONG-TERM CURRENT USE OF INSULIN (HCC): Primary | ICD-10-CM

## 2022-11-17 DIAGNOSIS — Z00.00 MEDICARE ANNUAL WELLNESS VISIT, SUBSEQUENT: ICD-10-CM

## 2022-11-17 DIAGNOSIS — N18.31 TYPE 2 DIABETES MELLITUS WITH STAGE 3A CHRONIC KIDNEY DISEASE, WITHOUT LONG-TERM CURRENT USE OF INSULIN (HCC): Primary | ICD-10-CM

## 2022-11-17 DIAGNOSIS — E03.9 ACQUIRED HYPOTHYROIDISM: ICD-10-CM

## 2022-11-17 DIAGNOSIS — R04.0 EPISTAXIS: ICD-10-CM

## 2022-11-17 LAB — SL AMB POCT HEMOGLOBIN AIC: 6.6 (ref ?–6.5)

## 2022-11-17 RX ORDER — LEVOTHYROXINE SODIUM 0.03 MG/1
25 TABLET ORAL DAILY
Qty: 90 TABLET | Refills: 1 | Status: SHIPPED | OUTPATIENT
Start: 2022-11-17

## 2022-11-17 NOTE — TELEPHONE ENCOUNTER
Spoke with  at [facility] pt called and cancelled appt Monday 11/21/22 due to location   [facility] is aware and trying to call pt to schedule appt they would have to squeeze her in to be seen

## 2022-11-17 NOTE — PROGRESS NOTES
Assessment and Plan:     Problem List Items Addressed This Visit        Endocrine    Type 2 diabetes mellitus with diabetic chronic kidney disease ([place] [address] ) - Primary    Relevant Orders    POCT hemoglobin A1c (Completed)    Acquired hypothyroidism    Relevant Medications    levothyroxine (Synthroid) 25 mcg tablet   Other Visit Diagnoses     Medicare annual wellness visit, subsequent        Epistaxis              Depression Screening and Follow-up Plan: Patient was screened for depression during today's encounter  They screened negative with a PHQ-2 score of 0  Preventive health issues were discussed with patient, and age appropriate screening tests were ordered as noted in patient's After Visit Summary  Personalized health advice and appropriate referrals for health education or preventive services given if needed, as noted in patient's After Visit Summary       History of Present Illness:     Patient presents for a Medicare Wellness Visit    HPI   Patient Care Team:  Darnell Palma DO as PCP - General  DO Xavier Thompson MD Arnita Basil, DO Anise Collier, MD as Endoscopist     Review of Systems:          Problem List:     Patient Active Problem List   Diagnosis   • Atrial flutter [facility])   • Atrial septal defect   • Type 2 diabetes mellitus with diabetic chronic kidney disease ([place] [address] )   • SSS (sick sinus syndrome) ([place] [address] )   • Pacemaker   • Age-related osteoporosis without current pathological fracture   • Bilateral nonexudative age-related macular degeneration   • Cataract, bilateral   • Essential hypertension   • Insomnia   • Seborrhea   • Hyperlipidemia   • Long term current use of antiarrhythmic drug   • Long term current use of anticoagulant therapy   • Transaminitis   • Central serous chorioretinopathy   • Claudication [facility])   • Daytime sleepiness   • Dry eyes   • Nonrheumatic tricuspid valve regurgitation   • Nuclear senile cataract   • Posterior vitreous detachment   • Snoring   • Chronic diastolic CHF (congestive heart failure) (HCC)   • Platelets decreased (HCC)   • Stage 3b chronic kidney disease ([facility] )   • 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 (HCC)   • Vitreous degeneration of right eye   • Type II diabetes mellitus with peripheral circulatory disorder [facility])   • Acquired hypothyroidism      Past Medical and Surgical History:     Past Medical History:   Diagnosis Date   • Allergic rhinitis     last assessed - [id number]   • Arthritis     lower back   • Atrial flutter ([facility] )     last assessed - [id number]   • Diabetes mellitus ([facility] )     Type II   • Fatigue     last assessed - 01Oct2013   • Heart murmur     tricupid reguritation, SSS   • Hyperlipidemia    • Hypertension    • Insomnia    • Irregular heart beat     Cardioversion, pacemaker, severe tricuspid regurgitation, atrial septal defect  • Lower leg edema     last assessed - [id number]   • Macular degeneration     b/l   • Osteoporosis    • Osteoporosis    • Shortness of breath     prior to heart surgery   • Sick sinus syndrome ([facility] )     last assessed - 05Apr2017   • Sting from hornet, wasp, or bee     last assessed - [id number]   • Subconjunctival hemorrhage     unspecified laterality; last assessed - [id number]     Past Surgical History:   Procedure Laterality Date   • ASD REPAIR, SECUNDUM     • BREAST BIOPSY Right     benign   • CARDIAC PACEMAKER PLACEMENT     • CARDIAC SURGERY      Atrial septal defect repaired, pacemaker   • COLONOSCOPY N/A 04/21/2016    Procedure: COLONOSCOPY;  Surgeon: Amanuel Howell MD;  Location: [facility];   Service:    • COLONOSCOPY W/ BIOPSIES AND POLYPECTOMY     • ESOPHAGOGASTRODUODENOSCOPY     • EYE SURGERY      lid lift   • HYSTERECTOMY      at age 40 or 45   • MAMMO STEREOTACTIC BREAST BIOPSY RIGHT (ALL INC) Right 07/14/2021   • OOPHORECTOMY Left    • VA COLONOSCOPY FLX DX W/COLLJ SPEC WHEN PFRMD N/A 05/10/2019    Procedure: COLONOSCOPY with polypectomy;  Surgeon: Amanuel Howell MD; Location: [facility]; Service: Gastroenterology   • TOTAL ABDOMINAL HYSTERECTOMY     • VEIN SURGERY Left     vericose vein left leg   • WISDOM TOOTH EXTRACTION        Family History:     Family History   Problem Relation Age of Onset   • No Known Problems Mother    • Gout Brother    • No Known Problems Father    • No Known Problems Sister    • No Known Problems Daughter    • No Known Problems Maternal Grandmother    • No Known Problems Maternal Grandfather    • No Known Problems Paternal Grandmother    • No Known Problems Paternal Grandfather    • Alcohol abuse Neg Hx    • Substance Abuse Neg Hx       Social History:     Social History     Socioeconomic History   • Marital status:      Spouse name: None   • Number of children: 4   • Years of education: None   • Highest education level: None   Occupational History   • None   Tobacco Use   • Smoking status: Never   • Smokeless tobacco: Never   Substance and Sexual Activity   • Alcohol use: Yes     Comment: occas none recently; social drinker - per [employer]   • Drug use: No   • Sexual activity: None   Other Topics Concern   • None   Social History Narrative    Caffeine use    Tenriism    Uses safety equipment - Seatbelts         Social Determinants of Health     Financial Resource Strain: Low Risk    • Difficulty of Paying Living Expenses: Not hard at all   Food Insecurity: Not on file   Transportation Needs: No Transportation Needs   • Lack of Transportation (Medical): No   • Lack of Transportation (Non-Medical):  No   Physical Activity: Not on file   Stress: Not on file   Social Connections: Not on file   Intimate Partner Violence: Not on file   Housing Stability: Not on file      Medications and Allergies:     Current Outpatient Medications   Medication Sig Dispense Refill   • levothyroxine (Synthroid) 25 mcg tablet Take 1 tablet (25 mcg total) by mouth daily 90 tablet 1   • amiodarone 200 mg tablet Take 200 mg by mouth daily     • Calcium Citrate 250 MG TABS Take 1 tablet by mouth 2 (two) times a day  • Cholecalciferol (VITAMIN D-3 PO) Take 1,000 Units by mouth daily  • furosemide (LASIX) 40 mg tablet Take 80 mg by mouth in the morning      • glucose blood test strip by In Vitro route 4 (four) times a day     • glucose monitoring kit (FREESTYLE) monitoring kit E11 9 Patient is type 2 diabetic  Test once daily 1 each 0   • Lancets (FREESTYLE) lancets by Does not apply route daily     • Magnesium 250 MG TABS Take 1 tablet by mouth daily  • metFORMIN (GLUCOPHAGE-XR) 500 mg 24 hr tablet TAKE 1 TABLET DAILY 90 tablet 3   • metoprolol succinate (TOPROL-XL) 50 mg 24 hr tablet TAKE 1 AND 1/2 TABLETS BY MOUTH DAILY 135 tablet 1   • Multiple Vitamins-Minerals (CENTRUM SILVER PO) Take 1 tablet by mouth daily  • potassium chloride (K-DUR,KLOR-CON) 10 mEq tablet 20 mEq daily     • rivaroxaban (XARELTO) 15 mg tablet Take 1 tablet (15 mg total) by mouth daily with breakfast 30 tablet 5   • simvastatin (ZOCOR) 10 mg tablet Take 1 tablet (10 mg total) by mouth daily at bedtime 90 tablet 1     No current facility-administered medications for this visit       Allergies   Allergen Reactions   • Penicillins Anaphylaxis   • Shellfish Allergy - Food Allergy Anaphylaxis and Hives      Immunizations:     Immunization History   Administered Date(s) Administered   • COVID-19 MODERNA VACC 0 25 ML IM BOOSTER 04/18/2022   • COVID-19 MODERNA VACC 0 5 ML IM 01/28/2021, 02/25/2021, 11/03/2021   • COVID-19 Moderna Vac BIVALENT 18 Yr+ Im (BOOSTER ONLY) 10/20/2022   • DT (pediatric) 11/09/2015   • INFLUENZA 11/09/2015, 11/29/2016, 12/06/2017   • Influenza Split High Dose Preservative Free IM 09/25/2014, 11/09/2015, 11/29/2016, 12/06/2017, 09/17/2019   • Influenza, high dose seasonal 0 7 mL 11/27/2018, 09/15/2020, 11/22/2021, 10/20/2022   • Influenza, seasonal, injectable 11/27/2012, 10/01/2013   • Pneumococcal Conjugate 13-Valent 07/08/2015   • Pneumococcal Polysaccharide PPV23 01/01/2004, 07/31/2019   • TD (adult) Preservative Free 03/07/2017   • Td (adult), adsorbed 11/09/2015   • Tdap 03/07/2017   • Zoster 08/02/2012      Health Maintenance:         Topic Date Due   • DXA SCAN  06/16/2023   • Breast Cancer Screening: Mammogram  10/03/2023         Topic Date Due   • Hepatitis B Vaccine (1 of 3 - 3-dose series) Never done   • COVID-19 Vaccine (5 - Booster for Arnold Shelling series) 06/13/2022      Medicare Screening Tests and Risk Assessments:     Saarh is here for her Subsequent Wellness visit  Last Medicare Wellness visit information reviewed, patient interviewed and updates made to the record today  Health Risk Assessment:   Patient rates overall health as fair  Patient feels that their physical health rating is much better  Patient is satisfied with their life  Eyesight was rated as same  Hearing was rated as same  Patient feels that their emotional and mental health rating is same  Patients states they are never, rarely angry  Patient states they are often unusually tired/fatigued  Pain experienced in the last 7 days has been none  Patient states that she has experienced weight loss or gain in last 6 months  Depression Screening:   PHQ-2 Score: 0      Fall Risk Screening: In the past year, patient has experienced: history of falling in past year    Number of falls: 1  Injured during fall?: Yes    Feels unsteady when standing or walking?: No    Worried about falling?: Yes      Urinary Incontinence Screening:   Patient has leaked urine accidently in the last six months  Devin Li about three to four weeks ago - tripped  Was in [facility] - was wearing socks  Home Safety:  Patient has trouble with stairs inside or outside of their home  Patient has working smoke alarms and has working carbon monoxide detector  Home safety hazards include: none  Nutrition:   Current diet is Regular  Medications:   Patient is currently taking over-the-counter supplements   OTC medications include: see medication list  Patient is able to manage medications  Activities of Daily Living (ADLs)/Instrumental Activities of Daily Living (IADLs):   Walk and transfer into and out of bed and chair?: Yes  Dress and groom yourself?: Yes    Bathe or shower yourself?: Yes    Feed yourself? Yes  Do your laundry/housekeeping?: Yes  Manage your money, pay your bills and track your expenses?: Yes  Make your own meals?: Yes    Do your own shopping?: Yes    Previous Hospitalizations:   Any hospitalizations or ED visits within the last 12 months?: Yes    How many hospitalizations have you had in the last year?: 1-2    Advance Care Planning:   Living will: Yes    Durable POA for healthcare: Yes    Advanced directive: Yes    Five wishes given: Yes    End of Life Decisions reviewed with patient: Yes      Cognitive Screening:   Provider or family/friend/caregiver concerned regarding cognition?: No    PREVENTIVE SCREENINGS      Cardiovascular Screening:    General: Screening Not Indicated and History Lipid Disorder      Diabetes Screening:     General: Screening Not Indicated and History Diabetes      Colorectal Cancer Screening:     General: Screening Not Indicated      Breast Cancer Screening:     General: Screening Current      Cervical Cancer Screening:    General: Screening Not Indicated      Osteoporosis Screening:    General: Screening Not Indicated and History Osteoporosis      Abdominal Aortic Aneurysm (AAA) Screening:        General: Screening Not Indicated      Lung Cancer Screening:     General: Screening Not Indicated      Hepatitis C Screening:    General: Screening Not Indicated    Screening, Brief Intervention, and Referral to Treatment (SBIRT)    Screening  Typical number of drinks in a day: 0  Typical number of drinks in a week: 0  Interpretation: Low risk drinking behavior      Single Item Drug Screening:  How often have you used an illegal drug (including marijuana) or a prescription medication for non-medical reasons in the past year? never    Single Item Drug Screen Score: 0  Interpretation: Negative screen for possible drug use disorder    Brief Intervention  Alcohol & drug use screenings were reviewed  No concerns regarding substance use disorder identified  No results found       Physical Exam:     /70   Pulse 87   Temp (!) 96 6 °F (35 9 °C) (Tympanic)   Ht 4' 11 5" (1 511 m)   Wt 53 5 kg (118 lb)   SpO2 99%   BMI 23 43 kg/m²          Bhavani García,

## 2022-11-17 NOTE — TELEPHONE ENCOUNTER
Krishan Marcelino,  Patient was seen in [address] [place] [address] ER on 11/15 for nose bleed  She was referred to [facility]NT and was given an appointment for Monday at their [facility] office  Patient does not drive on [address]  She was in our office today and there was a small amount of bleeding around the nasal packing, so I did not remove the packing  The packing was placed less than 48 hours ago  Is there anyway that she could be seen in the [facility] office for follow up  Call daughter, Carol Michaelvj with their response    Thank, Malaika Jaeger

## 2022-11-17 NOTE — PROGRESS NOTES
Name: Luis Shirley      : 1940      MRN: [id number]  Encounter Provider: Joseph Grullon DO  Encounter Date: 2022   Encounter department: [address] [place] [address]   Patient is seen for follow up of chronic medical conditions  1  Type 2 diabetes mellitus with stage 3a chronic kidney disease, without long-term current use of insulin (HCC)  -     POCT hemoglobin A1c    2  Acquired hypothyroidism  -     levothyroxine (Synthroid) 25 mcg tablet; Take 1 tablet (25 mcg total) by mouth daily    3  Medicare annual wellness visit, subsequent    4  Epistaxis  She also was seen in ER for nose bleed - has packing  I am not comfortable removing since patient is on blood thinner  Will see about getting her into ENT  Subjective      Chief Complaint   Patient presents with   • Medicare Wellness Visit     Pt has been seen  Ent and is unable to reschedule visit because she will be away  She needs her nose to be check  Has been bleeding for 3 days  Patient was in ER at [facility] for nose bleed on 11/15  Review of Systems   Constitutional: Negative for chills and fever  HENT: Negative for congestion and sore throat  Nose bleed   Respiratory: Negative for chest tightness  Cardiovascular: Negative for chest pain and palpitations  Gastrointestinal: Negative for abdominal pain, constipation, diarrhea and nausea  Genitourinary: Negative for difficulty urinating  Skin: Negative  Neurological: Negative for dizziness and headaches  Psychiatric/Behavioral: Negative  Current Outpatient Medications on File Prior to Visit   Medication Sig   • amiodarone 200 mg tablet Take 200 mg by mouth daily   • Calcium Citrate 250 MG TABS Take 1 tablet by mouth 2 (two) times a day  • Cholecalciferol (VITAMIN D-3 PO) Take 1,000 Units by mouth daily     • furosemide (LASIX) 40 mg tablet Take 80 mg by mouth in the morning    • glucose blood test strip by In Vitro route 4 (four) times a day   • glucose monitoring kit (FREESTYLE) monitoring kit E11 9 Patient is type 2 diabetic  Test once daily   • Lancets (FREESTYLE) lancets by Does not apply route daily   • Magnesium 250 MG TABS Take 1 tablet by mouth daily  • metFORMIN (GLUCOPHAGE-XR) 500 mg 24 hr tablet TAKE 1 TABLET DAILY   • metoprolol succinate (TOPROL-XL) 50 mg 24 hr tablet TAKE 1 AND 1/2 TABLETS BY MOUTH DAILY   • Multiple Vitamins-Minerals (CENTRUM SILVER PO) Take 1 tablet by mouth daily  • potassium chloride (K-DUR,KLOR-CON) 10 mEq tablet 20 mEq daily   • rivaroxaban (XARELTO) 15 mg tablet Take 1 tablet (15 mg total) by mouth daily with breakfast   • simvastatin (ZOCOR) 10 mg tablet Take 1 tablet (10 mg total) by mouth daily at bedtime       Objective     /70   Pulse 87   Temp (!) 96 6 °F (35 9 °C) (Tympanic)   Ht 4' 11 5" (1 511 m)   Wt 53 5 kg (118 lb)   SpO2 99%   BMI 23 43 kg/m²     Physical Exam  Vitals and nursing note reviewed  Constitutional:       General: She is not in acute distress  HENT:      Nose:      Comments: Packing in place with small amount of bleeding still  Neck:      Thyroid: No thyromegaly  Cardiovascular:      Rate and Rhythm: Normal rate and regular rhythm  Pulses: Pulses are weak  Dorsalis pedis pulses are 1+ on the right side and 1+ on the left side  Posterior tibial pulses are 1+ on the right side and 1+ on the left side  Heart sounds: Normal heart sounds  Pulmonary:      Effort: Pulmonary effort is normal       Breath sounds: Normal breath sounds  Feet:      Right foot:      Skin integrity: Callus present  Left foot:      Skin integrity: Callus present  Lymphadenopathy:      Cervical: No cervical adenopathy  Skin:     General: Skin is warm and dry  Neurological:      Mental Status: She is alert and oriented to person, place, and time  Psychiatric:         Mood and Affect: Mood normal      Patient's shoes and socks removed      Right Foot/Ankle   Right Foot Inspection  Skin Exam: skin intact, callus and callus  Toe Exam: right toe deformity  Sensory   Monofilament testing: intact    Vascular  The right DP pulse is 1+  The right PT pulse is 1+  Left Foot/Ankle  Left Foot Inspection  Skin Exam: skin intact and callus  Toe Exam: left toe deformity  Sensory   Monofilament testing: intact    Vascular  The left DP pulse is 1+  The left PT pulse is 1+       Assign Risk Category  Deformity present  No loss of protective sensation  Weak pulses  Risk: 0      Audelia Sat, DO

## 2022-11-22 ENCOUNTER — TELEPHONE (OUTPATIENT)
Dept: FAMILY MEDICINE CLINIC | Facility: CLINIC | Age: 82
End: 2022-11-22

## 2022-11-22 NOTE — TELEPHONE ENCOUNTER
Sly Gray from [facility] [place] called regarding patient  She wanted to let Dr Kwabena Castro know that patient is at a high fall risk  Her fall risk score was 13   Pt stated that falling is due to weakness

## 2023-04-21 ENCOUNTER — APPOINTMENT (OUTPATIENT)
Dept: LAB | Facility: MEDICAL CENTER | Age: 83
End: 2023-04-21

## 2023-04-21 DIAGNOSIS — I48.4 ATYPICAL ATRIAL FLUTTER (HCC): ICD-10-CM

## 2023-04-21 DIAGNOSIS — Z79.01 LONG TERM (CURRENT) USE OF ANTICOAGULANTS: ICD-10-CM

## 2023-04-21 DIAGNOSIS — Q21.10 ASD (ATRIAL SEPTAL DEFECT): ICD-10-CM

## 2023-04-21 DIAGNOSIS — I50.32 CHRONIC DIASTOLIC HEART FAILURE (HCC): ICD-10-CM

## 2023-04-21 LAB
ALBUMIN SERPL BCP-MCNC: 3.2 G/DL (ref 3.5–5)
ALP SERPL-CCNC: 135 U/L (ref 46–116)
ALT SERPL W P-5'-P-CCNC: 31 U/L (ref 12–78)
ANION GAP SERPL CALCULATED.3IONS-SCNC: 2 MMOL/L (ref 4–13)
AST SERPL W P-5'-P-CCNC: 52 U/L (ref 5–45)
BILIRUB SERPL-MCNC: 0.88 MG/DL (ref 0.2–1)
BUN SERPL-MCNC: 17 MG/DL (ref 5–25)
CALCIUM ALBUM COR SERPL-MCNC: 9.8 MG/DL (ref 8.3–10.1)
CALCIUM SERPL-MCNC: 9.2 MG/DL (ref 8.3–10.1)
CHLORIDE SERPL-SCNC: 101 MMOL/L (ref 96–108)
CO2 SERPL-SCNC: 33 MMOL/L (ref 21–32)
CREAT SERPL-MCNC: 1.29 MG/DL (ref 0.6–1.3)
ERYTHROCYTE [DISTWIDTH] IN BLOOD BY AUTOMATED COUNT: 18.1 % (ref 11.6–15.1)
GFR SERPL CREATININE-BSD FRML MDRD: 38 ML/MIN/1.73SQ M
GLUCOSE P FAST SERPL-MCNC: 103 MG/DL (ref 65–99)
HCT VFR BLD AUTO: 34.9 % (ref 34.8–46.1)
HGB BLD-MCNC: 11.2 G/DL (ref 11.5–15.4)
MCH RBC QN AUTO: 32.7 PG (ref 26.8–34.3)
MCHC RBC AUTO-ENTMCNC: 32.1 G/DL (ref 31.4–37.4)
MCV RBC AUTO: 102 FL (ref 82–98)
NT-PROBNP SERPL-MCNC: 1189 PG/ML
PLATELET # BLD AUTO: 177 THOUSANDS/UL (ref 149–390)
PMV BLD AUTO: 9.9 FL (ref 8.9–12.7)
POTASSIUM SERPL-SCNC: 3.7 MMOL/L (ref 3.5–5.3)
PROT SERPL-MCNC: 8 G/DL (ref 6.4–8.4)
RBC # BLD AUTO: 3.42 MILLION/UL (ref 3.81–5.12)
SODIUM SERPL-SCNC: 136 MMOL/L (ref 135–147)
T4 FREE SERPL-MCNC: 1.21 NG/DL (ref 0.76–1.46)
TSH SERPL DL<=0.05 MIU/L-ACNC: 13.9 UIU/ML (ref 0.45–4.5)
WBC # BLD AUTO: 2.97 THOUSAND/UL (ref 4.31–10.16)

## 2023-05-07 ENCOUNTER — RA CDI HCC (OUTPATIENT)
Dept: OTHER | Facility: HOSPITAL | Age: 83
End: 2023-05-07

## 2023-05-07 NOTE — PROGRESS NOTES
Previous suggestions used in 2022 and on [facility]  [facility]  coding opportunities       Chart reviewed, no opportunity found:   [address]        Patients Insurance     Medicare Insurance: [employer]

## 2023-05-11 LAB
CREAT ?TM UR-SCNC: 16.2 UMOL/L
EXT ALBUMIN URINE RANDOM: 54
MICROALBUMIN/CREAT UR: 333.3 MG/G{CREAT}

## 2023-05-15 ENCOUNTER — OFFICE VISIT (OUTPATIENT)
Dept: FAMILY MEDICINE CLINIC | Facility: CLINIC | Age: 83
End: 2023-05-15

## 2023-05-15 VITALS
BODY MASS INDEX: 22.74 KG/M2 | WEIGHT: 115.8 LBS | HEART RATE: 71 BPM | SYSTOLIC BLOOD PRESSURE: 110 MMHG | TEMPERATURE: 98.2 F | DIASTOLIC BLOOD PRESSURE: 68 MMHG | HEIGHT: 60 IN | OXYGEN SATURATION: 99 %

## 2023-05-15 DIAGNOSIS — I50.32 CHRONIC DIASTOLIC CHF (CONGESTIVE HEART FAILURE) (HCC): ICD-10-CM

## 2023-05-15 DIAGNOSIS — N18.32 TYPE 2 DIABETES MELLITUS WITH STAGE 3B CHRONIC KIDNEY DISEASE, WITHOUT LONG-TERM CURRENT USE OF INSULIN (HCC): Primary | ICD-10-CM

## 2023-05-15 DIAGNOSIS — E03.9 ACQUIRED HYPOTHYROIDISM: ICD-10-CM

## 2023-05-15 DIAGNOSIS — E11.22 TYPE 2 DIABETES MELLITUS WITH STAGE 3B CHRONIC KIDNEY DISEASE, WITHOUT LONG-TERM CURRENT USE OF INSULIN (HCC): Primary | ICD-10-CM

## 2023-05-15 DIAGNOSIS — R60.0 BILATERAL LOWER EXTREMITY EDEMA: ICD-10-CM

## 2023-05-15 DIAGNOSIS — I87.8 VENOUS STASIS: ICD-10-CM

## 2023-05-15 DIAGNOSIS — N18.32 STAGE 3B CHRONIC KIDNEY DISEASE (HCC): ICD-10-CM

## 2023-05-15 DIAGNOSIS — E11.9 TYPE 2 DIABETES MELLITUS WITHOUT COMPLICATION, WITHOUT LONG-TERM CURRENT USE OF INSULIN (HCC): ICD-10-CM

## 2023-05-15 LAB
CREAT UR-MCNC: 102 MG/DL
MICROALBUMIN UR-MCNC: 55.4 MG/L (ref 0–20)
MICROALBUMIN/CREAT 24H UR: 54 MG/G CREATININE (ref 0–30)
SL AMB POCT HEMOGLOBIN AIC: 6.5 (ref ?–6.5)

## 2023-05-15 NOTE — PROGRESS NOTES
Name: Nanda Chen      : 1940      MRN: [id number]  Encounter Provider: Marjorie Bender DO  Encounter Date: 5/15/2023   Encounter department: [address] [place] [address]   Patient is a 80-year-old female seen for follow-up of chronic medical conditions  After last visit in our office, she had gone to visit relatives in [place] and developed severe CHF and was admitted to the hospital   Her daughter had to fly over to see about her care  She is seeing her cardiologist and is having testing  1  Type 2 diabetes mellitus with stage 3b chronic kidney disease, without long-term current use of insulin (HCC)  -     Compression Stocking  -     Glucometer test strips  -     POCT hemoglobin A1c  -     glucose monitoring kit (FREESTYLE) monitoring kit; E11 9 Patient is type 2 diabetic  Test once daily  -     Albumin / creatinine urine ratio    2  Acquired hypothyroidism    3  Chronic diastolic CHF (congestive heart failure) (HCC)  -     Compression Stocking    4  Bilateral lower extremity edema  -     Compression Stocking    5  Venous stasis  -     Compression Stocking    6  Type 2 diabetes mellitus without complication, without long-term current use of insulin ([facility])  -     glucose monitoring kit (FREESTYLE) monitoring kit; E11 9 Patient is type 2 diabetic  Test once daily    7  Stage 3b chronic kidney disease (HCC)  A1C in office is 6 5  Recheck 4 months  Subjective      Chief Complaint   Patient presents with   • Follow-up     6 month follow up  Hospitalized in [facility]       Patient is here for follow up  She is still tired and has leg swelling  Her daughter brought her to the visit  Review of Systems   Constitutional: Positive for fatigue  Negative for chills and fever  HENT: Negative for congestion and sore throat  Respiratory: Positive for shortness of breath  Negative for chest tightness  Cardiovascular: Positive for leg swelling   Negative for chest pain and "palpitations  Gastrointestinal: Negative for abdominal pain, constipation, diarrhea and nausea  Genitourinary: Negative for difficulty urinating  Skin: Negative  Neurological: Negative for dizziness and headaches  Psychiatric/Behavioral: Negative  Current Outpatient Medications on File Prior to Visit   Medication Sig   • amiodarone 200 mg tablet Take 200 mg by mouth daily   • Calcium Citrate 250 MG TABS Take 1 tablet by mouth 2 (two) times a day  • Cholecalciferol (VITAMIN D-3 PO) Take 1,000 Units by mouth daily  • furosemide (LASIX) 40 mg tablet Take 80 mg by mouth in the morning    • glucose blood test strip by In Vitro route 4 (four) times a day   • Lancets (FREESTYLE) lancets by Does not apply route daily   • levothyroxine (Synthroid) 25 mcg tablet Take 1 tablet (25 mcg total) by mouth daily   • Magnesium 250 MG TABS Take 1 tablet by mouth daily  • metFORMIN (GLUCOPHAGE-XR) 500 mg 24 hr tablet TAKE 1 TABLET DAILY   • metoprolol succinate (TOPROL-XL) 50 mg 24 hr tablet TAKE 1 AND 1/2 TABLETS BY MOUTH DAILY   • Multiple Vitamins-Minerals (CENTRUM SILVER PO) Take 1 tablet by mouth daily  • potassium chloride (K-DUR,KLOR-CON) 10 mEq tablet 20 mEq daily   • rivaroxaban (XARELTO) 15 mg tablet Take 1 tablet (15 mg total) by mouth daily with breakfast   • simvastatin (ZOCOR) 10 mg tablet Take 1 tablet (10 mg total) by mouth daily at bedtime       Objective     /68 (BP Location: Left arm, Patient Position: Sitting, Cuff Size: Adult)   Pulse 71   Temp 98 2 °F (36 8 °C)   Ht 4' 11 5\" (1 511 m)   Wt 52 5 kg (115 lb 12 8 oz)   SpO2 99%   BMI 23 00 kg/m²     Physical Exam  Vitals and nursing note reviewed  Constitutional:       General: She is not in acute distress  HENT:      Head: Normocephalic  Neck:      Thyroid: No thyromegaly  Vascular: No carotid bruit  Cardiovascular:      Rate and Rhythm: Normal rate and regular rhythm  Heart sounds: Normal heart sounds   " Pulmonary:      Effort: Pulmonary effort is normal       Breath sounds: Normal breath sounds  Musculoskeletal:      Right lower leg: Edema present  Left lower leg: Edema present  Lymphadenopathy:      Cervical: No cervical adenopathy  Skin:     General: Skin is warm and dry  Neurological:      Mental Status: She is alert and oriented to person, place, and time     Psychiatric:         Mood and Affect: Mood normal        Kevin Cash DO

## 2023-06-12 LAB
LEFT EYE DIABETIC RETINOPATHY: NORMAL
RIGHT EYE DIABETIC RETINOPATHY: NORMAL
SEVERITY (EYE EXAM): NORMAL

## 2023-06-14 DIAGNOSIS — E03.9 ACQUIRED HYPOTHYROIDISM: ICD-10-CM

## 2023-06-14 DIAGNOSIS — I10 ESSENTIAL HYPERTENSION: ICD-10-CM

## 2023-06-14 RX ORDER — METOPROLOL SUCCINATE 50 MG/1
75 TABLET, EXTENDED RELEASE ORAL DAILY
Qty: 135 TABLET | Refills: 1 | Status: SHIPPED | OUTPATIENT
Start: 2023-06-14

## 2023-06-14 RX ORDER — LEVOTHYROXINE SODIUM 0.03 MG/1
25 TABLET ORAL DAILY
Qty: 90 TABLET | Refills: 1 | Status: SHIPPED | OUTPATIENT
Start: 2023-06-14

## 2023-08-28 ENCOUNTER — TRANSITIONAL CARE MANAGEMENT (OUTPATIENT)
Dept: FAMILY MEDICINE CLINIC | Facility: CLINIC | Age: 83
End: 2023-08-28

## 2023-08-30 ENCOUNTER — OFFICE VISIT (OUTPATIENT)
Dept: FAMILY MEDICINE CLINIC | Facility: CLINIC | Age: 83
End: 2023-08-30
Payer: COMMERCIAL

## 2023-08-30 ENCOUNTER — TELEPHONE (OUTPATIENT)
Dept: ADMINISTRATIVE | Facility: OTHER | Age: 83
End: 2023-08-30

## 2023-08-30 VITALS
BODY MASS INDEX: 21.85 KG/M2 | HEART RATE: 78 BPM | OXYGEN SATURATION: 96 % | DIASTOLIC BLOOD PRESSURE: 66 MMHG | RESPIRATION RATE: 20 BRPM | TEMPERATURE: 97.8 F | SYSTOLIC BLOOD PRESSURE: 118 MMHG | WEIGHT: 110 LBS

## 2023-08-30 DIAGNOSIS — N18.6 ESRD ON HEMODIALYSIS (HCC): Primary | ICD-10-CM

## 2023-08-30 DIAGNOSIS — R94.6 ABNORMAL THYROID FUNCTION TEST: ICD-10-CM

## 2023-08-30 DIAGNOSIS — Z99.2 ESRD ON HEMODIALYSIS (HCC): Primary | ICD-10-CM

## 2023-08-30 PROCEDURE — 99496 TRANSJ CARE MGMT HIGH F2F 7D: CPT | Performed by: FAMILY MEDICINE

## 2023-08-30 NOTE — TELEPHONE ENCOUNTER
Upon review of the In Basket request we were able to locate, review, and update the patient chart as requested for Diabetic Eye Exam.    Any additional questions or concerns should be emailed to the Practice Liaisons via the appropriate education email address, please do not reply via In Basket.     Thank you  Lara Anderson

## 2023-08-30 NOTE — TELEPHONE ENCOUNTER
----- Message from García Salgado RN sent at 8/30/2023 10:30 AM EDT -----  Regarding: DM eye exam  08/30/23 10:30 AM    Hello, our patient Candance Keen has had Diabetic Eye Exam completed/performed. Please assist in updating the patient chart by making an External outreach to [facility] facility located in [facility]. The date of service is within the last year.     Thank you,  García Salgado RN  [facility]

## 2023-08-30 NOTE — PROGRESS NOTES
Assessment/Plan:    1. ESRD on hemodialysis [facility])  Assessment & Plan:  Lab Results   Component Value Date    EGFR 38 04/21/2023    EGFR 37 07/26/2022    EGFR 33 03/22/2022    CREATININE 1.29 04/21/2023    CREATININE 1.33 (H) 07/26/2022    CREATININE 1.47 (H) 03/22/2022   Patient's chest port in place. She should continue HD on M/W/F as scheduled. Patient instructed to return to ER if she experiences recurrence of symptoms. All patient's questions and concerns were addressed today. Orders:  -     CBC and differential; Future; Expected date: 08/30/2023  -     Basic metabolic panel; Future    2. Abnormal thyroid function test  Assessment & Plan:  Patient should continue synthroid 25 mcg. Recheck TSH today as recommended by hospitalist.      Orders:  -     TSH, 3rd generation with Free T4 reflex; Future; Expected date: 08/30/2023      Subjective:      Patient ID: Jessenia Javed is a 80 y.o. female. HPI    Jessenia Javed is a 80 y.o. female with a PMHx significant for ASD repair, CHF, HTN, and CKD. She presented to [facility] on 7/21/2023 for chest pain and was admitted for cardiogenic shock to CICU. She was found to have severely enlarged right ventricle secondary to severe torrential tricuspid regurgitation. She was also found to have CALVIN on CKD with elevated creatinine. Intermittent HD was initiated on 8/15/23. She received 3 consecutive sessions from 8/15 - 8/17 and tolerated the sessions well even with fluid removal. On 8/17/23, patient started to have some urine output with subsequent signs of possible renal recovery. Patient will continue with dialysis (M/W/F) and patient arranged for outpatient HD. Cardiology evaluated and do not recommend continuing xarelto given bleeding risk and do not recommend antiarrythmic. Patient is currently on palliative care. She had all her medications adjusted and is now only on synthroid and vitamin D and b12. She reports that she is feeling well at this time.   She has a right chest port for HD. She denies chest pain and shortness of breath. She has a good appetite. She lives alone but her son is helping her at home. The following portions of the patient's history were reviewed and updated as appropriate: allergies, current medications, past family history, past medical history, past social history, past surgical history, and problem list.      Current Outpatient Medications:   •  levothyroxine (Synthroid) 25 mcg tablet, Take 1 tablet (25 mcg total) by mouth daily, Disp: 90 tablet, Rfl: 1  •  metoprolol succinate (TOPROL-XL) 50 mg 24 hr tablet, Take 1.5 tablets (75 mg total) by mouth daily, Disp: 135 tablet, Rfl: 1  •  amiodarone 200 mg tablet, Take 200 mg by mouth daily, Disp: , Rfl:   •  Calcium Citrate 250 MG TABS, Take 1 tablet by mouth 2 (two) times a day., Disp: , Rfl:   •  Cholecalciferol (VITAMIN D-3 PO), Take 1,000 Units by mouth daily. , Disp: , Rfl:   •  furosemide (LASIX) 40 mg tablet, Take 80 mg by mouth in the morning , Disp: , Rfl:   •  glucose blood test strip, by In Vitro route 4 (four) times a day, Disp: , Rfl:   •  glucose monitoring kit (FREESTYLE) monitoring kit, E11.9 Patient is type 2 diabetic. Test once daily, Disp: 1 each, Rfl: 0  •  Lancets (FREESTYLE) lancets, by Does not apply route daily, Disp: , Rfl:   •  Magnesium 250 MG TABS, Take 1 tablet by mouth daily. , Disp: , Rfl:   •  metFORMIN (GLUCOPHAGE-XR) 500 mg 24 hr tablet, TAKE 1 TABLET DAILY, Disp: 90 tablet, Rfl: 1  •  Multiple Vitamins-Minerals (CENTRUM SILVER PO), Take 1 tablet by mouth daily. , Disp: , Rfl:   •  potassium chloride (K-DUR,KLOR-CON) 10 mEq tablet, 20 mEq daily, Disp: , Rfl:   •  rivaroxaban (XARELTO) 15 mg tablet, Take 1 tablet (15 mg total) by mouth daily with breakfast, Disp: 30 tablet, Rfl: 5  •  simvastatin (ZOCOR) 10 mg tablet, Take 1 tablet (10 mg total) by mouth daily at bedtime, Disp: 90 tablet, Rfl: 1      Review of Systems   Constitutional: Negative for chills and fever.   HENT: Negative for ear pain and sore throat. Eyes: Negative for pain and visual disturbance. Respiratory: Negative for cough and shortness of breath. Cardiovascular: Positive for leg swelling. Negative for chest pain and palpitations. Gastrointestinal: Negative for abdominal pain and vomiting. Genitourinary: Negative for dysuria and hematuria. Musculoskeletal: Negative for arthralgias and back pain. Skin: Negative for color change and rash. Neurological: Negative for seizures and syncope. All other systems reviewed and are negative. Objective:      /66 (BP Location: Left arm, Patient Position: Sitting, Cuff Size: Child)   Pulse 78   Temp 97.8 °F (36.6 °C) (Temporal)   Resp 20   Wt 49.9 kg (110 lb)   SpO2 96%   BMI 21.85 kg/m²          Physical Exam  Vitals and nursing note reviewed. Constitutional:       General: She is not in acute distress. Appearance: Normal appearance. She is not toxic-appearing. HENT:      Head: Normocephalic and atraumatic. Nose: Nose normal.      Mouth/Throat:      Mouth: Mucous membranes are moist.      Pharynx: Oropharynx is clear. No oropharyngeal exudate. Eyes:      Extraocular Movements: Extraocular movements intact. Conjunctiva/sclera: Conjunctivae normal.   Neck:      Vascular: No carotid bruit. Cardiovascular:      Rate and Rhythm: Regular rhythm. Heart sounds: Murmur heard. Pulmonary:      Effort: Pulmonary effort is normal. No respiratory distress. Breath sounds: Normal breath sounds. No wheezing. Abdominal:      Palpations: Abdomen is soft. Musculoskeletal:         General: Normal range of motion. Cervical back: Normal range of motion. Right lower leg: Edema present. Left lower leg: Edema present. Skin:     General: Skin is warm. Neurological:      General: No focal deficit present. Mental Status: She is alert and oriented to person, place, and time.  Mental status is at baseline. Psychiatric:         Mood and Affect: Mood normal.         Behavior: Behavior normal.         Thought Content:  Thought content normal.         Judgment: Judgment normal.           TCM Call     Date and time call was made  8/28/2023 11:40 AM    Hospital care reviewed  Records reviewed    Patient was hospitialized at  [facility]    Date of Admission  07/21/23    Date of discharge  08/24/23    Diagnosis  CALVIN    Disposition  Home      TCM Call     I have advised the patient to call PCP with any new or worsening symptoms  Babs Doran MA

## 2023-08-30 NOTE — ASSESSMENT & PLAN NOTE
Lab Results   Component Value Date    EGFR 38 04/21/2023    EGFR 37 07/26/2022    EGFR 33 03/22/2022    CREATININE 1.29 04/21/2023    CREATININE 1.33 (H) 07/26/2022    CREATININE 1.47 (H) 03/22/2022   Patient's chest port in place. She should continue HD on M/W/F as scheduled. Patient instructed to return to ER if she experiences recurrence of symptoms. All patient's questions and concerns were addressed today.

## 2023-08-31 ENCOUNTER — APPOINTMENT (OUTPATIENT)
Dept: LAB | Facility: MEDICAL CENTER | Age: 83
End: 2023-08-31
Payer: COMMERCIAL

## 2023-08-31 DIAGNOSIS — Z99.2 ESRD ON HEMODIALYSIS (HCC): ICD-10-CM

## 2023-08-31 DIAGNOSIS — N18.6 ESRD ON HEMODIALYSIS (HCC): ICD-10-CM

## 2023-08-31 DIAGNOSIS — R94.6 ABNORMAL THYROID FUNCTION TEST: ICD-10-CM

## 2023-08-31 LAB
ANION GAP SERPL CALCULATED.3IONS-SCNC: 9 MMOL/L
BASOPHILS # BLD AUTO: 0.04 THOUSANDS/ÂΜL (ref 0–0.1)
BASOPHILS NFR BLD AUTO: 1 % (ref 0–1)
BUN SERPL-MCNC: 12 MG/DL (ref 5–25)
CALCIUM SERPL-MCNC: 9.6 MG/DL (ref 8.4–10.2)
CHLORIDE SERPL-SCNC: 92 MMOL/L (ref 96–108)
CO2 SERPL-SCNC: 33 MMOL/L (ref 21–32)
CREAT SERPL-MCNC: 1.57 MG/DL (ref 0.6–1.3)
EOSINOPHIL # BLD AUTO: 0.13 THOUSAND/ÂΜL (ref 0–0.61)
EOSINOPHIL NFR BLD AUTO: 4 % (ref 0–6)
ERYTHROCYTE [DISTWIDTH] IN BLOOD BY AUTOMATED COUNT: 25.2 % (ref 11.6–15.1)
GFR SERPL CREATININE-BSD FRML MDRD: 30 ML/MIN/1.73SQ M
GLUCOSE P FAST SERPL-MCNC: 90 MG/DL (ref 65–99)
HCT VFR BLD AUTO: 26.3 % (ref 34.8–46.1)
HGB BLD-MCNC: 8.3 G/DL (ref 11.5–15.4)
IMM GRANULOCYTES # BLD AUTO: 0.01 THOUSAND/UL (ref 0–0.2)
IMM GRANULOCYTES NFR BLD AUTO: 0 % (ref 0–2)
LYMPHOCYTES # BLD AUTO: 0.81 THOUSANDS/ÂΜL (ref 0.6–4.47)
LYMPHOCYTES NFR BLD AUTO: 22 % (ref 14–44)
MCH RBC QN AUTO: 32.4 PG (ref 26.8–34.3)
MCHC RBC AUTO-ENTMCNC: 31.6 G/DL (ref 31.4–37.4)
MCV RBC AUTO: 103 FL (ref 82–98)
MONOCYTES # BLD AUTO: 0.58 THOUSAND/ÂΜL (ref 0.17–1.22)
MONOCYTES NFR BLD AUTO: 16 % (ref 4–12)
NEUTROPHILS # BLD AUTO: 2.1 THOUSANDS/ÂΜL (ref 1.85–7.62)
NEUTS SEG NFR BLD AUTO: 57 % (ref 43–75)
NRBC BLD AUTO-RTO: 0 /100 WBCS
PLATELET # BLD AUTO: 168 THOUSANDS/UL (ref 149–390)
PMV BLD AUTO: 9.9 FL (ref 8.9–12.7)
POTASSIUM SERPL-SCNC: 3.9 MMOL/L (ref 3.5–5.3)
RBC # BLD AUTO: 2.56 MILLION/UL (ref 3.81–5.12)
SODIUM SERPL-SCNC: 134 MMOL/L (ref 135–147)
TSH SERPL DL<=0.05 MIU/L-ACNC: 0.78 UIU/ML (ref 0.45–4.5)
WBC # BLD AUTO: 3.67 THOUSAND/UL (ref 4.31–10.16)

## 2023-08-31 PROCEDURE — 85025 COMPLETE CBC W/AUTO DIFF WBC: CPT

## 2023-08-31 PROCEDURE — 80048 BASIC METABOLIC PNL TOTAL CA: CPT

## 2023-08-31 PROCEDURE — 84443 ASSAY THYROID STIM HORMONE: CPT

## 2023-08-31 PROCEDURE — 36415 COLL VENOUS BLD VENIPUNCTURE: CPT

## 2023-09-04 DIAGNOSIS — D64.9 ANEMIA, UNSPECIFIED TYPE: Primary | ICD-10-CM

## 2023-09-05 ENCOUNTER — APPOINTMENT (OUTPATIENT)
Dept: LAB | Facility: MEDICAL CENTER | Age: 83
End: 2023-09-05
Payer: COMMERCIAL

## 2023-09-05 DIAGNOSIS — D64.9 ANEMIA, UNSPECIFIED TYPE: ICD-10-CM

## 2023-09-05 LAB
BASOPHILS # BLD AUTO: 0.04 THOUSANDS/ÂΜL (ref 0–0.1)
BASOPHILS NFR BLD AUTO: 1 % (ref 0–1)
EOSINOPHIL # BLD AUTO: 0.11 THOUSAND/ÂΜL (ref 0–0.61)
EOSINOPHIL NFR BLD AUTO: 3 % (ref 0–6)
ERYTHROCYTE [DISTWIDTH] IN BLOOD BY AUTOMATED COUNT: 24.8 % (ref 11.6–15.1)
HCT VFR BLD AUTO: 25.6 % (ref 34.8–46.1)
HGB BLD-MCNC: 8.4 G/DL (ref 11.5–15.4)
IMM GRANULOCYTES # BLD AUTO: 0.01 THOUSAND/UL (ref 0–0.2)
IMM GRANULOCYTES NFR BLD AUTO: 0 % (ref 0–2)
LYMPHOCYTES # BLD AUTO: 0.81 THOUSANDS/ÂΜL (ref 0.6–4.47)
LYMPHOCYTES NFR BLD AUTO: 24 % (ref 14–44)
MCH RBC QN AUTO: 33.9 PG (ref 26.8–34.3)
MCHC RBC AUTO-ENTMCNC: 32.8 G/DL (ref 31.4–37.4)
MCV RBC AUTO: 103 FL (ref 82–98)
MONOCYTES # BLD AUTO: 0.75 THOUSAND/ÂΜL (ref 0.17–1.22)
MONOCYTES NFR BLD AUTO: 22 % (ref 4–12)
NEUTROPHILS # BLD AUTO: 1.72 THOUSANDS/ÂΜL (ref 1.85–7.62)
NEUTS SEG NFR BLD AUTO: 50 % (ref 43–75)
NRBC BLD AUTO-RTO: 0 /100 WBCS
PLATELET # BLD AUTO: 169 THOUSANDS/UL (ref 149–390)
PMV BLD AUTO: 10 FL (ref 8.9–12.7)
RBC # BLD AUTO: 2.48 MILLION/UL (ref 3.81–5.12)
WBC # BLD AUTO: 3.44 THOUSAND/UL (ref 4.31–10.16)

## 2023-09-05 PROCEDURE — 85025 COMPLETE CBC W/AUTO DIFF WBC: CPT

## 2023-09-05 PROCEDURE — 36415 COLL VENOUS BLD VENIPUNCTURE: CPT

## 2023-09-21 ENCOUNTER — OFFICE VISIT (OUTPATIENT)
Dept: FAMILY MEDICINE CLINIC | Facility: CLINIC | Age: 83
End: 2023-09-21
Payer: COMMERCIAL

## 2023-09-21 VITALS
HEART RATE: 82 BPM | SYSTOLIC BLOOD PRESSURE: 110 MMHG | OXYGEN SATURATION: 99 % | HEIGHT: 60 IN | BODY MASS INDEX: 23.29 KG/M2 | TEMPERATURE: 98.5 F | DIASTOLIC BLOOD PRESSURE: 62 MMHG | WEIGHT: 118.6 LBS

## 2023-09-21 DIAGNOSIS — D69.6 PLATELETS DECREASED (HCC): ICD-10-CM

## 2023-09-21 DIAGNOSIS — R60.0 BILATERAL LOWER EXTREMITY EDEMA: ICD-10-CM

## 2023-09-21 DIAGNOSIS — E11.51 TYPE II DIABETES MELLITUS WITH PERIPHERAL CIRCULATORY DISORDER (HCC): Primary | ICD-10-CM

## 2023-09-21 DIAGNOSIS — Z95.0 PACEMAKER: ICD-10-CM

## 2023-09-21 DIAGNOSIS — N25.81 SECONDARY HYPERPARATHYROIDISM OF RENAL ORIGIN (HCC): ICD-10-CM

## 2023-09-21 DIAGNOSIS — Z79.01 LONG TERM CURRENT USE OF ANTICOAGULANT THERAPY: ICD-10-CM

## 2023-09-21 DIAGNOSIS — E03.9 ACQUIRED HYPOTHYROIDISM: ICD-10-CM

## 2023-09-21 DIAGNOSIS — Z23 NEED FOR INFLUENZA VACCINATION: ICD-10-CM

## 2023-09-21 DIAGNOSIS — I48.4 ATYPICAL ATRIAL FLUTTER (HCC): ICD-10-CM

## 2023-09-21 PROCEDURE — 99214 OFFICE O/P EST MOD 30 MIN: CPT | Performed by: FAMILY MEDICINE

## 2023-09-21 PROCEDURE — G0008 ADMIN INFLUENZA VIRUS VAC: HCPCS | Performed by: FAMILY MEDICINE

## 2023-09-21 PROCEDURE — 90662 IIV NO PRSV INCREASED AG IM: CPT | Performed by: FAMILY MEDICINE

## 2023-09-21 RX ORDER — POTASSIUM CHLORIDE 1500 MG/1
1 TABLET, EXTENDED RELEASE ORAL DAILY
COMMUNITY
Start: 2023-09-13

## 2023-09-21 RX ORDER — TORSEMIDE 20 MG/1
40 TABLET ORAL DAILY
COMMUNITY
Start: 2023-09-13

## 2023-09-21 NOTE — PROGRESS NOTES
Name: Brando Scanlon      : 1940      MRN: [id number]  Encounter Provider: Antonio Terry DO  Encounter Date: 2023   Encounter department: [address] [place] [address]   Patient is an 80year old female seen for follow up of chronic medical conditions. She was hospitalized this summer from  -  with Vtach, CHF, developed kidney failure and is now on dialysis. I am seeing patient for follow up of diabetes and thryoid. Last A1C in July was 6.0 . She is not on diabetes meds at this time. 1. Type II diabetes mellitus with peripheral circulatory disorder ([address])    2. Need for influenza vaccination  -     FLUZONE HIGH-DOSE: influenza vaccine, high-dose, preservative-free 0.7 mL    3. Acquired hypothyroidism    4. Secondary hyperparathyroidism of renal origin ([address])    5. Atypical atrial flutter (HCC)    6. Platelets decreased ([address])    7. Bilateral lower extremity edema    8. Pacemaker    9. Long term current use of anticoagulant therapy           Subjective      Chief Complaint   Patient presents with   • Leg Swelling     Bilateral leg swelling       Patient is seen for follow up of chronic medical conditions. She is on dialysis since I last saw her. She had a complicated hospital course. Review of Systems   Constitutional:  Negative for chills and fever. HENT:  Negative for congestion and sore throat. Respiratory:  Negative for chest tightness. Cardiovascular:  Positive for leg swelling. Negative for chest pain and palpitations. Gastrointestinal:  Negative for abdominal pain, constipation, diarrhea and nausea. Genitourinary:  Negative for difficulty urinating. Skin: Negative. Neurological:  Negative for dizziness and headaches. Psychiatric/Behavioral: Negative.          Current Outpatient Medications on File Prior to Visit   Medication Sig   • levothyroxine (Synthroid) 25 mcg tablet Take 1 tablet (25 mcg total) by mouth daily   • metoprolol succinate (TOPROL-XL) 50 mg 24 hr tablet Take 1.5 tablets (75 mg total) by mouth daily   • Calcium Citrate 250 MG TABS Take 1 tablet by mouth 2 (two) times a day. • Cholecalciferol (VITAMIN D-3 PO) Take 1,000 Units by mouth daily. • glucose blood test strip by In Vitro route 4 (four) times a day   • glucose monitoring kit (FREESTYLE) monitoring kit E11.9 Patient is type 2 diabetic. Test once daily   • Lancets (FREESTYLE) lancets by Does not apply route daily   • Magnesium 250 MG TABS Take 1 tablet by mouth daily. • Multiple Vitamins-Minerals (CENTRUM SILVER PO) Take 1 tablet by mouth daily. • potassium chloride (K-DUR,KLOR-CON) 10 mEq tablet 20 mEq daily   • Potassium Chloride ER 20 MEQ TBCR Take 1 tablet by mouth daily   • torsemide (DEMADEX) 20 mg tablet Take 40 mg by mouth daily       Objective     /62 (BP Location: Right arm, Patient Position: Sitting, Cuff Size: Adult)   Pulse 82   Temp 98.5 °F (36.9 °C)   Ht 4' 11.5" (1.511 m)   Wt 53.8 kg (118 lb 9.6 oz)   SpO2 99%   BMI 23.55 kg/m²     Physical Exam  Vitals and nursing note reviewed. Constitutional:       General: She is not in acute distress. HENT:      Head: Normocephalic. Right Ear: Tympanic membrane normal.      Left Ear: Tympanic membrane normal.      Mouth/Throat:      Pharynx: No posterior oropharyngeal erythema. Neck:      Thyroid: No thyromegaly. Cardiovascular:      Rate and Rhythm: Normal rate. Rhythm irregular. Heart sounds: Normal heart sounds. Pulmonary:      Effort: Pulmonary effort is normal.      Breath sounds: Normal breath sounds. Musculoskeletal:      Right lower leg: No edema. Left lower leg: No edema. Lymphadenopathy:      Cervical: No cervical adenopathy. Skin:     General: Skin is warm and dry. Neurological:      Mental Status: She is alert and oriented to person, place, and time.    Psychiatric:         Mood and Affect: Mood normal.       Danyel Greene DO

## 2023-10-11 ENCOUNTER — TELEPHONE (OUTPATIENT)
Dept: FAMILY MEDICINE CLINIC | Facility: CLINIC | Age: 83
End: 2023-10-11

## 2023-10-11 PROBLEM — N25.81 SECONDARY HYPERPARATHYROIDISM OF RENAL ORIGIN (HCC): Status: ACTIVE | Noted: 2023-10-11

## 2023-10-11 NOTE — TELEPHONE ENCOUNTER
COVID Positive 11/11/2023  Fatigue, Cough x 1 day  Denies N/V/D, Chest pain, SOB   Would like to know what to do? .  Patient informed to Isolate, monitor symptoms if worsen call the office or seek ED Evaluation. Increase clear liquids, may take Tylenol alt Motrin if needed. May also take Vitamin D. C.E, Zinc.  "Patient is a Dialysis patient".     [facility]- [facility]

## 2023-10-31 ENCOUNTER — TELEPHONE (OUTPATIENT)
Dept: FAMILY MEDICINE CLINIC | Facility: CLINIC | Age: 83
End: 2023-10-31

## 2023-10-31 NOTE — TELEPHONE ENCOUNTER
"Leonarda, this is Tone Banks calling from my mother kaylin Dickey. Her YOB: 1940. She's at the dialysis center in [facility]. Renee asked me to request to have the office send over her COVID and flu shot she had last month with Doctor Davin Farah. If somebody can fax that over to the kidney dialysis and [place], that would be wonderful. My mother's phone number is [phone]. She has her phone with her. She's having dialysis right now and they're asking for today. Possible. My number is [phone]. Any questions I can be called and asked, That's fine.  Thank you"    Vaccine record printed and placed in fax bin

## 2023-11-08 ENCOUNTER — TELEPHONE (OUTPATIENT)
Dept: FAMILY MEDICINE CLINIC | Facility: CLINIC | Age: 83
End: 2023-11-08

## 2023-11-08 NOTE — TELEPHONE ENCOUNTER
Pt left a message stating that she has been itchy all over her body for the last 3-4 months and would like something to help with that. I advised patient that she would need to be evaluated in the office. Offered her an appt for today but has no transportation. Advised that if she can;t make to our office then she can also go to an urgent care Patient will talk to her children to see if they are available to take her. No further questions or concerns.

## 2023-11-09 ENCOUNTER — OFFICE VISIT (OUTPATIENT)
Dept: FAMILY MEDICINE CLINIC | Facility: CLINIC | Age: 83
End: 2023-11-09
Payer: COMMERCIAL

## 2023-11-09 VITALS
TEMPERATURE: 97.4 F | SYSTOLIC BLOOD PRESSURE: 118 MMHG | DIASTOLIC BLOOD PRESSURE: 62 MMHG | OXYGEN SATURATION: 99 % | HEART RATE: 81 BPM | BODY MASS INDEX: 21.71 KG/M2 | WEIGHT: 110.6 LBS | HEIGHT: 60 IN

## 2023-11-09 DIAGNOSIS — N18.6 ESRD ON HEMODIALYSIS (HCC): ICD-10-CM

## 2023-11-09 DIAGNOSIS — Z99.2 ESRD ON HEMODIALYSIS (HCC): ICD-10-CM

## 2023-11-09 DIAGNOSIS — L29.9 PRURITUS: Primary | ICD-10-CM

## 2023-11-09 PROCEDURE — 99214 OFFICE O/P EST MOD 30 MIN: CPT | Performed by: FAMILY MEDICINE

## 2023-11-09 RX ORDER — FOLIC ACID/VIT B COMPLEX AND C 0.8 MG
TABLET ORAL
COMMUNITY
Start: 2023-08-24

## 2023-11-09 NOTE — PROGRESS NOTES
Assessment/Plan:   1. Pruritus/ESRD on hemodialysis [facility])  Reviewed patient's symptoms today. This time, symptoms appear secondary to her hemodialysis. She has tried antihistamine medications with minimal relief. Discussed this case with patient's nephrologist.  We will start treatment with gabapentin 100 mg daily. There are no diagnoses linked to this encounter. Subjective:       Chief Complaint   Patient presents with    Itching     Pt states she has been itching all over for 3 to 4 months      Patient ID: Candance Keen is a 80 y.o. female presents today with a CC of pruritus for the past 3 to 4 months. She states that she believes that her symptoms started after she started her dialysis treatment. She is receiving HD on Monday Wednesday and Fridays. She states that she denies any rash however has been having itching throughout her entire body. She has tried antihistamine medications however these have not been effective at all. HPI    Review of Systems   Constitutional:  Negative for activity change, chills, fatigue and fever. HENT:  Negative for congestion, ear pain, sinus pressure and sore throat. Eyes:  Negative for redness, itching and visual disturbance. Respiratory:  Negative for cough and shortness of breath. Cardiovascular:  Negative for chest pain and palpitations. Gastrointestinal:  Negative for abdominal pain, diarrhea and nausea. Endocrine: Negative for cold intolerance and heat intolerance. Genitourinary:  Negative for dysuria, flank pain and frequency. Musculoskeletal:  Negative for arthralgias, back pain, gait problem and myalgias. Skin:  Negative for color change. Allergic/Immunologic: Negative for environmental allergies. Neurological:  Negative for dizziness, numbness and headaches. Psychiatric/Behavioral:  Negative for behavioral problems and sleep disturbance.         The following portions of the patient's history were reviewed and updated as appropriate : past family history, past medical history, past social history and past surgical history. Current Outpatient Medications:     b complex-C-folic acid (NEPHRO-RASHEL) 0.8 mg tablet, Take by mouth daily with dinner, Disp: , Rfl:     Calcium Citrate 250 MG TABS, Take 1 tablet by mouth 2 (two) times a day., Disp: , Rfl:     Cholecalciferol (VITAMIN D-3 PO), Take 1,000 Units by mouth daily. , Disp: , Rfl:     glucose blood test strip, by In Vitro route 4 (four) times a day, Disp: , Rfl:     glucose monitoring kit (FREESTYLE) monitoring kit, E11.9 Patient is type 2 diabetic. Test once daily, Disp: 1 each, Rfl: 0    Lancets (FREESTYLE) lancets, by Does not apply route daily, Disp: , Rfl:     levothyroxine (Synthroid) 25 mcg tablet, Take 1 tablet (25 mcg total) by mouth daily, Disp: 90 tablet, Rfl: 1    Magnesium 250 MG TABS, Take 1 tablet by mouth daily. , Disp: , Rfl:     metoprolol succinate (TOPROL-XL) 50 mg 24 hr tablet, Take 1.5 tablets (75 mg total) by mouth daily, Disp: 135 tablet, Rfl: 1    Multiple Vitamins-Minerals (CENTRUM SILVER PO), Take 1 tablet by mouth daily. , Disp: , Rfl:     potassium chloride (K-DUR,KLOR-CON) 10 mEq tablet, 20 mEq daily, Disp: , Rfl:     Potassium Chloride ER 20 MEQ TBCR, Take 1 tablet by mouth daily, Disp: , Rfl:     torsemide (DEMADEX) 20 mg tablet, Take 40 mg by mouth daily, Disp: , Rfl:     Methoxy PEG-Epoetin Beta (MIRCERA IJ), 30 mcg Once every four weeks (Patient not taking: Reported on 11/9/2023), Disp: , Rfl:          Objective:         Vitals:    11/09/23 0805   BP: 118/62   BP Location: Left arm   Patient Position: Sitting   Cuff Size: Adult   Pulse: 81   Temp: (!) 97.4 °F (36.3 °C)   TempSrc: Tympanic   SpO2: 99%   Weight: 50.2 kg (110 lb 9.6 oz)   Height: 4' 11.5" (1.511 m)     Physical Exam  Vitals reviewed. Constitutional:       Appearance: She is well-developed. HENT:      Head: Normocephalic and atraumatic.       Nose: Nose normal.      Mouth/Throat: Pharynx: No oropharyngeal exudate. Eyes:      General: No scleral icterus. Right eye: No discharge. Left eye: No discharge. Pupils: Pupils are equal, round, and reactive to light. Neck:      Trachea: No tracheal deviation. Cardiovascular:      Rate and Rhythm: Normal rate and regular rhythm. Pulses:           Dorsalis pedis pulses are 2+ on the right side and 2+ on the left side. Posterior tibial pulses are 2+ on the right side and 2+ on the left side. Heart sounds: Normal heart sounds. No murmur heard. No friction rub. No gallop. Pulmonary:      Effort: Pulmonary effort is normal. No respiratory distress. Breath sounds: Normal breath sounds. No wheezing or rales. Abdominal:      General: Bowel sounds are normal. There is no distension. Palpations: Abdomen is soft. Tenderness: There is no abdominal tenderness. There is no guarding or rebound. Musculoskeletal:         General: Normal range of motion. Cervical back: Normal range of motion and neck supple. Lymphadenopathy:      Head:      Right side of head: No submental or submandibular adenopathy. Left side of head: No submental or submandibular adenopathy. Cervical: No cervical adenopathy. Right cervical: No superficial, deep or posterior cervical adenopathy. Left cervical: No superficial, deep or posterior cervical adenopathy. Skin:     General: Skin is warm and dry. Findings: No erythema. Neurological:      Mental Status: She is alert and oriented to person, place, and time. Cranial Nerves: No cranial nerve deficit. Sensory: No sensory deficit. Psychiatric:         Mood and Affect: Mood is not anxious or depressed. Speech: Speech normal.         Behavior: Behavior normal.         Thought Content:  Thought content normal.         Judgment: Judgment normal.

## 2023-11-10 ENCOUNTER — TELEPHONE (OUTPATIENT)
Dept: FAMILY MEDICINE CLINIC | Facility: CLINIC | Age: 83
End: 2023-11-10

## 2023-11-10 RX ORDER — GABAPENTIN 100 MG/1
100 CAPSULE ORAL DAILY
Qty: 30 CAPSULE | Refills: 2 | Status: SHIPPED | OUTPATIENT
Start: 2023-11-10

## 2023-11-10 NOTE — TELEPHONE ENCOUNTER
Lmom for patient letting her know that Dr. Parker Kruger called in gabapentin to her pharmacy. She is to take 1 tablet daily.

## 2023-11-16 ENCOUNTER — TELEPHONE (OUTPATIENT)
Dept: FAMILY MEDICINE CLINIC | Facility: CLINIC | Age: 83
End: 2023-11-16

## 2023-11-16 NOTE — TELEPHONE ENCOUNTER
Sania called into office stated Sarah was prescribed Rx; Gabapentin 100 mg for itchiness, and is not doing any better. She has been with dizziness for 2 days.

## 2023-12-08 ENCOUNTER — RA CDI HCC (OUTPATIENT)
Dept: OTHER | Facility: HOSPITAL | Age: 83
End: 2023-12-08

## 2023-12-08 DIAGNOSIS — E03.9 ACQUIRED HYPOTHYROIDISM: ICD-10-CM

## 2023-12-08 RX ORDER — LEVOTHYROXINE SODIUM 0.03 MG/1
25 TABLET ORAL DAILY
Qty: 90 TABLET | Refills: 0 | Status: SHIPPED | OUTPATIENT
Start: 2023-12-08

## 2023-12-08 NOTE — PROGRESS NOTES
[facility] [address] [facility] coding opportunities          Chart Reviewed number of suggestions sent to Provider: 2   I13.0  I49.5    Patients Insurance     Medicare Insurance: [employer]

## 2023-12-14 ENCOUNTER — OFFICE VISIT (OUTPATIENT)
Dept: FAMILY MEDICINE CLINIC | Facility: CLINIC | Age: 83
End: 2023-12-14
Payer: COMMERCIAL

## 2023-12-14 VITALS
SYSTOLIC BLOOD PRESSURE: 122 MMHG | HEIGHT: 60 IN | BODY MASS INDEX: 22.1 KG/M2 | HEART RATE: 87 BPM | RESPIRATION RATE: 18 BRPM | OXYGEN SATURATION: 98 % | DIASTOLIC BLOOD PRESSURE: 68 MMHG | WEIGHT: 112.6 LBS | TEMPERATURE: 97.9 F

## 2023-12-14 DIAGNOSIS — N18.32 TYPE 2 DIABETES MELLITUS WITH STAGE 3B CHRONIC KIDNEY DISEASE, WITHOUT LONG-TERM CURRENT USE OF INSULIN (HCC): Primary | ICD-10-CM

## 2023-12-14 DIAGNOSIS — I50.32 CHRONIC DIASTOLIC CHF (CONGESTIVE HEART FAILURE) (HCC): ICD-10-CM

## 2023-12-14 DIAGNOSIS — Z79.01 LONG TERM CURRENT USE OF ANTICOAGULANT THERAPY: ICD-10-CM

## 2023-12-14 DIAGNOSIS — E11.22 TYPE 2 DIABETES MELLITUS WITH STAGE 3B CHRONIC KIDNEY DISEASE, WITHOUT LONG-TERM CURRENT USE OF INSULIN (HCC): Primary | ICD-10-CM

## 2023-12-14 DIAGNOSIS — Z95.0 PACEMAKER: ICD-10-CM

## 2023-12-14 DIAGNOSIS — Z00.00 MEDICARE ANNUAL WELLNESS VISIT, SUBSEQUENT: ICD-10-CM

## 2023-12-14 DIAGNOSIS — N18.6 ESRD ON HEMODIALYSIS (HCC): ICD-10-CM

## 2023-12-14 DIAGNOSIS — Z99.2 DIALYSIS PATIENT (HCC): ICD-10-CM

## 2023-12-14 DIAGNOSIS — E03.9 ACQUIRED HYPOTHYROIDISM: ICD-10-CM

## 2023-12-14 DIAGNOSIS — Z99.2 ESRD ON HEMODIALYSIS (HCC): ICD-10-CM

## 2023-12-14 PROCEDURE — 99213 OFFICE O/P EST LOW 20 MIN: CPT | Performed by: FAMILY MEDICINE

## 2023-12-14 PROCEDURE — G0439 PPPS, SUBSEQ VISIT: HCPCS | Performed by: FAMILY MEDICINE

## 2023-12-14 RX ORDER — TORSEMIDE 100 MG/1
100 TABLET ORAL DAILY
COMMUNITY
Start: 2023-11-27

## 2023-12-14 RX ORDER — HYDROXYZINE HYDROCHLORIDE 10 MG/1
1 TABLET, FILM COATED ORAL 3 TIMES DAILY
COMMUNITY
Start: 2023-12-13

## 2023-12-14 RX ORDER — METOLAZONE 2.5 MG/1
1 TABLET ORAL
COMMUNITY
Start: 2023-12-06

## 2023-12-14 NOTE — PATIENT INSTRUCTIONS
Medicare Preventive Visit Patient Instructions  Thank you for completing your Welcome to Medicare Visit or Medicare Annual Wellness Visit today. Your next wellness visit will be due in one year (12/14/2024).  The screening/preventive services that you may require over the next 5-10 years are detailed below. Some tests may not apply to you based off risk factors and/or age. Screening tests ordered at today's visit but not completed yet may show as past due. Also, please note that scanned in results may not display below.  Preventive Screenings:  Service Recommendations Previous Testing/Comments   Colorectal Cancer Screening  * Colonoscopy    * Fecal Occult Blood Test (FOBT)/Fecal Immunochemical Test (FIT)  * Fecal DNA/Cologuard Test  * Flexible Sigmoidoscopy Age: 45-75 years old   Colonoscopy: every 10 years (may be performed more frequently if at higher risk)  OR  FOBT/FIT: every 1 year  OR  Cologuard: every 3 years  OR  Sigmoidoscopy: every 5 years  Screening may be recommended earlier than age 45 if at higher risk for colorectal cancer. Also, an individualized decision between you and your healthcare provider will decide whether screening between the ages of 76-85 would be appropriate. Colonoscopy: Not on file  FOBT/FIT: Not on file  Cologuard: Not on file  Sigmoidoscopy: Not on file          Breast Cancer Screening Age: 40+ years old  Frequency: every 1-2 years  Not required if history of left and right mastectomy Mammogram: 10/03/2022    Screening Current   Cervical Cancer Screening Between the ages of 21-29, pap smear recommended once every 3 years.   Between the ages of 30-65, can perform pap smear with HPV co-testing every 5 years.   Recommendations may differ for women with a history of total hysterectomy, cervical cancer, or abnormal pap smears in past. Pap Smear: 01/08/2021    Screening Not Indicated   Hepatitis C Screening Once for adults born between 1945 and 1965  More frequently in patients at high  risk for Hepatitis C Hep C Antibody: Not on file        Diabetes Screening 1-2 times per year if you're at risk for diabetes or have pre-diabetes Fasting glucose: 90 mg/dL (8/31/2023)  A1C: 6.0 % (7/21/2023)  Screening Not Indicated  History Diabetes   Cholesterol Screening Once every 5 years if you don't have a lipid disorder. May order more often based on risk factors. Lipid panel: 10/11/2021    Screening Not Indicated  History Lipid Disorder     Other Preventive Screenings Covered by Medicare:  Abdominal Aortic Aneurysm (AAA) Screening: covered once if your at risk. You're considered to be at risk if you have a family history of AAA.  Lung Cancer Screening: covers low dose CT scan once per year if you meet all of the following conditions: (1) Age 55-77; (2) No signs or symptoms of lung cancer; (3) Current smoker or have quit smoking within the last 15 years; (4) You have a tobacco smoking history of at least 20 pack years (packs per day multiplied by number of years you smoked); (5) You get a written order from a healthcare provider.  Glaucoma Screening: covered annually if you're considered high risk: (1) You have diabetes OR (2) Family history of glaucoma OR (3)  aged 50 and older OR (4)  American aged 65 and older  Osteoporosis Screening: covered every 2 years if you meet one of the following conditions: (1) You're estrogen deficient and at risk for osteoporosis based off medical history and other findings; (2) Have a vertebral abnormality; (3) On glucocorticoid therapy for more than 3 months; (4) Have primary hyperparathyroidism; (5) On osteoporosis medications and need to assess response to drug therapy.   Last bone density test (DXA Scan): 06/18/2021.  HIV Screening: covered annually if you're between the age of 15-65. Also covered annually if you are younger than 15 and older than 65 with risk factors for HIV infection. For pregnant patients, it is covered up to 3 times per  pregnancy.    Immunizations:  Immunization Recommendations   Influenza Vaccine Annual influenza vaccination during flu season is recommended for all persons aged >= 6 months who do not have contraindications   Pneumococcal Vaccine   * Pneumococcal conjugate vaccine = PCV13 (Prevnar 13), PCV15 (Vaxneuvance), PCV20 (Prevnar 20)  * Pneumococcal polysaccharide vaccine = PPSV23 (Pneumovax) Adults 19-63 yo with certain risk factors or if 65+ yo  If never received any pneumonia vaccine: recommend Prevnar 20 (PCV20)  Give PCV20 if previously received 1 dose of PCV13 or PPSV23   Hepatitis B Vaccine 3 dose series if at intermediate or high risk (ex: diabetes, end stage renal disease, liver disease)   Respiratory syncytial virus (RSV) Vaccine - COVERED BY MEDICARE PART D  * RSVPreF3 (Arexvy) CDC recommends that adults 60 years of age and older may receive a single dose of RSV vaccine using shared clinical decision-making (SCDM)   Tetanus (Td) Vaccine - COST NOT COVERED BY MEDICARE PART B Following completion of primary series, a booster dose should be given every 10 years to maintain immunity against tetanus. Td may also be given as tetanus wound prophylaxis.   Tdap Vaccine - COST NOT COVERED BY MEDICARE PART B Recommended at least once for all adults. For pregnant patients, recommended with each pregnancy.   Shingles Vaccine (Shingrix) - COST NOT COVERED BY MEDICARE PART B  2 shot series recommended in those 19 years and older who have or will have weakened immune systems or those 50 years and older     Health Maintenance Due:      Topic Date Due   • DXA SCAN  06/16/2023     Immunizations Due:      Topic Date Due   • COVID-19 Vaccine (6 - 2023-24 season) 09/01/2023     Advance Directives   What are advance directives?  Advance directives are legal documents that state your wishes and plans for medical care. These plans are made ahead of time in case you lose your ability to make decisions for yourself. Advance directives can  apply to any medical decision, such as the treatments you want, and if you want to donate organs.   What are the types of advance directives?  There are many types of advance directives, and each state has rules about how to use them. You may choose a combination of any of the following:  Living will:  This is a written record of the treatment you want. You can also choose which treatments you do not want, which to limit, and which to stop at a certain time. This includes surgery, medicine, IV fluid, and tube feedings.   Durable power of  for healthcare (DPAHC):  This is a written record that states who you want to make healthcare choices for you when you are unable to make them for yourself. This person, called a proxy, is usually a family member or a friend. You may choose more than 1 proxy.  Do not resuscitate (DNR) order:  A DNR order is used in case your heart stops beating or you stop breathing. It is a request not to have certain forms of treatment, such as CPR. A DNR order may be included in other types of advance directives.  Medical directive:  This covers the care that you want if you are in a coma, near death, or unable to make decisions for yourself. You can list the treatments you want for each condition. Treatment may include pain medicine, surgery, blood transfusions, dialysis, IV or tube feedings, and a ventilator (breathing machine).  Values history:  This document has questions about your views, beliefs, and how you feel and think about life. This information can help others choose the care that you would choose.  Why are advance directives important?  An advance directive helps you control your care. Although spoken wishes may be used, it is better to have your wishes written down. Spoken wishes can be misunderstood, or not followed. Treatments may be given even if you do not want them. An advance directive may make it easier for your family to make difficult choices about your care.    Fall Prevention    Fall prevention  includes ways to make your home and other areas safer. It also includes ways you can move more carefully to prevent a fall. Health conditions that cause changes in your blood pressure, vision, or muscle strength and coordination may increase your risk for falls. Medicines may also increase your risk for falls if they make you dizzy, weak, or sleepy.   Fall prevention tips:   Stand or sit up slowly.    Use assistive devices as directed.    Wear shoes that fit well and have soles that .    Wear a personal alarm.    Stay active.    Manage your medical conditions.    Home Safety Tips:  Add items to prevent falls in the bathroom.    Keep paths clear.    Install bright lights in your home.    Keep items you use often on shelves within reach.    Paint or place reflective tape on the edges of your stairs.       © Copyright [employer] 2018 Information is for End User's use only and may not be sold, redistributed or otherwise used for commercial purposes. All illustrations and images included in CareNotes® are the copyrighted property of [employer] or [employer]

## 2023-12-14 NOTE — PROGRESS NOTES
Assessment and Plan:     Problem List Items Addressed This Visit     Type 2 diabetes mellitus with diabetic chronic kidney disease (HCC) - Primary    Relevant Medications    metolazone (ZAROXOLYN) 2.5 mg tablet    torsemide (DEMADEX) 100 mg tablet    Pacemaker    Long term current use of anticoagulant therapy    Chronic diastolic CHF (congestive heart failure) (HCC)    Acquired hypothyroidism    ESRD on hemodialysis (HCC)    Relevant Medications    metolazone (ZAROXOLYN) 2.5 mg tablet    torsemide (DEMADEX) 100 mg tablet    Dialysis patient ([facility])   Other Visit Diagnoses     Medicare annual wellness visit, subsequent              Depression Screening and Follow-up Plan: Patient was screened for depression during today's encounter. They screened negative with a PHQ-2 score of 0.    Falls Plan of Care: balance, strength, and gait training instructions were provided.       Preventive health issues were discussed with patient, and age appropriate screening tests were ordered as noted in patient's After Visit Summary.  Personalized health advice and appropriate referrals for health education or preventive services given if needed, as noted in patient's After Visit Summary.     History of Present Illness:     Patient presents for a Medicare Wellness Visit    Patient is seen for annual Medicare visit.  She is accompanied by her son.  We had been following her diabetes.  We will see if they do an A1c through dialysis.       Patient Care Team:  Corina Oconnor DO as PCP - General  DO Lizzie Ovalle MD Deborah Sundlof, DO Charles Brooks, MD as Endoscopist     Review of Systems:     Review of Systems   Constitutional:  Positive for fatigue. Negative for chills and fever.   HENT:  Negative for congestion and sore throat.    Respiratory:  Negative for chest tightness.    Cardiovascular:  Negative for chest pain and palpitations.   Gastrointestinal:  Negative for abdominal pain, constipation, diarrhea and nausea.    Genitourinary:  Negative for difficulty urinating.   Skin: Negative.    Neurological:  Positive for numbness. Negative for dizziness and headaches.   Psychiatric/Behavioral: Negative.          Problem List:     Patient Active Problem List   Diagnosis   • Atrial flutter ([facility])   • Atrial septal defect   • Type 2 diabetes mellitus with diabetic chronic kidney disease ([facility])   • SSS (sick sinus syndrome) ([facility])   • Pacemaker   • Age-related osteoporosis without current pathological fracture   • Bilateral nonexudative age-related macular degeneration   • Cataract, bilateral   • Essential hypertension   • Insomnia   • Seborrhea   • Hyperlipidemia   • Long term current use of antiarrhythmic drug   • Long term current use of anticoagulant therapy   • Transaminitis   • Central serous chorioretinopathy   • Claudication ([facility])   • Daytime sleepiness   • Dry eyes   • Nonrheumatic tricuspid valve regurgitation   • Nuclear senile cataract   • Posterior vitreous detachment   • Snoring   • Chronic diastolic CHF (congestive heart failure) ([facility])   • Platelets decreased ([facility])   • Chronic kidney disease, stage 3b ([facility])   • 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 ([facility])   • Vitreous degeneration of right eye   • Type II diabetes mellitus with peripheral circulatory disorder ([facility])   • Acquired hypothyroidism   • Venous stasis   • Bilateral lower extremity edema   • ESRD on hemodialysis ([facility])   • Abnormal thyroid function test   • Secondary hyperparathyroidism of renal origin ([facility])   • Anemia in chronic kidney disease   • Biventricular heart failure ([facility])   • Chronic fatigue   • Coagulation defect, unspecified ([facility])   • Dialysis patient ([facility])   • ESRD (end stage renal disease) on dialysis ([facility])   • Peripheral vascular disease, unspecified ([facility])      Past Medical and Surgical History:     Past Medical History:   Diagnosis Date   • Allergic rhinitis     last assessed - 05Jan2013   • Arthritis     lower back   •  Atrial flutter (HCC)     last assessed - 16Dec2013   • Diabetes mellitus (HCC)     Type II   • Fatigue     last assessed - 01Oct2013   • Heart murmur     tricupid reguritation, SSS   • Hyperlipidemia    • Hypertension    • Insomnia    • Irregular heart beat     Cardioversion, pacemaker, severe tricuspid regurgitation, atrial septal defect.   • Lower leg edema     last assessed - 09Nov2015   • Macular degeneration     b/l   • Osteoporosis    • Osteoporosis    • Shortness of breath     prior to heart surgery   • Sick sinus syndrome (HCC)     last assessed - 05Apr2017   • Sting from hornet, wasp, or bee     last assessed - 20Aug2013   • Subconjunctival hemorrhage     unspecified laterality; last assessed - 09Jan2014     Past Surgical History:   Procedure Laterality Date   • ASD REPAIR, SECUNDUM     • BREAST BIOPSY Right     benign   • CARDIAC PACEMAKER PLACEMENT     • CARDIAC SURGERY      Atrial septal defect repaired, pacemaker   • COLONOSCOPY N/A 04/21/2016    Procedure: COLONOSCOPY;  Surgeon: London Olivares MD;  Location: [facility];  Service:    • COLONOSCOPY W/ BIOPSIES AND POLYPECTOMY     • ESOPHAGOGASTRODUODENOSCOPY     • EYE SURGERY      lid lift   • HYSTERECTOMY      at age 37 or 38   • MAMMO STEREOTACTIC BREAST BIOPSY RIGHT (ALL INC) Right 07/14/2021   • OOPHORECTOMY Left    • LA COLONOSCOPY FLX DX W/COLLJ SPEC WHEN PFRMD N/A 05/10/2019    Procedure: COLONOSCOPY with polypectomy;  Surgeon: London Olivares MD;  Location: [facility];  Service: Gastroenterology   • TOTAL ABDOMINAL HYSTERECTOMY     • VEIN SURGERY Left     vericose vein left leg   • WISDOM TOOTH EXTRACTION        Family History:     Family History   Problem Relation Age of Onset   • No Known Problems Mother    • Gout Brother    • No Known Problems Father    • No Known Problems Sister    • No Known Problems Daughter    • No Known Problems Maternal Grandmother    • No Known Problems Maternal Grandfather    • No Known Problems Paternal Grandmother     • No Known Problems Paternal Grandfather    • Alcohol abuse Neg Hx    • Substance Abuse Neg Hx       Social History:     Social History     Socioeconomic History   • Marital status:      Spouse name: None   • Number of children: 4   • Years of education: None   • Highest education level: None   Occupational History   • None   Tobacco Use   • Smoking status: Never   • Smokeless tobacco: Never   Substance and Sexual Activity   • Alcohol use: Yes     Comment: occas none recently; social drinker - per [employer]   • Drug use: No   • Sexual activity: None   Other Topics Concern   • None   Social History Narrative    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 : Never true   Transportation Needs: No Transportation Needs (12/14/2023)    PRAPARE - Transportation    • Lack of Transportation (Medical): No    • Lack of Transportation (Non-Medical): No   Physical Activity: Not on file   Stress: High Risk (10/10/2023)    Received from [facility] ([facility])    Stress    • Over the last 2 weeks, how often have you been bothered by the following problems: feeling nervous, anxious, on edge?: Several days    • Over the last 2 weeks, how often have you been bothered by the following problems: Not being able to stop or control worrying?: Several days   Social Connections: Low Risk (10/10/2023)    Received from [facility] ([facility])    Social Connections    • How often do  you feel that you lack companionship?: Hardly ever    • How often do you feel left out?: Hardly Ever    • How often do you feel isolated from others?: Hardly ever   Intimate Partner Violence: Not At Risk (7/24/2023)    Received from [facility]    Humiliation, Afraid, Rape, and Kick questionnaire    • Fear of Current or Ex-Partner: No    • Emotionally Abused: No    • Physically Abused: No    • Sexually Abused: No   Housing Stability: Low Risk  (7/24/2023)    Received from [facility]    Housing Stability Vital Sign    • Unable to Pay for Housing in the Last Year: No    • Number of Places Lived in the Last Year: 1    • Unstable Housing in the Last Year: No      Medications and Allergies:     Current Outpatient Medications   Medication Sig Dispense Refill   • b complex-C-folic acid (NEPHRO-RASHEL) 0.8 mg tablet Take by mouth daily with dinner     • Calcium Citrate 250 MG TABS Take 1 tablet by mouth 2 (two) times a day.     • camphor-menthol (SARNA) lotion Apply topically     • Cholecalciferol (VITAMIN D-3 PO) Take 1,000 Units by mouth daily.     • gabapentin (NEURONTIN) 100 mg capsule Take 1 capsule (100 mg total) by mouth daily Take 1 tablet QHS for 3 days, then take 2 tablets QHS for 3 days, then 3 tablets QHS. 30 capsule 2   • glucose blood test strip by In Vitro route 4 (four) times a day     • glucose monitoring kit (FREESTYLE) monitoring kit E11.9 Patient is type 2 diabetic. Test once daily 1 each 0   • hydrOXYzine HCL (ATARAX) 10 mg tablet Take 1 tablet by mouth Three times a day     • Lancets (FREESTYLE) lancets by Does not apply route daily     • levothyroxine 25 mcg tablet TAKE 1 TABLET BY MOUTH EVERY DAY 90 tablet 0   • Magnesium 250 MG TABS Take 1 tablet by mouth daily.     • metolazone (ZAROXOLYN) 2.5 mg tablet Take 1 tablet by mouth     • metoprolol succinate (TOPROL-XL) 50 mg 24 hr tablet Take 1.5 tablets (75 mg total) by mouth daily 135 tablet 1   • Multiple  Vitamins-Minerals (CENTRUM SILVER PO) Take 1 tablet by mouth daily.     • potassium chloride (K-DUR,KLOR-CON) 10 mEq tablet 20 mEq daily     • Potassium Chloride ER 20 MEQ TBCR Take 1 tablet by mouth daily     • Methoxy PEG-Epoetin Beta (MIRCERA IJ) 30 mcg Once every four weeks (Patient not taking: Reported on 11/9/2023)     • torsemide (DEMADEX) 100 mg tablet Take 100 mg by mouth daily (Patient not taking: Reported on 12/14/2023)     • torsemide (DEMADEX) 20 mg tablet Take 40 mg by mouth daily (Patient not taking: Reported on 12/14/2023)       No current facility-administered medications for this visit.     Allergies   Allergen Reactions   • Penicillins Anaphylaxis   • Shellfish Allergy - Food Allergy Anaphylaxis and Hives      Immunizations:     Immunization History   Administered Date(s) Administered   • COVID-19 MODERNA VACC 0.25 ML IM BOOSTER 04/18/2022   • COVID-19 MODERNA VACC 0.5 ML IM 01/28/2021, 02/25/2021, 11/03/2021   • COVID-19 Moderna Vac BIVALENT 12 Yr+ IM 0.5 ML 10/20/2022   • DT (pediatric) 11/09/2015   • INFLUENZA 11/09/2015, 11/29/2016, 12/06/2017   • Influenza Split High Dose Preservative Free IM 09/25/2014, 11/09/2015, 11/29/2016, 12/06/2017, 09/17/2019   • Influenza, high dose seasonal 0.7 mL 11/27/2018, 09/15/2020, 11/22/2021, 10/20/2022, 09/21/2023   • Influenza, seasonal, injectable 11/27/2012, 10/01/2013   • Pneumococcal Conjugate 13-Valent 07/08/2015   • Pneumococcal Polysaccharide PPV23 01/01/2004, 07/31/2019   • TD (adult) Preservative Free 03/07/2017   • Td (adult), adsorbed 11/09/2015   • Tdap 03/07/2017   • Zoster 08/02/2012      Health Maintenance:         Topic Date Due   • DXA SCAN  06/16/2023         Topic Date Due   • COVID-19 Vaccine (6 - 2023-24 season) 09/01/2023      Medicare Screening Tests and Risk Assessments:     Sarah is here for her Subsequent Wellness visit. Last Medicare Wellness visit information reviewed, patient interviewed and updates made to the record today.       Health Risk Assessment:   Patient rates overall health as very good. Patient feels that their physical health rating is slightly better. Patient is satisfied with their life. Eyesight was rated as slightly worse. Hearing was rated as same. Patient feels that their emotional and mental health rating is same. Patients states they are sometimes angry. Patient states they are always unusually tired/fatigued. Pain experienced in the last 7 days has been none. Patient states that she has experienced no weight loss or gain in last 6 months.     Depression Screening:   PHQ-2 Score: 0      Fall Risk Screening:   In the past year, patient has experienced: history of falling in past year    Number of falls: 2 or more  Injured during fall?: No    Feels unsteady when standing or walking?: Yes    Worried about falling?: No      Urinary Incontinence Screening:   Patient has not leaked urine accidently in the last six months.     Home Safety:  Patient has trouble with stairs inside or outside of their home. Patient has working smoke alarms and has working carbon monoxide detector. Home safety hazards include: none.     Nutrition:   Current diet is Regular.     Medications:   Patient is currently taking over-the-counter supplements. OTC medications include: see medication list. Patient is able to manage medications.     Activities of Daily Living (ADLs)/Instrumental Activities of Daily Living (IADLs):   Walk and transfer into and out of bed and chair?: Yes  Dress and groom yourself?: Yes    Bathe or shower yourself?: Yes    Feed yourself? Yes  Do your laundry/housekeeping?: Yes  Manage your money, pay your bills and track your expenses?: Yes  Make your own meals?: Yes    Do your own shopping?: Yes    Previous Hospitalizations:   Any hospitalizations or ED visits within the last 12 months?: Yes    How many hospitalizations have you had in the last year?: 3-4    Advance Care Planning:   Living will: Yes    Durable POA for  "healthcare: Yes    Advanced directive: Yes    End of Life Decisions reviewed with patient: Yes      Cognitive Screening:   Provider or family/friend/caregiver concerned regarding cognition?: No    PREVENTIVE SCREENINGS      Cardiovascular Screening:    General: Screening Not Indicated and History Lipid Disorder      Diabetes Screening:     General: Screening Not Indicated and History Diabetes      Colorectal Cancer Screening:     General: Screening Not Indicated      Breast Cancer Screening:     General: Screening Current      Cervical Cancer Screening:    General: Screening Not Indicated      Osteoporosis Screening:    General: Screening Not Indicated and History Osteoporosis      Lung Cancer Screening:     General: Screening Not Indicated      Hepatitis C Screening:    General: Screening Not Indicated    Screening, Brief Intervention, and Referral to Treatment (SBIRT)    Screening  Typical number of drinks in a day: 0  Typical number of drinks in a week: 0  Interpretation: Low risk drinking behavior.    Single Item Drug Screening:  How often have you used an illegal drug (including marijuana) or a prescription medication for non-medical reasons in the past year? never    Single Item Drug Screen Score: 0  Interpretation: Negative screen for possible drug use disorder    Brief Intervention  Alcohol & drug use screenings were reviewed. No concerns regarding substance use disorder identified.     No results found.     Physical Exam:     /68 (BP Location: Left arm, Patient Position: Sitting, Cuff Size: Standard)   Pulse 87   Temp 97.9 °F (36.6 °C) (Temporal)   Resp 18   Ht 4' 11.5\" (1.511 m)   Wt 51.1 kg (112 lb 9.6 oz)   SpO2 98%   BMI 22.36 kg/m²     Physical Exam  Vitals and nursing note reviewed.   Constitutional:       General: She is not in acute distress.     Appearance: She is well-developed.   HENT:      Head: Normocephalic.      Right Ear: Tympanic membrane normal.      Left Ear: Tympanic " membrane normal.      Mouth/Throat:      Pharynx: No posterior oropharyngeal erythema.   Eyes:      General: No scleral icterus.  Neck:      Thyroid: No thyromegaly.      Vascular: No carotid bruit.   Cardiovascular:      Rate and Rhythm: Normal rate.      Pulses: Pulses are weak.           Dorsalis pedis pulses are 1+ on the right side and 1+ on the left side.        Posterior tibial pulses are 1+ on the right side and 1+ on the left side.      Heart sounds: Murmur heard.   Pulmonary:      Effort: Pulmonary effort is normal.      Breath sounds: Normal breath sounds.   Abdominal:      General: Bowel sounds are normal.      Palpations: Abdomen is soft.   Musculoskeletal:      Right lower leg: Edema present.      Left lower leg: Edema present.   Feet:      Right foot:      Skin integrity: Dry skin present.      Left foot:      Skin integrity: Dry skin present.   Lymphadenopathy:      Cervical: No cervical adenopathy.   Skin:     General: Skin is warm and dry.   Neurological:      Mental Status: She is alert and oriented to person, place, and time.   Psychiatric:         Mood and Affect: Mood normal.        Patient's shoes and socks removed.    Right Foot/Ankle   Right Foot Inspection  Skin Exam: skin intact and dry skin.     Toe Exam: right toe deformity.     Sensory   Monofilament testing: intact    Vascular  The right DP pulse is 1+. The right PT pulse is 1+.     Left Foot/Ankle  Left Foot Inspection  Skin Exam: skin intact and dry skin.     Toe Exam: left toe deformity.     Sensory   Monofilament testing: intact    Vascular  The left DP pulse is 1+. The left PT pulse is 1+.     Assign Risk Category  Deformity present  No loss of protective sensation  Weak pulses  Risk: 0   f  Corina Oconnor DO

## 2023-12-31 PROBLEM — I73.9 PERIPHERAL VASCULAR DISEASE, UNSPECIFIED (HCC): Status: ACTIVE | Noted: 2023-08-24

## 2023-12-31 PROBLEM — D68.9 COAGULATION DEFECT, UNSPECIFIED (HCC): Status: ACTIVE | Noted: 2023-08-24

## 2023-12-31 PROBLEM — I50.82 BIVENTRICULAR HEART FAILURE (HCC): Status: ACTIVE | Noted: 2023-08-25

## 2023-12-31 PROBLEM — R53.82 CHRONIC FATIGUE: Status: ACTIVE | Noted: 2020-06-26

## 2023-12-31 PROBLEM — D63.1 ANEMIA IN CHRONIC KIDNEY DISEASE: Status: ACTIVE | Noted: 2023-08-24

## 2023-12-31 PROBLEM — Z99.2 DIALYSIS PATIENT (HCC): Status: ACTIVE | Noted: 2023-08-25

## 2023-12-31 PROBLEM — N18.6 ESRD (END STAGE RENAL DISEASE) ON DIALYSIS (HCC): Status: ACTIVE | Noted: 2023-10-12

## 2023-12-31 PROBLEM — Z99.2 ESRD (END STAGE RENAL DISEASE) ON DIALYSIS (HCC): Status: ACTIVE | Noted: 2023-10-12

## 2023-12-31 PROBLEM — N18.9 ANEMIA IN CHRONIC KIDNEY DISEASE: Status: ACTIVE | Noted: 2023-08-24

## 2024-01-12 ENCOUNTER — APPOINTMENT (OUTPATIENT)
Dept: LAB | Facility: MEDICAL CENTER | Age: 84
End: 2024-01-12
Payer: COMMERCIAL

## 2024-01-12 DIAGNOSIS — E78.2 MIXED HYPERLIPIDEMIA: ICD-10-CM

## 2024-01-12 DIAGNOSIS — I36.1 NONRHEUMATIC TRICUSPID VALVE REGURGITATION: ICD-10-CM

## 2024-01-12 DIAGNOSIS — I48.92 ATRIAL FLUTTER, UNSPECIFIED TYPE (HCC): ICD-10-CM

## 2024-01-12 DIAGNOSIS — N18.6 ESRD (END STAGE RENAL DISEASE) (HCC): ICD-10-CM

## 2024-01-12 DIAGNOSIS — Q21.10 ASD (ATRIAL SEPTAL DEFECT): ICD-10-CM

## 2024-01-12 DIAGNOSIS — I10 ESSENTIAL HYPERTENSION: ICD-10-CM

## 2024-01-12 DIAGNOSIS — I49.5 SSS (SICK SINUS SYNDROME) (HCC): ICD-10-CM

## 2024-01-12 DIAGNOSIS — I50.32 CHRONIC DIASTOLIC CONGESTIVE HEART FAILURE (HCC): ICD-10-CM

## 2024-01-12 DIAGNOSIS — Z95.0 PACEMAKER: ICD-10-CM

## 2024-01-12 LAB
ALBUMIN SERPL BCP-MCNC: 4.1 G/DL (ref 3.5–5)
ALP SERPL-CCNC: 126 U/L (ref 34–104)
ALT SERPL W P-5'-P-CCNC: 16 U/L (ref 7–52)
ANION GAP SERPL CALCULATED.3IONS-SCNC: 15 MMOL/L
AST SERPL W P-5'-P-CCNC: 31 U/L (ref 13–39)
BILIRUB SERPL-MCNC: 1.46 MG/DL (ref 0.2–1)
BUN SERPL-MCNC: 64 MG/DL (ref 5–25)
CALCIUM SERPL-MCNC: 10 MG/DL (ref 8.4–10.2)
CHLORIDE SERPL-SCNC: 94 MMOL/L (ref 96–108)
CO2 SERPL-SCNC: 33 MMOL/L (ref 21–32)
CREAT SERPL-MCNC: 1.95 MG/DL (ref 0.6–1.3)
ERYTHROCYTE [DISTWIDTH] IN BLOOD BY AUTOMATED COUNT: 15.4 % (ref 11.6–15.1)
GFR SERPL CREATININE-BSD FRML MDRD: 23 ML/MIN/1.73SQ M
GLUCOSE P FAST SERPL-MCNC: 125 MG/DL (ref 65–99)
HCT VFR BLD AUTO: 34.7 % (ref 34.8–46.1)
HGB BLD-MCNC: 11.6 G/DL (ref 11.5–15.4)
MCH RBC QN AUTO: 34.3 PG (ref 26.8–34.3)
MCHC RBC AUTO-ENTMCNC: 33.4 G/DL (ref 31.4–37.4)
MCV RBC AUTO: 103 FL (ref 82–98)
PLATELET # BLD AUTO: 122 THOUSANDS/UL (ref 149–390)
PMV BLD AUTO: 10 FL (ref 8.9–12.7)
POTASSIUM SERPL-SCNC: 2.7 MMOL/L (ref 3.5–5.3)
PROT SERPL-MCNC: 8.3 G/DL (ref 6.4–8.4)
RBC # BLD AUTO: 3.38 MILLION/UL (ref 3.81–5.12)
SODIUM SERPL-SCNC: 142 MMOL/L (ref 135–147)
TSH SERPL DL<=0.05 MIU/L-ACNC: 4.2 UIU/ML (ref 0.45–4.5)
WBC # BLD AUTO: 3.08 THOUSAND/UL (ref 4.31–10.16)

## 2024-01-12 PROCEDURE — 84443 ASSAY THYROID STIM HORMONE: CPT

## 2024-01-12 PROCEDURE — 80053 COMPREHEN METABOLIC PANEL: CPT

## 2024-01-12 PROCEDURE — 85027 COMPLETE CBC AUTOMATED: CPT

## 2024-01-12 PROCEDURE — 36415 COLL VENOUS BLD VENIPUNCTURE: CPT

## 2024-01-15 ENCOUNTER — TELEPHONE (OUTPATIENT)
Age: 84
End: 2024-01-15

## 2024-01-15 NOTE — TELEPHONE ENCOUNTER
The labs were ordered by cardiology at [facility]. Note from them states daughter was informed of low potassium and given instructions. Will await provider review of any other labs she may need direction on

## 2024-01-15 NOTE — TELEPHONE ENCOUNTER
Patient daughter calling asking for lab results to  be read.     Asking if results can be faxed to [facility]. Fax- [phone]

## 2024-02-14 ENCOUNTER — TRANSITIONAL CARE MANAGEMENT (OUTPATIENT)
Dept: FAMILY MEDICINE CLINIC | Facility: CLINIC | Age: 84
End: 2024-02-14

## 2024-02-22 ENCOUNTER — OFFICE VISIT (OUTPATIENT)
Dept: FAMILY MEDICINE CLINIC | Facility: CLINIC | Age: 84
End: 2024-02-22
Payer: COMMERCIAL

## 2024-02-22 VITALS
WEIGHT: 121.8 LBS | SYSTOLIC BLOOD PRESSURE: 122 MMHG | OXYGEN SATURATION: 96 % | HEART RATE: 99 BPM | HEIGHT: 60 IN | DIASTOLIC BLOOD PRESSURE: 78 MMHG | BODY MASS INDEX: 23.91 KG/M2 | TEMPERATURE: 98.2 F

## 2024-02-22 DIAGNOSIS — E03.9 ACQUIRED HYPOTHYROIDISM: ICD-10-CM

## 2024-02-22 DIAGNOSIS — I50.32 CHRONIC DIASTOLIC CHF (CONGESTIVE HEART FAILURE) (HCC): Primary | ICD-10-CM

## 2024-02-22 DIAGNOSIS — I48.4 ATYPICAL ATRIAL FLUTTER (HCC): ICD-10-CM

## 2024-02-22 DIAGNOSIS — I50.33 DIASTOLIC CHF, ACUTE ON CHRONIC (HCC): ICD-10-CM

## 2024-02-22 DIAGNOSIS — D69.6 PLATELETS DECREASED (HCC): ICD-10-CM

## 2024-02-22 DIAGNOSIS — Z99.2 DIALYSIS PATIENT (HCC): ICD-10-CM

## 2024-02-22 DIAGNOSIS — E11.51 TYPE II DIABETES MELLITUS WITH PERIPHERAL CIRCULATORY DISORDER (HCC): ICD-10-CM

## 2024-02-22 LAB — SL AMB POCT HEMOGLOBIN AIC: 6.1 (ref ?–6.5)

## 2024-02-22 PROCEDURE — 83036 HEMOGLOBIN GLYCOSYLATED A1C: CPT | Performed by: FAMILY MEDICINE

## 2024-02-22 PROCEDURE — 99495 TRANSJ CARE MGMT MOD F2F 14D: CPT | Performed by: FAMILY MEDICINE

## 2024-02-22 NOTE — PATIENT INSTRUCTIONS
Can take Hydroxyzine as needed for itching up to three times a day. If use Zyrtec or Benadryl - don't take hydroxyzine

## 2024-02-23 PROBLEM — I48.0 PAROXYSMAL ATRIAL FIBRILLATION WITH RAPID VENTRICULAR RESPONSE (HCC): Status: ACTIVE | Noted: 2024-01-18

## 2024-02-23 PROBLEM — N18.4 STAGE 4 CHRONIC KIDNEY DISEASE (HCC): Chronic | Status: ACTIVE | Noted: 2023-10-12

## 2024-02-23 PROBLEM — I50.33 ACUTE ON CHRONIC HEART FAILURE WITH PRESERVED EJECTION FRACTION (HFPEF) (HCC): Status: ACTIVE | Noted: 2024-02-08

## 2024-02-23 PROBLEM — I27.20 SEVERE PULMONARY HYPERTENSION (HCC): Status: ACTIVE | Noted: 2024-01-18

## 2024-02-23 PROBLEM — E87.6 HYPOKALEMIA: Chronic | Status: ACTIVE | Noted: 2024-01-18

## 2024-02-23 PROBLEM — R26.81 GAIT INSTABILITY: Status: ACTIVE | Noted: 2024-02-21

## 2024-02-23 PROBLEM — L29.9 ITCHING: Status: ACTIVE | Noted: 2023-11-21

## 2024-02-23 RX ORDER — SENNOSIDES A AND B 8.6 MG/1
8.6 TABLET, FILM COATED ORAL DAILY
COMMUNITY

## 2024-02-23 RX ORDER — CLINDAMYCIN HYDROCHLORIDE 150 MG/1
CAPSULE ORAL
COMMUNITY
Start: 2023-12-26

## 2024-03-04 DIAGNOSIS — E03.9 ACQUIRED HYPOTHYROIDISM: ICD-10-CM

## 2024-03-04 RX ORDER — LEVOTHYROXINE SODIUM 0.03 MG/1
25 TABLET ORAL DAILY
Qty: 90 TABLET | Refills: 0 | Status: SHIPPED | OUTPATIENT
Start: 2024-03-04

## 2024-04-10 ENCOUNTER — TRANSITIONAL CARE MANAGEMENT (OUTPATIENT)
Dept: FAMILY MEDICINE CLINIC | Facility: CLINIC | Age: 84
End: 2024-04-10

## 2024-04-11 ENCOUNTER — RA CDI HCC (OUTPATIENT)
Dept: OTHER | Facility: HOSPITAL | Age: 84
End: 2024-04-11

## 2024-04-22 ENCOUNTER — TELEPHONE (OUTPATIENT)
Dept: FAMILY MEDICINE CLINIC | Facility: CLINIC | Age: 84
End: 2024-04-22

## 2024-04-23 ENCOUNTER — OFFICE VISIT (OUTPATIENT)
Dept: FAMILY MEDICINE CLINIC | Facility: CLINIC | Age: 84
End: 2024-04-23
Payer: COMMERCIAL

## 2024-04-23 VITALS
SYSTOLIC BLOOD PRESSURE: 126 MMHG | WEIGHT: 114.6 LBS | DIASTOLIC BLOOD PRESSURE: 64 MMHG | HEART RATE: 57 BPM | BODY MASS INDEX: 22.76 KG/M2 | TEMPERATURE: 98.2 F | OXYGEN SATURATION: 99 %

## 2024-04-23 DIAGNOSIS — I50.32 CHRONIC DIASTOLIC HEART FAILURE (HCC): Primary | ICD-10-CM

## 2024-04-23 DIAGNOSIS — E11.22 TYPE 2 DIABETES MELLITUS WITH STAGE 4 CHRONIC KIDNEY DISEASE, WITHOUT LONG-TERM CURRENT USE OF INSULIN (HCC): ICD-10-CM

## 2024-04-23 DIAGNOSIS — N18.4 TYPE 2 DIABETES MELLITUS WITH STAGE 4 CHRONIC KIDNEY DISEASE, WITHOUT LONG-TERM CURRENT USE OF INSULIN (HCC): ICD-10-CM

## 2024-04-23 DIAGNOSIS — E03.9 ACQUIRED HYPOTHYROIDISM: ICD-10-CM

## 2024-04-23 PROCEDURE — 99495 TRANSJ CARE MGMT MOD F2F 14D: CPT | Performed by: FAMILY MEDICINE

## 2024-04-23 NOTE — PROGRESS NOTES
Assessment & Plan   Patient is seen for follow up to hospitalization at [facility] from 4/4 to 4/10. She was admitted with acute exacerbation of CHF.  She is going to [facility] regarding tricuspid valve.  1. Chronic diastolic heart failure (HCC)    2. Acquired hypothyroidism    3. 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 disease, without long-term current use of insulin ([facility])    Will check on thyroid dosage in the chart to see if this was changed in the hospital.     Subjective     Transitional Care Management Review:   Sarah Holland is a 83 y.o. female here for TCM follow up.     During the TCM phone call patient stated:  TCM Call     Date and time call was made  4/10/2024 11:49 AM    Hospital care reviewed  Records reviewed    Patient was hospitialized at  [facility]    Date of Admission  04/04/24    Date of discharge  04/10/24    Diagnosis  NSTEMI    Disposition  Home    Were the patients medications reviewed and updated  --  I asked her to bring a list of medications to the appointment    Current Symptoms  None      TCM Call     Post hospital issues  None    Should patient be enrolled in anticoag monitoring?  No    Scheduled for follow up?  Yes    Did you obtain your prescribed medications  Yes    Do you need help managing your prescriptions or medications  No    Is transportation to your appointment needed  Yes    Specify why   will drive    I have advised the patient to call PCP with any new or worsening symptoms  Kath Elizondo MA    Living Arrangements  Spouse or Significiant other    Are you recieving any outpatient services  No    Are you recieving home care services  No    Are you using any community resources  No    Current waiver services  No    Interperter language line needed  No        Brought to visit by grandson.      Review of Systems   Constitutional:  Negative for chills and fever.   HENT:  Negative for congestion and sore throat.    Respiratory:  Negative for  chest tightness.    Cardiovascular:  Negative for chest pain and palpitations.   Gastrointestinal:  Negative for abdominal pain, constipation, diarrhea and nausea.   Genitourinary:  Negative for difficulty urinating.   Skin: Negative.    Neurological:  Negative for dizziness and headaches.   Psychiatric/Behavioral: Negative.         Objective     /64 (BP Location: Left arm, Patient Position: Sitting, Cuff Size: Adult)   Pulse 57   Temp 98.2 °F (36.8 °C)   Wt 52 kg (114 lb 9.6 oz)   SpO2 99%   BMI 22.76 kg/m²      Physical Exam  Vitals and nursing note reviewed.   Constitutional:       General: She is not in acute distress.     Appearance: She is well-developed.   HENT:      Head: Normocephalic.      Mouth/Throat:      Pharynx: No posterior oropharyngeal erythema.   Eyes:      General: No scleral icterus.     Comments: Left eye with subconjunctival hemorrhage.   Neck:      Thyroid: No thyromegaly.      Vascular: No carotid bruit.   Cardiovascular:      Rate and Rhythm: Normal rate and regular rhythm.      Heart sounds: Normal heart sounds. No murmur heard.  Pulmonary:      Effort: Pulmonary effort is normal.      Breath sounds: Normal breath sounds.   Musculoskeletal:      Right lower leg: No edema.      Left lower leg: No edema.   Lymphadenopathy:      Cervical: No cervical adenopathy.   Skin:     General: Skin is warm and dry.   Neurological:      Mental Status: She is alert and oriented to person, place, and time.   Psychiatric:         Mood and Affect: Mood normal.       Medications have been reviewed by provider in current encounter    Corina Oconnor DO

## 2024-05-20 ENCOUNTER — TRANSITIONAL CARE MANAGEMENT (OUTPATIENT)
Dept: FAMILY MEDICINE CLINIC | Facility: CLINIC | Age: 84
End: 2024-05-20

## 2024-05-21 ENCOUNTER — TELEPHONE (OUTPATIENT)
Age: 84
End: 2024-05-21

## 2024-05-21 ENCOUNTER — TELEPHONE (OUTPATIENT)
Dept: FAMILY MEDICINE CLINIC | Facility: CLINIC | Age: 84
End: 2024-05-21

## 2024-05-21 NOTE — TELEPHONE ENCOUNTER
Radha from [facility] contacted the office wanting to get clarification on a medication that patient was discharged with. Patient has been taking Protonix which she has a prescription at home to take, but was discharged from [facility] to take Omeprazole. Radha stated that have to go by discharge paperwork. She is asking if patient can continue again on Protonix as this is what she has at home. Please advise.

## 2024-05-22 ENCOUNTER — TELEPHONE (OUTPATIENT)
Dept: FAMILY MEDICINE CLINIC | Facility: CLINIC | Age: 84
End: 2024-05-22

## 2024-05-30 DIAGNOSIS — E03.9 ACQUIRED HYPOTHYROIDISM: ICD-10-CM

## 2024-05-31 ENCOUNTER — TELEPHONE (OUTPATIENT)
Dept: FAMILY MEDICINE CLINIC | Facility: CLINIC | Age: 84
End: 2024-05-31

## 2024-05-31 ENCOUNTER — TELEPHONE (OUTPATIENT)
Age: 84
End: 2024-05-31

## 2024-05-31 DIAGNOSIS — I50.32 CHRONIC DIASTOLIC HEART FAILURE (HCC): Primary | ICD-10-CM

## 2024-05-31 DIAGNOSIS — I48.92 ATRIAL FLUTTER, UNSPECIFIED TYPE (HCC): ICD-10-CM

## 2024-05-31 NOTE — TELEPHONE ENCOUNTER
Pt was recently re admitted into [facility] and son is concerned with the medications she was told to stop and start and would like someone to call sister Samaria since he is not on communication consent.    Samaria Johnson [phone]

## 2024-05-31 NOTE — TELEPHONE ENCOUNTER
Ameya from [facility] called requesting new orders for patient to resume [facility] visits with [facility]. Their fax# [phone]. She was discharged from [facility] on 5/30/2024. I advised the patient has a TCM appointment on 6/5/2024 with Dr. Shawanda Coelho. Please call Ameya to advise.

## 2024-06-04 ENCOUNTER — TELEPHONE (OUTPATIENT)
Dept: FAMILY MEDICINE CLINIC | Facility: CLINIC | Age: 84
End: 2024-06-04

## 2024-06-04 RX ORDER — LEVOTHYROXINE SODIUM 0.03 MG/1
25 TABLET ORAL DAILY
Qty: 90 TABLET | Refills: 0 | Status: SHIPPED | OUTPATIENT
Start: 2024-06-04

## 2024-06-19 ENCOUNTER — TELEPHONE (OUTPATIENT)
Dept: FAMILY MEDICINE CLINIC | Facility: CLINIC | Age: 84
End: 2024-06-19

## 2024-06-24 ENCOUNTER — TELEPHONE (OUTPATIENT)
Age: 84
End: 2024-06-24

## 2024-06-24 NOTE — TELEPHONE ENCOUNTER
Pt daughter called in to r/s her mom no show appt which has been scheduled for next month. At the same time she wanted us to put a note on patient chart that only she can call to schedule or canc appt for her mom. As she don't anymore trust her mom or brother to handle this. Please do help her mobile @ [phone]. Thanks

## 2024-07-25 ENCOUNTER — OFFICE VISIT (OUTPATIENT)
Dept: FAMILY MEDICINE CLINIC | Facility: CLINIC | Age: 84
End: 2024-07-25
Payer: COMMERCIAL

## 2024-07-25 ENCOUNTER — TELEPHONE (OUTPATIENT)
Dept: OTHER | Facility: OTHER | Age: 84
End: 2024-07-25

## 2024-07-25 VITALS
DIASTOLIC BLOOD PRESSURE: 60 MMHG | TEMPERATURE: 98.5 F | HEART RATE: 84 BPM | SYSTOLIC BLOOD PRESSURE: 110 MMHG | OXYGEN SATURATION: 99 %

## 2024-07-25 DIAGNOSIS — N18.4 TYPE 2 DIABETES MELLITUS WITH STAGE 4 CHRONIC KIDNEY DISEASE, WITHOUT LONG-TERM CURRENT USE OF INSULIN (HCC): ICD-10-CM

## 2024-07-25 DIAGNOSIS — E03.9 ACQUIRED HYPOTHYROIDISM: ICD-10-CM

## 2024-07-25 DIAGNOSIS — Z95.0 PACEMAKER: ICD-10-CM

## 2024-07-25 DIAGNOSIS — E11.22 TYPE 2 DIABETES MELLITUS WITH STAGE 4 CHRONIC KIDNEY DISEASE, WITHOUT LONG-TERM CURRENT USE OF INSULIN (HCC): ICD-10-CM

## 2024-07-25 DIAGNOSIS — E11.51 TYPE II DIABETES MELLITUS WITH PERIPHERAL CIRCULATORY DISORDER (HCC): ICD-10-CM

## 2024-07-25 DIAGNOSIS — I50.82 BIVENTRICULAR HEART FAILURE (HCC): ICD-10-CM

## 2024-07-25 DIAGNOSIS — I50.32 CHRONIC DIASTOLIC HEART FAILURE (HCC): Primary | ICD-10-CM

## 2024-07-25 DIAGNOSIS — D64.9 ANEMIA, UNSPECIFIED TYPE: ICD-10-CM

## 2024-07-25 DIAGNOSIS — R26.81 GAIT INSTABILITY: ICD-10-CM

## 2024-07-25 DIAGNOSIS — N18.4 STAGE 4 CHRONIC KIDNEY DISEASE (HCC): Chronic | ICD-10-CM

## 2024-07-25 PROBLEM — N18.6 ESRD ON HEMODIALYSIS (HCC): Status: RESOLVED | Noted: 2023-08-30 | Resolved: 2024-07-25

## 2024-07-25 PROBLEM — N18.6 ESRD (END STAGE RENAL DISEASE) ON DIALYSIS (HCC): Status: RESOLVED | Noted: 2023-10-12 | Resolved: 2024-07-25

## 2024-07-25 PROBLEM — Z99.2 ESRD ON HEMODIALYSIS (HCC): Status: RESOLVED | Noted: 2023-08-30 | Resolved: 2024-07-25

## 2024-07-25 PROBLEM — Z99.2 ESRD (END STAGE RENAL DISEASE) ON DIALYSIS (HCC): Status: RESOLVED | Noted: 2023-10-12 | Resolved: 2024-07-25

## 2024-07-25 LAB — SL AMB POCT HEMOGLOBIN AIC: 6.2 (ref ?–6.5)

## 2024-07-25 PROCEDURE — 83036 HEMOGLOBIN GLYCOSYLATED A1C: CPT | Performed by: FAMILY MEDICINE

## 2024-07-25 PROCEDURE — 99214 OFFICE O/P EST MOD 30 MIN: CPT | Performed by: FAMILY MEDICINE

## 2024-07-25 NOTE — PROGRESS NOTES
Ambulatory Visit  Name: Sarah Holland      : 1940      MRN: [id number]  Encounter Provider: Corina Oconnor DO  Encounter Date: 2024   Encounter department: [facility]    Assessment & Plan   1. Chronic diastolic heart failure (HCC)  -     Motorized Scooter  2. Type II diabetes mellitus with peripheral circulatory disorder (HCC)  -     POCT hemoglobin A1c  3. Acquired hypothyroidism  4. Biventricular heart failure (HCC)  5. 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 disease, without long-term current use of insulin (HCC)  -     POCT hemoglobin A1c  6. Stage 4 chronic kidney disease (HCC)  7. Anemia, unspecified type  -     Motorized Scooter  8. Pacemaker  -     Motorized Scooter  9. Gait instability  -     Motorized Scooter       History of Present Illness     Patient is here for follow u of chronic medical conditions.  Patient walks in the house but is wobbly, shaky. Uses a walker or cane. Daughter thinks she could use a alberto stick.  Likes to go out for ice cream and go to the casino.  She has good an bad days.         Review of Systems   Constitutional:  Positive for fatigue.   Musculoskeletal:  Positive for gait problem.       Objective     /60   Pulse 84   Temp 98.5 °F (36.9 °C)   SpO2 99%     Physical Exam  Vitals and nursing note reviewed.   Constitutional:       General: She is not in acute distress.     Appearance: She is ill-appearing.   Neck:      Thyroid: No thyromegaly.   Cardiovascular:      Rate and Rhythm: Normal rate and regular rhythm.      Heart sounds: Normal heart sounds.   Pulmonary:      Effort: Pulmonary effort is normal.      Breath sounds: Normal breath sounds.   Musculoskeletal:      Right lower leg: No edema.      Left lower leg: No edema.   Lymphadenopathy:      Cervical: No cervical adenopathy.   Skin:     General: Skin is warm and dry.   Neurological:      Mental Status: She is alert and oriented to person, place, and time.    Psychiatric:         Mood and Affect: Mood normal.       Administrative Statements

## 2024-07-25 NOTE — TELEPHONE ENCOUNTER
Pt and granddaughter reached the after hours service, they are about 7 minutes away from office for her appt his evening and wanted to let the office know.

## 2024-08-06 ENCOUNTER — OFFICE VISIT (OUTPATIENT)
Dept: FAMILY MEDICINE CLINIC | Facility: CLINIC | Age: 84
End: 2024-08-06
Payer: COMMERCIAL

## 2024-08-06 VITALS
HEART RATE: 73 BPM | SYSTOLIC BLOOD PRESSURE: 108 MMHG | TEMPERATURE: 98.1 F | BODY MASS INDEX: 22.9 KG/M2 | OXYGEN SATURATION: 98 % | DIASTOLIC BLOOD PRESSURE: 60 MMHG | HEIGHT: 59 IN | WEIGHT: 113.6 LBS

## 2024-08-06 DIAGNOSIS — S51.801A OPEN WOUND OF RIGHT FOREARM, INITIAL ENCOUNTER: Primary | ICD-10-CM

## 2024-08-06 DIAGNOSIS — S81.802A OPEN WOUND OF LEFT LOWER LEG, INITIAL ENCOUNTER: ICD-10-CM

## 2024-08-06 DIAGNOSIS — H11.32 SUBCONJUNCTIVAL HEMORRHAGE OF LEFT EYE: ICD-10-CM

## 2024-08-06 PROCEDURE — 99213 OFFICE O/P EST LOW 20 MIN: CPT | Performed by: FAMILY MEDICINE

## 2024-08-06 PROCEDURE — G2211 COMPLEX E/M VISIT ADD ON: HCPCS | Performed by: FAMILY MEDICINE

## 2024-08-06 RX ORDER — CLINDAMYCIN HCL 150 MG
150 CAPSULE ORAL EVERY 8 HOURS SCHEDULED
Qty: 15 CAPSULE | Refills: 0 | Status: SHIPPED | OUTPATIENT
Start: 2024-08-06 | End: 2024-08-11

## 2024-08-07 ENCOUNTER — RA CDI HCC (OUTPATIENT)
Dept: OTHER | Facility: HOSPITAL | Age: 84
End: 2024-08-07

## 2024-08-07 NOTE — PROGRESS NOTES
HCC coding opportunities          Chart Reviewed number of suggestions sent to Provider: 4   I13.0  I49.5  I27.20  I73.9  Patients Insurance     Medicare Insurance: [employer]

## 2024-08-13 ENCOUNTER — OFFICE VISIT (OUTPATIENT)
Dept: FAMILY MEDICINE CLINIC | Facility: CLINIC | Age: 84
End: 2024-08-13
Payer: COMMERCIAL

## 2024-08-13 VITALS
HEART RATE: 72 BPM | DIASTOLIC BLOOD PRESSURE: 52 MMHG | OXYGEN SATURATION: 98 % | TEMPERATURE: 98.9 F | WEIGHT: 120 LBS | SYSTOLIC BLOOD PRESSURE: 118 MMHG | BODY MASS INDEX: 23.84 KG/M2

## 2024-08-13 DIAGNOSIS — S81.802D OPEN WOUND OF LEFT LOWER LEG, SUBSEQUENT ENCOUNTER: Primary | ICD-10-CM

## 2024-08-13 PROCEDURE — 87070 CULTURE OTHR SPECIMN AEROBIC: CPT | Performed by: FAMILY MEDICINE

## 2024-08-13 PROCEDURE — G2211 COMPLEX E/M VISIT ADD ON: HCPCS | Performed by: FAMILY MEDICINE

## 2024-08-13 PROCEDURE — 87205 SMEAR GRAM STAIN: CPT | Performed by: FAMILY MEDICINE

## 2024-08-13 PROCEDURE — 87147 CULTURE TYPE IMMUNOLOGIC: CPT | Performed by: FAMILY MEDICINE

## 2024-08-13 PROCEDURE — 99213 OFFICE O/P EST LOW 20 MIN: CPT | Performed by: FAMILY MEDICINE

## 2024-08-13 RX ORDER — DOXYCYCLINE 100 MG/1
100 TABLET ORAL 2 TIMES DAILY
COMMUNITY

## 2024-08-13 NOTE — PROGRESS NOTES
Ambulatory Visit  Name: Sarah Holland      : 1940      MRN: [id number]  Encounter Provider: Corina Oconnor DO  Encounter Date: 2024   Encounter department: [facility]    Assessment & Plan   1. Open wound of left lower leg, subsequent encounter  -     Wound culture and Gram stain  Started on Doxycycline until culture results.  History of Present Illness     Patient is seen for follow up for wound on left lower leg and right forearm.  She was prescribed an antibiotic        Review of Systems   Skin:  Positive for wound.       Objective     /52 (BP Location: Left arm, Patient Position: Sitting, Cuff Size: Standard)   Pulse 72   Temp 98.9 °F (37.2 °C) (Temporal)   Wt 54.4 kg (120 lb)   SpO2 98%   BMI 23.84 kg/m²     Physical Exam  Vitals reviewed.   Constitutional:       Appearance: She is ill-appearing.   HENT:      Head: Normocephalic.   Skin:     Findings: Erythema (left lower extremity - slight  opening, erythema) present.   Neurological:      Mental Status: She is alert.       Administrative Statements

## 2024-08-16 LAB
BACTERIA WND AEROBE CULT: ABNORMAL
GRAM STN SPEC: ABNORMAL

## 2024-09-02 DIAGNOSIS — E03.9 ACQUIRED HYPOTHYROIDISM: ICD-10-CM

## 2024-09-02 NOTE — TELEPHONE ENCOUNTER
Medication Refill Request     Name levothyroxine 25 mcg tablet    Dose/Frequency     TAKE 1 TABLET BY MOUTH EVERY DAY     Quantity 90  Verified pharmacy   [x]  Verified ordering Provider   [x]  Does patient have enough for the next 3 days? Yes [x] No []

## 2024-09-03 RX ORDER — LEVOTHYROXINE SODIUM 25 UG/1
25 TABLET ORAL DAILY
Qty: 90 TABLET | Refills: 0 | Status: SHIPPED | OUTPATIENT
Start: 2024-09-03

## 2024-09-26 ENCOUNTER — TRANSITIONAL CARE MANAGEMENT (OUTPATIENT)
Dept: FAMILY MEDICINE CLINIC | Facility: CLINIC | Age: 84
End: 2024-09-26

## 2024-09-27 ENCOUNTER — TELEPHONE (OUTPATIENT)
Age: 84
End: 2024-09-27

## 2024-09-27 NOTE — TELEPHONE ENCOUNTER
Please call back pt daughter to schedule TCM.    She also wanted to inquire about getting a Physician Cert for a Home Health Aid

## 2024-09-27 NOTE — TELEPHONE ENCOUNTER
I spoke to patient's daughter Samaria. She stated that patient is currently at [facility] for rehab. And not sure when she will be discharged. Advised to call once d/c'd to set up a follow up appointment. Samaria stated that a Physician's Certification form needs to be completed once pt is d/c'd electronically at [email or web address] for home health care.    Form was printed incase you wanted to do it that way and placed on your desk.

## 2024-10-03 NOTE — TELEPHONE ENCOUNTER
Mahi with [employer] called and wanted to know if the Physician's Certification form  completed. Advised that I didn't see anything - Bety will fax it over to the office .

## 2024-10-07 ENCOUNTER — TRANSITIONAL CARE MANAGEMENT (OUTPATIENT)
Dept: FAMILY MEDICINE CLINIC | Facility: CLINIC | Age: 84
End: 2024-10-07

## 2024-10-10 ENCOUNTER — TELEPHONE (OUTPATIENT)
Age: 84
End: 2024-10-10

## 2024-10-10 DIAGNOSIS — J06.9 UPPER RESPIRATORY TRACT INFECTION, UNSPECIFIED TYPE: Primary | ICD-10-CM

## 2024-10-10 DIAGNOSIS — R06.2 WHEEZING: ICD-10-CM

## 2024-10-10 RX ORDER — AZITHROMYCIN 250 MG/1
TABLET, FILM COATED ORAL
Qty: 6 TABLET | Refills: 0 | Status: SHIPPED | OUTPATIENT
Start: 2024-10-10 | End: 2024-10-15

## 2024-10-10 RX ORDER — ALBUTEROL SULFATE 0.83 MG/ML
2.5 SOLUTION RESPIRATORY (INHALATION) EVERY 6 HOURS PRN
Qty: 180 ML | Refills: 0 | Status: SHIPPED | OUTPATIENT
Start: 2024-10-10

## 2024-10-10 NOTE — TELEPHONE ENCOUNTER
Patient's daughter asked fro a z-arden for mother as she left the rehab with cold symptoms. She is rescheduled for Wednesday. Also please call daughter with any appt. Also call daughter if any Rx are ordered. She did have breathing treatments in hospital.

## 2024-10-17 PROBLEM — D61.818 PANCYTOPENIA (HCC): Status: ACTIVE | Noted: 2023-07-26

## 2024-10-17 PROBLEM — D50.9 IRON DEFICIENCY ANEMIA: Status: ACTIVE | Noted: 2024-07-25

## 2024-10-17 PROBLEM — K80.20 CALCULUS OF GALLBLADDER WITHOUT CHOLECYSTITIS WITHOUT OBSTRUCTION: Status: ACTIVE | Noted: 2024-04-05

## 2024-10-17 PROBLEM — R26.2 AMBULATORY DYSFUNCTION: Status: ACTIVE | Noted: 2024-04-28

## 2024-10-17 PROBLEM — R54 FRAILTY: Status: ACTIVE | Noted: 2024-04-05

## 2024-10-17 PROBLEM — F09 COGNITIVE DYSFUNCTION: Status: ACTIVE | Noted: 2024-07-10

## 2024-10-17 PROBLEM — I5A NONISCHEMIC NONTRAUMATIC MYOCARDIAL INJURY: Status: ACTIVE | Noted: 2024-04-05

## 2024-10-17 PROBLEM — K59.01 SLOW TRANSIT CONSTIPATION: Status: ACTIVE | Noted: 2024-01-09

## 2024-10-17 PROBLEM — R05.9 COUGH: Status: ACTIVE | Noted: 2024-10-12

## 2024-10-17 PROBLEM — I50.32 CHRONIC DIASTOLIC HEART FAILURE (HCC): Chronic | Status: ACTIVE | Noted: 2020-12-15

## 2024-10-21 ENCOUNTER — NURSING HOME VISIT (OUTPATIENT)
Dept: GERIATRICS | Facility: OTHER | Age: 84
End: 2024-10-21
Payer: COMMERCIAL

## 2024-10-21 VITALS
DIASTOLIC BLOOD PRESSURE: 53 MMHG | BODY MASS INDEX: 20.17 KG/M2 | RESPIRATION RATE: 18 BRPM | TEMPERATURE: 97.9 F | HEART RATE: 71 BPM | OXYGEN SATURATION: 95 % | WEIGHT: 101.5 LBS | SYSTOLIC BLOOD PRESSURE: 120 MMHG

## 2024-10-21 DIAGNOSIS — N18.4 TYPE 2 DIABETES MELLITUS WITH STAGE 4 CHRONIC KIDNEY DISEASE, WITHOUT LONG-TERM CURRENT USE OF INSULIN (HCC): ICD-10-CM

## 2024-10-21 DIAGNOSIS — I27.20 SEVERE PULMONARY HYPERTENSION (HCC): ICD-10-CM

## 2024-10-21 DIAGNOSIS — E11.22 TYPE 2 DIABETES MELLITUS WITH STAGE 4 CHRONIC KIDNEY DISEASE, WITHOUT LONG-TERM CURRENT USE OF INSULIN (HCC): ICD-10-CM

## 2024-10-21 DIAGNOSIS — E87.6 HYPOKALEMIA: Chronic | ICD-10-CM

## 2024-10-21 DIAGNOSIS — R26.2 AMBULATORY DYSFUNCTION: ICD-10-CM

## 2024-10-21 DIAGNOSIS — I50.32 CHRONIC DIASTOLIC HEART FAILURE (HCC): Primary | Chronic | ICD-10-CM

## 2024-10-21 DIAGNOSIS — K59.01 SLOW TRANSIT CONSTIPATION: ICD-10-CM

## 2024-10-21 DIAGNOSIS — E03.9 ACQUIRED HYPOTHYROIDISM: ICD-10-CM

## 2024-10-21 PROCEDURE — 99310 SBSQ NF CARE HIGH MDM 45: CPT

## 2024-10-21 RX ORDER — LANOLIN ALCOHOL/MO/W.PET/CERES
200 CREAM (GRAM) TOPICAL DAILY
COMMUNITY

## 2024-10-21 RX ORDER — MULTIVITAMIN WITH IRON
250 TABLET ORAL DAILY
COMMUNITY

## 2024-10-21 RX ORDER — LACTULOSE 10 G/15ML
20 SOLUTION ORAL 2 TIMES DAILY
COMMUNITY

## 2024-10-22 NOTE — ASSESSMENT & PLAN NOTE
Recent transition to CBA after hospitalization  Continue PT/OT  Encourage use of assistive device  At baseline resident states she does not use assistive device  Continue fall precautions  Encourage patient to participate with activities  Provide education for patient, family, and caregivers

## 2024-10-22 NOTE — ASSESSMENT & PLAN NOTE
Continue potassium ER 20 mEq po daily  Potassium during recent hospitalization on 10/19 was 3.7  Continue to monitor

## 2024-10-22 NOTE — ASSESSMENT & PLAN NOTE
ECHO showing LVEF of 60%, moderate to severe MR and torrentil tricuspid regurgitation  [facility]  [facility]  Monitor blood pressures closely

## 2024-10-22 NOTE — ASSESSMENT & PLAN NOTE
Wt Readings from Last 3 Encounters:   10/21/24 46 kg (101 lb 8 oz)   08/13/24 54.4 kg (120 lb)   08/06/24 51.5 kg (113 lb 9.6 oz)     Recent hospitalization for CHF exacerbation from 10/11-10/19  Was treated with IV lasix during hospitalization transitioned to torsemide 80 mg po daily  Previously on metolazone, unsure if medication was started  Nephrology note from 03/05/24 recommending metolazone twice a week 2.5 mg prior to taking torsemide  Monitor daily weights  Continue low salt diet, 1800 fluid restriction  No lower extremity edema noted on exam, breath sounds clear, no SOB  Follow up with cardiology

## 2024-10-22 NOTE — PROGRESS NOTES
[address], [address], [place] [address]  [phone]    NAME: Sarah Holland  AGE: 83 y.o. SEX: female    Progress Note    Location: [facility]  POS: 32 ([facility])  Code Status: DNR    Chief complaint / Reason for visit:  Follow-up visit    Assessment/Plan:    Hypokalemia  Continue potassium ER 20 mEq po daily  Potassium during recent hospitalization on 10/19 was 3.7  Continue to monitor    Ambulatory dysfunction  Recent transition to CBA after hospitalization  Continue PT/OT  Encourage use of assistive device  At baseline resident states she does not use assistive device  Continue fall precautions  Encourage patient to participate with activities  Provide education for patient, family, and caregivers      Acquired hypothyroidism  Continue levothyroxine 25 mcg po daily  TSH (09/19/24)  1.05  Continue to monitor q6 months    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 disease, without long-term current use of insulin (HCC)    Lab Results   Component Value Date    HGBA1C 6.2 (H) 09/10/2024     Not currently on diabetic medication  May benefit from SGLT2 for renal protection  Monitor blood pressures  Avoid NSAIDs, no iodinated contrast    Slow transit constipation  Continue bowel regimen  Senna and lactulose  Goal for BM every 1-2 days    Chronic diastolic heart failure (HCC)  Wt Readings from Last 3 Encounters:   10/21/24 46 kg (101 lb 8 oz)   08/13/24 54.4 kg (120 lb)   08/06/24 51.5 kg (113 lb 9.6 oz)     Recent hospitalization for CHF exacerbation from 10/11-10/19  Was treated with IV lasix during hospitalization transitioned to torsemide 80 mg po daily  Previously on metolazone, unsure if medication was started  Nephrology note from 03/05/24 recommending metolazone twice a week 2.5 mg prior to taking torsemide  Monitor daily weights  Continue low salt diet, 1800 fluid restriction  No lower extremity edema noted on exam, breath sounds clear, no SOB  Follow up with  cardiology           Severe pulmonary hypertension (HCC)  ECHO showing LVEF of 60%, moderate to severe MR and torrentil tricuspid regurgitation  Follows with [facility]  Planned procedure with [facility] on 11/1 for tricuspid valve replacement  Monitor blood pressures closely    This is an 83 y.o. female seen today at [facility].  Medical history includes, not limited to, CHF, type 2 DM, CKD stage 4, anemia, hypothyroidism, peripheral vascular disease, vitamin deficiency, and hypertension.    Patient with recent hospitalization from 10/11-10/19.  She presented to the ED with shortness of breath and fatigue.  She was treated for an acute exacerbation of CHF.  Inpatient she was given IV lasix, at discharge she was transitioned to torsemide. She was discharged to [facility] to continue therapy.     Upon entering the room, she is resting comfortably in bed.  She is alert and oriented x 4.  She denies shortness of breath, remains on RA, cough heard on exam.  Encouraged her to continue taking her prescribed cough medicine.  States at baseline she was ambulating with assistive devices.  Denies difficulty eating.  Is continent of both bowel and bladder.  Denies difficulty sleeping.  Wears glasses.  She lives at home with her son.      Reviewed discharge paperwork from [facility].  Has follow up appointments and scheduled procedures with [facility].      Total time spent reviewing chart, meeting with the patient, reviewing with nursing staff > 45 minutes.           Nursing and prior provider notes reviewed on this visit. Discussed visit with PCP and nursing staff/ supervisor.      Review of Systems   Constitutional:  Positive for activity change. Negative for appetite change, fatigue, fever and unexpected weight change.   HENT:  Negative for congestion.    Eyes:  Negative for pain, discharge and visual disturbance.   Respiratory:  Negative for cough and shortness of breath.    Cardiovascular:  Negative for chest pain and  palpitations.   Gastrointestinal:  Negative for abdominal pain, constipation and diarrhea.   Genitourinary:  Negative for difficulty urinating, dysuria, hematuria and urgency.   Musculoskeletal:  Positive for gait problem. Negative for arthralgias.   Skin:  Negative for color change.   Neurological:  Negative for syncope and weakness.   Psychiatric/Behavioral:  Negative for confusion and sleep disturbance.    All other systems reviewed and are negative.         ALLERGY: Reviewed, unchanged  Allergies   Allergen Reactions    Penicillins Anaphylaxis    Shellfish Allergy - Food Allergy Anaphylaxis and Hives       HISTORY:  Medical Hx: Reviewed, unchanged  Family Hx: Reviewed, unchanged  Soc Hx: Reviewed,  unchanged      Physical Exam  Vitals reviewed.   Constitutional:       General: She is not in acute distress.     Appearance: Normal appearance. She is not ill-appearing.   HENT:      Head: Normocephalic.      Right Ear: Tympanic membrane normal.      Left Ear: Tympanic membrane normal.      Nose: Nose normal. No congestion.      Mouth/Throat:      Mouth: Mucous membranes are moist.      Pharynx: Oropharynx is clear.   Eyes:      General:         Right eye: No discharge.         Left eye: No discharge.      Extraocular Movements: Extraocular movements intact.      Conjunctiva/sclera: Conjunctivae normal.      Pupils: Pupils are equal, round, and reactive to light.   Cardiovascular:      Rate and Rhythm: Normal rate and regular rhythm.      Pulses: Normal pulses.      Comments: Left chest wall pacer  Pulmonary:      Effort: Pulmonary effort is normal. No respiratory distress.      Breath sounds: Normal breath sounds.   Chest:      Chest wall: No tenderness.   Abdominal:      General: Bowel sounds are normal.      Palpations: Abdomen is soft.      Tenderness: There is no abdominal tenderness.   Musculoskeletal:         General: No swelling. Normal range of motion.      Cervical back: Normal range of motion.      Right  "lower leg: No edema.      Left lower leg: No edema.   Skin:     General: Skin is warm and dry.   Neurological:      Mental Status: She is alert and oriented to person, place, and time. Mental status is at baseline.      Motor: No weakness.   Psychiatric:         Mood and Affect: Mood normal.         Behavior: Behavior normal.         Thought Content: Thought content normal.            Laboratory results / Imaging reviewed: Hard copy/ies in medical chart:    Vitals:    10/21/24 2248   BP: 120/53   Pulse: 71   Resp: 18   Temp: 97.9 °F (36.6 °C)   SpO2: 95%       Current Medications:  All medications reviewed and updated in Nursing Home Chart    Please note: This note was completed in part utilizing a voice-recognition software may have been used in the preparation of this document. Grammatical errors, random word insertion, spelling mistakes, and incomplete sentences may be an occasional consequence of the system secondary to software limitations, ambient noise and hardware issues. Occasional wrong word or \"sound-alike\" substitutions may have occurred due to the inherent limitations of voice recognition software. At the time of dictation, efforts were made to edit, clarify and/or correct errors. Interpretation should be guided by context. Please read the chart carefully and recognize, using context, where substitutions have occurred. If you have any questions or concerns about the context, text or information contained within the body of this dictation, please contact myself, the provider, for further clarification.      TONY Ascencio  10/22/2024  "

## 2024-10-22 NOTE — ASSESSMENT & PLAN NOTE
Lab Results   Component Value Date    HGBA1C 6.2 (H) 09/10/2024     Not currently on diabetic medication  May benefit from SGLT2 for renal protection  Monitor blood pressures  Avoid NSAIDs, no iodinated contrast

## 2024-10-23 ENCOUNTER — NURSING HOME VISIT (OUTPATIENT)
Dept: GERIATRICS | Facility: OTHER | Age: 84
End: 2024-10-23
Payer: COMMERCIAL

## 2024-10-23 DIAGNOSIS — I10 ESSENTIAL HYPERTENSION: ICD-10-CM

## 2024-10-23 DIAGNOSIS — N18.4 TYPE 2 DIABETES MELLITUS WITH STAGE 4 CHRONIC KIDNEY DISEASE, WITHOUT LONG-TERM CURRENT USE OF INSULIN (HCC): ICD-10-CM

## 2024-10-23 DIAGNOSIS — E87.6 HYPOKALEMIA: Chronic | ICD-10-CM

## 2024-10-23 DIAGNOSIS — I49.5 SICK SINUS SYNDROME (HCC): ICD-10-CM

## 2024-10-23 DIAGNOSIS — E78.2 MIXED HYPERLIPIDEMIA: ICD-10-CM

## 2024-10-23 DIAGNOSIS — I36.1 NONRHEUMATIC TRICUSPID VALVE REGURGITATION: ICD-10-CM

## 2024-10-23 DIAGNOSIS — D63.1 ANEMIA IN STAGE 4 CHRONIC KIDNEY DISEASE  (HCC): ICD-10-CM

## 2024-10-23 DIAGNOSIS — N18.4 STAGE 4 CHRONIC KIDNEY DISEASE (HCC): Chronic | ICD-10-CM

## 2024-10-23 DIAGNOSIS — I50.33 ACUTE ON CHRONIC HEART FAILURE WITH PRESERVED EJECTION FRACTION (HFPEF) (HCC): Primary | ICD-10-CM

## 2024-10-23 DIAGNOSIS — E11.22 TYPE 2 DIABETES MELLITUS WITH STAGE 4 CHRONIC KIDNEY DISEASE, WITHOUT LONG-TERM CURRENT USE OF INSULIN (HCC): ICD-10-CM

## 2024-10-23 DIAGNOSIS — Z71.89 ADVANCE CARE PLANNING: ICD-10-CM

## 2024-10-23 DIAGNOSIS — N18.4 ANEMIA IN STAGE 4 CHRONIC KIDNEY DISEASE  (HCC): ICD-10-CM

## 2024-10-23 DIAGNOSIS — E03.9 ACQUIRED HYPOTHYROIDISM: ICD-10-CM

## 2024-10-23 DIAGNOSIS — R53.81 PHYSICAL DECONDITIONING: ICD-10-CM

## 2024-10-23 DIAGNOSIS — R41.89 COGNITIVE IMPAIRMENT: ICD-10-CM

## 2024-10-23 DIAGNOSIS — I48.0 PAROXYSMAL ATRIAL FIBRILLATION (HCC): ICD-10-CM

## 2024-10-23 DIAGNOSIS — Z91.89 AT RISK FOR CONSTIPATION: ICD-10-CM

## 2024-10-23 DIAGNOSIS — R05.1 ACUTE COUGH: ICD-10-CM

## 2024-10-23 DIAGNOSIS — Z91.89 AT RISK FOR DELIRIUM: ICD-10-CM

## 2024-10-23 DIAGNOSIS — R54 FRAILTY SYNDROME IN GERIATRIC PATIENT: ICD-10-CM

## 2024-10-23 PROCEDURE — 99306 1ST NF CARE HIGH MDM 50: CPT | Performed by: STUDENT IN AN ORGANIZED HEALTH CARE EDUCATION/TRAINING PROGRAM

## 2024-10-24 PROBLEM — Z71.89 ADVANCE CARE PLANNING: Status: ACTIVE | Noted: 2024-10-24

## 2024-10-24 PROBLEM — Z91.89 AT RISK FOR DELIRIUM: Status: ACTIVE | Noted: 2024-10-24

## 2024-10-24 PROBLEM — Z91.89 AT RISK FOR CONSTIPATION: Status: ACTIVE | Noted: 2024-10-24

## 2024-10-24 PROBLEM — R41.89 COGNITIVE IMPAIRMENT: Status: ACTIVE | Noted: 2024-07-10

## 2024-10-24 PROBLEM — R53.81 PHYSICAL DECONDITIONING: Status: ACTIVE | Noted: 2024-10-24

## 2024-10-24 NOTE — ASSESSMENT & PLAN NOTE
Patient has some noted/documented hx of cognitive impairment though no clear diagnosis of dementia  On exam is Aox3 and seems to be a fair historian though with some forgetfulness evident  Given baseline functional status (family helps with/manages her medications and finances) this is likely consistent with at least a mild dementia, though would hold off from making formal diagnosis while patient is not presently at baseline in setting of recent hospitalization and acute illness  She does appear able to intelligently discuss her medical conditions and make related decisions at this time  Supportive care  Monitor mentation  Delirium precautions  Follow up with PCP, consider outpatient cognitive testing as appropriate

## 2024-10-24 NOTE — PROGRESS NOTES
[facility] Care  Facility: [facility]    HISTORY AND PHYSICAL  Nursing Home Place of Service: nursing home place of service: POS 31 Skilled Care-Part A Coverage    NAME: Sarah Holland  : 1940 AGE: 83 y.o. SEX: female MRN: [id number]  DATE OF ENCOUNTER: 10/23/2024    Records Reviewed include: Hospital records    Chief Complaint/ Reason for Admission:   Admission to [facility] after hospitalization for CHF exacerbation    History of Present Illness:     Sarah Holland is a 83 y.o. female with PMH including Afib, DM2, CKD4, SSS (s/p pacemaker), osteoporosis, HTN, HLD, chronic diastolic CHF, severe TR, hypothyroidism, PVD  For details of hospitalization, see hospital records including discharge documentation  Patient was hospitalized at [facility] from 10/11 to 10/19/24  Briefly, patient hospitalized with fatigue/SOB, diagnosed with acute on chronic CHF exacerbation, evaluated by Cardiology/Heart Failure team, aggressively diuresed; ultimately deemed stable for discharge to rehab; has a planned cardiac surgery with [facility] scheduled on 24 which she is aware of and amenable to.    Patient observed to self propel in hallway in wheelchair using legs primarily    Patient seen and examined in room  Others present: none  Patient seated in WC  Appears comfortable, awake, alert, oriented to situation, able to converse appropriately  Patient polite, appears in good spirits, Aox3, appears to be a fair historian perhaps a bit forgetful of some details including details of her recent hospitalization, though aware of details regarding her upcoming cardiac surgery. She notes she feels well today and has no acute concerns.  No acute pain anywhere  Breathing fine, on room air, no acute SOB, no orthopnea. Notes she has baseline chronic MARKHAM which is stable and not acutely worsened recently  No present CP/palpitations; does endorse intermittent orthostatic lightheadedness since several months, she thinks it has  been stable  Appetite stable/good, no acute swallowing concerns  Urinating well without acute symptoms  Last BM yesterday easy/normal. No abd pain/N/V  Does not feel acutely sick or confused  No acute abdominal, or urinary symptoms; see ROS for more details.  Patient expresses her goal is to return home after her cardiac surgery next week.    No further questions or acute concerns identified.  No acute concerns per NP or nursing. Discharge planning pending her rehab course and upcoming cardiac surgery, as yet unclear if she will be returning to this facility after surgery, facility team will discus with with family and continue planning.      Lab Review:  10/19/24: BMP with Cr 1.29 (improving, baseline around 1.4-2.0), GFR 41 (baseline 20s); CBC with Hb 10.4 (improving, normocytic)      Lab Results   Component Value Date    HGBA1C 6.2 (H) 09/10/2024     Lab Results   Component Value Date    ACL4YPTLURNU 4.199 01/12/2024    TSH 1.05 09/19/2024     Lab Results   Component Value Date    TGCXWESG86 604 09/19/2024     Lab Results   Component Value Date    IRON 33 (L) 10/16/2024    TIBC 393 10/16/2024    FERRITIN 45.0 10/16/2024     Lab Results   Component Value Date    CHOLESTEROL 170 10/11/2021     Lab Results   Component Value Date     10/11/2021     Lab Results   Component Value Date    TRIG 64 10/11/2021     Lab Results   Component Value Date    NONHDLC 47 10/11/2021     Lab Results   Component Value Date    LDLCALC 34 10/11/2021           XR chest portable  Result Date: 10/15/2024  Impression: Enlarged cardiac silhouette. Mild interstitial edema. Trace bilateral pleural effusions. Prominence of the main pulmonary artery may represent pulmonary hypertension.     XR chest pa and lateral  Result Date: 10/11/2024  Impression: Enlarged cardiac silhouette.      Echo 9/20/24: EF 55-60, Indeterminate diastolic function due to pacemaker, at least moderate MR, severe TR        [place] PDMP reviewed 10/23/2024  No patient  matching the search criteria submitted was identified       Social: Patient lives in a 2 story home with her son; daughter lives very close. Family assists with many tasks including routine care, medications, finances, meal prep. At baseline she uses a walker and denies recent falls. No longer drives recently.    Lives: Home,  Social Support: family  Fall in the past 12 months: denies  Use of assistance Device: Walker    Allergies    Allergies   Allergen Reactions    Penicillins Anaphylaxis    Shellfish Allergy - Food Allergy Anaphylaxis and Hives       Past Medical History  Past Medical History:   Diagnosis Date    Allergic rhinitis     last assessed - 05Jan2013    Arthritis     lower back    Atrial flutter (HCC)     last assessed - 16Dec2013    Diabetes mellitus (HCC)     Type II    Fatigue     last assessed - 01Oct2013    Heart murmur     tricupid reguritation, SSS    Hyperlipidemia     Hypertension     Insomnia     Irregular heart beat     Cardioversion, pacemaker, severe tricuspid regurgitation, atrial septal defect.    Lower leg edema     last assessed - 09Nov2015    Macular degeneration     b/l    Osteoporosis     Osteoporosis     Shortness of breath     prior to heart surgery    Sick sinus syndrome (HCC)     last assessed - 05Apr2017    Sting from hornet, wasp, or bee     last assessed - 20Aug2013    Subconjunctival hemorrhage     unspecified laterality; last assessed - 09Jan2014        Past Surgical History:   Procedure Laterality Date    ASD REPAIR, SECUNDUM      BREAST BIOPSY Right     benign    CARDIAC PACEMAKER PLACEMENT      CARDIAC SURGERY      Atrial septal defect repaired, pacemaker    COLONOSCOPY N/A 04/21/2016    Procedure: COLONOSCOPY;  Surgeon: London Olivares MD;  Location: [facility];  Service:     COLONOSCOPY W/ BIOPSIES AND POLYPECTOMY      ESOPHAGOGASTRODUODENOSCOPY      EYE SURGERY      lid lift    HYSTERECTOMY      at age 37 or 38    MAMMO STEREOTACTIC BREAST BIOPSY RIGHT (ALL INC) Right  07/14/2021    OOPHORECTOMY Left     FL COLONOSCOPY FLX DX W/COLLJ SPEC WHEN PFRMD N/A 05/10/2019    Procedure: COLONOSCOPY with polypectomy;  Surgeon: Lodnon Olivares MD;  Location: [facility];  Service: Gastroenterology    TOTAL ABDOMINAL HYSTERECTOMY      VEIN SURGERY Left     vericose vein left leg    WISDOM TOOTH EXTRACTION         Family History  Family History   Problem Relation Age of Onset    No Known Problems Mother     Gout Brother     No Known Problems Father     No Known Problems Sister     No Known Problems Daughter     No Known Problems Maternal Grandmother     No Known Problems Maternal Grandfather     No Known Problems Paternal Grandmother     No Known Problems Paternal Grandfather     Alcohol abuse Neg Hx     Substance Abuse Neg Hx        Social History  Social History     Tobacco Use   Smoking Status Never    Passive exposure: Never   Smokeless Tobacco Never      Social History     Substance and Sexual Activity   Alcohol Use Not Currently    Comment: occas none recently; social drinker - per [employer]      Social History     Substance and Sexual Activity   Drug Use No            Physical Exam    Vital Signs  There were no vitals filed for this visit.  BP: 107/56 mmHg   Temp:97.9 °F   Pulse:73 bpm   Weight:101.3 Lbs     Resp:16 Breaths/min   BS:106 mg/dL   O2:97 %   Pain:0           Physical Exam  Vitals reviewed.   Constitutional:       General: She is not in acute distress.     Appearance: She is not toxic-appearing or diaphoretic.   HENT:      Head: Normocephalic and atraumatic.      Right Ear: External ear normal.      Left Ear: External ear normal.      Nose: Nose normal. No rhinorrhea.      Mouth/Throat:      Mouth: Mucous membranes are moist.      Pharynx: Oropharynx is clear. No posterior oropharyngeal erythema.   Eyes:      General: No scleral icterus.        Right eye: No discharge.         Left eye: No discharge.      Extraocular Movements: Extraocular movements intact.       Conjunctiva/sclera: Conjunctivae normal.      Pupils: Pupils are equal, round, and reactive to light.   Cardiovascular:      Rate and Rhythm: Normal rate and regular rhythm.   Pulmonary:      Effort: Pulmonary effort is normal. No respiratory distress.      Breath sounds: Normal breath sounds. No wheezing or rales.   Abdominal:      General: Bowel sounds are normal. There is no distension.      Palpations: Abdomen is soft.      Tenderness: There is no abdominal tenderness. There is no guarding.   Musculoskeletal:      Cervical back: No rigidity.      Comments: No notable peripheral edema   Skin:     General: Skin is warm and dry.      Coloration: Skin is not jaundiced.   Neurological:      General: No focal deficit present.      Mental Status: She is alert and oriented to person, place, and time. Mental status is at baseline.      Comments: Aox2 (to self, location, month, year)   Psychiatric:         Mood and Affect: Mood normal.         Behavior: Behavior normal.         Thought Content: Thought content normal.         Review of Systems:  Review of Systems   Constitutional:  Negative for appetite change, chills, diaphoresis and fever.   HENT:  Negative for drooling, ear pain, hearing loss, rhinorrhea, sore throat and trouble swallowing.    Eyes:  Negative for pain, discharge, redness, itching and visual disturbance.   Respiratory:  Negative for cough, chest tightness, shortness of breath (chronic stable MARKHAM) and wheezing.    Cardiovascular:  Negative for chest pain and palpitations.   Gastrointestinal:  Negative for abdominal pain, blood in stool, constipation, diarrhea, nausea and vomiting.   Genitourinary:  Negative for difficulty urinating, dysuria and hematuria.   Musculoskeletal:  Positive for gait problem. Negative for arthralgias, back pain and neck pain.   Skin:  Negative for color change.   Neurological:  Positive for weakness. Negative for facial asymmetry, speech difficulty, light-headedness (none  presently but notes chronic stable orthostatic lightheadedness since at least several months) and headaches.   Psychiatric/Behavioral:  Negative for agitation, behavioral problems and confusion. The patient is not nervous/anxious and is not hyperactive.    All other systems reviewed and are negative.      List of Current Medications:  Current Outpatient Medications   Medication Instructions    albuterol 2.5 mg, Nebulization, Every 6 hours PRN    apixaban (ELIQUIS) 2.5 mg, Oral, 2 times daily    b complex-C-folic acid (NEPHRO-RASHEL) 0.8 mg tablet Oral, Daily with dinner    Calcium Citrate 250 MG TABS 1 tablet, Oral, 2 times daily    camphor-menthol (SARNA) lotion 1 Application, Topical, As needed    Cholecalciferol (VITAMIN D-3 PO) 1,000 Units, Oral, Daily    clindamycin (CLEOCIN) 150 mg capsule TAKE 2 CAPSULES BY MOUTH 1 HOUR BEFORE DENTAL VISIT    glucose blood test strip In Vitro, 4 times daily    glucose monitoring kit (FREESTYLE) monitoring kit E11.9 Patient is type 2 diabetic. Test once daily    lactulose 20 g, Oral, 2 times daily    Lancets (FREESTYLE) lancets Does not apply, Daily    levothyroxine 25 mcg, Oral, Daily    Magnesium 250 mg, Oral, Daily    Multiple Vitamins-Minerals (CENTRUM SILVER PO) 1 tablet, Oral, Daily    Potassium Chloride ER 20 MEQ TBCR 1 tablet, Oral, Daily    senna (SENOKOT) 8.6 mg, Oral, Daily    thiamine 200 mg, Oral, Daily    torsemide (DEMADEX) 80 mg, Oral, Daily         Medication reviewed. All orders signed. Complete list is in the paper chart.     Allergies    Allergies   Allergen Reactions    Penicillins Anaphylaxis    Shellfish Allergy - Food Allergy Anaphylaxis and Hives       Labs/Diagnostics (reviewed by this provider):     I personally reviewed lab results and imaging studies. Full reports are in the paper chart.     Assessment/Plan:    Acute on chronic heart failure with preserved ejection fraction (HFpEF) ([facility])  Wt Readings from Last 3 Encounters:   10/21/24 46 kg (101 lb 8  "oz)   08/13/24 54.4 kg (120 lb)   08/06/24 51.5 kg (113 lb 9.6 oz)     Hospitalized with acute on chronic CHF  Evaluated by Cardiology inpatient and underwent aggressive diuresis  Monitor weight- stable/slight downtrend, no alarming uptrend, limited data so far  Monitor volume status clinically - minimal peripheral edema (improved per patient), no orthopnea  Encourage elevation, compression of lower extremities as able  Continue torsemide 80mg daily, adjust as appropriate  As per inpatient Heart Failure team: \"BB not indicated in HFpEF/RV dyfunction. Not on ACE/ARB/ARNI/MRA or SGLT2 inhibitor given renal function. \"  Follow up with PCP, Cardiology as appropriate            Essential hypertension  Noted hx of HTN  Monitor BP - generally 100s-110s systolic recenetly, overall stable and borderline low  Avoid hypotension  No acute cardiac complaints  Continue torsemide as above  Follow up with PCP, Cardiology as appropriate      Physical deconditioning  In setting of CHF, sedentary baseline, recent hospitalization  -PT/OT  -fall precautions  -encourage appropriate DME use  -SW to follow for safe discharge planning/homecare services as appropriate      Hypokalemia  Likely related to diuretic  Monitor on routine labs - seems stable  Continue potassium supplement, adjust/replete as appropriate    Anemia in chronic kidney disease  Lab Results   Component Value Date    EGFR 41 (L) 10/19/2024    EGFR 39 (L) 10/18/2024    EGFR 38 (L) 10/17/2024    CREATININE 1.29 (H) 10/19/2024    CREATININE 1.35 (H) 10/18/2024    CREATININE 1.37 (H) 10/17/2024     -monitor on routine labs- seems improving/stable  -recent iron level low- consider adding supplement if anemia worsens, though otherwise TIBC not elevated so anemia less likely related to the low iron  -monitor for acute bleed - no present signs  -consider further workup, Heme consult if persistent/worsening  -transfuse PRN Hb <7      Sick sinus syndrome (HCC)  Has pacemaker  Follow " up with Cardiology as appropriate    Acquired hypothyroidism  Continue levothyroxine 25 mcg po daily  TSH (09/19/24)  1.05  Continue to monitor routinely    Stage 4 chronic kidney disease ([facility])  Lab Results   Component Value Date    EGFR 41 (L) 10/19/2024    EGFR 39 (L) 10/18/2024    EGFR 38 (L) 10/17/2024    CREATININE 1.29 (H) 10/19/2024    CREATININE 1.35 (H) 10/18/2024    CREATININE 1.37 (H) 10/17/2024       Monitor renal function on routine labs- seems stable  She had been on dialysis temporarily in 2023 per records during a period of acute renal injury/worsening, not appearing to require dialysis presently  Avoid nephrotoxins, NSAIDs as able  Encourage PO hydration, respecting volume status  Follow up with PCP, Nephrology as appropriate      Cognitive impairment  Patient has some noted/documented hx of cognitive impairment though no clear diagnosis of dementia  On exam is Aox3 and seems to be a fair historian though with some forgetfulness evident  Given baseline functional status (family helps with/manages her medications and finances) this is likely consistent with at least a mild dementia, though would hold off from making formal diagnosis while patient is not presently at baseline in setting of recent hospitalization and acute illness  She does appear able to intelligently discuss her medical conditions and make related decisions at this time  Supportive care  Monitor mentation  Delirium precautions  Follow up with PCP, consider outpatient cognitive testing as appropriate    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 disease, without long-term current use of insulin ([facility])    Lab Results   Component Value Date    HGBA1C 6.2 (H) 09/10/2024     A1c well controlled for age  Monitor glucose on routine labs, no indications for daily/aggressive checks  Avoid hypoglycemia  Not presently on diabetic medications. Defer to outpatient care team. Limited options in setting of advanced renal disease.  Follow  "up with PCP, Endocrine/Ophthalmology/Podiatry outpatient as appropriate      Advance care planning  HCP and code status discussion as per note      Nonrheumatic tricuspid valve regurgitation  Severe TR  Seems to be symptomatic with orthostasis and MARKHAM since a long time  has a planned associated cardiac surgery with [facility] scheduled on 11/1/24 which she is aware of and amenable to  (As per inpatient documentation \"Evoque TV valve evaluation being completed at [place]. Dr. Ibarra spoke with Dr. Sancho Ferguson who has confirmed pt is scheduled for pre-procedure RHC for optimization on 10/29 and then subsequent Evoque valve on 11/1.\")    Paroxysmal atrial fibrillation (HCC)  Continue Eliquis for AC  No on beta blocker as per Cardiology. HR seems controlled.  Follow up with PCP, Cardiology    At risk for constipation  At risk due to hospitalization, relative immobility, comorbidities  Monitor stool output  Bowel regimen at facility as per protocol; adjust as appropriate; consider bisacodyl suppository PRN if no BM in 2-3 days  Encourage mobility as tolerated, PO hydration as appropriate, high fiber diet/prune juice (in outpatient setting as appropriate)  Goal is for 1 easy BM every 1-2 days      At risk for delirium  Delirium precautions  -Patient is high risk of delirium due to age, comorbidities, hospitalization/unfamiliar environment  -delirium precautions  -maintain normal sleep/wake cycle  -minimize overnight interruptions, group overnight vitals/labs/nursing checks as possible  -dim lights, close blinds and turn off tv to minimize stimulation and encourage sleep environment in evenings  -ensure that pain is well controlled  -monitor for fecal and urinary retention which may precipitate delirium  -encourage early mobilization and ambulation  -provide frequent reorientation and redirection  -encourage family and friends at the bedside to help help calm patient if anxious  -avoid medications which may precipitate or " worsen delirium such as tramadol, benzodiazepine, anticholinergics, and benadryl  -encourage hydration and nutrition   -redirect unwanted behaviors as first line, avoid physical restraints, use chemical restraint only if all other attempts have been unsuccessful      Hyperlipidemia  Noted history  Not presently on statin (historically was on simvastatin appears stopped in 2023)  Encourage lifestyle modifications including healthy diet, weight management, exercise as appropriate  Follow up with PCP, Cardiology    Frailty syndrome in geriatric patient  -Clinical frailty scale stage 6-7 moderately frail  -Multifactorial including age, CHF, and additional chronic medical comorbidities, superimposed on elderly individual with limited physiologic and metabolic reserve and evidence of malnutrition  -continue healthy style choices and well balanced diet and nutrition intake   -Continue optimization of chronic medical conditions and address acute metabolic derangements as arise  -follow up with PCP      Cough  Noted to have cough recently inpatient with unremarkable viral panel workup and was thought to be related to cardiac etiology with acute on chronic CHF and volume overload  Improved with management of CHF  Monitor clinically - no acute cough noted on exam presently       Pain: none  Rehab Potential:Fair  Patient Informed of Medical Condition: yes   Patient is Capable of Understanding Their Right: yes   Discharge Plan: possibly home, pending rehab course and upcoming cardiac surgery  Vaccination:   Immunization History   Administered Date(s) Administered    COVID-19 MODERNA VACC 0.25 ML IM BOOSTER 04/18/2022    COVID-19 MODERNA VACC 0.5 ML IM 01/28/2021, 02/25/2021, 11/03/2021    COVID-19 Moderna Vac BIVALENT 12 Yr+ IM 0.5 ML 10/20/2022    DT (pediatric) 11/09/2015    INFLUENZA 11/09/2015, 11/29/2016, 12/06/2017    Influenza Split High Dose Preservative Free IM 09/25/2014, 11/09/2015, 11/29/2016, 12/06/2017, 09/17/2019  "   Influenza, high dose seasonal 0.7 mL 11/27/2018, 09/15/2020, 11/22/2021, 10/20/2022, 09/21/2023    Influenza, seasonal, injectable 11/27/2012, 10/01/2013    Pneumococcal Conjugate 13-Valent 07/08/2015    Pneumococcal Polysaccharide PPV23 01/01/2004, 07/31/2019    TD (adult) Preservative Free 03/07/2017    Td (adult), adsorbed 11/09/2015    Tdap 03/07/2017    Zoster 08/02/2012       DVT ppx: eliquis    Advanced Directives: patient verbalizes primary HCP as daughter Samaria and alternate as son Celso  Code status:DNR (Per discussion with resident or POA) Extensive discussion/education with patient today regarding their wishes with respect to resuscitative measures; discussed as appropriate potential risks vs benefits of resuscitative measures; patient verbalizes understanding, appears to have capacity to make this decision presently, and clearly verbalizes a desire for DNR, citing her age and that she has \"suffered a long time\" with her heart conditions, consistent with prior documentation in paper chart from arrival to facility  PCP: Elvin      -Total time spent on this encounter today including documentation and workup review, face to face time, history and exam, and documentation/orders was approximately 70 minutes.  -This note will be copied to [facility] EMR where vitals and medication orders are placed.    Guanakito Perry D.O.  Geriatric Medicine  10/24/2024 2:28 PM    "

## 2024-10-24 NOTE — ASSESSMENT & PLAN NOTE
Lab Results   Component Value Date    EGFR 41 (L) 10/19/2024    EGFR 39 (L) 10/18/2024    EGFR 38 (L) 10/17/2024    CREATININE 1.29 (H) 10/19/2024    CREATININE 1.35 (H) 10/18/2024    CREATININE 1.37 (H) 10/17/2024     -monitor on routine labs- seems improving/stable  -recent iron level low- consider adding supplement if anemia worsens, though otherwise TIBC not elevated so anemia less likely related to the low iron  -monitor for acute bleed - no present signs  -consider further workup, Heme consult if persistent/worsening  -transfuse PRN Hb <7

## 2024-10-24 NOTE — ASSESSMENT & PLAN NOTE
"Wt Readings from Last 3 Encounters:   10/21/24 46 kg (101 lb 8 oz)   08/13/24 54.4 kg (120 lb)   08/06/24 51.5 kg (113 lb 9.6 oz)     Hospitalized with acute on chronic CHF  Evaluated by Cardiology inpatient and underwent aggressive diuresis  Monitor weight- stable/slight downtrend, no alarming uptrend, limited data so far  Monitor volume status clinically - minimal peripheral edema (improved per patient), no orthopnea  Encourage elevation, compression of lower extremities as able  Continue torsemide 80mg daily, adjust as appropriate  As per inpatient Heart Failure team: \"BB not indicated in HFpEF/RV dyfunction. Not on ACE/ARB/ARNI/MRA or SGLT2 inhibitor given renal function. \"  Follow up with PCP, Cardiology as appropriate          "

## 2024-10-24 NOTE — ASSESSMENT & PLAN NOTE
In setting of CHF, sedentary baseline, recent hospitalization  -PT/OT  -fall precautions  -encourage appropriate DME use  -SW to follow for safe discharge planning/homecare services as appropriate

## 2024-10-24 NOTE — ASSESSMENT & PLAN NOTE
"Severe TR  Seems to be symptomatic with orthostasis and MARKHAM since a long time  has a planned associated cardiac surgery with [facility] scheduled on 11/1/24 which she is aware of and amenable to  (As per inpatient documentation \"Evoque TV valve evaluation being completed at [place]. Dr. Ibarra spoke with Dr. Sancho Ferguson who has confirmed pt is scheduled for pre-procedure RHC for optimization on 10/29 and then subsequent Evoque valve on 11/1.\")  "

## 2024-10-24 NOTE — ASSESSMENT & PLAN NOTE
Noted to have cough recently inpatient with unremarkable viral panel workup and was thought to be related to cardiac etiology with acute on chronic CHF and volume overload  Improved with management of CHF  Monitor clinically - no acute cough noted on exam presently

## 2024-10-24 NOTE — ASSESSMENT & PLAN NOTE
Lab Results   Component Value Date    HGBA1C 6.2 (H) 09/10/2024     A1c well controlled for age  Monitor glucose on routine labs, no indications for daily/aggressive checks  Avoid hypoglycemia  Not presently on diabetic medications. Defer to outpatient care team. Limited options in setting of advanced renal disease.  Follow up with PCP, Endocrine/Ophthalmology/Podiatry outpatient as appropriate

## 2024-10-24 NOTE — ASSESSMENT & PLAN NOTE
Likely related to diuretic  Monitor on routine labs - seems stable  Continue potassium supplement, adjust/replete as appropriate

## 2024-10-24 NOTE — ASSESSMENT & PLAN NOTE
Noted hx of HTN  Monitor BP - generally 100s-110s systolic recenetly, overall stable and borderline low  Avoid hypotension  No acute cardiac complaints  Continue torsemide as above  Follow up with PCP, Cardiology as appropriate

## 2024-10-24 NOTE — ASSESSMENT & PLAN NOTE
Continue Eliquis for AC  No on beta blocker as per Cardiology. HR seems controlled.  Follow up with PCP, Cardiology

## 2024-10-24 NOTE — ASSESSMENT & PLAN NOTE
Noted history  Not presently on statin (historically was on simvastatin appears stopped in 2023)  Encourage lifestyle modifications including healthy diet, weight management, exercise as appropriate  Follow up with PCP, Cardiology

## 2024-10-24 NOTE — ASSESSMENT & PLAN NOTE
At risk due to hospitalization, relative immobility, comorbidities  Monitor stool output  Bowel regimen at facility as per protocol; adjust as appropriate; consider bisacodyl suppository PRN if no BM in 2-3 days  Encourage mobility as tolerated, PO hydration as appropriate, high fiber diet/prune juice (in outpatient setting as appropriate)  Goal is for 1 easy BM every 1-2 days

## 2024-10-24 NOTE — ASSESSMENT & PLAN NOTE
Lab Results   Component Value Date    EGFR 41 (L) 10/19/2024    EGFR 39 (L) 10/18/2024    EGFR 38 (L) 10/17/2024    CREATININE 1.29 (H) 10/19/2024    CREATININE 1.35 (H) 10/18/2024    CREATININE 1.37 (H) 10/17/2024       Monitor renal function on routine labs- seems stable  She had been on dialysis temporarily in 2023 per records during a period of acute renal injury/worsening, not appearing to require dialysis presently  Avoid nephrotoxins, NSAIDs as able  Encourage PO hydration, respecting volume status  Follow up with PCP, Nephrology as appropriate

## 2024-10-24 NOTE — ASSESSMENT & PLAN NOTE
-Clinical frailty scale stage 6-7 moderately frail  -Multifactorial including age, CHF, and additional chronic medical comorbidities, superimposed on elderly individual with limited physiologic and metabolic reserve and evidence of malnutrition  -continue healthy style choices and well balanced diet and nutrition intake   -Continue optimization of chronic medical conditions and address acute metabolic derangements as arise  -follow up with PCP

## 2024-10-29 ENCOUNTER — NURSING HOME VISIT (OUTPATIENT)
Dept: GERIATRICS | Facility: OTHER | Age: 84
End: 2024-10-29
Payer: COMMERCIAL

## 2024-10-29 VITALS
TEMPERATURE: 97.9 F | DIASTOLIC BLOOD PRESSURE: 57 MMHG | OXYGEN SATURATION: 96 % | HEART RATE: 73 BPM | SYSTOLIC BLOOD PRESSURE: 93 MMHG | RESPIRATION RATE: 18 BRPM | WEIGHT: 100.7 LBS | BODY MASS INDEX: 20.01 KG/M2

## 2024-10-29 DIAGNOSIS — I27.20 SEVERE PULMONARY HYPERTENSION (HCC): ICD-10-CM

## 2024-10-29 DIAGNOSIS — E03.9 ACQUIRED HYPOTHYROIDISM: ICD-10-CM

## 2024-10-29 DIAGNOSIS — N18.4 STAGE 4 CHRONIC KIDNEY DISEASE (HCC): Chronic | ICD-10-CM

## 2024-10-29 DIAGNOSIS — I48.0 PAROXYSMAL ATRIAL FIBRILLATION (HCC): ICD-10-CM

## 2024-10-29 DIAGNOSIS — E11.22 TYPE 2 DIABETES MELLITUS WITH STAGE 4 CHRONIC KIDNEY DISEASE, WITHOUT LONG-TERM CURRENT USE OF INSULIN (HCC): ICD-10-CM

## 2024-10-29 DIAGNOSIS — I50.32 CHRONIC DIASTOLIC HEART FAILURE (HCC): Chronic | ICD-10-CM

## 2024-10-29 DIAGNOSIS — I36.1 NONRHEUMATIC TRICUSPID VALVE REGURGITATION: ICD-10-CM

## 2024-10-29 DIAGNOSIS — N18.4 TYPE 2 DIABETES MELLITUS WITH STAGE 4 CHRONIC KIDNEY DISEASE, WITHOUT LONG-TERM CURRENT USE OF INSULIN (HCC): ICD-10-CM

## 2024-10-29 DIAGNOSIS — R26.2 AMBULATORY DYSFUNCTION: ICD-10-CM

## 2024-10-29 PROCEDURE — 99316 NF DSCHRG MGMT 30 MIN+: CPT

## 2024-10-29 NOTE — ASSESSMENT & PLAN NOTE
ECHO showing LVEF of 60%, moderate to severe MR and torrentil tricuspid regurgitation  Follows with [facility]  Cardiac cath with [facility] on 10/30  Planned procedure with [facility] on 11/1 for tricuspid valve replacement

## 2024-10-29 NOTE — ASSESSMENT & PLAN NOTE
Wt Readings from Last 3 Encounters:   10/29/24 45.7 kg (100 lb 11.2 oz)   10/21/24 46 kg (101 lb 8 oz)   08/13/24 54.4 kg (120 lb)     Recent hospitalization for CHF exacerbation from 10/11-10/19  Was treated with IV lasix during hospitalization transitioned to torsemide 80 mg po daily  Previously on metolazone, unsure if medication was started  Nephrology note from 03/05/24 recommending metolazone twice a week 2.5 mg prior to taking torsemide  Monitor daily weights  Continue low salt diet, 1800 fluid restriction  No lower extremity edema noted on exam, breath sounds clear, no SOB  Follow up with cardiology

## 2024-10-29 NOTE — PROGRESS NOTES
[address], [address], [place] [address]  [phone]    NAME: Sarah Holland  AGE: 83 y.o. SEX: female    Discharge Summary    Location: [facility]  POS: 31 ([facility])  Code Status: DNR/DNI      Admission Date:10/19/2024  Discharge Date:10/30/2024      Chief complaint / Reason for visit: Discharge    Assessment/Plan:    Severe pulmonary hypertension (HCC)  ECHO showing LVEF of 60%, moderate to severe MR and torrentil tricuspid regurgitation  Follows with [facility]  [facility]  [facility] for [facility]    Paroxysmal atrial fibrillation (HCC)  Continue Eliquis for AC  No on beta blocker as per Cardiology. HR seems controlled.  Follow up with PCP, Cardiology    Nonrheumatic tricuspid valve regurgitation  Severe TR  Seems to be symptomatic with orthostasis and MARKHAM since a long time  Follow up with [facility]    Chronic diastolic heart failure (HCC)  Wt Readings from Last 3 Encounters:   10/29/24 45.7 kg (100 lb 11.2 oz)   10/21/24 46 kg (101 lb 8 oz)   08/13/24 54.4 kg (120 lb)     Recent hospitalization for CHF exacerbation from 10/11-10/19  Was treated with IV lasix during hospitalization transitioned to torsemide 80 mg po daily  Previously on metolazone, unsure if medication was started  Nephrology note from 03/05/24 recommending metolazone twice a week 2.5 mg prior to taking torsemide  Monitor daily weights  Continue low salt diet, 1800 fluid restriction  No lower extremity edema noted on exam, breath sounds clear, no SOB  Follow up with cardiology     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 disease, without long-term current use of insulin ([facility])    Lab Results   Component Value Date    HGBA1C 6.2 (H) 09/10/2024     Not currently on diabetic medication  Monitor blood pressures  Avoid NSAIDs, no iodinated contrast  Follow up with PCP, Endocrinologist     Acquired hypothyroidism  Continue  levothyroxine 25 mcg po daily  TSH (09/19/24)  1.05  Continue to monitor q6 months  Follow up with PCP    Stage 4 chronic kidney disease (HCC)  Lab Results   Component Value Date    EGFR 41 (L) 10/19/2024    EGFR 39 (L) 10/18/2024    EGFR 38 (L) 10/17/2024    CREATININE 1.29 (H) 10/19/2024    CREATININE 1.35 (H) 10/18/2024    CREATININE 1.37 (H) 10/17/2024       Monitor renal function on routine labs- seems stable  She had been on dialysis temporarily in 2023 per records during a period of acute renal injury/worsening, not appearing to require dialysis presently  Avoid nephrotoxins, NSAIDs as able  Encourage PO hydration, respecting volume status  Follow up with PCP, Nephrology as appropriate    Ambulatory dysfunction  Recent transition to CBA after hospitalization for STR  Encourage use of assistive device  At baseline resident states she does not use assistive device  Continue fall precautions  Encourage patient to participate with activities  Provide education for patient, family, and caregivers      This is an 83 y.o. female seen today at [facility].  Medical history includes, not limited to, CHF, type 2 DM, CKD stage 4, anemia, hypothyroidism, peripheral vascular disease, vitamin deficiency, and hypertension.    Patient with recent hospitalization from 10/11-10/19.  She presented to the ED with shortness of breath and fatigue.  She was treated for an acute exacerbation of CHF.  Inpatient she was given IV lasix, at discharge she was transitioned to torsemide. She was discharged to [facility] to continue therapy.     Sarah is seen today for a discharge.  She has a cardiac catheterization planned for 10/30/24 at [facility].  Family will be assisting with transportation.  Upon entering the room she is laying in bed comfortable.  She is alert and oriented x 3.  Currently denies pain.  Denies shortness of breath.  During her limited stay at [facility] she had been working well with therapy.          Reviewed patient with  nursing staff.  No concerns at this time.           Nursing and prior provider notes reviewed on this visit. Discussed visit with PCP and nursing staff/ supervisor.      Review of Systems   Constitutional:  Positive for activity change. Negative for appetite change, fatigue, fever and unexpected weight change.   HENT:  Negative for congestion.    Eyes:  Negative for pain, discharge and visual disturbance.   Respiratory:  Negative for cough and shortness of breath.    Cardiovascular:  Negative for chest pain and palpitations.   Gastrointestinal:  Negative for abdominal pain, constipation and diarrhea.   Genitourinary:  Negative for difficulty urinating, dysuria, hematuria and urgency.   Musculoskeletal:  Positive for gait problem. Negative for arthralgias.   Skin:  Negative for color change.   Neurological:  Negative for syncope and weakness.   Psychiatric/Behavioral:  Negative for confusion and sleep disturbance.    All other systems reviewed and are negative.         ALLERGY: Reviewed, unchanged  Allergies   Allergen Reactions    Penicillins Anaphylaxis    Shellfish Allergy - Food Allergy Anaphylaxis and Hives       HISTORY:  Medical Hx: Reviewed, unchanged  Family Hx: Reviewed, unchanged  Soc Hx: Reviewed,  unchanged      Physical Exam  Vitals reviewed.   Constitutional:       General: She is not in acute distress.     Appearance: Normal appearance. She is not ill-appearing.   HENT:      Head: Normocephalic.      Right Ear: Tympanic membrane normal.      Left Ear: Tympanic membrane normal.      Nose: Nose normal. No congestion.      Mouth/Throat:      Mouth: Mucous membranes are moist.      Pharynx: Oropharynx is clear.   Eyes:      General:         Right eye: No discharge.         Left eye: No discharge.      Extraocular Movements: Extraocular movements intact.      Conjunctiva/sclera: Conjunctivae normal.      Pupils: Pupils are equal, round, and reactive to light.   Cardiovascular:      Rate and Rhythm: Normal  rate. Rhythm irregular.      Pulses: Normal pulses.   Pulmonary:      Effort: Pulmonary effort is normal. No respiratory distress.      Breath sounds: Normal breath sounds.   Chest:      Chest wall: No tenderness.   Abdominal:      General: Bowel sounds are normal.      Palpations: Abdomen is soft.      Tenderness: There is no abdominal tenderness.   Musculoskeletal:         General: No swelling. Normal range of motion.      Cervical back: Normal range of motion.      Right lower leg: No edema.      Left lower leg: No edema.   Skin:     General: Skin is warm and dry.   Neurological:      Mental Status: She is alert and oriented to person, place, and time. Mental status is at baseline.      Motor: No weakness.   Psychiatric:         Mood and Affect: Mood normal.         Behavior: Behavior normal.         Thought Content: Thought content normal.         Judgment: Judgment normal.            Laboratory results / Imaging reviewed: Hard copy/ies in medical chart:    Vitals:    10/29/24 1900   BP: 93/57   Pulse: 73   Resp: 18   Temp: 97.9 °F (36.6 °C)   SpO2: 96%       Current Medications:  Current Outpatient Medications   Medication Instructions    albuterol 2.5 mg, Nebulization, Every 6 hours PRN    apixaban (ELIQUIS) 2.5 mg, Oral, 2 times daily    b complex-C-folic acid (NEPHRO-RASHEL) 0.8 mg tablet Oral, Daily with dinner    Calcium Citrate 250 MG TABS 1 tablet, Oral, 2 times daily    camphor-menthol (SARNA) lotion 1 Application, Topical, As needed    Cholecalciferol (VITAMIN D-3 PO) 1,000 Units, Oral, Daily    clindamycin (CLEOCIN) 150 mg capsule TAKE 2 CAPSULES BY MOUTH 1 HOUR BEFORE DENTAL VISIT    glucose blood test strip In Vitro, 4 times daily    glucose monitoring kit (FREESTYLE) monitoring kit E11.9 Patient is type 2 diabetic. Test once daily    lactulose 20 g, Oral, 2 times daily    Lancets (FREESTYLE) lancets Does not apply, Daily    levothyroxine 25 mcg, Oral, Daily    Magnesium 250 mg, Oral, Daily     "Multiple Vitamins-Minerals (CENTRUM SILVER PO) 1 tablet, Oral, Daily    Potassium Chloride ER 20 MEQ TBCR 1 tablet, Oral, Daily    senna (SENOKOT) 8.6 mg, Oral, Daily    thiamine 200 mg, Oral, Daily    torsemide (DEMADEX) 80 mg, Oral, Daily        Please note: This note was completed in part utilizing a voice-recognition software may have been used in the preparation of this document. Grammatical errors, random word insertion, spelling mistakes, and incomplete sentences may be an occasional consequence of the system secondary to software limitations, ambient noise and hardware issues. Occasional wrong word or \"sound-alike\" substitutions may have occurred due to the inherent limitations of voice recognition software. At the time of dictation, efforts were made to edit, clarify and/or correct errors. Interpretation should be guided by context. Please read the chart carefully and recognize, using context, where substitutions have occurred. If you have any questions or concerns about the context, text or information contained within the body of this dictation, please contact myself, the provider, for further clarification.    Time spent greater than 30 minutes with coordination of care, direct patient care, patient examination, teaching, and chart review.    TONY Ascencio  10/29/2024  " Cyclophosphamide Counseling:  I discussed with the patient the risks of cyclophosphamide including but not limited to hair loss, hormonal abnormalities, decreased fertility, abdominal pain, diarrhea, nausea and vomiting, bone marrow suppression and infection. The patient understands that monitoring is required while taking this medication.

## 2024-10-29 NOTE — ASSESSMENT & PLAN NOTE
Lab Results   Component Value Date    HGBA1C 6.2 (H) 09/10/2024     Not currently on diabetic medication  Monitor blood pressures  Avoid NSAIDs, no iodinated contrast  Follow up with PCP, Endocrinologist

## 2024-10-29 NOTE — ASSESSMENT & PLAN NOTE
Severe TR  Seems to be symptomatic with orthostasis and MARKHAM since a long time  Follow up with [facility]

## 2024-10-29 NOTE — ASSESSMENT & PLAN NOTE
Recent transition to CBA after hospitalization for STR  Encourage use of assistive device  At baseline resident states she does not use assistive device  Continue fall precautions  Encourage patient to participate with activities  Provide education for patient, family, and caregivers

## 2024-10-29 NOTE — ASSESSMENT & PLAN NOTE
Continue levothyroxine 25 mcg po daily  TSH (09/19/24)  1.05  Continue to monitor q6 months  Follow up with PCP

## 2024-11-01 ENCOUNTER — TELEPHONE (OUTPATIENT)
Age: 84
End: 2024-11-01

## 2024-11-01 NOTE — TELEPHONE ENCOUNTER
Spoke with [facility]. She stated that they were not sure if the nurse will be going to see patient on Sunday or Monday.   They will fax orders to us after the nurse evaluates in home.   Sommer did mention that she did receive a verbal order to evaluate her from Keiry Bailey RN.

## 2024-11-01 NOTE — TELEPHONE ENCOUNTER
Received call from [facility] with [facility] stating that they received a referral from [facility] to provide home health services to patient.  Patient is planned for DC home this weekend.

## 2024-11-01 NOTE — TELEPHONE ENCOUNTER
Rcvd call from Melissa/IBIS at the [facility]/ calling b/c patient is currently hospitalized there and could be discharged this weekend. [facility] is going to provide [facility]HC but they would like to know if the Dr will sign their orders before they will commit to providing care for her. Please follow up with [facility]/[phone].

## 2024-11-08 ENCOUNTER — NURSING HOME VISIT (OUTPATIENT)
Dept: GERIATRICS | Facility: OTHER | Age: 84
End: 2024-11-08
Payer: COMMERCIAL

## 2024-11-08 DIAGNOSIS — D63.1 ANEMIA IN STAGE 4 CHRONIC KIDNEY DISEASE  (HCC): ICD-10-CM

## 2024-11-08 DIAGNOSIS — E03.9 ACQUIRED HYPOTHYROIDISM: ICD-10-CM

## 2024-11-08 DIAGNOSIS — Z95.0 PACEMAKER: ICD-10-CM

## 2024-11-08 DIAGNOSIS — R54 FRAILTY SYNDROME IN GERIATRIC PATIENT: ICD-10-CM

## 2024-11-08 DIAGNOSIS — R41.89 COGNITIVE IMPAIRMENT: ICD-10-CM

## 2024-11-08 DIAGNOSIS — I49.5 SICK SINUS SYNDROME (HCC): ICD-10-CM

## 2024-11-08 DIAGNOSIS — Z91.89 AT RISK FOR CONSTIPATION: ICD-10-CM

## 2024-11-08 DIAGNOSIS — I10 ESSENTIAL HYPERTENSION: ICD-10-CM

## 2024-11-08 DIAGNOSIS — R50.82 POSTOPERATIVE FEVER: ICD-10-CM

## 2024-11-08 DIAGNOSIS — N18.4 STAGE 4 CHRONIC KIDNEY DISEASE (HCC): Chronic | ICD-10-CM

## 2024-11-08 DIAGNOSIS — E87.6 HYPOKALEMIA: Chronic | ICD-10-CM

## 2024-11-08 DIAGNOSIS — N18.4 ANEMIA IN STAGE 4 CHRONIC KIDNEY DISEASE  (HCC): ICD-10-CM

## 2024-11-08 DIAGNOSIS — N18.4 TYPE 2 DIABETES MELLITUS WITH STAGE 4 CHRONIC KIDNEY DISEASE, WITHOUT LONG-TERM CURRENT USE OF INSULIN (HCC): ICD-10-CM

## 2024-11-08 DIAGNOSIS — I48.0 PAROXYSMAL ATRIAL FIBRILLATION (HCC): ICD-10-CM

## 2024-11-08 DIAGNOSIS — Z91.89 AT RISK FOR DELIRIUM: ICD-10-CM

## 2024-11-08 DIAGNOSIS — R53.81 PHYSICAL DECONDITIONING: ICD-10-CM

## 2024-11-08 DIAGNOSIS — I50.33 ACUTE ON CHRONIC HEART FAILURE WITH PRESERVED EJECTION FRACTION (HFPEF) (HCC): ICD-10-CM

## 2024-11-08 DIAGNOSIS — I36.1 NONRHEUMATIC TRICUSPID VALVE REGURGITATION: Primary | ICD-10-CM

## 2024-11-08 DIAGNOSIS — E87.1 HYPONATREMIA: ICD-10-CM

## 2024-11-08 DIAGNOSIS — Z71.89 ADVANCE CARE PLANNING: ICD-10-CM

## 2024-11-08 DIAGNOSIS — E11.22 TYPE 2 DIABETES MELLITUS WITH STAGE 4 CHRONIC KIDNEY DISEASE, WITHOUT LONG-TERM CURRENT USE OF INSULIN (HCC): ICD-10-CM

## 2024-11-08 DIAGNOSIS — E78.2 MIXED HYPERLIPIDEMIA: ICD-10-CM

## 2024-11-08 PROCEDURE — 99306 1ST NF CARE HIGH MDM 50: CPT | Performed by: STUDENT IN AN ORGANIZED HEALTH CARE EDUCATION/TRAINING PROGRAM

## 2024-11-08 RX ORDER — TORSEMIDE 20 MG/1
40 TABLET ORAL DAILY
Status: SHIPPED
Start: 2024-11-08

## 2024-11-08 NOTE — PROGRESS NOTES
[facility]  Facility: [facility]    HISTORY AND PHYSICAL  Nursing Home Place of Service: nursing home place of service: POS 31 Skilled Care-Part A Coverage    NAME: Sarah Holland  : 1940 AGE: 83 y.o. SEX: female MRN: [id number]  DATE OF ENCOUNTER: 2024    Records Reviewed include: Hospital records    Chief Complaint/ Reason for Admission:   Re-Admission to [facility] after hospitalization for tricuspid valve replacement    History of Present Illness:     Sarah Holland is a 83 y.o. female with PMH including Afib, DM2, CKD4, SSS (s/p pacemaker), osteoporosis, HTN, HLD, chronic diastolic CHF, severe TR, hypothyroidism, PVD  For details of hospitalization, see hospital records including discharge documentation  Briefly, patient initially hospitalized  at [facility] from 10/11 to 10/19/24 with fatigue/SOB, diagnosed with acute on chronic CHF exacerbation, evaluated by Cardiology/Heart Failure team, aggressively diuresed; ultimately deemed stable for discharge to rehab. She was subsequently at [facility] for rehab briefly, after which she returned to hospital ([facility] from 10/30 - 24) for scheduled cardiac surgery; she underwent TTVR on 24; course complicated by dehydration from aggressive diuresis which was improved with fluids and adjustment of her torsemide; also complicated by post-op fever with unclear etiology with generally unremarkable infectious workup, was briefly on broad-spectrum antibiotics which were discontinued and she subsequently remained afebrile; ultimately deemed stable for discharge to rehab.    Patient seen and examined in dining room  Others present for encounter: none  Patient seated in WC, feeding herself breakfast  Appears comfortable, awake, alert, oriented to situation, able to converse appropriately  Patient polite, appears in good spirits, Aox3, appears to be a fair historian perhaps a bit forgetful of some details, though aware of  "details of her recent valve replacement surgery and also seems to recall me from her recent stay at this facility. She notes she feels very well today and has no acute concerns. She feels \"better\" after her valve surgery and feels the surgery went well. She is happy to be back here, motivated to continue with rehab, and expresses goal to return home after completion of rehab.  No acute pain anywhere recently.  Breathing fine, on room air, no acute SOB, no orthopnea. She has baseline chronic MARKHAM which she feels is improved since her recent hospitalization and valve replacement surgery  No recent CP/palpitations. She had previously had intermittent orthostatic lightheadedness since several months, she thinks it has been improved since her recent hospitalization and valve replacement surgery and she has not felt any recent dizziness/orthostasis.  Appetite stable/good \"I eat like a horse\", no acute swallowing concerns  Urinating well without acute symptoms  Last BM yesterday easy/normal. No abd pain/N/V  Does not feel acutely sick or confused  No acute abdominal, or urinary symptoms; see ROS for more details.    No further questions or acute concerns identified.  No acute concerns per staff or charge nurse today.        Lab Review:  10/19/24: BMP with Cr 1.29 (improving, baseline around 1.4-2.0), GFR 41 (baseline 20s); CBC with Hb 10.4 (improving, normocytic)  11/7/24: BMP generally stable, Na 136 (recent hyponatremia improving), Cr 1.24 (recent peak 1.84 on 11/2); CBC with Hb 8.9 (fairly stable/slight downtrend, normocytic); Mg 2.0; UA 11/2 generally unremarkable for infection, small LE, rare bacteria      Lab Results   Component Value Date    HGBA1C 6.2 (H) 09/10/2024     Lab Results   Component Value Date    JJU0YEPTQDFE 4.199 01/12/2024    TSH 1.05 09/19/2024     Lab Results   Component Value Date    ZORFCMLS55 604 09/19/2024     Lab Results   Component Value Date    IRON 33 (L) 10/16/2024    TIBC 393 10/16/2024    " FERRITIN 45.0 10/16/2024     Lab Results   Component Value Date    CHOLESTEROL 170 10/11/2021     Lab Results   Component Value Date     10/11/2021     Lab Results   Component Value Date    TRIG 64 10/11/2021     Lab Results   Component Value Date    NONHDLC 47 10/11/2021     Lab Results   Component Value Date    LDLCALC 34 10/11/2021       XR chest 1 view  Result Date: 11/3/2024  No new or increasing consolidative opacities. Central vascular congestion without overt pulmonary edema.     XR chest 1 view  Result Date: 11/1/2024  Cardiomegaly with mild interstitial pulmonary edema .    CARDIAC CATHETERIZATION  Result Date: 11/1/2024  Impression: Conclusion Hemodynamics demonstrate preserved cardiac output/index in the setting of elevated right and left sided filling pressures, mild pulmonary hypertension (predominantly post-capillary) and normal systemic vascular resistance. Please see actual tracings for hemodynamic values. Hemodynamics   RA =  18 mmHg RVSP = 43 mmHg, RVEDP = 14 mmHg PCWP = 20 mmHg     PA = 43/17 mmHg with a mean of 28 mmHg NIBP (79) mmHg Ao sat  99 % PA sat  69.1 % PVR = 1.9 Wood Units SVR = 1138 Dyne-s/cm5 eFick Cardiac output/index = 4.3 L/min and 3.1 L/min/m2 Access: 6 Fr Right Internal Jugular vein    CARDIAC CATHETERIZATION  Result Date: 11/1/2024  Impression: Severe Tricuspid Regurgitation s/p transcatheter tricuspid valve replacement (TTVR) with an Evoque #52 mm valve.    XR chest 1 view  Result Date: 10/31/2024  Impression: Tubes, lines, surgical material: Sternotomy wires and left chest wall dual-lead pacemaker. Lungs and pleura: No focal consolidation. Central vascular congestion without evidence of pulmonary edema. No pleural effusions. No pneumothorax. Cardiovascular and mediastinum: Stable cardiomegaly.     XR chest portable  Result Date: 10/15/2024  Impression: Enlarged cardiac silhouette. Mild interstitial edema. Trace bilateral pleural effusions. Prominence of the main  pulmonary artery may represent pulmonary hypertension.     XR chest pa and lateral  Result Date: 10/11/2024  Impression: Enlarged cardiac silhouette.      Echo 9/20/24: EF 55-60, Indeterminate diastolic function due to pacemaker, at least moderate MR, severe TR  RONN 11/2/24: EF 62, LV diastolic function parameters were not assessed, mild-mod MR, trace TR,         [place] PDMP reviewed 11/8/2024  No patient matching the search criteria submitted was identified       Social: Patient lives in a 2 story home with her son; daughter lives very close by and is involved in care/life. Family assists with many tasks including routine care, medications, finances, meal prep. At baseline she uses a walker and denies recent falls in the past year or so. No longer drives recently. Intends to return home upon completion of rehab at facility.    Lives: Home,  Social Support: family  Fall in the past 12 months: denies  Use of assistance Device: Walker    Allergies    Allergies   Allergen Reactions    Penicillins Anaphylaxis    Shellfish Allergy - Food Allergy Anaphylaxis and Hives       Past Medical History  Past Medical History:   Diagnosis Date    Allergic rhinitis     last assessed - 05Jan2013    Arthritis     lower back    Atrial flutter (HCC)     last assessed - [id number]    Diabetes mellitus (HCC)     Type II    Fatigue     last assessed - 01Oct2013    Heart murmur     tricupid reguritation, SSS    Hyperlipidemia     Hypertension     Insomnia     Irregular heart beat     Cardioversion, pacemaker, severe tricuspid regurgitation, atrial septal defect.    Lower leg edema     last assessed - 09Nov2015    Macular degeneration     b/l    Osteoporosis     Osteoporosis     Shortness of breath     prior to heart surgery    Sick sinus syndrome (HCC)     last assessed - 05Apr2017    Sting from hornet, wasp, or bee     last assessed - [id number]    Subconjunctival hemorrhage     unspecified laterality; last assessed - 09Jan2014        Past  Surgical History:   Procedure Laterality Date    ASD REPAIR, SECUNDUM      BREAST BIOPSY Right     benign    CARDIAC PACEMAKER PLACEMENT      CARDIAC SURGERY      Atrial septal defect repaired, pacemaker    COLONOSCOPY N/A 04/21/2016    Procedure: COLONOSCOPY;  Surgeon: London Olivares MD;  Location: [facility];  Service:     COLONOSCOPY W/ BIOPSIES AND POLYPECTOMY      ESOPHAGOGASTRODUODENOSCOPY      EYE SURGERY      lid lift    HYSTERECTOMY      at age 37 or 38    MAMMO STEREOTACTIC BREAST BIOPSY RIGHT (ALL INC) Right 07/14/2021    OOPHORECTOMY Left     DC COLONOSCOPY FLX DX W/COLLJ SPEC WHEN PFRMD N/A 05/10/2019    Procedure: COLONOSCOPY with polypectomy;  Surgeon: London Olivares MD;  Location: [facility];  Service: Gastroenterology    TOTAL ABDOMINAL HYSTERECTOMY      VEIN SURGERY Left     vericose vein left leg    WISDOM TOOTH EXTRACTION         Family History  Family History   Problem Relation Age of Onset    No Known Problems Mother     Gout Brother     No Known Problems Father     No Known Problems Sister     No Known Problems Daughter     No Known Problems Maternal Grandmother     No Known Problems Maternal Grandfather     No Known Problems Paternal Grandmother     No Known Problems Paternal Grandfather     Alcohol abuse Neg Hx     Substance Abuse Neg Hx        Social History  Social History     Tobacco Use   Smoking Status Never    Passive exposure: Never   Smokeless Tobacco Never      Social History     Substance and Sexual Activity   Alcohol Use Not Currently    Comment: occas none recently; social drinker - per [employer]      Social History     Substance and Sexual Activity   Drug Use No            Physical Exam    Vital Signs  There were no vitals filed for this visit.  BP: 100/52 mmHg  11/7/2024 18:03 Temp:98.1 °F  11/7/2024 18:02 Pulse:70 bpm  11/8/2024 08:48   Weight:108.4 Lbs  11/7/2024 20:49   Resp:16 Breaths/min  11/7/2024 18:03 BS:162 mg/dL  11/8/2024 06:00 O2:99 %  11/7/2024 18:03  Pain:0  11/8/2024 08:40         Physical Exam  Vitals reviewed.   Constitutional:       General: She is not in acute distress.     Appearance: She is not toxic-appearing or diaphoretic.   HENT:      Head: Normocephalic and atraumatic.      Right Ear: External ear normal.      Left Ear: External ear normal.      Nose: Nose normal. No rhinorrhea.      Mouth/Throat:      Mouth: Mucous membranes are moist.      Pharynx: Oropharynx is clear. No posterior oropharyngeal erythema.   Eyes:      General: No scleral icterus.        Right eye: No discharge.         Left eye: No discharge.      Extraocular Movements: Extraocular movements intact.      Conjunctiva/sclera: Conjunctivae normal.      Pupils: Pupils are equal, round, and reactive to light.   Cardiovascular:      Rate and Rhythm: Normal rate and regular rhythm.   Pulmonary:      Effort: Pulmonary effort is normal. No respiratory distress.      Breath sounds: Normal breath sounds. No wheezing or rales.   Abdominal:      General: Bowel sounds are normal. There is no distension.      Palpations: Abdomen is soft.      Tenderness: There is no abdominal tenderness. There is no guarding.   Musculoskeletal:         General: No tenderness.      Cervical back: No rigidity.      Comments: Very minimal/No notable peripheral edema  No calf tenderness bilaterally   Skin:     General: Skin is warm and dry.      Coloration: Skin is not jaundiced.   Neurological:      General: No focal deficit present.      Mental Status: She is alert. Mental status is at baseline.      Comments: Aox2-3 (to self, to month/year, approximately to location but needed prompting for exact location of [facility] and [place]   Psychiatric:         Mood and Affect: Mood normal.         Behavior: Behavior normal.         Thought Content: Thought content normal.         Review of Systems:  Review of Systems   Constitutional:  Negative for appetite change, chills, diaphoresis and fever.   HENT:  Negative for  drooling, ear pain, hearing loss, rhinorrhea, sore throat and trouble swallowing.    Eyes:  Negative for pain, discharge, redness, itching and visual disturbance.   Respiratory:  Negative for cough, chest tightness, shortness of breath (chronic MARKHAM improved post-op) and wheezing.    Cardiovascular:  Negative for chest pain and palpitations.   Gastrointestinal:  Negative for abdominal pain, blood in stool, constipation, diarrhea, nausea and vomiting.   Genitourinary:  Negative for difficulty urinating, dysuria and hematuria.   Musculoskeletal:  Positive for gait problem. Negative for arthralgias, back pain and neck pain.   Skin:  Negative for color change.   Neurological:  Positive for weakness (gradually improving). Negative for facial asymmetry, speech difficulty, light-headedness (none presently but notes chronic stable orthostatic lightheadedness since at least several months) and headaches.   Psychiatric/Behavioral:  Negative for agitation, behavioral problems and confusion. The patient is not nervous/anxious and is not hyperactive.    All other systems reviewed and are negative.      List of Current Medications:  Current Outpatient Medications   Medication Instructions    albuterol 2.5 mg, Nebulization, Every 6 hours PRN    apixaban (ELIQUIS) 2.5 mg, Oral, 2 times daily    b complex-C-folic acid (NEPHRO-RASHEL) 0.8 mg tablet Oral, Daily with dinner    Calcium Citrate 250 MG TABS 1 tablet, Oral, 2 times daily    camphor-menthol (SARNA) lotion 1 Application, Topical, As needed    Cholecalciferol (VITAMIN D-3 PO) 1,000 Units, Oral, Daily    clindamycin (CLEOCIN) 150 mg capsule TAKE 2 CAPSULES BY MOUTH 1 HOUR BEFORE DENTAL VISIT    glucose blood test strip In Vitro, 4 times daily    glucose monitoring kit (FREESTYLE) monitoring kit E11.9 Patient is type 2 diabetic. Test once daily    lactulose 20 g, Oral, 2 times daily    Lancets (FREESTYLE) lancets Does not apply, Daily    levothyroxine 25 mcg, Oral, Daily     "Magnesium 250 mg, Oral, Daily    Multiple Vitamins-Minerals (CENTRUM SILVER PO) 1 tablet, Oral, Daily    Potassium Chloride ER 20 MEQ TBCR 1 tablet, Oral, Daily    senna (SENOKOT) 8.6 mg, Oral, Daily    thiamine 200 mg, Oral, Daily    torsemide (DEMADEX) 40 mg, Oral, Daily         Medication reviewed. All orders signed. Complete list is in the paper chart.     Allergies    Allergies   Allergen Reactions    Penicillins Anaphylaxis    Shellfish Allergy - Food Allergy Anaphylaxis and Hives       Labs/Diagnostics (reviewed by this provider):     I personally reviewed lab results and imaging studies. Full reports are in the paper chart.     Assessment/Plan:    Acute on chronic heart failure with preserved ejection fraction (HFpEF) ([facility])  Wt Readings from Last 3 Encounters:   10/29/24 45.7 kg (100 lb 11.2 oz)   10/21/24 46 kg (101 lb 8 oz)   08/13/24 54.4 kg (120 lb)     Hospitalized recently with acute on chronic CHF  Evaluated by Cardiology inpatient and underwent aggressive diuresis  Monitor weight daily - limited data so far to see a trend since return to rehab  Monitor volume status clinically - seems euvolemic; very minimal peripheral edema, no orthopnea  Encourage elevation, compression of lower extremities as appropriate  Continue torsemide 40mg daily, adjust as appropriate  As per inpatient Heart Failure team: \"BB not indicated in HFpEF/RV dyfunction. Not on ACE/ARB/ARNI/MRA or SGLT2 inhibitor given renal function. \"  Follow up with PCP, Cardiology as appropriate            Essential hypertension  Noted hx of HTN  Monitor BP - generally around 100s systolic recently with limited data so far, overall stable and borderline low  Avoid hypotension  No acute cardiac complaints  Continue torsemide as above  Follow up with PCP, Cardiology as appropriate      Nonrheumatic tricuspid valve regurgitation  History of severe TR which seemed to be symptomatic with orthostasis and MARKHAM since a long time  Now s/p TTVR on 11/1/24 " "at [facility]  Symptoms seem improved  Follow up with PCP, Cardiology      POD #1 TTE: EF 62%, RV moderately dilated, Pulmonary artery systolic pressure measures 43 mmHg. The pulmonary artery systolic pressure is mildly elevated. There is a #52 mm Evoque bioprosthetic tricuspid valve prosthesis. The prosthesis is well-seated. There is mild intravalvular regurgitation present. Tricuspid prosthesis peak gradient measures 15 mmHg. Tricuspid prosthesis mean gradient measures 8 mmHg.     Postoperative fever  Inpatient, post-op, patient noted to have fevers with reported Tmax 101.3F on 11/2 overnight  Inpatient infectious workup reported as generally unremarkable. UA small leuks/rare bacteria, otherwise unremarkable; urine culture with \"Fewer Than 10,000 CFU/ml Mixed Bacterial Helen , No Further Identification\"; CXR without focal pneumonia; blood culture x2 with no growth  -s/p IV vanc 11/3-11/4, IV cefepime 11/3-11/4  Antibiotics subsequently were discontinued inpatient  Patient reportedly stabilized with no further fevers inpatient  Monitor for acute/infectious symptoms at rehab. At present seems to have no acute infectious symptoms including systemic, urinary, or respiratory; afebrile    Pacemaker  Noted, see remainder of plan    Hypokalemia  Noted mild/intermittent hypokalemia on lab review  Likely related to diuretic use  Monitor on routine labs - seems stable  Continue potassium supplement, adjust/replete as appropriate    Hyperlipidemia  Noted history  Not presently on statin (historically was on simvastatin appears stopped in 2023)  Encourage lifestyle modifications including healthy diet, weight management, exercise as appropriate  Follow up with PCP, Cardiology    Hyponatremia  Noted on recent labs inpatient  May have been related to volume overload and related diuresis  Monitor on routine labs - seems to have improved to WNL as of hospital discharge  Consider further workup, supplementation, Nephro consult " if issue persists/worsens    Frailty syndrome in geriatric patient  -Clinical frailty scale stage 6-7 moderately frail  -Multifactorial including age, CHF, and additional chronic medical comorbidities, superimposed on elderly individual with limited physiologic and metabolic reserve and evidence of malnutrition  -continue healthy style choices and well balanced diet and nutrition intake   -Continue optimization of chronic medical conditions and address acute metabolic derangements as arise  -follow up with PCP      At risk for constipation  At risk due to hospitalization, relative immobility, comorbidities  Monitor stool output - recently reported stable/regular  Bowel regimen at facility as per protocol; adjust as appropriate; consider bisacodyl suppository PRN if no BM in 2-3 days  Encourage mobility as tolerated, PO hydration as appropriate, high fiber diet/prune juice (in outpatient setting as appropriate)  Goal is for 1 easy BM every 1-2 days      Cognitive impairment  Patient has some noted/documented hx of cognitive impairment though no clear diagnosis of dementia  On exam is Aox2-3 and seems to be a fair historian though with some forgetfulness evident  Given baseline functional status (family helps with/manages her medications and finances) this is likely consistent with at least a mild dementia, though would hold off from making formal diagnosis while patient is not presently at baseline in setting of recent hospitalizations and acute illness  She does appear able to intelligently discuss her medical conditions and make related decisions at this time  Supportive care  Monitor mentation  Delirium precautions  Follow up with PCP, consider outpatient cognitive testing as appropriate    Physical deconditioning  In setting of CHF, sedentary baseline, recent hospitalizations and cardiac surgery  -PT/OT  -fall precautions  -encourage appropriate DME use  -SW to follow for safe discharge planning/homecare services  as appropriate      Advance care planning  HCP and code status discussion revisited/verified after latest hospitalization and cardiac surgery as per note      At risk for delirium  Delirium precautions  -Patient is high risk of delirium due to age, comorbidities, hospitalization/unfamiliar environment  -delirium precautions  -maintain normal sleep/wake cycle  -minimize overnight interruptions, group overnight vitals/labs/nursing checks as possible  -dim lights, close blinds and turn off tv to minimize stimulation and encourage sleep environment in evenings  -ensure that pain is well controlled  -monitor for fecal and urinary retention which may precipitate delirium  -encourage early mobilization and ambulation  -provide frequent reorientation and redirection  -encourage family and friends at the bedside to help help calm patient if anxious  -avoid medications which may precipitate or worsen delirium such as tramadol, benzodiazepine, anticholinergics, and benadryl  -encourage hydration and nutrition   -redirect unwanted behaviors as first line, avoid physical restraints, use chemical restraint only if all other attempts have been unsuccessful      Stage 4 chronic kidney disease (HCC)  Lab Results   Component Value Date    EGFR 41 (L) 10/19/2024    EGFR 39 (L) 10/18/2024    EGFR 38 (L) 10/17/2024    CREATININE 1.24 (H) 11/07/2024    CREATININE 1.39 (H) 11/06/2024    CREATININE 1.24 (H) 11/05/2024       Monitor renal function on routine labs- seems stable/within baseline. Had worsened inpatient recently in setting of aggressive diuresis though still seemed to be within her baseline range, and improved with fluids and adjustment of diuretic.  She had been on dialysis temporarily in 2023 per records during a period of acute renal injury/worsening, not appearing to require dialysis presently  Avoid nephrotoxins, NSAIDs as able  Encourage PO hydration, respecting volume status  Follow up with PCP, Nephrology as  appropriate      Anemia in chronic kidney disease  Lab Results   Component Value Date    EGFR 41 (L) 10/19/2024    EGFR 39 (L) 10/18/2024    EGFR 38 (L) 10/17/2024    CREATININE 1.24 (H) 11/07/2024    CREATININE 1.39 (H) 11/06/2024    CREATININE 1.24 (H) 11/05/2024       -since around 2022 baseline Hb seems around 10-11 range  -likely chronic related to CKD  -may be recently acutely lower in setting of CHF/volume overload and cardiac surgery  -monitor on routine labs - fairly stable perhaps a slight downtrend recently  -recent iron level low- consider adding supplement if anemia worsens, though otherwise TIBC not elevated so anemia less likely related to the low iron  -monitor for acute bleed - no present signs  -consider further workup, Heme consult if persistent/worsening  -transfuse PRN Hb <7      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 disease, without long-term current use of insulin (HCC)    Lab Results   Component Value Date    HGBA1C 6.2 (H) 09/10/2024     A1c well controlled for age  Monitor glucose on routine labs, no indications for daily/aggressive checks  Avoid hypoglycemia  Not presently on diabetic medications. Defer to outpatient care team. Limited options in setting of advanced renal disease.  Encourage lifestyle modifications including healthy diet, weight management, exercise as appropriate  Follow up with PCP, Endocrine/Ophthalmology/Podiatry outpatient as appropriate      Acquired hypothyroidism  Continue levothyroxine 25 mcg po daily  TSH (09/19/24)  1.05  Continue to monitor routinely    Sick sinus syndrome (HCC)  Has pacemaker  Follow up with Cardiology as appropriate    Paroxysmal atrial fibrillation (HCC)  Continue Eliquis for AC  No on beta blocker as per Cardiology. HR seems controlled.  Follow up with PCP, Cardiology         Pain: none  Rehab Potential:Fair  Patient Informed of Medical Condition: yes   Patient is Capable of Understanding Their Right: yes   Discharge Plan:  home pending rehab course  Vaccination:   Immunization History   Administered Date(s) Administered    COVID-19 MODERNA VACC 0.25 ML IM BOOSTER 04/18/2022    COVID-19 MODERNA VACC 0.5 ML IM 01/28/2021, 02/25/2021, 11/03/2021    COVID-19 Moderna Vac BIVALENT 12 Yr+ IM 0.5 ML 10/20/2022    DT (pediatric) 11/09/2015    INFLUENZA 11/09/2015, 11/29/2016, 12/06/2017    Influenza Split High Dose Preservative Free IM 09/25/2014, 11/09/2015, 11/29/2016, 12/06/2017, 09/17/2019    Influenza, high dose seasonal 0.7 mL 11/27/2018, 09/15/2020, 11/22/2021, 10/20/2022, 09/21/2023    Influenza, seasonal, injectable 11/27/2012, 10/01/2013    Pneumococcal Conjugate 13-Valent 07/08/2015    Pneumococcal Polysaccharide PPV23 01/01/2004, 07/31/2019    TD (adult) Preservative Free 03/07/2017    Td (adult), adsorbed 11/09/2015    Tdap 03/07/2017    Zoster 08/02/2012       DVT ppx: eliquis    Advanced Directives: patient again verbalizes/confirms primary HCP as daughter Samaria and alternate as son Celso, consistent with prior  Code status:DNR (Per discussion with resident or POA) discussion/education revisited with patient today after cardiac surgery hospitalization, regarding their wishes with respect to resuscitative measures; discussed as appropriate potential risks vs benefits of resuscitative measures; patient verbalizes understanding, appears to have capacity to make this decision presently, and clearly verbalizes a desire for DNR, citing her age, consistent with prior documentation in paper chart and prior stated wishes  PCP: Elvin      -Total time spent on this encounter today including documentation and workup review, face to face time, history and exam, and documentation/orders was approximately 65 minutes.  -This note will be copied to [facility] EMR where vitals and medication orders are placed.    Guanakito Perry D.O.  Geriatric Medicine  11/8/2024 10:54 AM

## 2024-11-08 NOTE — ASSESSMENT & PLAN NOTE
HCP and code status discussion revisited/verified after latest hospitalization and cardiac surgery as per note

## 2024-11-08 NOTE — ASSESSMENT & PLAN NOTE
At risk due to hospitalization, relative immobility, comorbidities  Monitor stool output - recently reported stable/regular  Bowel regimen at facility as per protocol; adjust as appropriate; consider bisacodyl suppository PRN if no BM in 2-3 days  Encourage mobility as tolerated, PO hydration as appropriate, high fiber diet/prune juice (in outpatient setting as appropriate)  Goal is for 1 easy BM every 1-2 days

## 2024-11-08 NOTE — ASSESSMENT & PLAN NOTE
In setting of CHF, sedentary baseline, recent hospitalizations and cardiac surgery  -PT/OT  -fall precautions  -encourage appropriate DME use  -SW to follow for safe discharge planning/homecare services as appropriate

## 2024-11-08 NOTE — ASSESSMENT & PLAN NOTE
Lab Results   Component Value Date    EGFR 41 (L) 10/19/2024    EGFR 39 (L) 10/18/2024    EGFR 38 (L) 10/17/2024    CREATININE 1.24 (H) 11/07/2024    CREATININE 1.39 (H) 11/06/2024    CREATININE 1.24 (H) 11/05/2024       Monitor renal function on routine labs- seems stable/within baseline. Had worsened inpatient recently in setting of aggressive diuresis though still seemed to be within her baseline range, and improved with fluids and adjustment of diuretic.  She had been on dialysis temporarily in 2023 per records during a period of acute renal injury/worsening, not appearing to require dialysis presently  Avoid nephrotoxins, NSAIDs as able  Encourage PO hydration, respecting volume status  Follow up with PCP, Nephrology as appropriate

## 2024-11-08 NOTE — ASSESSMENT & PLAN NOTE
Lab Results   Component Value Date    HGBA1C 6.2 (H) 09/10/2024     A1c well controlled for age  Monitor glucose on routine labs, no indications for daily/aggressive checks  Avoid hypoglycemia  Not presently on diabetic medications. Defer to outpatient care team. Limited options in setting of advanced renal disease.  Encourage lifestyle modifications including healthy diet, weight management, exercise as appropriate  Follow up with PCP, Endocrine/Ophthalmology/Podiatry outpatient as appropriate

## 2024-11-08 NOTE — ASSESSMENT & PLAN NOTE
Noted on recent labs inpatient  May have been related to volume overload and related diuresis  Monitor on routine labs - seems to have improved to WNL as of hospital discharge  Consider further workup, supplementation, Nephro consult if issue persists/worsens

## 2024-11-08 NOTE — ASSESSMENT & PLAN NOTE
"Inpatient, post-op, patient noted to have fevers with reported Tmax 101.3F on 11/2 overnight  Inpatient infectious workup reported as generally unremarkable. UA small leuks/rare bacteria, otherwise unremarkable; urine culture with \"Fewer Than 10,000 CFU/ml Mixed Bacterial Helen , No Further Identification\"; CXR without focal pneumonia; blood culture x2 with no growth  -s/p IV vanc 11/3-11/4, IV cefepime 11/3-11/4  Antibiotics subsequently were discontinued inpatient  Patient reportedly stabilized with no further fevers inpatient  Monitor for acute/infectious symptoms at rehab. At present seems to have no acute infectious symptoms including systemic, urinary, or respiratory; afebrile  "

## 2024-11-08 NOTE — ASSESSMENT & PLAN NOTE
History of severe TR which seemed to be symptomatic with orthostasis and MARKHAM since a long time  Now s/p TTVR on 11/1/24 at [facility]  Symptoms seem improved  Follow up with PCP, Cardiology      POD #1 TTE: EF 62%, RV moderately dilated, Pulmonary artery systolic pressure measures 43 mmHg. The pulmonary artery systolic pressure is mildly elevated. There is a #52 mm Evoque bioprosthetic tricuspid valve prosthesis. The prosthesis is well-seated. There is mild intravalvular regurgitation present. Tricuspid prosthesis peak gradient measures 15 mmHg. Tricuspid prosthesis mean gradient measures 8 mmHg.

## 2024-11-08 NOTE — ASSESSMENT & PLAN NOTE
Noted mild/intermittent hypokalemia on lab review  Likely related to diuretic use  Monitor on routine labs - seems stable  Continue potassium supplement, adjust/replete as appropriate

## 2024-11-08 NOTE — ASSESSMENT & PLAN NOTE
"Wt Readings from Last 3 Encounters:   10/29/24 45.7 kg (100 lb 11.2 oz)   10/21/24 46 kg (101 lb 8 oz)   08/13/24 54.4 kg (120 lb)     Hospitalized recently with acute on chronic CHF  Evaluated by Cardiology inpatient and underwent aggressive diuresis  Monitor weight daily - limited data so far to see a trend since return to rehab  Monitor volume status clinically - seems euvolemic; very minimal peripheral edema, no orthopnea  Encourage elevation, compression of lower extremities as appropriate  Continue torsemide 40mg daily, adjust as appropriate  As per inpatient Heart Failure team: \"BB not indicated in HFpEF/RV dyfunction. Not on ACE/ARB/ARNI/MRA or SGLT2 inhibitor given renal function. \"  Follow up with PCP, Cardiology as appropriate          "

## 2024-11-08 NOTE — ASSESSMENT & PLAN NOTE
Noted hx of HTN  Monitor BP - generally around 100s systolic recently with limited data so far, overall stable and borderline low  Avoid hypotension  No acute cardiac complaints  Continue torsemide as above  Follow up with PCP, Cardiology as appropriate

## 2024-11-08 NOTE — ASSESSMENT & PLAN NOTE
Lab Results   Component Value Date    EGFR 41 (L) 10/19/2024    EGFR 39 (L) 10/18/2024    EGFR 38 (L) 10/17/2024    CREATININE 1.24 (H) 11/07/2024    CREATININE 1.39 (H) 11/06/2024    CREATININE 1.24 (H) 11/05/2024       -since around 2022 baseline Hb seems around 10-11 range  -likely chronic related to CKD  -may be recently acutely lower in setting of CHF/volume overload and cardiac surgery  -monitor on routine labs - fairly stable perhaps a slight downtrend recently  -recent iron level low- consider adding supplement if anemia worsens, though otherwise TIBC not elevated so anemia less likely related to the low iron  -monitor for acute bleed - no present signs  -consider further workup, Heme consult if persistent/worsening  -transfuse PRN Hb <7

## 2024-11-08 NOTE — ASSESSMENT & PLAN NOTE
Patient has some noted/documented hx of cognitive impairment though no clear diagnosis of dementia  On exam is Aox2-3 and seems to be a fair historian though with some forgetfulness evident  Given baseline functional status (family helps with/manages her medications and finances) this is likely consistent with at least a mild dementia, though would hold off from making formal diagnosis while patient is not presently at baseline in setting of recent hospitalizations and acute illness  She does appear able to intelligently discuss her medical conditions and make related decisions at this time  Supportive care  Monitor mentation  Delirium precautions  Follow up with PCP, consider outpatient cognitive testing as appropriate

## 2024-11-11 ENCOUNTER — NURSING HOME VISIT (OUTPATIENT)
Dept: GERIATRICS | Facility: OTHER | Age: 84
End: 2024-11-11

## 2024-11-11 VITALS
OXYGEN SATURATION: 99 % | DIASTOLIC BLOOD PRESSURE: 62 MMHG | SYSTOLIC BLOOD PRESSURE: 108 MMHG | HEART RATE: 80 BPM | RESPIRATION RATE: 20 BRPM | TEMPERATURE: 97.7 F

## 2024-11-11 DIAGNOSIS — I50.33 ACUTE ON CHRONIC HEART FAILURE WITH PRESERVED EJECTION FRACTION (HFPEF) (HCC): Primary | ICD-10-CM

## 2024-11-11 DIAGNOSIS — R26.2 AMBULATORY DYSFUNCTION: ICD-10-CM

## 2024-11-11 DIAGNOSIS — I36.1 NONRHEUMATIC TRICUSPID VALVE REGURGITATION: ICD-10-CM

## 2024-11-11 DIAGNOSIS — I49.5 SICK SINUS SYNDROME (HCC): ICD-10-CM

## 2024-11-11 DIAGNOSIS — I48.0 PAROXYSMAL ATRIAL FIBRILLATION (HCC): ICD-10-CM

## 2024-11-12 ENCOUNTER — NURSING HOME VISIT (OUTPATIENT)
Dept: GERIATRICS | Facility: OTHER | Age: 84
End: 2024-11-12
Payer: COMMERCIAL

## 2024-11-12 VITALS
OXYGEN SATURATION: 98 % | TEMPERATURE: 97.7 F | DIASTOLIC BLOOD PRESSURE: 52 MMHG | RESPIRATION RATE: 19 BRPM | SYSTOLIC BLOOD PRESSURE: 115 MMHG | HEART RATE: 74 BPM

## 2024-11-12 DIAGNOSIS — R41.89 COGNITIVE IMPAIRMENT: Primary | ICD-10-CM

## 2024-11-12 PROCEDURE — 99308 SBSQ NF CARE LOW MDM 20: CPT

## 2024-11-12 NOTE — PROGRESS NOTES
Nursing Home Visit    Name: Sarah Holland      : 1940      MRN: [id number]  Encounter Provider: TONY Rehman  Encounter Date: 2024   Encounter department: [facility]    POS-31    Assessment & Plan  Acute on chronic heart failure with preserved ejection fraction (HFpEF) (HCC)  Current weight is 107.1 lbs with desirable 1.3 lb weight loss.  -EF-60%  -Continue to monitor weight daily.  -Decrease Torsemide to 20 mg po daily due to labs reviewed  with noted increase in bun/creat 27/1.32 and GFR-40  -Recheck BMP on 2024  -Continue Potassium supplementation with 20 meq daily.  -Monitor for signs and symptoms of dehydration  -Encourage hydration with attention to fluid restriction   - Continue low sodium diet .  -Follow up with cardiology as scheduled             Nonrheumatic tricuspid valve regurgitation  S/p TTVR on 2024  -Currently stable.  -Follow up with cardiology as scheduled.         Sick sinus syndrome (HCC)  -Current heart rate is 80.  -Right chest wall pacemaker present.  -Follow up with Cardiology as scheduled.         Paroxysmal atrial fibrillation (HCC)  Currently on Eliquis 2.5 mg daily.  -Follow-up with cardiology as scheduled,         Ambulatory dysfunction  -Continue PT/OT recommendations fo ensure safe mobilizations and transfers.  -Continue fall precautions.  -Continue use of DME to assist with ambulation            History of Present Illness     Sarah Holland is a 83 y.o. female who presents for follow up care of acute and chronic conditions. On examination patient is alert, awake and with confusion. As per nurse and daughter patient is alert , oriented and able to answer questions appropriately .. Vital signs reviewed and are within normal limits. Labs reviewed with increase  in bun/creat 27/1.32 noted. Torsemide reduced to 20 mg po daily and recheck of BMP to check renal function on 2024. Hydration encouraged and monitoring daily  weights noted as well. Will continue to monitor patient progress.      Review of Systems   Constitutional:  Negative for activity change and fever.   HENT:  Negative for congestion.    Respiratory:  Negative for cough and shortness of breath.    Cardiovascular:  Negative for chest pain, palpitations and leg swelling.   Gastrointestinal:  Negative for constipation, nausea and vomiting.   Genitourinary:  Negative for dysuria.   Musculoskeletal:  Negative for arthralgias.   Neurological:  Negative for dizziness, weakness, light-headedness and headaches.   Psychiatric/Behavioral:  Negative for sleep disturbance. The patient is not nervous/anxious.      Pertinent Medical History       Past Medical History   Past Medical History:   Diagnosis Date    Allergic rhinitis     last assessed - 05Jan2013    Arthritis     lower back    Atrial flutter (HCC)     last assessed - 16Dec2013    Diabetes mellitus (HCC)     Type II    Fatigue     last assessed - 01Oct2013    Heart murmur     tricupid reguritation, SSS    Hyperlipidemia     Hypertension     Insomnia     Irregular heart beat     Cardioversion, pacemaker, severe tricuspid regurgitation, atrial septal defect.    Lower leg edema     last assessed - 09Nov2015    Macular degeneration     b/l    Osteoporosis     Osteoporosis     Shortness of breath     prior to heart surgery    Sick sinus syndrome (HCC)     last assessed - 05Apr2017    Sting from hornet, wasp, or bee     last assessed - 20Aug2013    Subconjunctival hemorrhage     unspecified laterality; last assessed - 09Jan2014     Past Surgical History:   Procedure Laterality Date    ASD REPAIR, SECUNDUM      BREAST BIOPSY Right     benign    CARDIAC PACEMAKER PLACEMENT      CARDIAC SURGERY      Atrial septal defect repaired, pacemaker    COLONOSCOPY N/A 04/21/2016    Procedure: COLONOSCOPY;  Surgeon: London Olivares MD;  Location: [facility];  Service:     COLONOSCOPY W/ BIOPSIES AND POLYPECTOMY      ESOPHAGOGASTRODUODENOSCOPY       EYE SURGERY      lid lift    HYSTERECTOMY      at age 37 or 38    MAMMO STEREOTACTIC BREAST BIOPSY RIGHT (ALL INC) Right 07/14/2021    OOPHORECTOMY Left     MD COLONOSCOPY FLX DX W/COLLJ SPEC WHEN PFRMD N/A 05/10/2019    Procedure: COLONOSCOPY with polypectomy;  Surgeon: London Olivares MD;  Location: [facility];  Service: Gastroenterology    TOTAL ABDOMINAL HYSTERECTOMY      VEIN SURGERY Left     vericose vein left leg    WISDOM TOOTH EXTRACTION       Family History   Problem Relation Age of Onset    No Known Problems Mother     Gout Brother     No Known Problems Father     No Known Problems Sister     No Known Problems Daughter     No Known Problems Maternal Grandmother     No Known Problems Maternal Grandfather     No Known Problems Paternal Grandmother     No Known Problems Paternal Grandfather     Alcohol abuse Neg Hx     Substance Abuse Neg Hx      Current Outpatient Medications on File Prior to Visit   Medication Sig Dispense Refill    albuterol (2.5 mg/3 mL) 0.083 % nebulizer solution Take 3 mL (2.5 mg total) by nebulization every 6 (six) hours as needed for wheezing or shortness of breath 180 mL 0    apixaban (ELIQUIS) 2.5 mg Take 2.5 mg by mouth 2 (two) times a day      b complex-C-folic acid (NEPHRO-RASHEL) 0.8 mg tablet Take by mouth daily with dinner      Calcium Citrate 250 MG TABS Take 1 tablet by mouth 2 (two) times a day.      camphor-menthol (SARNA) lotion Apply 1 Application topically as needed for itching      Cholecalciferol (VITAMIN D-3 PO) Take 1,000 Units by mouth daily.      clindamycin (CLEOCIN) 150 mg capsule TAKE 2 CAPSULES BY MOUTH 1 HOUR BEFORE DENTAL VISIT      glucose blood test strip by In Vitro route 4 (four) times a day      glucose monitoring kit (FREESTYLE) monitoring kit E11.9 Patient is type 2 diabetic. Test once daily 1 each 0    lactulose 20 g/30 mL Take 20 g by mouth 2 (two) times a day      Lancets (FREESTYLE) lancets by Does not apply route daily      levothyroxine 25 mcg  tablet Take 1 tablet (25 mcg total) by mouth daily 90 tablet 0    Magnesium 250 MG TABS Take 250 mg by mouth daily      Multiple Vitamins-Minerals (CENTRUM SILVER PO) Take 1 tablet by mouth daily.      Potassium Chloride ER 20 MEQ TBCR Take 1 tablet by mouth daily      senna (Senokot) 8.6 MG tablet Take 8.6 mg by mouth daily      thiamine 100 MG tablet Take 200 mg by mouth daily      torsemide (DEMADEX) 20 mg tablet Take 2 tablets (40 mg total) by mouth daily       No current facility-administered medications on file prior to visit.     Allergies   Allergen Reactions    Penicillins Anaphylaxis    Shellfish Allergy - Food Allergy Anaphylaxis and Hives      Current Outpatient Medications on File Prior to Visit   Medication Sig Dispense Refill    albuterol (2.5 mg/3 mL) 0.083 % nebulizer solution Take 3 mL (2.5 mg total) by nebulization every 6 (six) hours as needed for wheezing or shortness of breath 180 mL 0    apixaban (ELIQUIS) 2.5 mg Take 2.5 mg by mouth 2 (two) times a day      b complex-C-folic acid (NEPHRO-RASHEL) 0.8 mg tablet Take by mouth daily with dinner      Calcium Citrate 250 MG TABS Take 1 tablet by mouth 2 (two) times a day.      camphor-menthol (SARNA) lotion Apply 1 Application topically as needed for itching      Cholecalciferol (VITAMIN D-3 PO) Take 1,000 Units by mouth daily.      clindamycin (CLEOCIN) 150 mg capsule TAKE 2 CAPSULES BY MOUTH 1 HOUR BEFORE DENTAL VISIT      glucose blood test strip by In Vitro route 4 (four) times a day      glucose monitoring kit (FREESTYLE) monitoring kit E11.9 Patient is type 2 diabetic. Test once daily 1 each 0    lactulose 20 g/30 mL Take 20 g by mouth 2 (two) times a day      Lancets (FREESTYLE) lancets by Does not apply route daily      levothyroxine 25 mcg tablet Take 1 tablet (25 mcg total) by mouth daily 90 tablet 0    Magnesium 250 MG TABS Take 250 mg by mouth daily      Multiple Vitamins-Minerals (CENTRUM SILVER PO) Take 1 tablet by mouth daily.       Potassium Chloride ER 20 MEQ TBCR Take 1 tablet by mouth daily      senna (Senokot) 8.6 MG tablet Take 8.6 mg by mouth daily      thiamine 100 MG tablet Take 200 mg by mouth daily      torsemide (DEMADEX) 20 mg tablet Take 2 tablets (40 mg total) by mouth daily       No current facility-administered medications on file prior to visit.      Social History     Tobacco Use    Smoking status: Never     Passive exposure: Never    Smokeless tobacco: Never   Vaping Use    Vaping status: Never Used   Substance and Sexual Activity    Alcohol use: Not Currently     Comment: occas none recently; social drinker - per [employer]    Drug use: No    Sexual activity: Not on file         Objective   Vital Signs     /62   Pulse 80   Temp 97.7 °F (36.5 °C)   Resp 20   SpO2 99%     Physical Exam  Constitutional:       Appearance: Normal appearance.   HENT:      Nose: Nose normal.   Cardiovascular:      Rate and Rhythm: Regular rhythm.      Pulses: Normal pulses.      Heart sounds: Normal heart sounds.   Pulmonary:      Effort: Pulmonary effort is normal.      Breath sounds: Normal breath sounds.   Abdominal:      General: Bowel sounds are normal.      Palpations: Abdomen is soft.   Skin:     General: Skin is warm and dry.      Capillary Refill: Capillary refill takes less than 2 seconds.   Neurological:      Mental Status: She is alert. Mental status is at baseline.   Psychiatric:         Behavior: Behavior normal.         Shannon Santa @11/12/2024@0058

## 2024-11-12 NOTE — ASSESSMENT & PLAN NOTE
-Current heart rate is 80.  -Right chest wall pacemaker present.  -Follow up with Cardiology as scheduled.

## 2024-11-12 NOTE — ASSESSMENT & PLAN NOTE
Current weight is 107.1 lbs with desirable 1.3 lb weight loss.  -EF-60%  -Continue to monitor weight daily.  -Decrease Torsemide to 20 mg po daily due to labs reviewed  with noted increase in bun/creat 27/1.32 and GFR-40  -Recheck BMP on 11/14/2024  -Continue Potassium supplementation with 20 meq daily.  -Monitor for signs and symptoms of dehydration  -Encourage hydration with attention to fluid restriction   - Continue low sodium diet .  -Follow up with cardiology as scheduled

## 2024-11-12 NOTE — ASSESSMENT & PLAN NOTE
-Continue PT/OT recommendations fo ensure safe mobilizations and transfers.  -Continue fall precautions.  -Continue use of DME to assist with ambulation

## 2024-11-13 NOTE — PROGRESS NOTES
"[facility] Home Visit    [facility]  Name: Sarah Holland      : 1940      MRN: [id number]  Encounter Provider: TONY Rehman  Encounter Date: 2024   Encounter department: [facility] VIRTUAL    POS-31    Assessment & Plan  Cognitive impairment  -Encourage hydration .  -Monitor changes in behavior.  -Reorient patient as needed.  -Continue fall precautions.           History of Present Illness     Sarah Holland is a 83 y.o. female who presents for follow up care after she was noted to be with confusion and fatigue that was reported by both nurse and daughter who was visiting. Patient was received in her room alert, verbal , awake and cooperative. Patient verbalized that she was more awake than yesterday and stated ,\"the fogginess is much better\". Physical assessment was completed and patient offered no concerns.Patient was observed to be awake, able to answer questions appropriately and self propelling in wheelchair.Educated on importance of hydration and verbalized understanding      Review of Systems   Constitutional:  Negative for activity change and fever.   Respiratory:  Negative for cough and shortness of breath.    Cardiovascular:  Negative for chest pain, palpitations and leg swelling.   Gastrointestinal:  Negative for constipation.   Genitourinary:  Negative for dysuria and frequency.   Neurological:  Negative for dizziness, tremors, numbness and headaches.   Psychiatric/Behavioral:  Negative for sleep disturbance. The patient is not nervous/anxious.      Pertinent Medical History       Past Medical History   Past Medical History:   Diagnosis Date    Allergic rhinitis     last assessed - 2013    Arthritis     lower back    Atrial flutter (HCC)     last assessed - 2013    Diabetes mellitus (HCC)     Type II    Fatigue     last assessed - 2013    Heart murmur     tricupid reguritation, SSS    Hyperlipidemia     Hypertension     Insomnia     Irregular " heart beat     Cardioversion, pacemaker, severe tricuspid regurgitation, atrial septal defect.    Lower leg edema     last assessed - 09Nov2015    Macular degeneration     b/l    Osteoporosis     Osteoporosis     Shortness of breath     prior to heart surgery    Sick sinus syndrome (HCC)     last assessed - 05Apr2017    Sting from hornet, wasp, or bee     last assessed - 20Aug2013    Subconjunctival hemorrhage     unspecified laterality; last assessed - 09Jan2014     Past Surgical History:   Procedure Laterality Date    ASD REPAIR, SECUNDUM      BREAST BIOPSY Right     benign    CARDIAC PACEMAKER PLACEMENT      CARDIAC SURGERY      Atrial septal defect repaired, pacemaker    COLONOSCOPY N/A 04/21/2016    Procedure: COLONOSCOPY;  Surgeon: London Olivares MD;  Location: [facility];  Service:     COLONOSCOPY W/ BIOPSIES AND POLYPECTOMY      ESOPHAGOGASTRODUODENOSCOPY      EYE SURGERY      lid lift    HYSTERECTOMY      at age 37 or 38    MAMMO STEREOTACTIC BREAST BIOPSY RIGHT (ALL INC) Right 07/14/2021    OOPHORECTOMY Left     [place] COLONOSCOPY FLX DX W/COLLJ SPEC WHEN PFRMD N/A 05/10/2019    Procedure: COLONOSCOPY with polypectomy;  Surgeon: London Olivares MD;  Location: [facility];  Service: Gastroenterology    TOTAL ABDOMINAL HYSTERECTOMY      VEIN SURGERY Left     vericose vein left leg    WISDOM TOOTH EXTRACTION       Family History   Problem Relation Age of Onset    No Known Problems Mother     Gout Brother     No Known Problems Father     No Known Problems Sister     No Known Problems Daughter     No Known Problems Maternal Grandmother     No Known Problems Maternal Grandfather     No Known Problems Paternal Grandmother     No Known Problems Paternal Grandfather     Alcohol abuse Neg Hx     Substance Abuse Neg Hx      Current Outpatient Medications on File Prior to Visit   Medication Sig Dispense Refill    albuterol (2.5 mg/3 mL) 0.083 % nebulizer solution Take 3 mL (2.5 mg total) by nebulization every 6 (six) hours  as needed for wheezing or shortness of breath 180 mL 0    apixaban (ELIQUIS) 2.5 mg Take 2.5 mg by mouth 2 (two) times a day      b complex-C-folic acid (NEPHRO-RASHEL) 0.8 mg tablet Take by mouth daily with dinner      Calcium Citrate 250 MG TABS Take 1 tablet by mouth 2 (two) times a day.      camphor-menthol (SARNA) lotion Apply 1 Application topically as needed for itching      Cholecalciferol (VITAMIN D-3 PO) Take 1,000 Units by mouth daily.      clindamycin (CLEOCIN) 150 mg capsule TAKE 2 CAPSULES BY MOUTH 1 HOUR BEFORE DENTAL VISIT      glucose blood test strip by In Vitro route 4 (four) times a day      glucose monitoring kit (FREESTYLE) monitoring kit E11.9 Patient is type 2 diabetic. Test once daily 1 each 0    lactulose 20 g/30 mL Take 20 g by mouth 2 (two) times a day      Lancets (FREESTYLE) lancets by Does not apply route daily      levothyroxine 25 mcg tablet Take 1 tablet (25 mcg total) by mouth daily 90 tablet 0    Magnesium 250 MG TABS Take 250 mg by mouth daily      Multiple Vitamins-Minerals (CENTRUM SILVER PO) Take 1 tablet by mouth daily.      Potassium Chloride ER 20 MEQ TBCR Take 1 tablet by mouth daily      senna (Senokot) 8.6 MG tablet Take 8.6 mg by mouth daily      thiamine 100 MG tablet Take 200 mg by mouth daily      torsemide (DEMADEX) 20 mg tablet Take 2 tablets (40 mg total) by mouth daily       No current facility-administered medications on file prior to visit.     Allergies   Allergen Reactions    Penicillins Anaphylaxis    Shellfish Allergy - Food Allergy Anaphylaxis and Hives      Current Outpatient Medications on File Prior to Visit   Medication Sig Dispense Refill    albuterol (2.5 mg/3 mL) 0.083 % nebulizer solution Take 3 mL (2.5 mg total) by nebulization every 6 (six) hours as needed for wheezing or shortness of breath 180 mL 0    apixaban (ELIQUIS) 2.5 mg Take 2.5 mg by mouth 2 (two) times a day      b complex-C-folic acid (NEPHRO-RASHEL) 0.8 mg tablet Take by mouth daily with  dinner      Calcium Citrate 250 MG TABS Take 1 tablet by mouth 2 (two) times a day.      camphor-menthol (SARNA) lotion Apply 1 Application topically as needed for itching      Cholecalciferol (VITAMIN D-3 PO) Take 1,000 Units by mouth daily.      clindamycin (CLEOCIN) 150 mg capsule TAKE 2 CAPSULES BY MOUTH 1 HOUR BEFORE DENTAL VISIT      glucose blood test strip by In Vitro route 4 (four) times a day      glucose monitoring kit (FREESTYLE) monitoring kit E11.9 Patient is type 2 diabetic. Test once daily 1 each 0    lactulose 20 g/30 mL Take 20 g by mouth 2 (two) times a day      Lancets (FREESTYLE) lancets by Does not apply route daily      levothyroxine 25 mcg tablet Take 1 tablet (25 mcg total) by mouth daily 90 tablet 0    Magnesium 250 MG TABS Take 250 mg by mouth daily      Multiple Vitamins-Minerals (CENTRUM SILVER PO) Take 1 tablet by mouth daily.      Potassium Chloride ER 20 MEQ TBCR Take 1 tablet by mouth daily      senna (Senokot) 8.6 MG tablet Take 8.6 mg by mouth daily      thiamine 100 MG tablet Take 200 mg by mouth daily      torsemide (DEMADEX) 20 mg tablet Take 2 tablets (40 mg total) by mouth daily       No current facility-administered medications on file prior to visit.      Social History     Tobacco Use    Smoking status: Never     Passive exposure: Never    Smokeless tobacco: Never   Vaping Use    Vaping status: Never Used   Substance and Sexual Activity    Alcohol use: Not Currently     Comment: occas none recently; social drinker - per [employer]    Drug use: No    Sexual activity: Not on file         Objective   Vital Signs  /52   Pulse 74   Temp 97.7 °F (36.5 °C)   Resp 19   SpO2 98%     Physical Exam  Cardiovascular:      Rate and Rhythm: Normal rate and regular rhythm.      Pulses: Normal pulses.      Heart sounds: Murmur (hx of murmur) heard.   Pulmonary:      Effort: Pulmonary effort is normal.      Breath sounds: Normal breath sounds.   Abdominal:      General: Abdomen is  flat. Bowel sounds are normal. There is no distension.      Palpations: Abdomen is soft.      Tenderness: There is no abdominal tenderness.   Musculoskeletal:         General: Normal range of motion.   Skin:     General: Skin is warm and dry.      Capillary Refill: Capillary refill takes less than 2 seconds.   Neurological:      Mental Status: She is alert. Mental status is at baseline.      Motor: Weakness present.      Gait: Gait abnormal.   Psychiatric:         Mood and Affect: Mood normal.         Judgment: Judgment normal.         Shannon Santa 11/12/2024@2635

## 2024-11-13 NOTE — ASSESSMENT & PLAN NOTE
-Encourage hydration .  -Monitor changes in behavior.  -Reorient patient as needed.  -Continue fall precautions.

## 2024-11-14 ENCOUNTER — NURSING HOME VISIT (OUTPATIENT)
Dept: GERIATRICS | Facility: OTHER | Age: 84
End: 2024-11-14
Payer: COMMERCIAL

## 2024-11-14 DIAGNOSIS — I36.1 NONRHEUMATIC TRICUSPID VALVE REGURGITATION: ICD-10-CM

## 2024-11-14 DIAGNOSIS — I49.5 SICK SINUS SYNDROME (HCC): ICD-10-CM

## 2024-11-14 DIAGNOSIS — R41.89 COGNITIVE IMPAIRMENT: Primary | ICD-10-CM

## 2024-11-14 DIAGNOSIS — I48.0 PAROXYSMAL ATRIAL FIBRILLATION (HCC): ICD-10-CM

## 2024-11-14 DIAGNOSIS — I50.33 ACUTE ON CHRONIC HEART FAILURE WITH PRESERVED EJECTION FRACTION (HFPEF) (HCC): ICD-10-CM

## 2024-11-14 DIAGNOSIS — N18.32 CHRONIC KIDNEY DISEASE, STAGE 3B (HCC): ICD-10-CM

## 2024-11-14 PROCEDURE — 99309 SBSQ NF CARE MODERATE MDM 30: CPT

## 2024-11-15 VITALS
OXYGEN SATURATION: 98 % | DIASTOLIC BLOOD PRESSURE: 64 MMHG | HEART RATE: 71 BPM | BODY MASS INDEX: 21.26 KG/M2 | RESPIRATION RATE: 19 BRPM | WEIGHT: 107 LBS | SYSTOLIC BLOOD PRESSURE: 118 MMHG

## 2024-11-15 NOTE — ASSESSMENT & PLAN NOTE
Condition currently stable.  -Continue Eliquis 2.5 mg for anticoagulation.  -Monitor for increased bruisng and bleeding.  -Follow up with cardiology as needed.

## 2024-11-15 NOTE — ASSESSMENT & PLAN NOTE
-Continue to monitor BMP regularly  -Current pertinent labs 11/11 bun/creat/ GFR -27/1.32/40 and 11/14 26/1.54/33  -Fluid restriction increased to 2000ml/24 hours  -Encourage oral hydration.  -Recheck BMP on 11/18/2024

## 2024-11-15 NOTE — ASSESSMENT & PLAN NOTE
S/p TTVR on 11/01/2024  -Currently stable.  -Monitor weights daily.  -Monitor for signs and symptoms of heart failure  -Follow up with cardiology as scheduled.

## 2024-11-15 NOTE — ASSESSMENT & PLAN NOTE
-Current weight as of 11/14 -107 lbs and is stable.  -Continue Torsemide 20 mg po daily.  -Continue daily weights.  -Continue fluid restriction 2000ml/24 hour  -Recheck BMP on 11/18/24  -Follow up with cardiology as scheduled or as needed

## 2024-11-15 NOTE — ASSESSMENT & PLAN NOTE
Current heart rate paced at 71.  Right chest wall pacemaker present  Follow up with cardiology as scheduled

## 2024-11-15 NOTE — PROGRESS NOTES
NURSING HOME VISIT    FACILITY : [facility]      Name: Sarah Holland      : 1940      MRN: [id number]  Encounter Provider: TONY Rehman  Encounter Date: 2024   Encounter department: [facility] VIRTUAL      POS-[facility] 31  :  Assessment & Plan  Cognitive impairment  -Cognitive function much improved, patient is alert, oriented and coherent . Answering questions appropriately.  -Enourage oral hydration .  -Monitor or changes in behavior and mental status.  -Reorient patient when appropriate.  -Continue fall precautions.         Acute on chronic heart failure with preserved ejection fraction (HFpEF) ([facility])  -Current weight as of  -107 lbs and is stable.  -Continue Torsemide 20 mg po daily.  -Continue daily weights.  -Continue fluid restriction 2000ml/24 hour  -Recheck BMP on 24  -Follow up with cardiology as scheduled or as needed                 Nonrheumatic tricuspid valve regurgitation  S/p TTVR on 2024  -Currently stable.  -Monitor weights daily.  -Monitor for signs and symptoms of heart failure  -Follow up with cardiology as scheduled.               Paroxysmal atrial fibrillation ([facility])  Condition currently stable.  -Continue Eliquis 2.5 mg for anticoagulation.  -Monitor for increased bruisng and bleeding.  -Follow up with cardiology as needed.         Sick sinus syndrome ([facility])  Current heart rate paced at 71.  Right chest wall pacemaker present  Follow up with cardiology as scheduled         Chronic kidney disease, stage 3b ([facility])  -Continue to monitor BMP regularly  -Current pertinent labs  bun/creat/ GFR -27/1.32/40 and  26/1.54/33  -Fluid restriction increased to 2000ml/24 hours  -Encourage oral hydration.  -Recheck BMP on 2024             History of Present Illness   HPI  Sarah Holland is a 83 y.o. female who was seen today for follow up of chronic conditions which are currently stable. On examination patient was alert, verbal and  oriented x3.denied  pain at this time. Physical assessment was completed and patient was cooperative during visit.       Review of Systems   Constitutional:  Negative for activity change and fever.   Respiratory:  Negative for shortness of breath and wheezing.    Cardiovascular:  Negative for chest pain, palpitations and leg swelling.   Gastrointestinal:  Negative for abdominal distention and constipation.   Genitourinary:  Negative for dysuria.   Musculoskeletal:  Negative for arthralgias.   Skin:  Negative for color change.   Neurological:  Negative for dizziness, light-headedness and headaches.   Psychiatric/Behavioral:  Negative for sleep disturbance. The patient is not nervous/anxious.      .  Past Medical History   Past Medical History:   Diagnosis Date    Allergic rhinitis     last assessed - 05Jan2013    Arthritis     lower back    Atrial flutter (HCC)     last assessed - [id number]    Diabetes mellitus (HCC)     Type II    Fatigue     last assessed - 01Oct2013    Heart murmur     tricupid reguritation, SSS    Hyperlipidemia     Hypertension     Insomnia     Irregular heart beat     Cardioversion, pacemaker, severe tricuspid regurgitation, atrial septal defect.    Lower leg edema     last assessed - 09Nov2015    Macular degeneration     b/l    Osteoporosis     Osteoporosis     Shortness of breath     prior to heart surgery    Sick sinus syndrome (HCC)     last assessed - 05Apr2017    Sting from hornet, wasp, or bee     last assessed - [id number]    Subconjunctival hemorrhage     unspecified laterality; last assessed - 09Jan2014     Past Surgical History:   Procedure Laterality Date    ASD REPAIR, SECUNDUM      BREAST BIOPSY Right     benign    CARDIAC PACEMAKER PLACEMENT      CARDIAC SURGERY      Atrial septal defect repaired, pacemaker    COLONOSCOPY N/A 04/21/2016    Procedure: COLONOSCOPY;  Surgeon: London Olivares MD;  Location: [facility];  Service:     COLONOSCOPY W/ BIOPSIES AND POLYPECTOMY       ESOPHAGOGASTRODUODENOSCOPY      EYE SURGERY      lid lift    HYSTERECTOMY      at age 37 or 38    MAMMO STEREOTACTIC BREAST BIOPSY RIGHT (ALL INC) Right 07/14/2021    OOPHORECTOMY Left     [place] COLONOSCOPY FLX DX W/COLLJ SPEC WHEN PFRMD N/A 05/10/2019    Procedure: COLONOSCOPY with polypectomy;  Surgeon: London Olivares MD;  Location: [facility];  Service: Gastroenterology    TOTAL ABDOMINAL HYSTERECTOMY      VEIN SURGERY Left     vericose vein left leg    WISDOM TOOTH EXTRACTION       Family History   Problem Relation Age of Onset    No Known Problems Mother     Gout Brother     No Known Problems Father     No Known Problems Sister     No Known Problems Daughter     No Known Problems Maternal Grandmother     No Known Problems Maternal Grandfather     No Known Problems Paternal Grandmother     No Known Problems Paternal Grandfather     Alcohol abuse Neg Hx     Substance Abuse Neg Hx       reports that she has never smoked. She has never been exposed to tobacco smoke. She has never used smokeless tobacco. She reports that she does not currently use alcohol. She reports that she does not use drugs.  Current Outpatient Medications on File Prior to Visit   Medication Sig Dispense Refill    albuterol (2.5 mg/3 mL) 0.083 % nebulizer solution Take 3 mL (2.5 mg total) by nebulization every 6 (six) hours as needed for wheezing or shortness of breath 180 mL 0    apixaban (ELIQUIS) 2.5 mg Take 2.5 mg by mouth 2 (two) times a day      b complex-C-folic acid (NEPHRO-RASHEL) 0.8 mg tablet Take by mouth daily with dinner      Calcium Citrate 250 MG TABS Take 1 tablet by mouth 2 (two) times a day.      camphor-menthol (SARNA) lotion Apply 1 Application topically as needed for itching      Cholecalciferol (VITAMIN D-3 PO) Take 1,000 Units by mouth daily.      clindamycin (CLEOCIN) 150 mg capsule TAKE 2 CAPSULES BY MOUTH 1 HOUR BEFORE DENTAL VISIT      glucose blood test strip by In Vitro route 4 (four) times a day      glucose monitoring  kit (FREESTYLE) monitoring kit E11.9 Patient is type 2 diabetic. Test once daily 1 each 0    lactulose 20 g/30 mL Take 20 g by mouth 2 (two) times a day      Lancets (FREESTYLE) lancets by Does not apply route daily      levothyroxine 25 mcg tablet Take 1 tablet (25 mcg total) by mouth daily 90 tablet 0    Magnesium 250 MG TABS Take 250 mg by mouth daily      Multiple Vitamins-Minerals (CENTRUM SILVER PO) Take 1 tablet by mouth daily.      Potassium Chloride ER 20 MEQ TBCR Take 1 tablet by mouth daily      senna (Senokot) 8.6 MG tablet Take 8.6 mg by mouth daily      thiamine 100 MG tablet Take 200 mg by mouth daily      torsemide (DEMADEX) 20 mg tablet Take 2 tablets (40 mg total) by mouth daily       No current facility-administered medications on file prior to visit.     Allergies   Allergen Reactions    Penicillins Anaphylaxis    Shellfish Allergy - Food Allergy Anaphylaxis and Hives      Current Outpatient Medications on File Prior to Visit   Medication Sig Dispense Refill    albuterol (2.5 mg/3 mL) 0.083 % nebulizer solution Take 3 mL (2.5 mg total) by nebulization every 6 (six) hours as needed for wheezing or shortness of breath 180 mL 0    apixaban (ELIQUIS) 2.5 mg Take 2.5 mg by mouth 2 (two) times a day      b complex-C-folic acid (NEPHRO-RASHEL) 0.8 mg tablet Take by mouth daily with dinner      Calcium Citrate 250 MG TABS Take 1 tablet by mouth 2 (two) times a day.      camphor-menthol (SARNA) lotion Apply 1 Application topically as needed for itching      Cholecalciferol (VITAMIN D-3 PO) Take 1,000 Units by mouth daily.      clindamycin (CLEOCIN) 150 mg capsule TAKE 2 CAPSULES BY MOUTH 1 HOUR BEFORE DENTAL VISIT      glucose blood test strip by In Vitro route 4 (four) times a day      glucose monitoring kit (FREESTYLE) monitoring kit E11.9 Patient is type 2 diabetic. Test once daily 1 each 0    lactulose 20 g/30 mL Take 20 g by mouth 2 (two) times a day      Lancets (FREESTYLE) lancets by Does not apply  route daily      levothyroxine 25 mcg tablet Take 1 tablet (25 mcg total) by mouth daily 90 tablet 0    Magnesium 250 MG TABS Take 250 mg by mouth daily      Multiple Vitamins-Minerals (CENTRUM SILVER PO) Take 1 tablet by mouth daily.      Potassium Chloride ER 20 MEQ TBCR Take 1 tablet by mouth daily      senna (Senokot) 8.6 MG tablet Take 8.6 mg by mouth daily      thiamine 100 MG tablet Take 200 mg by mouth daily      torsemide (DEMADEX) 20 mg tablet Take 2 tablets (40 mg total) by mouth daily       No current facility-administered medications on file prior to visit.      Social History     Tobacco Use    Smoking status: Never     Passive exposure: Never    Smokeless tobacco: Never   Vaping Use    Vaping status: Never Used   Substance and Sexual Activity    Alcohol use: Not Currently     Comment: occas none recently; social drinker - per [employer]    Drug use: No    Sexual activity: Not on file        Objective     Vital Signs      /64   Pulse 71   Resp 19   Wt 48.5 kg (107 lb)   SpO2 98%   BMI 21.26 kg/m²      Physical Exam  HENT:      Head: Normocephalic.   Cardiovascular:      Rate and Rhythm: Normal rate.      Pulses: Normal pulses.      Heart sounds: Normal heart sounds.   Pulmonary:      Effort: Pulmonary effort is normal.      Breath sounds: Normal breath sounds.   Abdominal:      General: Abdomen is flat.      Palpations: Abdomen is soft.   Musculoskeletal:         General: Normal range of motion.   Skin:     General: Skin is warm and dry.      Capillary Refill: Capillary refill takes less than 2 seconds.   Neurological:      General: No focal deficit present.      Mental Status: She is alert. Mental status is at baseline.   Psychiatric:         Mood and Affect: Mood normal.         Behavior: Behavior normal.           Shannon Leanndomenica 11/15/2024@1546

## 2024-11-16 PROBLEM — R05.9 COUGH: Status: RESOLVED | Noted: 2024-10-12 | Resolved: 2024-11-16

## 2024-11-18 ENCOUNTER — NURSING HOME VISIT (OUTPATIENT)
Dept: GERIATRICS | Facility: OTHER | Age: 84
End: 2024-11-18
Payer: COMMERCIAL

## 2024-11-18 ENCOUNTER — TELEPHONE (OUTPATIENT)
Dept: OTHER | Facility: OTHER | Age: 84
End: 2024-11-18

## 2024-11-18 VITALS — HEART RATE: 75 BPM | SYSTOLIC BLOOD PRESSURE: 114 MMHG | TEMPERATURE: 97.9 F | DIASTOLIC BLOOD PRESSURE: 60 MMHG

## 2024-11-18 DIAGNOSIS — I36.1 NONRHEUMATIC TRICUSPID VALVE REGURGITATION: ICD-10-CM

## 2024-11-18 DIAGNOSIS — R41.89 COGNITIVE IMPAIRMENT: Primary | ICD-10-CM

## 2024-11-18 DIAGNOSIS — I48.0 PAROXYSMAL ATRIAL FIBRILLATION (HCC): ICD-10-CM

## 2024-11-18 DIAGNOSIS — N18.4 STAGE 4 CHRONIC KIDNEY DISEASE (HCC): Chronic | ICD-10-CM

## 2024-11-18 DIAGNOSIS — I50.32 CHRONIC DIASTOLIC HEART FAILURE (HCC): Chronic | ICD-10-CM

## 2024-11-18 PROBLEM — R50.82 POSTOPERATIVE FEVER: Status: RESOLVED | Noted: 2024-11-08 | Resolved: 2024-11-18

## 2024-11-18 PROBLEM — L29.9 ITCHING: Status: RESOLVED | Noted: 2023-11-21 | Resolved: 2024-11-18

## 2024-11-18 PROBLEM — R06.83 SNORING: Status: RESOLVED | Noted: 2020-07-28 | Resolved: 2024-11-18

## 2024-11-18 PROCEDURE — 99309 SBSQ NF CARE MODERATE MDM 30: CPT

## 2024-11-18 NOTE — TELEPHONE ENCOUNTER
Nursing home or Independent living name:  [facility]    Who is calling:  Marco Antonioleah    Callback number:   [phone]    Symptoms:   fall    Message sent to on call provider; verified read receipt of message.

## 2024-11-18 NOTE — ASSESSMENT & PLAN NOTE
Nursing report patient has been more forgetful, not herself since hospitalization   Patient reports brain fog  Appears she was previously on lactulose for hepatic encephalopathy in September 2024 but has been off lactulose since most recent hospitalization at [facility]   Will recheck Ammonia level and LFTs   Fall/safety precautions  Supportive care, assistance with ADLs   Avoid deliriogenic medications   Encourage adequate hydration and nutrition   Maintain sleep/wake cycle

## 2024-11-19 ENCOUNTER — TELEPHONE (OUTPATIENT)
Dept: OTHER | Facility: OTHER | Age: 84
End: 2024-11-19

## 2024-11-19 NOTE — PROGRESS NOTES
[facility]  [place]  [address], [place], [place]  [phone]  SNF Rehab: POS 31    NAME: Sarah Holland  AGE: 83 y.o. SEX: female  : 1940     DATE: 24    CODE STATUS: No CPR     Assessment and Plan:     Problem List Items Addressed This Visit       Nonrheumatic tricuspid valve regurgitation    S/p TTVR on 2024  Currently stable.  Monitor weights daily.  Monitor for signs and symptoms of heart failure  Follow up with cardiology as scheduled.           Chronic diastolic heart failure (HCC) (Chronic)    Wt Readings from Last 3 Encounters:   11/15/24 48.5 kg (107 lb)   10/29/24 45.7 kg (100 lb 11.2 oz)   10/21/24 46 kg (101 lb 8 oz)     Weight stable   Continue torsemide and potassium supplement   Monitor weight   F/u with cardiology outpatient          Paroxysmal atrial fibrillation (HCC)    S/p PPM  Continue eliquis          Stage 4 chronic kidney disease (HCC) (Chronic)    Lab Results   Component Value Date    EGFR 41 (L) 10/19/2024    EGFR 39 (L) 10/18/2024    EGFR 38 (L) 10/17/2024    CREATININE 1.24 (H) 2024    CREATININE 1.39 (H) 2024    CREATININE 1.24 (H) 2024   Hx of dialysis temporarily in    Not currently on dialysis   Avoid nephrotoxins, NSAIDs   Monitor renal function   Consider addition of SGLT2 inhibitor  F/u with nephrology          Cognitive impairment - Primary    Nursing report patient has been more forgetful, not herself since hospitalization   Patient reports brain fog  Appears she was previously on lactulose for hepatic encephalopathy in 2024 but has been off lactulose since most recent hospitalization at [facility]   Will recheck Ammonia level and LFTs   Fall/safety precautions  Supportive care, assistance with ADLs   Avoid deliriogenic medications   Encourage adequate hydration and nutrition   Maintain sleep/wake cycle                  History of Present Illness:     Patient is a 83 y.o. old female being  seen at [facility] for follow up of acute and chronic medical conditions. Seen in collaboration with nursing staff who report patient appears cognitively impaired/foggy since returning from hospital. Patient also reporting brain fog and forgetfulness. She denies CP/SOB. No palpitations. Sitting eating lunch without acute distress.         The following portions of the patient's history were reviewed and updated as appropriate: allergies, current medications, past family history, past medical history, past social history, past surgical history and problem list.     Review of Systems:     Review of Systems   Psychiatric/Behavioral:          Brain fog   All other systems reviewed and are negative.       Problem List:     Patient Active Problem List   Diagnosis    Atrial flutter (HCC)    Atrial septal defect    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 disease, without long-term current use of insulin ([facility])    Sick sinus syndrome ([facility])    Pacemaker    Age-related osteoporosis without current pathological fracture    Bilateral nonexudative age-related macular degeneration    Cataract, bilateral    Essential hypertension    Insomnia    Seborrhea    Hyperlipidemia    Long term current use of antiarrhythmic drug    Long term current use of anticoagulant therapy    Transaminitis    Central serous chorioretinopathy    Claudication ([facility])    Daytime sleepiness    Dry eyes    Nonrheumatic tricuspid valve regurgitation    Nuclear senile cataract    Posterior vitreous detachment    Chronic diastolic heart failure ([facility])    Platelets decreased ([facility])    Chronic kidney disease, stage 3b ([facility])    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 ([facility])    Vitreous degeneration of right eye    Type II diabetes mellitus with peripheral circulatory disorder ([facility])    Acquired hypothyroidism    Venous stasis    Bilateral lower extremity edema    Abnormal thyroid function test    Secondary hyperparathyroidism of renal  origin (HCC)    Anemia in chronic kidney disease    Biventricular heart failure (HCC)    Chronic fatigue    Coagulation defect, unspecified (HCC)    Dialysis patient (HCC)    Peripheral vascular disease, unspecified (HCC)    Acute on chronic heart failure with preserved ejection fraction (HFpEF) (HCC)    Hypokalemia    Gait instability    Paroxysmal atrial fibrillation (HCC)    Severe pulmonary hypertension (HCC)    Stage 4 chronic kidney disease (HCC)    Iron deficiency anemia    Cognitive impairment    Frailty syndrome in geriatric patient    Slow transit constipation    Nonischemic nontraumatic myocardial injury    Calculus of gallbladder without cholecystitis without obstruction    Ambulatory dysfunction    Pancytopenia (HCC)    Physical deconditioning    Advance care planning    At risk for constipation    At risk for delirium    Hyponatremia        Objective:     /60   Pulse 75   Temp 97.9 °F (36.6 °C)     Physical Exam  Vitals and nursing note reviewed.   Constitutional:       General: She is not in acute distress.     Appearance: She is well-developed.   HENT:      Head: Normocephalic and atraumatic.   Eyes:      Conjunctiva/sclera: Conjunctivae normal.   Cardiovascular:      Rate and Rhythm: Normal rate and regular rhythm.      Heart sounds: No murmur heard.  Pulmonary:      Effort: Pulmonary effort is normal. No respiratory distress.      Breath sounds: Normal breath sounds.   Abdominal:      Palpations: Abdomen is soft.      Tenderness: There is no abdominal tenderness.   Musculoskeletal:         General: No swelling.      Cervical back: Neck supple.   Skin:     General: Skin is warm and dry.      Capillary Refill: Capillary refill takes less than 2 seconds.   Neurological:      Mental Status: She is alert.   Psychiatric:         Mood and Affect: Mood normal.         Behavior: Behavior normal.         Cognition and Memory: Cognition is impaired.         Pertinent Laboratory/Diagnostic  Studies:    Laboratory Results: I have personally reviewed the pertinent laboratory results/reports     Radiology/Other Diagnostic Testing Results: I have personally reviewed the pertinent diagnostic reports/results.       TONY Tao  Geriatric Medicine

## 2024-11-19 NOTE — ASSESSMENT & PLAN NOTE
S/p TTVR on 11/01/2024  Currently stable.  Monitor weights daily.  Monitor for signs and symptoms of heart failure  Follow up with cardiology as scheduled.

## 2024-11-19 NOTE — ASSESSMENT & PLAN NOTE
Lab Results   Component Value Date    EGFR 41 (L) 10/19/2024    EGFR 39 (L) 10/18/2024    EGFR 38 (L) 10/17/2024    CREATININE 1.24 (H) 11/07/2024    CREATININE 1.39 (H) 11/06/2024    CREATININE 1.24 (H) 11/05/2024   Hx of dialysis temporarily in 2023   Not currently on dialysis   Avoid nephrotoxins, NSAIDs   Monitor renal function   Consider addition of SGLT2 inhibitor  F/u with nephrology

## 2024-11-19 NOTE — ASSESSMENT & PLAN NOTE
Wt Readings from Last 3 Encounters:   11/15/24 48.5 kg (107 lb)   10/29/24 45.7 kg (100 lb 11.2 oz)   10/21/24 46 kg (101 lb 8 oz)     Weight stable   Continue torsemide and potassium supplement   Monitor weight   F/u with cardiology outpatient

## 2024-11-20 ENCOUNTER — NURSING HOME VISIT (OUTPATIENT)
Dept: GERIATRICS | Facility: OTHER | Age: 84
End: 2024-11-20
Payer: COMMERCIAL

## 2024-11-20 VITALS
WEIGHT: 102.2 LBS | SYSTOLIC BLOOD PRESSURE: 104 MMHG | HEART RATE: 71 BPM | TEMPERATURE: 97.5 F | OXYGEN SATURATION: 99 % | BODY MASS INDEX: 20.3 KG/M2 | DIASTOLIC BLOOD PRESSURE: 53 MMHG

## 2024-11-20 DIAGNOSIS — E72.20 HYPERAMMONEMIA (HCC): Primary | ICD-10-CM

## 2024-11-20 DIAGNOSIS — R26.2 AMBULATORY DYSFUNCTION: ICD-10-CM

## 2024-11-20 DIAGNOSIS — Z91.89 AT RISK FOR DELIRIUM: ICD-10-CM

## 2024-11-20 DIAGNOSIS — I36.1 NONRHEUMATIC TRICUSPID VALVE REGURGITATION: ICD-10-CM

## 2024-11-20 PROCEDURE — 99308 SBSQ NF CARE LOW MDM 20: CPT

## 2024-11-20 NOTE — TELEPHONE ENCOUNTER
Kimberly called, requesting a callback from on call provider, to review recent results. Provider was paged via ESC

## 2024-11-21 NOTE — ASSESSMENT & PLAN NOTE
S/P TTVR on 11/01/24 at [facility]  Currently stable  Denies chest pain  No shortness of breath  Weights stable  Follow up with cardiology

## 2024-11-21 NOTE — PROGRESS NOTES
[address], [address], [place] [address]  [phone]    NAME: Sarah Holland  AGE: 83 y.o. SEX: female    Progress Note    Location: [facility]  POS: 32 ([facility])  Code Status: DNR    Chief complaint / Reason for visit:  Acute visit-confusion    Assessment/Plan:    Hyperammonemia (HCC)  Ammonia level on 11/19/24  107  Recent confusion, change in mental status  Started on lactulose 30 ml BID  Compliant with taking medications  Alert and oriented x 2 on exam today  Continue neuro checks, fall precautions  Repeat ammonia level    Ambulatory dysfunction  Recent transition to CBA after hospitalization for STR  Encourage use of assistive device  Continue fall precautions  Encourage patient to participate with activities  Provide education for patient, family, and caregivers      At risk for delirium  Patient at risk for delirium secondary to age, co-morbidities, poly pharmacy, electrolyte imbalance  Identify and treat underlying cause  Provide frequent redirection, reorientation, distraction techniques  Avoid deliriogenic medications such as tramadol, benzodiazepines, anticholinergics,  Benadryl  Treat pain, See geriatric pain protocol  Monitor for constipation and urinary retention  Encourage early and frequent moblization, OOB  Encourage Hydration/ Nutrition  Implement sleep hygiene, limit night time interuptions, group activities    Nonrheumatic tricuspid valve regurgitation  S/P TTVR on 11/01/24 at [facility]  Currently stable  Denies chest pain  No shortness of breath  Weights stable  Follow up with cardiology     This is an 83 y.o. female seen today at [facility].  Medical history includes, not limited to, CHF, type 2 DM, CKD stage 4, anemia, hypothyroidism, peripheral vascular disease, vitamin deficiency, and hypertension.      Resident is seen today for an acute visit regarding recent confusion and fall.  She is seen today laying in bed, comfortable.  Alert and  oriented x 2.  Currently denies pain.  No chest pain.  States occasionally she gets short of breath.  Mainly while working with therapy.  Denies shortness of breath at rest.  States she has been eating well.    Reviewed resident with nursing staff.  Nursing staff reporting acute confusion has slowly been resolving, resident has been taking medications appropriately.  Reviewed recent medication changes, lab work.           Nursing and prior provider notes reviewed on this visit. Discussed visit with PCP and nursing staff/ supervisor.      Review of Systems   Constitutional:  Positive for activity change and fatigue. Negative for appetite change, fever and unexpected weight change.   HENT:  Negative for congestion.    Eyes:  Negative for pain, discharge and visual disturbance.   Respiratory:  Negative for cough and shortness of breath.    Cardiovascular:  Negative for chest pain and palpitations.   Gastrointestinal:  Negative for abdominal pain, constipation and diarrhea.   Genitourinary:  Negative for difficulty urinating, dysuria, hematuria and urgency.   Musculoskeletal:  Positive for gait problem. Negative for arthralgias.   Skin:  Negative for color change.   Neurological:  Negative for syncope and weakness.   Psychiatric/Behavioral:  Negative for confusion and sleep disturbance.    All other systems reviewed and are negative.         ALLERGY: Reviewed, unchanged  Allergies   Allergen Reactions    Penicillins Anaphylaxis    Shellfish Allergy - Food Allergy Anaphylaxis and Hives       HISTORY:  Medical Hx: Reviewed, unchanged  Family Hx: Reviewed, unchanged  Soc Hx: Reviewed,  unchanged      Physical Exam  Vitals reviewed.   Constitutional:       General: She is not in acute distress.     Appearance: Normal appearance. She is not ill-appearing.   HENT:      Head: Normocephalic.      Right Ear: Tympanic membrane normal.      Left Ear: Tympanic membrane normal.      Nose: Nose normal. No congestion.       "Mouth/Throat:      Mouth: Mucous membranes are moist.      Pharynx: Oropharynx is clear.   Eyes:      General:         Right eye: No discharge.         Left eye: No discharge.      Extraocular Movements: Extraocular movements intact.      Conjunctiva/sclera: Conjunctivae normal.      Pupils: Pupils are equal, round, and reactive to light.   Cardiovascular:      Rate and Rhythm: Normal rate and regular rhythm.      Pulses: Normal pulses.      Comments: Left chest wall pacer  Pulmonary:      Effort: Pulmonary effort is normal. No respiratory distress.      Breath sounds: Normal breath sounds.   Chest:      Chest wall: No tenderness.   Abdominal:      General: Bowel sounds are normal.      Palpations: Abdomen is soft.      Tenderness: There is no abdominal tenderness.   Musculoskeletal:         General: No swelling. Normal range of motion.      Cervical back: Normal range of motion.      Right lower leg: No edema.      Left lower leg: No edema.   Skin:     General: Skin is warm and dry.   Neurological:      Mental Status: She is alert.      Motor: No weakness.      Comments: Alert and oriented x 2   Psychiatric:         Mood and Affect: Mood normal.         Behavior: Behavior normal.         Thought Content: Thought content normal.         Judgment: Judgment normal.            Laboratory results / Imaging reviewed: Hard copy/ies in medical chart:    Vitals:    11/20/24 2130   BP: 104/53   Pulse: 71   Temp: 97.5 °F (36.4 °C)   SpO2: 99%       Current Medications:  All medications reviewed and updated in Nursing Home Chart    Please note: This note was completed in part utilizing a voice-recognition software may have been used in the preparation of this document. Grammatical errors, random word insertion, spelling mistakes, and incomplete sentences may be an occasional consequence of the system secondary to software limitations, ambient noise and hardware issues. Occasional wrong word or \"sound-alike\" substitutions may " have occurred due to the inherent limitations of voice recognition software. At the time of dictation, efforts were made to edit, clarify and/or correct errors. Interpretation should be guided by context. Please read the chart carefully and recognize, using context, where substitutions have occurred. If you have any questions or concerns about the context, text or information contained within the body of this dictation, please contact myself, the provider, for further clarification.      TONY Ascencio  11/20/2024

## 2024-11-21 NOTE — ASSESSMENT & PLAN NOTE
Ammonia level on 11/19/24  107  Recent confusion, change in mental status  Started on lactulose 30 ml BID  Compliant with taking medications  Alert and oriented x 2 on exam today  Continue neuro checks, fall precautions  Repeat ammonia level

## 2024-11-21 NOTE — ASSESSMENT & PLAN NOTE
Recent transition to CBA after hospitalization for STR  Encourage use of assistive device  Continue fall precautions  Encourage patient to participate with activities  Provide education for patient, family, and caregivers

## 2024-11-21 NOTE — ASSESSMENT & PLAN NOTE
Patient at risk for delirium secondary to age, co-morbidities, poly pharmacy, electrolyte imbalance  Identify and treat underlying cause  Provide frequent redirection, reorientation, distraction techniques  Avoid deliriogenic medications such as tramadol, benzodiazepines, anticholinergics,  Benadryl  Treat pain, See geriatric pain protocol  Monitor for constipation and urinary retention  Encourage early and frequent moblization, OOB  Encourage Hydration/ Nutrition  Implement sleep hygiene, limit night time interuptions, group activities

## 2024-11-25 ENCOUNTER — NURSING HOME VISIT (OUTPATIENT)
Dept: GERIATRICS | Facility: OTHER | Age: 84
End: 2024-11-25
Payer: COMMERCIAL

## 2024-11-25 DIAGNOSIS — I50.33 ACUTE ON CHRONIC HEART FAILURE WITH PRESERVED EJECTION FRACTION (HFPEF) (HCC): Primary | ICD-10-CM

## 2024-11-25 DIAGNOSIS — N18.4 ANEMIA IN STAGE 4 CHRONIC KIDNEY DISEASE  (HCC): ICD-10-CM

## 2024-11-25 DIAGNOSIS — K59.01 SLOW TRANSIT CONSTIPATION: ICD-10-CM

## 2024-11-25 DIAGNOSIS — E72.20 HYPERAMMONEMIA (HCC): ICD-10-CM

## 2024-11-25 DIAGNOSIS — N18.4 STAGE 4 CHRONIC KIDNEY DISEASE (HCC): Chronic | ICD-10-CM

## 2024-11-25 DIAGNOSIS — I36.1 NONRHEUMATIC TRICUSPID VALVE REGURGITATION: ICD-10-CM

## 2024-11-25 DIAGNOSIS — R76.8 ELEVATED SERUM IMMUNOGLOBULIN FREE LIGHT CHAIN LEVEL: ICD-10-CM

## 2024-11-25 DIAGNOSIS — I10 ESSENTIAL HYPERTENSION: ICD-10-CM

## 2024-11-25 DIAGNOSIS — D63.1 ANEMIA IN STAGE 4 CHRONIC KIDNEY DISEASE  (HCC): ICD-10-CM

## 2024-11-25 DIAGNOSIS — R41.89 COGNITIVE IMPAIRMENT: ICD-10-CM

## 2024-11-25 PROCEDURE — 99309 SBSQ NF CARE MODERATE MDM 30: CPT | Performed by: STUDENT IN AN ORGANIZED HEALTH CARE EDUCATION/TRAINING PROGRAM

## 2024-11-26 PROBLEM — R76.8 ELEVATED SERUM IMMUNOGLOBULIN FREE LIGHT CHAIN LEVEL: Status: ACTIVE | Noted: 2024-11-26

## 2024-11-26 NOTE — ASSESSMENT & PLAN NOTE
11/19/24 results  FR LIGHT CHAINS,SER     KAPPA FR LIGHT CHAIN  96.04 mg/L 3.30-19.40 H Final   LAMBDA FR LT CHAIN  64.26 mg/L 5.70-26.30 H Final   KAPPA/LAMBDA RATIO  1.49  0.26-1.65  Final    This test was ordered by other provider. It is unclear to me why the test was ordered. It appears the intended test may have been LFTs along with ammonia level, but instead light free chain testing was done with ammonia level. The elevation in kappa and lambda likely falls within reference range for patient's level of renal impairment, and her k/l ratio is within reference range. Suspect this elevation in levels is related to her known poor renal function rather than a primary hematologic issue. Continue to monitor clinically, can consider Heme/Onc consult if needed or in line with goals of care.

## 2024-11-26 NOTE — ASSESSMENT & PLAN NOTE
"Wt Readings from Last 3 Encounters:   11/20/24 46.4 kg (102 lb 3.2 oz)   11/15/24 48.5 kg (107 lb)   10/29/24 45.7 kg (100 lb 11.2 oz)     Hospitalized recently with acute on chronic CHF  Evaluated by Cardiology inpatient and underwent aggressive diuresis  Monitor weight daily - overall fairly stable perhaps slight downtrend overall since return to facility, stable in the last week  Monitor volume status clinically - seems on the dry side; no notable peripheral edema, no orthopnea  Encourage elevation, compression of lower extremities as appropriate  Continue torsemide, will further decrease from 20mg daily to 10mg daily as she seems dry, adjust as appropriate  As per inpatient Heart Failure team: \"BB not indicated in HFpEF/RV dyfunction. Not on ACE/ARB/ARNI/MRA or SGLT2 inhibitor given renal function. \"  Follow up with PCP, Cardiology as appropriate    "

## 2024-11-26 NOTE — ASSESSMENT & PLAN NOTE
Noted hx of HTN  Monitor BP - generally around 100s-110s systolic recently, overall stable and borderline low  Avoid hypotension  No acute cardiac complaints  Continue torsemide as above  Follow up with PCP, Cardiology as appropriate

## 2024-11-26 NOTE — PROGRESS NOTES
[facility]  Facility: [facility]    PROGRESS NOTE  Nursing Home Place of Service: nursing home place of service: POS 31 Skilled Care-Part A Coverage    NAME: Sarah Holland  : 1940 AGE: 83 y.o. SEX: female MRN: [id number]  DATE OF ENCOUNTER: 2024    Assessment and Plan     Problem List Items Addressed This Visit       Essential hypertension    Noted hx of HTN  Monitor BP - generally around 100s-110s systolic recently, overall stable and borderline low  Avoid hypotension  No acute cardiac complaints  Continue torsemide as above  Follow up with PCP, Cardiology as appropriate         Nonrheumatic tricuspid valve regurgitation    History of severe TR which seemed to be symptomatic with orthostasis and MARKHAM since a long time  Now s/p TTVR on 24 at [facility]  No acute cardiac complaints. Pre-op symptoms seem improved  Follow up with PCP, Cardiology      POD #1 TTE: EF 62%, RV moderately dilated, Pulmonary artery systolic pressure measures 43 mmHg. The pulmonary artery systolic pressure is mildly elevated. There is a #52 mm Evoque bioprosthetic tricuspid valve prosthesis. The prosthesis is well-seated. There is mild intravalvular regurgitation present. Tricuspid prosthesis peak gradient measures 15 mmHg. Tricuspid prosthesis mean gradient measures 8 mmHg.          Anemia in chronic kidney disease    Lab Results   Component Value Date    EGFR 41 (L) 10/19/2024    EGFR 39 (L) 10/18/2024    EGFR 38 (L) 10/17/2024    CREATININE 1.24 (H) 2024    CREATININE 1.39 (H) 2024    CREATININE 1.24 (H) 2024       -since around  baseline Hb seems around 10-11 range  -likely chronic related to CKD  -may be recently acutely lower in setting of CHF/volume overload and cardiac surgery  -monitor on routine labs - fairly stable  -recent iron level low- consider adding supplement if anemia worsens, though otherwise TIBC not elevated so anemia less likely related to the low  "iron  -monitor for acute bleed - no present signs  -consider further workup, Heme consult if persistent/worsening  -transfuse PRN Hb <7           Acute on chronic heart failure with preserved ejection fraction (HFpEF) ([facility]) - Primary    Wt Readings from Last 3 Encounters:   11/20/24 46.4 kg (102 lb 3.2 oz)   11/15/24 48.5 kg (107 lb)   10/29/24 45.7 kg (100 lb 11.2 oz)     Hospitalized recently with acute on chronic CHF  Evaluated by Cardiology inpatient and underwent aggressive diuresis  Monitor weight daily - overall fairly stable perhaps slight downtrend overall since return to facility, stable in the last week  Monitor volume status clinically - seems on the dry side; no notable peripheral edema, no orthopnea  Encourage elevation, compression of lower extremities as appropriate  Continue torsemide, will further decrease from 20mg daily to 10mg daily as she seems dry, adjust as appropriate  As per inpatient Heart Failure team: \"BB not indicated in HFpEF/RV dyfunction. Not on ACE/ARB/ARNI/MRA or SGLT2 inhibitor given renal function. \"  Follow up with PCP, Cardiology as appropriate           Stage 4 chronic kidney disease (HCC) (Chronic)    Lab Results   Component Value Date    EGFR 41 (L) 10/19/2024    EGFR 39 (L) 10/18/2024    EGFR 38 (L) 10/17/2024    CREATININE 1.24 (H) 11/07/2024    CREATININE 1.39 (H) 11/06/2024    CREATININE 1.24 (H) 11/05/2024       Monitor renal function on routine labs- seems stable/within baseline. Had worsened inpatient recently in setting of aggressive diuresis though still seemed to be within her baseline range, and improved with fluids and adjustment of diuretic.  She had been on dialysis temporarily in 2023 per records during a period of acute renal injury/worsening, not appearing to require dialysis presently  Avoid nephrotoxins, NSAIDs as able  Encourage PO hydration, respecting volume status  Follow up with PCP, Nephrology as appropriate           Cognitive impairment    Patient " has some noted/documented hx of cognitive impairment though no clear diagnosis of dementia  On exam is Aox2-3 and seems to be a fair historian though with some forgetfulness evident  Given baseline functional status (family helps with/manages her medications and finances) this is likely consistent with at least a mild dementia, though would hold off from making formal diagnosis while patient is not presently at baseline in setting of recent hospitalizations and acute illness  Was reportedly acutely worsened in the last 1-2 weeks but more recently improving back to apparent baseline. May have been related to overdiuresis/dryness. Also appears she was previously on lactulose for hepatic encephalopathy in September 2024 but has been off lactulose since most recent hospitalization at [facility] - see Hyperammonemia  She does appear able to intelligently discuss her medical conditions and make related decisions at this time  Supportive care  Monitor mentation  Delirium precautions  Follow up with PCP, consider outpatient cognitive testing as appropriate         Slow transit constipation    At risk due to recent hospitalization, relative immobility, comorbidities  Monitor stool output - recent BM few days ago, hard. Could be related to dryness from overdiuresis.  Bowel regimen at facility: changed miralax to daily scheduled. Continue senna. Continue lactulose started recently; consider bisacodyl suppository PRN if no BM in 2-3 days  Encourage mobility as tolerated, PO hydration as appropriate, high fiber diet/prune juice (in outpatient setting as appropriate)  Goal is for 1 easy BM every 1-2 days           Hyperammonemia (HCC)    Hx of hepatic encephalopathy  Ammonia level on 11/19/24  107  Recent confusion, change in mental status  Started on lactulose 30 ml BID  Compliant with taking medications  Mentation seems improved  Repeat ammonia level and check LFTs  Consider further workup, GI consult if needed         Elevated  serum immunoglobulin free light chain level    11/19/24 results  FR LIGHT CHAINS,SER     KAPPA FR LIGHT CHAIN  96.04 mg/L 3.30-19.40 H Final   LAMBDA FR LT CHAIN  64.26 mg/L 5.70-26.30 H Final   KAPPA/LAMBDA RATIO  1.49  0.26-1.65  Final    This test was ordered by other provider. It is unclear to me why the test was ordered. It appears the intended test may have been LFTs along with ammonia level, but instead light free chain testing was done with ammonia level. The elevation in kappa and lambda likely falls within reference range for patient's level of renal impairment, and her k/l ratio is within reference range. Suspect this elevation in levels is related to her known poor renal function rather than a primary hematologic issue. Continue to monitor clinically, can consider Heme/Onc consult if needed or in line with goals of care.                Chief Complaint     Follow up rehab and acute regarding recent confusion, constipation    History of Present Illness     Sarah Holland is a 83 y.o. female who was seen today for follow up. PMH including Afib, DM2, CKD4, SSS (s/p pacemaker), osteoporosis, HTN, HLD, chronic diastolic CHF, severe TR, hypothyroidism, PVD     There had been concern in the last week or so patient had been more confused, this had reportedly been improving recently, asked by nursing to check on patient to follow up.    Patient seen and examined in room  Others present for encounter: none  Patient laying in bed with head elevated  Appears comfortable, awake, alert, oriented to situation, able to converse appropriately  Patient polite, appears in good spirits, Aox3 at present, appears to be a fair historian perhaps a bit forgetful of some details; her mentation at this time seems comparable to my prior visit with her. She notes she feels well overall. Feels therapy has been going well, walked with walker this morning, feels she is getting stronger gradually, reiterates goal of returning home after  rehab if possible. Acknowledges she was more confused last week and that she feels better this week.  No acute pain anywhere recently.  Breathing fine, on room air, no acute SOB, no orthopnea. She has baseline chronic MARKHAM which she feels is much improved since her recent hospitalization and valve replacement surgery, stable now  No recent CP/palpitations. She had previously had intermittent orthostatic lightheadedness since several months, she thinks it has been much improved since her recent hospitalization and valve replacement surgery and she has not felt any recent dizziness/orthostasis.  Appetite stable/good, no acute swallowing concerns  Urinating well without acute symptoms  Last BM she estimates several days ago was hard, she is concerned about constipation. No abd pain/N/V  Does not feel acutely sick or confused  No acute abdominal, or urinary symptoms; see ROS for more details.     No further questions or acute concerns identified.  No other acute concerns per nurse today.           Lab Review:  10/19/24: BMP with Cr 1.29 (improving, baseline around 1.4-2.0), GFR 41 (baseline 20s); CBC with Hb 10.4 (improving, normocytic)  11/7/24: BMP generally stable, Na 136 (recent hyponatremia improving), Cr 1.24 (recent peak 1.84 on 11/2); CBC with Hb 8.9 (fairly stable/slight downtrend, normocytic); Mg 2.0; UA 11/2 generally unremarkable for infection, small LE, rare bacteria  11/14/24: CBC with Hb 9.5 (normocytic); BMP with Cr 1.54  11/18/24: BMP with Cr 1.38, eGFR 38  11/19/24: ammonia 107 (elevated)  FR LIGHT CHAINS,SER     KAPPA FR LIGHT CHAIN  96.04 mg/L 3.30-19.40 H Final   LAMBDA FR LT CHAIN  64.26 mg/L 5.70-26.30 H Final   KAPPA/LAMBDA RATIO  1.49  0.26-1.65  Final                Lab Results   Component Value Date     HGBA1C 6.2 (H) 09/10/2024            Lab Results   Component Value Date     AXL1IRUUJNYK 4.199 01/12/2024     TSH 1.05 09/19/2024            Lab Results   Component Value Date     CUUZOIRA86 604  09/19/2024            Lab Results   Component Value Date     IRON 33 (L) 10/16/2024     TIBC 393 10/16/2024     FERRITIN 45.0 10/16/2024            Lab Results   Component Value Date     CHOLESTEROL 170 10/11/2021            Lab Results   Component Value Date      10/11/2021            Lab Results   Component Value Date     TRIG 64 10/11/2021            Lab Results   Component Value Date     NONHDLC 47 10/11/2021            Lab Results   Component Value Date     LDLCALC 34 10/11/2021         XR chest 1 view  Result Date: 11/3/2024  No new or increasing consolidative opacities. Central vascular congestion without overt pulmonary edema.      XR chest 1 view  Result Date: 11/1/2024  Cardiomegaly with mild interstitial pulmonary edema .     CARDIAC CATHETERIZATION  Result Date: 11/1/2024  Impression: Conclusion Hemodynamics demonstrate preserved cardiac output/index in the setting of elevated right and left sided filling pressures, mild pulmonary hypertension (predominantly post-capillary) and normal systemic vascular resistance. Please see actual tracings for hemodynamic values. Hemodynamics   RA =  18 mmHg RVSP = 43 mmHg, RVEDP = 14 mmHg PCWP = 20 mmHg     PA = 43/17 mmHg with a mean of 28 mmHg NIBP (79) mmHg Ao sat  99 % PA sat  69.1 % PVR = 1.9 Wood Units SVR = 1138 Dyne-s/cm5 eFick Cardiac output/index = 4.3 L/min and 3.1 L/min/m2 Access: 6 Fr Right Internal Jugular vein     CARDIAC CATHETERIZATION  Result Date: 11/1/2024  Impression: Severe Tricuspid Regurgitation s/p transcatheter tricuspid valve replacement (TTVR) with an Evoque #52 mm valve.     XR chest 1 view  Result Date: 10/31/2024  Impression: Tubes, lines, surgical material: Sternotomy wires and left chest wall dual-lead pacemaker. Lungs and pleura: No focal consolidation. Central vascular congestion without evidence of pulmonary edema. No pleural effusions. No pneumothorax. Cardiovascular and mediastinum: Stable cardiomegaly.      XR chest  portable  Result Date: 10/15/2024  Impression: Enlarged cardiac silhouette. Mild interstitial edema. Trace bilateral pleural effusions. Prominence of the main pulmonary artery may represent pulmonary hypertension.      XR chest pa and lateral  Result Date: 10/11/2024  Impression: Enlarged cardiac silhouette.        Echo 9/20/24: EF 55-60, Indeterminate diastolic function due to pacemaker, at least moderate MR, severe TR  RONN 11/2/24: EF 62, LV diastolic function parameters were not assessed, mild-mod MR, trace TR,           The following portions of the patient's history were reviewed and updated as appropriate: allergies, current medications, past family history, past medical history, past social history, past surgical history and problem list.    Review of Systems     Review of Systems   Constitutional:  Negative for appetite change, chills, diaphoresis and fever.   HENT:  Negative for drooling, ear pain, hearing loss, rhinorrhea, sore throat and trouble swallowing.    Eyes:  Negative for pain, discharge, redness, itching and visual disturbance.   Respiratory:  Negative for cough, chest tightness, shortness of breath (chronic MARKHAM improved post-op) and wheezing.    Cardiovascular:  Negative for chest pain and palpitations.   Gastrointestinal:  Positive for constipation. Negative for abdominal pain, blood in stool, diarrhea, nausea and vomiting.   Genitourinary:  Negative for difficulty urinating, dysuria and hematuria.   Musculoskeletal:  Positive for gait problem. Negative for arthralgias, back pain and neck pain.   Skin:  Negative for color change.   Neurological:  Positive for weakness (gradually improving). Negative for facial asymmetry, speech difficulty, light-headedness (seems much improved lately) and headaches.   Psychiatric/Behavioral:  Negative for agitation, behavioral problems and confusion. The patient is not nervous/anxious and is not hyperactive.    All other systems reviewed and are  negative.      Active Problem List     Patient Active Problem List   Diagnosis    Atrial flutter (HCC)    Atrial septal defect    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 disease, without long-term current use of insulin (HCC)    Sick sinus syndrome (HCC)    Pacemaker    Age-related osteoporosis without current pathological fracture    Bilateral nonexudative age-related macular degeneration    Cataract, bilateral    Essential hypertension    Insomnia    Seborrhea    Hyperlipidemia    Long term current use of antiarrhythmic drug    Long term current use of anticoagulant therapy    Transaminitis    Central serous chorioretinopathy    Claudication (HCC)    Daytime sleepiness    Dry eyes    Nonrheumatic tricuspid valve regurgitation    Nuclear senile cataract    Posterior vitreous detachment    Chronic diastolic heart failure (HCC)    Platelets decreased (HCC)    Chronic kidney disease, stage 3b (HCC)    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 (HCC)    Vitreous degeneration of right eye    Type II diabetes mellitus with peripheral circulatory disorder (HCC)    Acquired hypothyroidism    Venous stasis    Bilateral lower extremity edema    Abnormal thyroid function test    Secondary hyperparathyroidism of renal origin (HCC)    Anemia in chronic kidney disease    Biventricular heart failure (HCC)    Chronic fatigue    Coagulation defect, unspecified (HCC)    Dialysis patient (HCC)    Peripheral vascular disease, unspecified (HCC)    Acute on chronic heart failure with preserved ejection fraction (HFpEF) (HCC)    Hypokalemia    Gait instability    Paroxysmal atrial fibrillation (HCC)    Severe pulmonary hypertension (HCC)    Stage 4 chronic kidney disease (HCC)    Iron deficiency anemia    Cognitive impairment    Frailty syndrome in geriatric patient    Slow transit constipation    Nonischemic nontraumatic myocardial injury    Calculus of gallbladder without cholecystitis without  obstruction    Ambulatory dysfunction    Pancytopenia (HCC)    Physical deconditioning    Advance care planning    At risk for constipation    At risk for delirium    Hyponatremia    Hyperammonemia (HCC)    Elevated serum immunoglobulin free light chain level       Objective     Vital Signs:     BP: 105/59 mmHg  11/25/2024 14:53 Temp:97.9 °F  11/25/2024 14:53 Pulse:73 bpm  11/25/2024 14:53 Weight:103 Lbs  11/25/2024 14:40   Resp:18 Breaths/min  11/24/2024 10:56 BS:176 mg/dL   O2:98 %  11/25/2024 14:53 Pain:0         Physical Exam  Vitals reviewed.   Constitutional:       General: She is not in acute distress.     Appearance: She is not toxic-appearing or diaphoretic.   HENT:      Head: Normocephalic and atraumatic.      Right Ear: External ear normal.      Left Ear: External ear normal.      Nose: Nose normal. No rhinorrhea.      Mouth/Throat:      Mouth: Mucous membranes are dry.      Pharynx: Oropharynx is clear. No posterior oropharyngeal erythema.   Eyes:      General: No scleral icterus.        Right eye: No discharge.         Left eye: No discharge.      Extraocular Movements: Extraocular movements intact.      Conjunctiva/sclera: Conjunctivae normal.      Pupils: Pupils are equal, round, and reactive to light.   Cardiovascular:      Rate and Rhythm: Normal rate and regular rhythm.   Pulmonary:      Effort: Pulmonary effort is normal. No respiratory distress.      Breath sounds: Normal breath sounds. No wheezing or rales.   Abdominal:      General: Bowel sounds are normal. There is no distension.      Palpations: Abdomen is soft.      Tenderness: There is no abdominal tenderness. There is no guarding.   Musculoskeletal:         General: No tenderness.      Cervical back: No rigidity.      Comments: No notable peripheral edema  No calf tenderness bilaterally   Skin:     General: Skin is warm and dry.      Coloration: Skin is not jaundiced.   Neurological:      General: No focal deficit present.      Mental  Status: She is alert and oriented to person, place, and time. Mental status is at baseline.      Comments: Aox2-3 (to self, to month/year, to [facility] but not quite to facility name)   Psychiatric:         Mood and Affect: Mood normal.         Behavior: Behavior normal.         Thought Content: Thought content normal.         Pertinent Laboratory/Diagnostic Studies:  Laboratory and Imaging studies reviewed. Full report in the paper chart.     Current Medications   Medications reviewed and updated in facility chart.      -Total time spent on this encounter today including documentation and workup review, face to face time, history and exam, and documentation/orders was approximately 40 minutes.  -This note will be copied to [facility] EMR where vitals and medication orders are placed.    Guanakito Perry D.O.  Geriatric Medicine  11/26/2024 11:39 AM

## 2024-11-26 NOTE — ASSESSMENT & PLAN NOTE
At risk due to recent hospitalization, relative immobility, comorbidities  Monitor stool output - recent BM few days ago, hard. Could be related to dryness from overdiuresis.  Bowel regimen at facility: changed miralax to daily scheduled. Continue senna. Continue lactulose started recently; consider bisacodyl suppository PRN if no BM in 2-3 days  Encourage mobility as tolerated, PO hydration as appropriate, high fiber diet/prune juice (in outpatient setting as appropriate)  Goal is for 1 easy BM every 1-2 days

## 2024-11-26 NOTE — ASSESSMENT & PLAN NOTE
History of severe TR which seemed to be symptomatic with orthostasis and MARKHAM since a long time  Now s/p TTVR on 11/1/24 at [facility]  No acute cardiac complaints. Pre-op symptoms seem improved  Follow up with PCP, Cardiology      POD #1 TTE: EF 62%, RV moderately dilated, Pulmonary artery systolic pressure measures 43 mmHg. The pulmonary artery systolic pressure is mildly elevated. There is a #52 mm Evoque bioprosthetic tricuspid valve prosthesis. The prosthesis is well-seated. There is mild intravalvular regurgitation present. Tricuspid prosthesis peak gradient measures 15 mmHg. Tricuspid prosthesis mean gradient measures 8 mmHg.

## 2024-11-26 NOTE — ASSESSMENT & PLAN NOTE
Lab Results   Component Value Date    EGFR 41 (L) 10/19/2024    EGFR 39 (L) 10/18/2024    EGFR 38 (L) 10/17/2024    CREATININE 1.24 (H) 11/07/2024    CREATININE 1.39 (H) 11/06/2024    CREATININE 1.24 (H) 11/05/2024       -since around 2022 baseline Hb seems around 10-11 range  -likely chronic related to CKD  -may be recently acutely lower in setting of CHF/volume overload and cardiac surgery  -monitor on routine labs - fairly stable  -recent iron level low- consider adding supplement if anemia worsens, though otherwise TIBC not elevated so anemia less likely related to the low iron  -monitor for acute bleed - no present signs  -consider further workup, Heme consult if persistent/worsening  -transfuse PRN Hb <7

## 2024-11-26 NOTE — ASSESSMENT & PLAN NOTE
Hx of hepatic encephalopathy  Ammonia level on 11/19/24  107  Recent confusion, change in mental status  Started on lactulose 30 ml BID  Compliant with taking medications  Mentation seems improved  Repeat ammonia level and check LFTs  Consider further workup, GI consult if needed

## 2024-11-28 ENCOUNTER — TELEPHONE (OUTPATIENT)
Dept: OTHER | Facility: OTHER | Age: 84
End: 2024-11-28

## 2024-11-28 NOTE — TELEPHONE ENCOUNTER
Stacie from  [facility] requested to speak to the on call regarding the Patient's nose bleed.    Contacted the on call via secure chat.

## 2024-12-09 ENCOUNTER — TELEPHONE (OUTPATIENT)
Age: 84
End: 2024-12-09

## 2024-12-09 ENCOUNTER — TELEPHONE (OUTPATIENT)
Dept: OTHER | Facility: OTHER | Age: 84
End: 2024-12-09

## 2024-12-09 ENCOUNTER — NURSING HOME VISIT (OUTPATIENT)
Dept: GERIATRICS | Facility: OTHER | Age: 84
End: 2024-12-09
Payer: COMMERCIAL

## 2024-12-09 DIAGNOSIS — E72.20 HYPERAMMONEMIA (HCC): ICD-10-CM

## 2024-12-09 DIAGNOSIS — N18.4 STAGE 4 CHRONIC KIDNEY DISEASE (HCC): Chronic | ICD-10-CM

## 2024-12-09 DIAGNOSIS — N18.4 ANEMIA IN STAGE 4 CHRONIC KIDNEY DISEASE  (HCC): ICD-10-CM

## 2024-12-09 DIAGNOSIS — D63.1 ANEMIA IN STAGE 4 CHRONIC KIDNEY DISEASE  (HCC): ICD-10-CM

## 2024-12-09 DIAGNOSIS — I50.33 ACUTE ON CHRONIC HEART FAILURE WITH PRESERVED EJECTION FRACTION (HFPEF) (HCC): Primary | ICD-10-CM

## 2024-12-09 DIAGNOSIS — K59.01 SLOW TRANSIT CONSTIPATION: ICD-10-CM

## 2024-12-09 DIAGNOSIS — I10 ESSENTIAL HYPERTENSION: ICD-10-CM

## 2024-12-09 DIAGNOSIS — I36.1 NONRHEUMATIC TRICUSPID VALVE REGURGITATION: ICD-10-CM

## 2024-12-09 PROCEDURE — 99309 SBSQ NF CARE MODERATE MDM 30: CPT | Performed by: STUDENT IN AN ORGANIZED HEALTH CARE EDUCATION/TRAINING PROGRAM

## 2024-12-09 RX ORDER — TORSEMIDE 10 MG/1
10 TABLET ORAL DAILY
Status: SHIPPED
Start: 2024-12-09 | End: 2024-12-11 | Stop reason: SDUPTHER

## 2024-12-09 NOTE — ASSESSMENT & PLAN NOTE
History of severe TR which seemed to be symptomatic with orthostasis and MARKHAM since a long time  Now s/p TTVR on 11/1/24 at [facility]  No acute cardiac complaints. Pre-op symptoms seem improved and stable recently  Follow up with PCP, Cardiology as appropriate. Had recent Cardio visit 12/2/24, considered stable      POD #1 TTE: EF 62%, RV moderately dilated, Pulmonary artery systolic pressure measures 43 mmHg. The pulmonary artery systolic pressure is mildly elevated. There is a #52 mm Evoque bioprosthetic tricuspid valve prosthesis. The prosthesis is well-seated. There is mild intravalvular regurgitation present. Tricuspid prosthesis peak gradient measures 15 mmHg. Tricuspid prosthesis mean gradient measures 8 mmHg.

## 2024-12-09 NOTE — ASSESSMENT & PLAN NOTE
At risk due to recent hospitalization, relative immobility, comorbidities  Monitor stool output - much improved, endorsing regular easy BM recently  Bowel regimen at facility: continue miralax and senna regimen. Continue lactulose started recently; consider bisacodyl suppository PRN if no BM in 2-3 days  Encourage mobility as tolerated, PO hydration as appropriate, high fiber diet/prune juice (in outpatient setting as appropriate)  Goal is for 1 easy BM every 1-2 days

## 2024-12-09 NOTE — TELEPHONE ENCOUNTER
Pt to be dc'd from [facility] to home 12/12. Will be in need of a tcm apt. I told Liam Kong to call our office day of dc. If they dont, please f/u with patient to schedule tcm.

## 2024-12-09 NOTE — PROGRESS NOTES
[facility]  Facility: [facility]    PROGRESS NOTE  Nursing Home Place of Service: nursing home place of service: POS 31 Skilled Care-Part A Coverage    NAME: Sarah Holland  : 1940 AGE: 84 y.o. SEX: female MRN: [id number]  DATE OF ENCOUNTER: 2024    Assessment and Plan     Problem List Items Addressed This Visit       Essential hypertension    Noted hx of HTN  Monitor BP - generally around 100s-110s systolic recently, overall stable and borderline low  Avoid hypotension  No acute cardiac complaints  Continue torsemide as above  Follow up with PCP, Cardiology as appropriate         Relevant Medications    torsemide (DEMADEX) 10 mg tablet    Nonrheumatic tricuspid valve regurgitation    History of severe TR which seemed to be symptomatic with orthostasis and MARKHAM since a long time  Now s/p TTVR on 24 at [facility]  No acute cardiac complaints. Pre-op symptoms seem improved and stable recently  Follow up with PCP, Cardiology as appropriate. Had recent Cardio visit 24, considered stable      POD #1 TTE: EF 62%, RV moderately dilated, Pulmonary artery systolic pressure measures 43 mmHg. The pulmonary artery systolic pressure is mildly elevated. There is a #52 mm Evoque bioprosthetic tricuspid valve prosthesis. The prosthesis is well-seated. There is mild intravalvular regurgitation present. Tricuspid prosthesis peak gradient measures 15 mmHg. Tricuspid prosthesis mean gradient measures 8 mmHg.          Anemia in chronic kidney disease    Lab Results   Component Value Date    EGFR 41 (L) 10/19/2024    EGFR 39 (L) 10/18/2024    EGFR 38 (L) 10/17/2024    CREATININE 1.24 (H) 2024    CREATININE 1.39 (H) 2024    CREATININE 1.24 (H) 2024       -since around  baseline Hb seems around 10-11 range  -likely chronic related to CKD  -may be recently acutely lower in setting of CHF/volume overload and cardiac surgery  -monitor on routine labs - fairly stable  -recent  "iron level low- consider adding supplement if anemia worsens, though otherwise TIBC not elevated so anemia less likely related to the low iron  -monitor for acute bleed - no present signs  -consider further workup, Heme consult if persistent/worsening  -transfuse PRN Hb <7  Follow up with PCP           Relevant Medications    torsemide (DEMADEX) 10 mg tablet    Acute on chronic heart failure with preserved ejection fraction (HFpEF) (HCC) - Primary    Wt Readings from Last 3 Encounters:   11/20/24 46.4 kg (102 lb 3.2 oz)   11/15/24 48.5 kg (107 lb)   10/29/24 45.7 kg (100 lb 11.2 oz)     Hospitalized recently with acute on chronic CHF  Evaluated by Cardiology inpatient and underwent aggressive diuresis  Monitor weight daily - Fairly stable, no alarming uptrend  Monitor volume status clinically - seems euvolemic/slightly dry; no notable peripheral edema, no orthopnea  Encourage elevation, compression of lower extremities as appropriate  Continue torsemide, at this time seeming stable on 10mg daily  As per inpatient Heart Failure team: \"BB not indicated in HFpEF/RV dyfunction. Not on ACE/ARB/ARNI/MRA or SGLT2 inhibitor given renal function. \"  Follow up with PCP, Cardiology as appropriate           Relevant Medications    torsemide (DEMADEX) 10 mg tablet    Stage 4 chronic kidney disease (HCC) (Chronic)    Lab Results   Component Value Date    EGFR 41 (L) 10/19/2024    EGFR 39 (L) 10/18/2024    EGFR 38 (L) 10/17/2024    CREATININE 1.24 (H) 11/07/2024    CREATININE 1.39 (H) 11/06/2024    CREATININE 1.24 (H) 11/05/2024       Monitor renal function on routine labs- seems stable/within baseline. Had worsened inpatient recently in setting of aggressive diuresis though still seemed to be within her baseline range, and improved with fluids and adjustment of diuretic.  She had been on dialysis temporarily in 2023 per records during a period of acute renal injury/worsening, not appearing to require dialysis presently  Avoid " nephrotoxins, NSAIDs as able  Encourage PO hydration, respecting volume status  Follow up with PCP, Nephrology as appropriate           Relevant Medications    torsemide (DEMADEX) 10 mg tablet    Slow transit constipation    At risk due to recent hospitalization, relative immobility, comorbidities  Monitor stool output - much improved, endorsing regular easy BM recently  Bowel regimen at facility: continue miralax and senna regimen. Continue lactulose started recently; consider bisacodyl suppository PRN if no BM in 2-3 days  Encourage mobility as tolerated, PO hydration as appropriate, high fiber diet/prune juice (in outpatient setting as appropriate)  Goal is for 1 easy BM every 1-2 days           Hyperammonemia (HCC)    Hx of hepatic encephalopathy  Ammonia level elevated on labs  Continue on lactulose 30 ml BID(started recently)  Compliant with taking medications  Mentation seems improved from prior confusion at facility - seems back to baseline  Follow up with PCP, Nephrology/GI as appropriate                  Chief Complaint     Follow up STR, CHF    History of Present Illness     Sarah Holland is a 84 y.o. female who was seen today for follow up. PMH including Afib, DM2, CKD4, SSS (s/p pacemaker), osteoporosis, HTN, HLD, chronic diastolic CHF, severe TR, hypothyroidism, PVD     Patient seen and examined in common room  Others present for encounter: none  Patient in wheelchair, able to self-propel  Appears comfortable, awake, alert, oriented to situation, able to converse appropriately  Patient polite, appears in good spirits, Aox2-3 at present, appears to be a fair historian perhaps a bit forgetful of some details; her mentation at this time seems stable/baseline. She notes she feels well overall and with no acute concerns. Feels therapy has been going well, feels she is getting stronger gradually, endorses goal of using primarily walker at home and also has wheelchair.   No acute pain anywhere  "recently.  Breathing fine, on room air, no acute SOB, no orthopnea. She has had prior baseline chronic MARKHAM which she feels is much improved since her recent hospitalization and valve replacement surgery, and stable lately  No recent CP/palpitations since her valve surgery. She had previously had intermittent orthostatic lightheadedness since several months, she thinks it has been much improved since her recent hospitalization and valve replacement surgery and she has not felt any recent dizziness/orthostasis.  Appetite stable/good \"I am eating like a horse\", no acute swallowing concerns  Urinating well without acute symptoms  Endorses prior constipation much improved, having lately regular easy BM. No abd pain/N/V  Does not feel acutely sick or confused  No acute abdominal, or urinary symptoms; see ROS for more details.     No further questions or acute concerns identified.  No other acute concerns per nursing.  Spoke with PABLO Hernadez as well. Patient anticipated to discharge to home with homecare likely later this week.           Lab Review:  10/19/24: BMP with Cr 1.29 (improving, baseline around 1.4-2.0), GFR 41 (baseline 20s); CBC with Hb 10.4 (improving, normocytic)  11/7/24: BMP generally stable, Na 136 (recent hyponatremia improving), Cr 1.24 (recent peak 1.84 on 11/2); CBC with Hb 8.9 (fairly stable/slight downtrend, normocytic); Mg 2.0; UA 11/2 generally unremarkable for infection, small LE, rare bacteria  11/14/24: CBC with Hb 9.5 (normocytic); BMP with Cr 1.54  11/18/24: BMP with Cr 1.38, eGFR 38  11/19/24: ammonia 107 (elevated)  FR LIGHT CHAINS,SER     KAPPA FR LIGHT CHAIN  96.04 mg/L 3.30-19.40 H Final   LAMBDA FR LT CHAIN  64.26 mg/L 5.70-26.30 H Final   KAPPA/LAMBDA RATIO  1.49  0.26-1.65  Final  12/4/24: CMP with Cr 1.79, eGFR 28; CBC with Hb 9.4, plt 124; ammonia 112 (elevated)                Lab Results   Component Value Date     HGBA1C 6.2 (H) 09/10/2024            Lab Results   Component Value Date "     XJD1JDHWIGLK 4.199 01/12/2024     TSH 1.05 09/19/2024            Lab Results   Component Value Date     RJUZRGWW03 604 09/19/2024            Lab Results   Component Value Date     IRON 33 (L) 10/16/2024     TIBC 393 10/16/2024     FERRITIN 45.0 10/16/2024            Lab Results   Component Value Date     CHOLESTEROL 170 10/11/2021            Lab Results   Component Value Date      10/11/2021            Lab Results   Component Value Date     TRIG 64 10/11/2021            Lab Results   Component Value Date     NONHDLC 47 10/11/2021            Lab Results   Component Value Date     LDLCALC 34 10/11/2021         XR chest 1 view  Result Date: 11/3/2024  No new or increasing consolidative opacities. Central vascular congestion without overt pulmonary edema.      XR chest 1 view  Result Date: 11/1/2024  Cardiomegaly with mild interstitial pulmonary edema .     CARDIAC CATHETERIZATION  Result Date: 11/1/2024  Impression: Conclusion Hemodynamics demonstrate preserved cardiac output/index in the setting of elevated right and left sided filling pressures, mild pulmonary hypertension (predominantly post-capillary) and normal systemic vascular resistance. Please see actual tracings for hemodynamic values. Hemodynamics   RA =  18 mmHg RVSP = 43 mmHg, RVEDP = 14 mmHg PCWP = 20 mmHg     PA = 43/17 mmHg with a mean of 28 mmHg NIBP (79) mmHg Ao sat  99 % PA sat  69.1 % PVR = 1.9 Wood Units SVR = 1138 Dyne-s/cm5 eFick Cardiac output/index = 4.3 L/min and 3.1 L/min/m2 Access: 6 Fr Right Internal Jugular vein     CARDIAC CATHETERIZATION  Result Date: 11/1/2024  Impression: Severe Tricuspid Regurgitation s/p transcatheter tricuspid valve replacement (TTVR) with an Evoque #52 mm valve.     XR chest 1 view  Result Date: 10/31/2024  Impression: Tubes, lines, surgical material: Sternotomy wires and left chest wall dual-lead pacemaker. Lungs and pleura: No focal consolidation. Central vascular congestion without evidence of  pulmonary edema. No pleural effusions. No pneumothorax. Cardiovascular and mediastinum: Stable cardiomegaly.      XR chest portable  Result Date: 10/15/2024  Impression: Enlarged cardiac silhouette. Mild interstitial edema. Trace bilateral pleural effusions. Prominence of the main pulmonary artery may represent pulmonary hypertension.      XR chest pa and lateral  Result Date: 10/11/2024  Impression: Enlarged cardiac silhouette.        Echo 9/20/24: EF 55-60, Indeterminate diastolic function due to pacemaker, at least moderate MR, severe TR  RONN 11/2/24: EF 62, LV diastolic function parameters were not assessed, mild-mod MR, trace TR,           The following portions of the patient's history were reviewed and updated as appropriate: allergies, current medications, past family history, past medical history, past social history, past surgical history and problem list.    Review of Systems     Review of Systems   Constitutional:  Negative for appetite change, chills, diaphoresis and fever.   HENT:  Negative for drooling, ear pain, hearing loss, rhinorrhea, sore throat and trouble swallowing.    Eyes:  Negative for pain, discharge, redness, itching and visual disturbance.   Respiratory:  Negative for cough, chest tightness, shortness of breath (chronic MARKHAM improved post-op, minimal/stable) and wheezing.    Cardiovascular:  Negative for chest pain and palpitations.   Gastrointestinal:  Negative for abdominal pain, blood in stool, constipation, diarrhea, nausea and vomiting.   Genitourinary:  Negative for difficulty urinating, dysuria and hematuria.   Musculoskeletal:  Positive for gait problem. Negative for arthralgias, back pain and neck pain.   Skin:  Negative for color change.   Neurological:  Negative for facial asymmetry, speech difficulty, weakness (gradually improving), light-headedness (seems much improved overall and recently stable/mild) and headaches.   Psychiatric/Behavioral:  Negative for agitation,  behavioral problems and confusion. The patient is not nervous/anxious and is not hyperactive.    All other systems reviewed and are negative.      Active Problem List     Patient Active Problem List   Diagnosis    Atrial flutter (HCC)    Atrial septal defect    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 disease, without long-term current use of insulin (HCC)    Sick sinus syndrome (HCC)    Pacemaker    Age-related osteoporosis without current pathological fracture    Bilateral nonexudative age-related macular degeneration    Cataract, bilateral    Essential hypertension    Insomnia    Seborrhea    Hyperlipidemia    Long term current use of antiarrhythmic drug    Long term current use of anticoagulant therapy    Transaminitis    Central serous chorioretinopathy    Claudication (HCC)    Daytime sleepiness    Dry eyes    Nonrheumatic tricuspid valve regurgitation    Nuclear senile cataract    Posterior vitreous detachment    Chronic diastolic heart failure (HCC)    Platelets decreased (HCC)    Chronic kidney disease, stage 3b (HCC)    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 (HCC)    Vitreous degeneration of right eye    Type II diabetes mellitus with peripheral circulatory disorder (HCC)    Acquired hypothyroidism    Venous stasis    Bilateral lower extremity edema    Abnormal thyroid function test    Secondary hyperparathyroidism of renal origin (HCC)    Anemia in chronic kidney disease    Biventricular heart failure (HCC)    Chronic fatigue    Coagulation defect, unspecified (HCC)    Dialysis patient (HCC)    Peripheral vascular disease, unspecified (HCC)    Acute on chronic heart failure with preserved ejection fraction (HFpEF) (HCC)    Hypokalemia    Gait instability    Paroxysmal atrial fibrillation (HCC)    Severe pulmonary hypertension (HCC)    Stage 4 chronic kidney disease (HCC)    Iron deficiency anemia    Cognitive impairment    Frailty syndrome in geriatric patient     Slow transit constipation    Nonischemic nontraumatic myocardial injury    Calculus of gallbladder without cholecystitis without obstruction    Ambulatory dysfunction    Pancytopenia (HCC)    Physical deconditioning    Advance care planning    At risk for constipation    At risk for delirium    Hyponatremia    Hyperammonemia (HCC)    Elevated serum immunoglobulin free light chain level       Objective     Vital Signs:     BP: 99/48 mmHg  12/9/2024 10:38   Temp:97.7 °F  12/9/2024 10:38 Pulse:70 bpm  12/9/2024 10:38 Weight:105.1 Lbs  12/8/2024 12:48   Resp:20 Breaths/min  12/9/2024 10:38 BS:113 mg/dL  12/9/2024 05:23 O2:99 %  12/9/2024 10:38 Pain:0  12/9/2024 09:09       Physical Exam  Vitals reviewed.   Constitutional:       General: She is not in acute distress.     Appearance: She is not toxic-appearing or diaphoretic.   HENT:      Head: Normocephalic and atraumatic.      Right Ear: External ear normal.      Left Ear: External ear normal.      Nose: Nose normal. No rhinorrhea.      Mouth/Throat:      Mouth: Mucous membranes are dry.      Pharynx: Oropharynx is clear. No posterior oropharyngeal erythema.   Eyes:      General: No scleral icterus.        Right eye: No discharge.         Left eye: No discharge.      Extraocular Movements: Extraocular movements intact.      Conjunctiva/sclera: Conjunctivae normal.      Pupils: Pupils are equal, round, and reactive to light.   Cardiovascular:      Rate and Rhythm: Normal rate and regular rhythm.   Pulmonary:      Effort: Pulmonary effort is normal. No respiratory distress.      Breath sounds: Normal breath sounds. No wheezing or rales.   Abdominal:      General: Bowel sounds are normal. There is no distension.      Palpations: Abdomen is soft.      Tenderness: There is no abdominal tenderness. There is no guarding.   Musculoskeletal:         General: No tenderness.      Cervical back: No rigidity.      Comments: No notable peripheral edema  No calf tenderness bilaterally    Skin:     General: Skin is warm and dry.      Coloration: Skin is not jaundiced.   Neurological:      General: No focal deficit present.      Mental Status: She is alert. Mental status is at baseline.      Comments: Aox2-3 (to self, to location [facility], Nearly to month)   Psychiatric:         Mood and Affect: Mood normal.         Behavior: Behavior normal.         Thought Content: Thought content normal.         Pertinent Laboratory/Diagnostic Studies:  Laboratory and Imaging studies reviewed. Full report in the paper chart.     Current Medications   Medications reviewed and updated in facility chart.      -Total time spent on this encounter today including documentation and workup review, face to face time, history and exam, and documentation/orders was approximately 35 minutes.  -This note will be copied to [facility] EMR where vitals and medication orders are placed.    Guanakito Perry D.O.  Geriatric Medicine  12/9/2024 12:48 PM

## 2024-12-09 NOTE — ASSESSMENT & PLAN NOTE
"Wt Readings from Last 3 Encounters:   11/20/24 46.4 kg (102 lb 3.2 oz)   11/15/24 48.5 kg (107 lb)   10/29/24 45.7 kg (100 lb 11.2 oz)     Hospitalized recently with acute on chronic CHF  Evaluated by Cardiology inpatient and underwent aggressive diuresis  Monitor weight daily - Fairly stable, no alarming uptrend  Monitor volume status clinically - seems euvolemic/slightly dry; no notable peripheral edema, no orthopnea  Encourage elevation, compression of lower extremities as appropriate  Continue torsemide, at this time seeming stable on 10mg daily  As per inpatient Heart Failure team: \"BB not indicated in HFpEF/RV dyfunction. Not on ACE/ARB/ARNI/MRA or SGLT2 inhibitor given renal function. \"  Follow up with PCP, Cardiology as appropriate    "

## 2024-12-09 NOTE — TELEPHONE ENCOUNTER
"Kath BIRD stated, \"I have critical lab results I would like to review with the provider.\"    On call notified via secure chat   "

## 2024-12-09 NOTE — ASSESSMENT & PLAN NOTE
Lab Results   Component Value Date    EGFR 41 (L) 10/19/2024    EGFR 39 (L) 10/18/2024    EGFR 38 (L) 10/17/2024    CREATININE 1.24 (H) 11/07/2024    CREATININE 1.39 (H) 11/06/2024    CREATININE 1.24 (H) 11/05/2024       -since around 2022 baseline Hb seems around 10-11 range  -likely chronic related to CKD  -may be recently acutely lower in setting of CHF/volume overload and cardiac surgery  -monitor on routine labs - fairly stable  -recent iron level low- consider adding supplement if anemia worsens, though otherwise TIBC not elevated so anemia less likely related to the low iron  -monitor for acute bleed - no present signs  -consider further workup, Heme consult if persistent/worsening  -transfuse PRN Hb <7  Follow up with PCP

## 2024-12-09 NOTE — ASSESSMENT & PLAN NOTE
Hx of hepatic encephalopathy  Ammonia level elevated on labs  Continue on lactulose 30 ml BID(started recently)  Compliant with taking medications  Mentation seems improved from prior confusion at facility - seems back to baseline  Follow up with PCP, Nephrology/GI as appropriate

## 2024-12-11 ENCOUNTER — NURSING HOME VISIT (OUTPATIENT)
Dept: GERIATRICS | Facility: OTHER | Age: 84
End: 2024-12-11
Payer: COMMERCIAL

## 2024-12-11 DIAGNOSIS — D63.1 ANEMIA IN STAGE 4 CHRONIC KIDNEY DISEASE  (HCC): ICD-10-CM

## 2024-12-11 DIAGNOSIS — R76.8 ELEVATED SERUM IMMUNOGLOBULIN FREE LIGHT CHAIN LEVEL: ICD-10-CM

## 2024-12-11 DIAGNOSIS — E11.22 TYPE 2 DIABETES MELLITUS WITH STAGE 4 CHRONIC KIDNEY DISEASE, WITHOUT LONG-TERM CURRENT USE OF INSULIN (HCC): ICD-10-CM

## 2024-12-11 DIAGNOSIS — E03.9 ACQUIRED HYPOTHYROIDISM: ICD-10-CM

## 2024-12-11 DIAGNOSIS — I50.33 ACUTE ON CHRONIC HEART FAILURE WITH PRESERVED EJECTION FRACTION (HFPEF) (HCC): ICD-10-CM

## 2024-12-11 DIAGNOSIS — E78.2 MIXED HYPERLIPIDEMIA: ICD-10-CM

## 2024-12-11 DIAGNOSIS — R54 FRAILTY SYNDROME IN GERIATRIC PATIENT: ICD-10-CM

## 2024-12-11 DIAGNOSIS — R53.81 PHYSICAL DECONDITIONING: ICD-10-CM

## 2024-12-11 DIAGNOSIS — I49.5 SICK SINUS SYNDROME (HCC): ICD-10-CM

## 2024-12-11 DIAGNOSIS — N18.4 STAGE 4 CHRONIC KIDNEY DISEASE (HCC): Chronic | ICD-10-CM

## 2024-12-11 DIAGNOSIS — K59.01 SLOW TRANSIT CONSTIPATION: ICD-10-CM

## 2024-12-11 DIAGNOSIS — K21.9 GASTROESOPHAGEAL REFLUX DISEASE, UNSPECIFIED WHETHER ESOPHAGITIS PRESENT: ICD-10-CM

## 2024-12-11 DIAGNOSIS — I36.1 NONRHEUMATIC TRICUSPID VALVE REGURGITATION: Primary | ICD-10-CM

## 2024-12-11 DIAGNOSIS — Z95.0 PACEMAKER: ICD-10-CM

## 2024-12-11 DIAGNOSIS — E87.1 HYPONATREMIA: ICD-10-CM

## 2024-12-11 DIAGNOSIS — E72.20 HYPERAMMONEMIA (HCC): ICD-10-CM

## 2024-12-11 DIAGNOSIS — N18.4 ANEMIA IN STAGE 4 CHRONIC KIDNEY DISEASE  (HCC): ICD-10-CM

## 2024-12-11 DIAGNOSIS — R41.89 COGNITIVE IMPAIRMENT: ICD-10-CM

## 2024-12-11 DIAGNOSIS — I48.0 PAROXYSMAL ATRIAL FIBRILLATION (HCC): ICD-10-CM

## 2024-12-11 DIAGNOSIS — E87.6 HYPOKALEMIA: Chronic | ICD-10-CM

## 2024-12-11 DIAGNOSIS — I10 ESSENTIAL HYPERTENSION: ICD-10-CM

## 2024-12-11 DIAGNOSIS — N18.4 TYPE 2 DIABETES MELLITUS WITH STAGE 4 CHRONIC KIDNEY DISEASE, WITHOUT LONG-TERM CURRENT USE OF INSULIN (HCC): ICD-10-CM

## 2024-12-11 PROCEDURE — 99316 NF DSCHRG MGMT 30 MIN+: CPT | Performed by: STUDENT IN AN ORGANIZED HEALTH CARE EDUCATION/TRAINING PROGRAM

## 2024-12-11 RX ORDER — LEVOTHYROXINE SODIUM 25 UG/1
25 TABLET ORAL DAILY
Qty: 30 TABLET | Refills: 0 | Status: SHIPPED | OUTPATIENT
Start: 2024-12-11

## 2024-12-11 RX ORDER — TORSEMIDE 10 MG/1
10 TABLET ORAL DAILY
Qty: 30 TABLET | Refills: 0 | Status: SHIPPED | OUTPATIENT
Start: 2024-12-11

## 2024-12-11 RX ORDER — SENNOSIDES A AND B 8.6 MG/1
1 TABLET, FILM COATED ORAL
Qty: 30 TABLET | Refills: 0 | Status: SHIPPED | OUTPATIENT
Start: 2024-12-11

## 2024-12-11 RX ORDER — PSEUDOEPHEDRINE HCL 30 MG
1 TABLET ORAL 2 TIMES DAILY
Qty: 60 TABLET | Refills: 0 | Status: SHIPPED | OUTPATIENT
Start: 2024-12-11

## 2024-12-11 RX ORDER — POTASSIUM CHLORIDE 1500 MG/1
2 TABLET, EXTENDED RELEASE ORAL DAILY
Qty: 60 TABLET | Refills: 0 | Status: SHIPPED | OUTPATIENT
Start: 2024-12-11

## 2024-12-11 RX ORDER — LACTULOSE 10 G/15ML
20 SOLUTION ORAL 3 TIMES DAILY
Qty: 2700 ML | Refills: 0 | Status: SHIPPED | OUTPATIENT
Start: 2024-12-11

## 2024-12-11 RX ORDER — MULTIVITAMIN WITH IRON
250 TABLET ORAL DAILY
Qty: 30 TABLET | Refills: 0 | Status: SHIPPED | OUTPATIENT
Start: 2024-12-11

## 2024-12-11 NOTE — ASSESSMENT & PLAN NOTE
Noted on recent labs inpatient  May have been related to volume overload and related diuresis  Monitor on routine labs - seems to have improved to WNL as of hospital discharge and recently stable  Consider further workup, supplementation, Nephro consult if issue persists/worsens  Follow up with PCP

## 2024-12-11 NOTE — ASSESSMENT & PLAN NOTE
Patient has some noted/documented hx of cognitive impairment though no clear diagnosis of dementia  On exam has generally been Aox2-3 and seems to be a fair historian though with some forgetfulness evident  Given baseline functional status (family helps with/manages her medications and finances) this is likely consistent with at least a mild dementia, though would hold off from making formal diagnosis while patient is not presently at baseline in setting of recent hospitalizations and acute illness  Was reportedly acutely worsened a few weeks ago at facility but more recently improved back to apparent baseline. May have been related to overdiuresis/dryness. Also appears she was previously on lactulose for hepatic encephalopathy in September 2024 but has been off lactulose since most recent hospitalization at [facility] - see Hyperammonemia  She does appear able to intelligently discuss her medical conditions and make related decisions at this time  Supportive care  Monitor mentation  Delirium precautions  Follow up with PCP, consider outpatient cognitive testing as appropriate

## 2024-12-11 NOTE — ASSESSMENT & PLAN NOTE
History of severe TR which seemed to be symptomatic with orthostasis and MARKHAM since a long time  Now s/p TTVR on 11/1/24 at [facility]  No acute cardiac complaints. Pre-op symptoms seem improved and stable recently  Follow up with PCP, Cardiology as appropriate. Had recent Cardio visit 12/2/24, considered stable      POD #1 TTE: EF 62%, RV moderately dilated, Pulmonary artery systolic pressure measures 43 mmHg. The pulmonary artery systolic pressure is mildly elevated. There is a #52 mm Evoque bioprosthetic tricuspid valve prosthesis. The prosthesis is well-seated. There is mild intravalvular regurgitation present. Tricuspid prosthesis peak gradient measures 15 mmHg. Tricuspid prosthesis mean gradient measures 8 mmHg.

## 2024-12-11 NOTE — PROGRESS NOTES
[facility] Care  Facility: [facility]    DISCHARGE SUMMARY  Nursing Home Place of Service: 31: SNF/Short Term Rehab    NAME: Sarah Holland  : 1940 AGE: 84 y.o. SEX: female MRN: [id number]  DATE OF ENCOUNTER: 2024    DATE OF ADMISSION: 10/19/24 (re-admission 24) DATE OF DISCHARGE:planned 24 DISCHARGE DISPOSITION: stable, home    HPI: Sarah Holland is a 83 y.o. female with PMH including Afib, DM2, CKD4, SSS (s/p pacemaker), osteoporosis, HTN, HLD, chronic diastolic CHF, severe TR, hypothyroidism, PVD     Reason for admission: For details of hospitalization, see hospital records including discharge documentation  Briefly, patient initially hospitalized  at [facility] from 10/11 to 10/19/24 with fatigue/SOB, diagnosed with acute on chronic CHF exacerbation, evaluated by Cardiology/Heart Failure team, aggressively diuresed; ultimately deemed stable for discharge to rehab. She was subsequently at [facility] for rehab briefly, after which she returned to hospital ([facility] from 10/30 - 24) for scheduled cardiac surgery; she underwent TTVR on 24; course complicated by dehydration from aggressive diuresis which was improved with fluids and adjustment of her torsemide; also complicated by post-op fever with unclear etiology with generally unremarkable infectious workup, was briefly on broad-spectrum antibiotics which were discontinued and she subsequently remained afebrile; ultimately deemed stable for discharge to rehab.      Course of stay: Patient was admitted to [facility] for rehabilitation due to physical deconditioning and acute on chronic CHF, severe TR s/p TTVR. Significant events during the stay include: n/a. The patient participated in PT/OT.     Patient seen and examined in common room  Others present for encounter: none  Patient in wheelchair, able to self-propel, watching TV  Appears comfortable,  "awake, alert, oriented to situation, able to converse appropriately  Patient polite, appears in good spirits, Aox3 at present, appears to be a fair historian perhaps a bit forgetful of some details; her mentation at this time seems stable/baseline. She notes she feels very well and with no acute concerns. Feels therapy has been going well, and feels happy and safe to go home tomorrow and endorses good social support including that her son lives with her..  No acute pain anywhere.  Breathing fine, on room air, no acute SOB, no orthopnea. She has had prior baseline chronic MARKHAM which she feels is much improved since her recent hospitalization and valve replacement surgery, and quite minimal and stable lately  No recent CP/palpitations since her valve surgery. (She had previously had intermittent orthostatic lightheadedness since several months, she thinks it has been much improved since her recent hospitalization and valve replacement surgery). She has not felt any recent dizziness/orthostasis.  Appetite stable/good \"like a horse\" again, no acute swallowing concerns  Urinating well without acute symptoms  Endorses prior constipation much improved and now well controlled, having lately regular easy BM. No abd pain/N/V  Does not feel acutely sick or confused  No acute abdominal, or urinary symptoms; see ROS for more details.     No further questions or acute concerns identified.  No other acute concerns per nursing.  Spoke with PABLO Hernadez on unit today in anticipation of discharge; have completed relevant homecare paperwork and pharmacy confirmed.           Lab Review:  10/19/24: BMP with Cr 1.29 (improving, baseline around 1.4-2.0), GFR 41 (baseline 20s); CBC with Hb 10.4 (improving, normocytic)  11/7/24: BMP generally stable, Na 136 (recent hyponatremia improving), Cr 1.24 (recent peak 1.84 on 11/2); CBC with Hb 8.9 (fairly stable/slight downtrend, normocytic); Mg 2.0; UA 11/2 generally unremarkable for infection, small " LE, rare bacteria  11/14/24: CBC with Hb 9.5 (normocytic); BMP with Cr 1.54  11/18/24: BMP with Cr 1.38, eGFR 38  11/19/24: ammonia 107 (elevated)  FR LIGHT CHAINS,SER     KAPPA FR LIGHT CHAIN  96.04mg/L3.30-19.40HFinal   LAMBDA FR LT CHAIN  64.26mg/L5.70-26.30HFinal   KAPPA/LAMBDA RATIO  1.49                    0.26-1.65                        Final  12/4/24: CMP with Cr 1.79, eGFR 28; CBC with Hb 9.4, plt 124; ammonia 112 (elevated)  12/9/24: BMP with Cr 1.62, eGFR 31; ammonia 123 (elevated)                     Lab Results   Component Value Date     HGBA1C 6.2 (H) 09/10/2024                Lab Results   Component Value Date     GFC0WARFFCSK 4.199 01/12/2024     TSH 1.05 09/19/2024                Lab Results   Component Value Date     VRHRVSXH11 604 09/19/2024                Lab Results   Component Value Date     IRON 33 (L) 10/16/2024     TIBC 393 10/16/2024     FERRITIN 45.0 10/16/2024                Lab Results   Component Value Date     CHOLESTEROL 170 10/11/2021                Lab Results   Component Value Date      10/11/2021                Lab Results   Component Value Date     TRIG 64 10/11/2021                Lab Results   Component Value Date     NONHDLC 47 10/11/2021                Lab Results   Component Value Date     LDLCALC 34 10/11/2021         XR chest 1 view  Result Date: 11/3/2024  No new or increasing consolidative opacities. Central vascular congestion without overt pulmonary edema.      XR chest 1 view  Result Date: 11/1/2024  Cardiomegaly with mild interstitial pulmonary edema .     CARDIAC CATHETERIZATION  Result Date: 11/1/2024  Impression: Conclusion Hemodynamics demonstrate preserved cardiac output/index in the setting of elevated right and left sided filling pressures, mild pulmonary hypertension (predominantly post-capillary) and normal systemic vascular resistance. Please see actual tracings for hemodynamic values. Hemodynamics   RA =  18 mmHg RVSP = 43 mmHg, RVEDP = 14 mmHg  PCWP = 20 mmHg     PA = 43/17 mmHg with a mean of 28 mmHg NIBP (79) mmHg Ao sat  99 % PA sat  69.1 % PVR = 1.9 Wood Units SVR = 1138 Dyne-s/cm5 eFick Cardiac output/index = 4.3 L/min and 3.1 L/min/m2 Access: 6 Fr Right Internal Jugular vein     CARDIAC CATHETERIZATION  Result Date: 11/1/2024  Impression: Severe Tricuspid Regurgitation s/p transcatheter tricuspid valve replacement (TTVR) with an Evoque #52 mm valve.     XR chest 1 view  Result Date: 10/31/2024  Impression: Tubes, lines, surgical material: Sternotomy wires and left chest wall dual-lead pacemaker. Lungs and pleura: No focal consolidation. Central vascular congestion without evidence of pulmonary edema. No pleural effusions. No pneumothorax. Cardiovascular and mediastinum: Stable cardiomegaly.      XR chest portable  Result Date: 10/15/2024  Impression: Enlarged cardiac silhouette. Mild interstitial edema. Trace bilateral pleural effusions. Prominence of the main pulmonary artery may represent pulmonary hypertension.      XR chest pa and lateral  Result Date: 10/11/2024  Impression: Enlarged cardiac silhouette.        Echo 9/20/24: EF 55-60, Indeterminate diastolic function due to pacemaker, at least moderate MR, severe TR  RONN 11/2/24: EF 62, LV diastolic function parameters were not assessed, mild-mod MR, trace TR,             Assessment/Plan:    Acute on chronic heart failure with preserved ejection fraction (HFpEF) ([facility])  Wt Readings from Last 3 Encounters:   11/20/24 46.4 kg (102 lb 3.2 oz)   11/15/24 48.5 kg (107 lb)   10/29/24 45.7 kg (100 lb 11.2 oz)     Hospitalized recently with acute on chronic CHF  Evaluated by Cardiology inpatient and underwent aggressive diuresis  Monitor weight daily - Fairly stable, no acute/alarming uptrend  Monitor volume status clinically - seems euvolemic, earlier dryness seems better; no notable peripheral edema, no orthopnea  Encourage elevation, compression of lower extremities as appropriate  Continue torsemide,  "at this time seeming stable on 10mg daily. Adjust as appropriate as per outpatient care team.  As per inpatient Heart Failure team: \"BB not indicated in HFpEF/RV dyfunction. Not on ACE/ARB/ARNI/MRA or SGLT2 inhibitor given renal function. \"  Follow up with PCP, Cardiology as appropriate      Essential hypertension  Noted hx of HTN  Monitor BP - generally around 100s-110s systolic recently, overall stable and borderline low  Avoid hypotension  No acute cardiac complaints  Continue torsemide as above  Follow up with PCP, Cardiology as appropriate    Nonrheumatic tricuspid valve regurgitation  History of severe TR which seemed to be symptomatic with orthostasis and MARKHAM since a long time  Now s/p TTVR on 11/1/24 at [facility]  No acute cardiac complaints. Pre-op symptoms seem improved and stable recently  Follow up with PCP, Cardiology as appropriate. Had recent Cardio visit 12/2/24, considered stable      POD #1 TTE: EF 62%, RV moderately dilated, Pulmonary artery systolic pressure measures 43 mmHg. The pulmonary artery systolic pressure is mildly elevated. There is a #52 mm Evoque bioprosthetic tricuspid valve prosthesis. The prosthesis is well-seated. There is mild intravalvular regurgitation present. Tricuspid prosthesis peak gradient measures 15 mmHg. Tricuspid prosthesis mean gradient measures 8 mmHg.     Paroxysmal atrial fibrillation (HCC)  Continue Eliquis for AC  No on beta blocker as per Cardiology. HR seems controlled.  Follow up with PCP, Cardiology    Sick sinus syndrome (HCC)  Has pacemaker  Follow up with Cardiology as appropriate    Slow transit constipation  At risk due to recent hospitalization, relative immobility, comorbidities  Monitor stool output - much improved, endorsing regular easy BM recently  Bowel regimen at facility: continue miralax and senna regimen. Continue lactulose started recently; consider bisacodyl suppository PRN if no BM in 2-3 days  Encourage mobility as tolerated, PO " hydration as appropriate, high fiber diet/prune juice (in outpatient setting as appropriate)  Goal is for 1 easy BM every 1-2 days      Acquired hypothyroidism  Continue levothyroxine 25 mcg po daily  TSH (09/19/24)  1.05  Continue to monitor routinely    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 disease, without long-term current use of insulin ([facility])    Lab Results   Component Value Date    HGBA1C 6.2 (H) 09/10/2024     A1c well controlled for age  Monitor glucose on routine labs, no indications for daily/aggressive checks  Avoid hypoglycemia  Not presently on diabetic medications. Defer to outpatient care team. Limited options in setting of advanced renal disease.  Encourage lifestyle modifications including healthy diet, weight management, exercise as appropriate  Follow up with PCP, Endocrine/Ophthalmology/Podiatry outpatient as appropriate      Anemia in chronic kidney disease  Lab Results   Component Value Date    EGFR 41 (L) 10/19/2024    EGFR 39 (L) 10/18/2024    EGFR 38 (L) 10/17/2024    CREATININE 1.24 (H) 11/07/2024    CREATININE 1.39 (H) 11/06/2024    CREATININE 1.24 (H) 11/05/2024       -since around 2022 baseline Hb seems around 10-11 range  -likely chronic related to CKD  -may be recently acutely lower in setting of CHF/volume overload and cardiac surgery  -monitor on routine labs - fairly stable  -recent iron level low- consider adding supplement if anemia worsens, though otherwise TIBC not elevated so anemia less likely related to the low iron  -monitor for acute bleed - no present signs  -consider further workup, Heme consult if persistent/worsening  -transfuse PRN Hb <7  Follow up with PCP      Stage 4 chronic kidney disease (HCC)  Lab Results   Component Value Date    EGFR 41 (L) 10/19/2024    EGFR 39 (L) 10/18/2024    EGFR 38 (L) 10/17/2024    CREATININE 1.24 (H) 11/07/2024    CREATININE 1.39 (H) 11/06/2024    CREATININE 1.24 (H) 11/05/2024       Monitor renal function on routine  labs- seems stable/within baseline. Had worsened inpatient recently in setting of aggressive diuresis though still seemed to be within her baseline range, and improved with fluids and adjustment of diuretic.  She had been on dialysis temporarily in 2023 per records during a period of acute renal injury/worsening, not appearing to require dialysis presently  Avoid nephrotoxins, NSAIDs as able  Encourage PO hydration, respecting volume status  Follow up with PCP, Nephrology as appropriate      Pacemaker  Noted, see remainder of plan    Hyponatremia  Noted on recent labs inpatient  May have been related to volume overload and related diuresis  Monitor on routine labs - seems to have improved to WNL as of hospital discharge and recently stable  Consider further workup, supplementation, Nephro consult if issue persists/worsens  Follow up with PCP    Hypokalemia  Noted mild/intermittent hypokalemia on lab review  Likely related to diuretic use  Monitor on routine labs - seems stable  Continue potassium supplement, adjust/replete as appropriate    Hyperlipidemia  Noted history  Not presently on statin (historically was on simvastatin appears stopped in 2023) - defer to outpatient care team  Encourage lifestyle modifications including healthy diet, weight management, exercise as appropriate  Follow up with PCP, Cardiology    Hyperammonemia (HCC)  Hx of hepatic encephalopathy  Ammonia level elevated on labs  Continue on lactulose 30 ml TID (recently started/titrated)  Mentation seems improved from prior confusion at facility - seems back to baseline  Follow up with PCP, Nephrology/GI as appropriate    Frailty syndrome in geriatric patient  -Clinical frailty scale stage 6-7 moderately frail  -Multifactorial including age, CHF, and additional chronic medical comorbidities, superimposed on elderly individual with limited physiologic and metabolic reserve and evidence of malnutrition  -continue healthy style choices and well  balanced diet and nutrition intake   -Continue optimization of chronic medical conditions and address acute metabolic derangements as arise  -follow up with PCP      Elevated serum immunoglobulin free light chain level  11/19/24 results  FR LIGHT CHAINS,SER     KAPPA FR LIGHT CHAIN  96.04 mg/L 3.30-19.40 H Final   LAMBDA FR LT CHAIN  64.26 mg/L 5.70-26.30 H Final   KAPPA/LAMBDA RATIO  1.49  0.26-1.65  Final    This test was ordered by other provider. It is unclear to me why the test was ordered. It appears the intended test may have been LFTs along with ammonia level, but instead light free chain testing was done with ammonia level. The elevation in kappa and lambda likely falls within reference range for patient's level of renal impairment, and her k/l ratio is within reference range. Suspect this elevation in levels is related to her known poor renal function rather than a primary hematologic issue. Continue to monitor clinically, can consider Heme/Onc consult if needed or in line with goals of care.    Cognitive impairment  Patient has some noted/documented hx of cognitive impairment though no clear diagnosis of dementia  On exam has generally been Aox2-3 and seems to be a fair historian though with some forgetfulness evident  Given baseline functional status (family helps with/manages her medications and finances) this is likely consistent with at least a mild dementia, though would hold off from making formal diagnosis while patient is not presently at baseline in setting of recent hospitalizations and acute illness  Was reportedly acutely worsened a few weeks ago at facility but more recently improved back to apparent baseline. May have been related to overdiuresis/dryness. Also appears she was previously on lactulose for hepatic encephalopathy in September 2024 but has been off lactulose since most recent hospitalization at [facility] - see Hyperammonemia  She does appear able to intelligently discuss her medical  conditions and make related decisions at this time  Supportive care  Monitor mentation  Delirium precautions  Follow up with PCP, consider outpatient cognitive testing as appropriate    Physical deconditioning  In setting of CHF, sedentary baseline, recent hospitalizations and cardiac surgery  -PT/OT as appropriate  -fall precautions  -encourage appropriate DME use  -SW following for safe discharge planning/homecare services as appropriate      GERD (gastroesophageal reflux disease)  -noted chronic history  -stable  -recommend OOB with meals, sit upright for at least 30 minutes afterwards, avoid trigger foods  -continue to monitor  -continue omeprazole 20mg daily, adjust at discretion of outpatient team as appropriate  -follow up with PCP, GI as appropriate           Review of Systems   Constitutional:  Negative for appetite change, chills, diaphoresis and fever.   HENT:  Negative for drooling, ear pain, hearing loss, rhinorrhea, sore throat and trouble swallowing.    Eyes:  Negative for pain, discharge, redness, itching and visual disturbance.   Respiratory:  Negative for cough, chest tightness, shortness of breath (chronic MARKHAM improved post-op, minimal/stable) and wheezing.    Cardiovascular:  Negative for chest pain and palpitations.   Gastrointestinal:  Negative for abdominal pain, blood in stool, constipation, diarrhea, nausea and vomiting.   Genitourinary:  Negative for difficulty urinating, dysuria and hematuria.   Musculoskeletal:  Positive for gait problem. Negative for arthralgias, back pain and neck pain.   Skin:  Negative for color change.   Neurological:  Negative for facial asymmetry, speech difficulty, weakness (gradually improving), light-headedness (seems much improved overall and recently stable/mild) and headaches.   Psychiatric/Behavioral:  Negative for agitation, behavioral problems and confusion. The patient is not nervous/anxious and is not hyperactive.    All other systems reviewed and are  negative.      Vital Signs:     BP: 110/52 mmHg  12/10/2024 08:14 Temp:97.9 °F  12/10/2024 08:14 Pulse:73 bpm  12/10/2024 08:14   Weight:108 Lbs  12/11/2024 13:41   Resp:18 Breaths/min  12/10/2024 08:14 BS:123 mg/dL  12/11/2024 06:08 O2:97 %  12/10/2024 08:14 Pain:0  12/11/2024 16:01       Exam:     Physical Exam  Vitals reviewed.   Constitutional:       General: She is not in acute distress.     Appearance: She is not toxic-appearing or diaphoretic.   HENT:      Head: Normocephalic and atraumatic.      Right Ear: External ear normal.      Left Ear: External ear normal.      Nose: Nose normal. No rhinorrhea.      Mouth/Throat:      Mouth: Mucous membranes are dry.      Pharynx: Oropharynx is clear. No posterior oropharyngeal erythema.   Eyes:      General: No scleral icterus.        Right eye: No discharge.         Left eye: No discharge.      Extraocular Movements: Extraocular movements intact.      Conjunctiva/sclera: Conjunctivae normal.      Pupils: Pupils are equal, round, and reactive to light.   Cardiovascular:      Rate and Rhythm: Normal rate and regular rhythm.   Pulmonary:      Effort: Pulmonary effort is normal. No respiratory distress.      Breath sounds: Normal breath sounds. No wheezing or rales.   Abdominal:      General: Bowel sounds are normal. There is no distension.      Palpations: Abdomen is soft.      Tenderness: There is no abdominal tenderness. There is no guarding.   Musculoskeletal:         General: No tenderness.      Cervical back: No rigidity.      Comments: Very slight/no notable peripheral edema  No calf tenderness bilaterally   Skin:     General: Skin is warm and dry.      Coloration: Skin is not jaundiced.   Neurological:      General: No focal deficit present.      Mental Status: She is alert and oriented to person, place, and time. Mental status is at baseline.      Comments: Aox3 (to self, to location [facility], to month)   Psychiatric:         Mood and Affect: Mood normal.          Behavior: Behavior normal.         Thought Content: Thought content normal.           Discharge Medications: See discharge medication list which was reviewed and signed.    Status at time of discharge: Stable    Discussion with patient/family as appropriate and further instructions:  -Fall precautions  -Aspiration precautions  -Bleeding precautions  -Monitor for signs/symptoms of infection  -Medication list was reviewed and signed  -DME form was completed    Follow-up Recommendations: Please follow-up with your primary care physician within 7-10 days of discharge to review medication changes and current status.     Problem List Follow-up Recommendations:      -Total time spent on this encounter today including documentation and workup review, face to face time, history and exam, and documentation/orders was approximately 55 minutes.  -This note will be copied to [facility] EMR where vitals and medication orders are placed.    Guanakito Perry D.O.  Geriatric Medicine  12/11/2024 7:05 PM

## 2024-12-11 NOTE — ASSESSMENT & PLAN NOTE
In setting of CHF, sedentary baseline, recent hospitalizations and cardiac surgery  -PT/OT as appropriate  -fall precautions  -encourage appropriate DME use  -SW following for safe discharge planning/homecare services as appropriate

## 2024-12-11 NOTE — ASSESSMENT & PLAN NOTE
Noted history  Not presently on statin (historically was on simvastatin appears stopped in 2023) - defer to outpatient care team  Encourage lifestyle modifications including healthy diet, weight management, exercise as appropriate  Follow up with PCP, Cardiology

## 2024-12-11 NOTE — ASSESSMENT & PLAN NOTE
Hx of hepatic encephalopathy  Ammonia level elevated on labs  Continue on lactulose 30 ml TID (recently started/titrated)  Mentation seems improved from prior confusion at facility - seems back to baseline  Follow up with PCP, Nephrology/GI as appropriate

## 2024-12-11 NOTE — ASSESSMENT & PLAN NOTE
"Wt Readings from Last 3 Encounters:   11/20/24 46.4 kg (102 lb 3.2 oz)   11/15/24 48.5 kg (107 lb)   10/29/24 45.7 kg (100 lb 11.2 oz)     Hospitalized recently with acute on chronic CHF  Evaluated by Cardiology inpatient and underwent aggressive diuresis  Monitor weight daily - Fairly stable, no acute/alarming uptrend  Monitor volume status clinically - seems euvolemic, earlier dryness seems better; no notable peripheral edema, no orthopnea  Encourage elevation, compression of lower extremities as appropriate  Continue torsemide, at this time seeming stable on 10mg daily. Adjust as appropriate as per outpatient care team.  As per inpatient Heart Failure team: \"BB not indicated in HFpEF/RV dyfunction. Not on ACE/ARB/ARNI/MRA or SGLT2 inhibitor given renal function. \"  Follow up with PCP, Cardiology as appropriate    "

## 2024-12-12 ENCOUNTER — TRANSITIONAL CARE MANAGEMENT (OUTPATIENT)
Dept: FAMILY MEDICINE CLINIC | Facility: CLINIC | Age: 84
End: 2024-12-12

## 2024-12-12 NOTE — ASSESSMENT & PLAN NOTE
-noted chronic history  -stable  -recommend OOB with meals, sit upright for at least 30 minutes afterwards, avoid trigger foods  -continue to monitor  -continue omeprazole 20mg daily, adjust at discretion of outpatient team as appropriate  -follow up with PCP, GI as appropriate

## 2024-12-23 ENCOUNTER — OFFICE VISIT (OUTPATIENT)
Dept: FAMILY MEDICINE CLINIC | Facility: CLINIC | Age: 84
End: 2024-12-23
Payer: COMMERCIAL

## 2024-12-23 ENCOUNTER — TELEPHONE (OUTPATIENT)
Age: 84
End: 2024-12-23

## 2024-12-23 VITALS
SYSTOLIC BLOOD PRESSURE: 118 MMHG | WEIGHT: 119 LBS | BODY MASS INDEX: 23.64 KG/M2 | TEMPERATURE: 98 F | HEART RATE: 80 BPM | DIASTOLIC BLOOD PRESSURE: 60 MMHG | OXYGEN SATURATION: 99 %

## 2024-12-23 DIAGNOSIS — J40 BRONCHITIS: ICD-10-CM

## 2024-12-23 DIAGNOSIS — D61.818 PANCYTOPENIA (HCC): ICD-10-CM

## 2024-12-23 DIAGNOSIS — Z95.4 S/P TVR (TRICUSPID VALVE REPLACEMENT): Primary | ICD-10-CM

## 2024-12-23 DIAGNOSIS — I50.33 ACUTE ON CHRONIC HEART FAILURE WITH PRESERVED EJECTION FRACTION (HFPEF) (HCC): ICD-10-CM

## 2024-12-23 DIAGNOSIS — I36.1 NONRHEUMATIC TRICUSPID VALVE REGURGITATION: ICD-10-CM

## 2024-12-23 DIAGNOSIS — I10 ESSENTIAL HYPERTENSION: ICD-10-CM

## 2024-12-23 DIAGNOSIS — E11.51 TYPE II DIABETES MELLITUS WITH PERIPHERAL CIRCULATORY DISORDER (HCC): ICD-10-CM

## 2024-12-23 DIAGNOSIS — N25.81 SECONDARY HYPERPARATHYROIDISM OF RENAL ORIGIN (HCC): ICD-10-CM

## 2024-12-23 DIAGNOSIS — E03.9 ACQUIRED HYPOTHYROIDISM: ICD-10-CM

## 2024-12-23 PROCEDURE — 99495 TRANSJ CARE MGMT MOD F2F 14D: CPT | Performed by: FAMILY MEDICINE

## 2024-12-23 RX ORDER — AZITHROMYCIN 250 MG/1
TABLET, FILM COATED ORAL
Qty: 6 TABLET | Refills: 0 | Status: SHIPPED | OUTPATIENT
Start: 2024-12-23 | End: 2024-12-28

## 2024-12-23 RX ORDER — TORSEMIDE 20 MG/1
20 TABLET ORAL DAILY
Start: 2024-12-23

## 2024-12-23 NOTE — PATIENT INSTRUCTIONS
Medicare Preventive Visit Patient Instructions  Thank you for completing your Welcome to Medicare Visit or Medicare Annual Wellness Visit today. Your next wellness visit will be due in one year (12/24/2025).  The screening/preventive services that you may require over the next 5-10 years are detailed below. Some tests may not apply to you based off risk factors and/or age. Screening tests ordered at today's visit but not completed yet may show as past due. Also, please note that scanned in results may not display below.  Preventive Screenings:  Service Recommendations Previous Testing/Comments   Colorectal Cancer Screening  * Colonoscopy    * Fecal Occult Blood Test (FOBT)/Fecal Immunochemical Test (FIT)  * Fecal DNA/Cologuard Test  * Flexible Sigmoidoscopy Age: 45-75 years old   Colonoscopy: every 10 years (may be performed more frequently if at higher risk)  OR  FOBT/FIT: every 1 year  OR  Cologuard: every 3 years  OR  Sigmoidoscopy: every 5 years  Screening may be recommended earlier than age 45 if at higher risk for colorectal cancer. Also, an individualized decision between you and your healthcare provider will decide whether screening between the ages of 76-85 would be appropriate. Colonoscopy: Not on file  FOBT/FIT: Not on file  Cologuard: Not on file  Sigmoidoscopy: Not on file          Breast Cancer Screening Age: 40+ years old  Frequency: every 1-2 years  Not required if history of left and right mastectomy Mammogram: 10/03/2022        Cervical Cancer Screening Between the ages of 21-29, pap smear recommended once every 3 years.   Between the ages of 30-65, can perform pap smear with HPV co-testing every 5 years.   Recommendations may differ for women with a history of total hysterectomy, cervical cancer, or abnormal pap smears in past. Pap Smear: 01/08/2021    Screening Not Indicated   Hepatitis C Screening Once for adults born between 1945 and 1965  More frequently in patients at high risk for Hepatitis  C Hep C Antibody: Not on file        Diabetes Screening 1-2 times per year if you're at risk for diabetes or have pre-diabetes Fasting glucose: 125 mg/dL (1/12/2024)  A1C: 6.2 % (9/10/2024)  Screening Not Indicated  History Diabetes   Cholesterol Screening Once every 5 years if you don't have a lipid disorder. May order more often based on risk factors. Lipid panel: 09/19/2024    Screening Not Indicated  History Lipid Disorder     Other Preventive Screenings Covered by Medicare:  Abdominal Aortic Aneurysm (AAA) Screening: covered once if your at risk. You're considered to be at risk if you have a family history of AAA.  Lung Cancer Screening: covers low dose CT scan once per year if you meet all of the following conditions: (1) Age 55-77; (2) No signs or symptoms of lung cancer; (3) Current smoker or have quit smoking within the last 15 years; (4) You have a tobacco smoking history of at least 20 pack years (packs per day multiplied by number of years you smoked); (5) You get a written order from a healthcare provider.  Glaucoma Screening: covered annually if you're considered high risk: (1) You have diabetes OR (2) Family history of glaucoma OR (3)  aged 50 and older OR (4)  American aged 65 and older  Osteoporosis Screening: covered every 2 years if you meet one of the following conditions: (1) You're estrogen deficient and at risk for osteoporosis based off medical history and other findings; (2) Have a vertebral abnormality; (3) On glucocorticoid therapy for more than 3 months; (4) Have primary hyperparathyroidism; (5) On osteoporosis medications and need to assess response to drug therapy.   Last bone density test (DXA Scan): 06/18/2021.  HIV Screening: covered annually if you're between the age of 15-65. Also covered annually if you are younger than 15 and older than 65 with risk factors for HIV infection. For pregnant patients, it is covered up to 3 times per  pregnancy.    Immunizations:  Immunization Recommendations   Influenza Vaccine Annual influenza vaccination during flu season is recommended for all persons aged >= 6 months who do not have contraindications   Pneumococcal Vaccine   * Pneumococcal conjugate vaccine = PCV13 (Prevnar 13), PCV15 (Vaxneuvance), PCV20 (Prevnar 20)  * Pneumococcal polysaccharide vaccine = PPSV23 (Pneumovax) Adults 19-63 yo with certain risk factors or if 65+ yo  If never received any pneumonia vaccine: recommend Prevnar 20 (PCV20)  Give PCV20 if previously received 1 dose of PCV13 or PPSV23   Hepatitis B Vaccine 3 dose series if at intermediate or high risk (ex: diabetes, end stage renal disease, liver disease)   Respiratory syncytial virus (RSV) Vaccine - COVERED BY MEDICARE PART D  * RSVPreF3 (Arexvy) CDC recommends that adults 60 years of age and older may receive a single dose of RSV vaccine using shared clinical decision-making (SCDM)   Tetanus (Td) Vaccine - COST NOT COVERED BY MEDICARE PART B Following completion of primary series, a booster dose should be given every 10 years to maintain immunity against tetanus. Td may also be given as tetanus wound prophylaxis.   Tdap Vaccine - COST NOT COVERED BY MEDICARE PART B Recommended at least once for all adults. For pregnant patients, recommended with each pregnancy.   Shingles Vaccine (Shingrix) - COST NOT COVERED BY MEDICARE PART B  2 shot series recommended in those 19 years and older who have or will have weakened immune systems or those 50 years and older     Health Maintenance Due:      Topic Date Due   • DXA SCAN  06/16/2023     Immunizations Due:      Topic Date Due   • Hepatitis B Vaccine (1 of 3 - Risk Dialysis 4-dose series) Never done   • Influenza Vaccine (1) 09/01/2024   • COVID-19 Vaccine (6 - 2024-25 season) 09/01/2024     Advance Directives   What are advance directives?  Advance directives are legal documents that state your wishes and plans for medical care. These  plans are made ahead of time in case you lose your ability to make decisions for yourself. Advance directives can apply to any medical decision, such as the treatments you want, and if you want to donate organs.   What are the types of advance directives?  There are many types of advance directives, and each state has rules about how to use them. You may choose a combination of any of the following:  Living will:  This is a written record of the treatment you want. You can also choose which treatments you do not want, which to limit, and which to stop at a certain time. This includes surgery, medicine, IV fluid, and tube feedings.   Durable power of  for healthcare (DPAHC):  This is a written record that states who you want to make healthcare choices for you when you are unable to make them for yourself. This person, called a proxy, is usually a family member or a friend. You may choose more than 1 proxy.  Do not resuscitate (DNR) order:  A DNR order is used in case your heart stops beating or you stop breathing. It is a request not to have certain forms of treatment, such as CPR. A DNR order may be included in other types of advance directives.  Medical directive:  This covers the care that you want if you are in a coma, near death, or unable to make decisions for yourself. You can list the treatments you want for each condition. Treatment may include pain medicine, surgery, blood transfusions, dialysis, IV or tube feedings, and a ventilator (breathing machine).  Values history:  This document has questions about your views, beliefs, and how you feel and think about life. This information can help others choose the care that you would choose.  Why are advance directives important?  An advance directive helps you control your care. Although spoken wishes may be used, it is better to have your wishes written down. Spoken wishes can be misunderstood, or not followed. Treatments may be given even if you do not  want them. An advance directive may make it easier for your family to make difficult choices about your care.   Fall Prevention    Fall prevention  includes ways to make your home and other areas safer. It also includes ways you can move more carefully to prevent a fall. Health conditions that cause changes in your blood pressure, vision, or muscle strength and coordination may increase your risk for falls. Medicines may also increase your risk for falls if they make you dizzy, weak, or sleepy.   Fall prevention tips:   Stand or sit up slowly.    Use assistive devices as directed.    Wear shoes that fit well and have soles that .    Wear a personal alarm.    Stay active.    Manage your medical conditions.    Home Safety Tips:  Add items to prevent falls in the bathroom.    Keep paths clear.    Install bright lights in your home.    Keep items you use often on shelves within reach.    Paint or place reflective tape on the edges of your stairs.    Urinary Incontinence   Urinary incontinence (UI)  is when you lose control of your bladder. UI develops because your bladder cannot store or empty urine properly. The 3 most common types of UI are stress incontinence, urge incontinence, or both.  Medicines:   May be given to help strengthen your bladder control. Report any side effects of medication to your healthcare provider.  Do pelvic muscle exercises often:  Your pelvic muscles help you stop urinating. Squeeze these muscles tight for 5 seconds, then relax for 5 seconds. Gradually work up to squeezing for 10 seconds. Do 3 sets of 15 repetitions a day, or as directed. This will help strengthen your pelvic muscles and improve bladder control.  Train your bladder:  Go to the bathroom at set times, such as every 2 hours, even if you do not feel the urge to go. You can also try to hold your urine when you feel the urge to go. For example, hold your urine for 5 minutes when you feel the urge to go. As that becomes easier,  hold your urine for 10 minutes.   Self-care:   Keep a UI record.  Write down how often you leak urine and how much you leak. Make a note of what you were doing when you leaked urine.  Drink liquids as directed. You may need to limit the amount of liquid you drink to help control your urine leakage. Do not drink any liquid right before you go to bed. Limit or do not have drinks that contain caffeine or alcohol.   Prevent constipation.  Eat a variety of high-fiber foods. Good examples are high-fiber cereals, beans, vegetables, and whole-grain breads. Walking is the best way to trigger your intestines to have a bowel movement.  Exercise regularly and maintain a healthy weight.  Weight loss and exercise will decrease pressure on your bladder and help you control your leakage.   Use a catheter as directed  to help empty your bladder. A catheter is a tiny, plastic tube that is put into your bladder to drain your urine.   Go to behavior therapy as directed.  Behavior therapy may be used to help you learn to control your urge to urinate.     © Copyright [employer] 2018 Information is for End User's use only and may not be sold, redistributed or otherwise used for commercial purposes. All illustrations and images included in CareNotes® are the copyrighted property of [employer] or [employer]

## 2024-12-23 NOTE — ASSESSMENT & PLAN NOTE
Orders:  •  torsemide (DEMADEX) 20 mg tablet; Take 1 tablet (20 mg total) by mouth daily  •  Basic metabolic panel; Future

## 2024-12-23 NOTE — TELEPHONE ENCOUNTER
Received call from [facility] stated she seen patient for physical therapy today    Noted that patient had   2 lb weight gain   Small amount of swelling ankles  Fatigued      Is scheduled to see for apt today

## 2024-12-23 NOTE — ASSESSMENT & PLAN NOTE
Wt Readings from Last 3 Encounters:   12/23/24 54 kg (119 lb)   11/20/24 46.4 kg (102 lb 3.2 oz)   11/15/24 48.5 kg (107 lb)     Some slight swelling today. Increase Torsemide for now. Daughter to call me on 12/30 to let me know how she is doing. Will check BMP also.        Orders:  •  torsemide (DEMADEX) 20 mg tablet; Take 1 tablet (20 mg total) by mouth daily  •  Basic metabolic panel; Future

## 2024-12-23 NOTE — PROGRESS NOTES
Transition of Care Visit  Name: Sarah Holland      : 1940      MRN: [id number]  Encounter Provider: Corina Oconnor DO  Encounter Date: 2024   Encounter department: [facility]    Assessment & Plan  S/P TVR (tricuspid valve replacement)  Patient had surgery at [place], then went to [facility] for recovery.       Acute on chronic heart failure with preserved ejection fraction (HFpEF) ([facility])  Wt Readings from Last 3 Encounters:   24 54 kg (119 lb)   24 46.4 kg (102 lb 3.2 oz)   11/15/24 48.5 kg (107 lb)     Some slight swelling today. Increase Torsemide for now. Daughter to call me on  to let me know how she is doing. Will check BMP also.        Orders:  •  torsemide (DEMADEX) 20 mg tablet; Take 1 tablet (20 mg total) by mouth daily  •  Basic metabolic panel; Future    Essential hypertension    Orders:  •  torsemide (DEMADEX) 20 mg tablet; Take 1 tablet (20 mg total) by mouth daily  •  Basic metabolic panel; Future    Bronchitis  Nebulizer ordered. Has albuterol at home.  Orders:  •  azithromycin (Zithromax) 250 mg tablet; Take 2 tablets (500 mg total) by mouth daily for 1 day, THEN 1 tablet (250 mg total) daily for 4 days.    Pancytopenia (HCC)         Secondary hyperparathyroidism of renal origin (HCC)         Nonrheumatic tricuspid valve regurgitation         Type II diabetes mellitus with peripheral circulatory disorder (HCC)    Lab Results   Component Value Date    HGBA1C 6.2 (H) 09/10/2024     On no medication at this time. Diabetic foot exam done.  Orders:  •  Basic metabolic panel; Future    Acquired hypothyroidism  Patient taking Levothyroxine.         Falls Plan of Care: referral to physical therapy. Has home physical therapy.    Urinary Incontinence Plan of Care: counseling topics discussed: use restroom every 2 hours and taking fluid pills at a time when you can get to bathroom easily.     Follow up in four months.    History of Present Illness      Transitional Care Management Review:   Sarah Holland is a 84 y.o. female here for TCM follow up.     During the TCM phone call patient stated:  TCM Call     Date and time call was made  12/12/2024  5:07 PM    Hospital care reviewed  Records reviewed    Patient was hospitialized at  Other (comment)    Comment  [facility]    Date of Admission  10/30/24    Date of discharge  12/12/24    Diagnosis  Tricuspid valvular rheumatic insufficiency    Disposition  Home    Were the patients medications reviewed and updated  Yes    Current Symptoms  None      TCM Call     Post hospital issues  Reduced activity; Poor ADL (Activities of Daily Living) performance    Should patient be enrolled in anticoag monitoring?  No    Scheduled for follow up?  Yes    Referrals needed  No    Did you obtain your prescribed medications  Yes    Do you need help managing your prescriptions or medications  Yes    Why type of assitance do you need  Pt relies on home care staff    Is transportation to your appointment needed  Yes    Specify why  Pt relies on home care staff    I have advised the patient to call PCP with any new or worsening symptoms  Kath Elizondo MA    Living Arrangements  Children    Are you recieving any outpatient services  No    Are you recieving home care services  No    Are you using any community resources  No    Current waiver services  No    Interperter language line needed  No        Patient is here for follow up to hospitalization for TVR procedure and then rehab at [facility].  She is brought to visit by daughter. There is some concern regarding slight edema. Had call from visiting nurses earlier today.  Chart reviewed. Medications reviewed.      Review of Systems   Constitutional:  Positive for fatigue.   Respiratory:  Positive for cough and wheezing.    Cardiovascular:  Positive for leg swelling.     Objective   /60 (BP Location: Left arm, Patient Position: Sitting, Cuff Size: Standard)   Pulse 80   Temp  98 °F (36.7 °C) (Temporal)   Wt 54 kg (119 lb)   SpO2 99%   BMI 23.64 kg/m²     Physical Exam  Vitals and nursing note reviewed.   Constitutional:       General: She is not in acute distress.  HENT:      Head: Normocephalic.   Neck:      Thyroid: No thyromegaly.   Cardiovascular:      Rate and Rhythm: Normal rate and regular rhythm.   Pulmonary:      Effort: Pulmonary effort is normal.      Breath sounds: Wheezing present.   Musculoskeletal:      Right lower leg: No edema (very slight).      Left lower leg: No edema (very slight).   Lymphadenopathy:      Cervical: No cervical adenopathy.   Skin:     General: Skin is warm and dry.   Neurological:      Mental Status: She is alert and oriented to person, place, and time.   Psychiatric:         Mood and Affect: Mood normal.       Medications have been reviewed by provider in current encounter

## 2024-12-23 NOTE — PROGRESS NOTES
Diabetic Foot Exam    Patient's shoes and socks removed.    Right Foot/Ankle   Right Foot Inspection  Skin Exam: skin intact and dry skin.     Toe Exam: right toe deformity.     Sensory   Monofilament testing: diminished    Vascular  The right DP pulse is 1+. The right PT pulse is 1+.     Left Foot/Ankle  Left Foot Inspection  Skin Exam: skin intact and dry skin.     Toe Exam: left toe deformity.     Sensory   Monofilament testing: diminished    Vascular  The left DP pulse is 1+. The left PT pulse is 1+.     Assign Risk Category  Deformity present  Loss of protective sensation  Weak pulses  Risk: 2      Name: Sarah Holland      : 1940      MRN: [id number]  Encounter Provider: Corina Oconnor DO  Encounter Date: 2024   Encounter department: [facility]    Assessment & Plan  S/P TVR (tricuspid valve replacement)         Acute on chronic heart failure with preserved ejection fraction (HFpEF) ([facility])  Wt Readings from Last 3 Encounters:   24 54 kg (119 lb)   24 46.4 kg (102 lb 3.2 oz)   11/15/24 48.5 kg (107 lb)             Orders:  •  torsemide (DEMADEX) 20 mg tablet; Take 1 tablet (20 mg total) by mouth daily  •  Basic metabolic panel; Future    Essential hypertension    Orders:  •  torsemide (DEMADEX) 20 mg tablet; Take 1 tablet (20 mg total) by mouth daily  •  Basic metabolic panel; Future    Bronchitis    Orders:  •  azithromycin (Zithromax) 250 mg tablet; Take 2 tablets (500 mg total) by mouth daily for 1 day, THEN 1 tablet (250 mg total) daily for 4 days.    Pancytopenia (HCC)         Secondary hyperparathyroidism of renal origin (HCC)         Nonrheumatic tricuspid valve regurgitation         Type II diabetes mellitus with peripheral circulatory disorder (HCC)    Lab Results   Component Value Date    HGBA1C 6.2 (H) 09/10/2024     Orders:  •  Basic metabolic panel; Future    Acquired hypothyroidism                       Objective   /60 (BP Location: Left arm,  Patient Position: Sitting, Cuff Size: Standard)   Pulse 80   Temp 98 °F (36.7 °C) (Temporal)   Wt 54 kg (119 lb)   SpO2 99%   BMI 23.64 kg/m²     Physical Exam  Cardiovascular:      Pulses: Pulses are weak.           Dorsalis pedis pulses are 1+ on the right side and 1+ on the left side.        Posterior tibial pulses are 1+ on the right side and 1+ on the left side.   Musculoskeletal:        Feet:    Feet:      Right foot:      Skin integrity: Dry skin present.      Left foot:      Skin integrity: Dry skin present.

## 2024-12-24 LAB
DME PARACHUTE DELIVERY DATE REQUESTED: NORMAL
DME PARACHUTE ITEM DESCRIPTION: NORMAL
DME PARACHUTE ORDER STATUS: NORMAL
DME PARACHUTE SUPPLIER NAME: NORMAL
DME PARACHUTE SUPPLIER PHONE: NORMAL

## 2024-12-24 NOTE — TELEPHONE ENCOUNTER
Patient's daughter returned call;I provided message below; daughter said she will call back with phone number for [facility].

## 2024-12-24 NOTE — TELEPHONE ENCOUNTER
No number on file for [facility]. I called and left a vcml for pts daughter to call office back so we can either get a number to reach out to [facility] or she can relay message to them for us.

## 2024-12-24 NOTE — ASSESSMENT & PLAN NOTE
Wt Readings from Last 3 Encounters:   12/23/24 54 kg (119 lb)   11/20/24 46.4 kg (102 lb 3.2 oz)   11/15/24 48.5 kg (107 lb)             Orders:  •  torsemide (DEMADEX) 20 mg tablet; Take 1 tablet (20 mg total) by mouth daily  •  Basic metabolic panel; Future

## 2024-12-24 NOTE — ASSESSMENT & PLAN NOTE
Lab Results   Component Value Date    HGBA1C 6.2 (H) 09/10/2024     On no medication at this time. Diabetic foot exam done.  Orders:  •  Basic metabolic panel; Future

## 2024-12-24 NOTE — ASSESSMENT & PLAN NOTE
Lab Results   Component Value Date    HGBA1C 6.2 (H) 09/10/2024     Orders:  •  Basic metabolic panel; Future

## 2024-12-26 NOTE — TELEPHONE ENCOUNTER
Called [facility] and spoke with Anai. Advised her that Dr. Oconnor increased pts Torsemide to 20 mg daily and would like for a Chemistry Panel (BMP) to be drawn on 12/30. Anai requested lab order to be fax to provide to the nurse as she can not take verbal orders. Lab order to be faxed to [facility] at [phone].

## 2024-12-26 NOTE — TELEPHONE ENCOUNTER
Pt daughter called in stating Dr. Oconnor requested for the following [facility] nurse contact number @ [phone] wait for the day is 116. Kindly pass the message to Dr. Oconnor and keep her updated by an return call. Thanks

## 2024-12-27 ENCOUNTER — TELEPHONE (OUTPATIENT)
Age: 84
End: 2024-12-27

## 2024-12-27 NOTE — TELEPHONE ENCOUNTER
Fabiana a nurse with [facility] called in regards to seeing patient for nursing today. Fabiana stated that when she saw patient 2 weeks ago patient was 108 lbs. and now patient is 118.6 lbs.  Fabiana stated that patient has +2 edema in right left and +1 edema in left leg, puffy face, and daughter informed her that patients abdomen is larger than usual. Fabiana's call back number is [phone].

## 2024-12-30 ENCOUNTER — TELEPHONE (OUTPATIENT)
Age: 84
End: 2024-12-30

## 2024-12-30 NOTE — TELEPHONE ENCOUNTER
Jami a nurse with [facility] called in regards to not being able to get any blood out of patient today. Jami stated that she contacted daughter and daughter will be taking patient to the lab to get blood drawn. Jami also stated that patients weight has came down to 116.6 lbs and that patient has less edema in legs and abdomen.

## 2024-12-30 NOTE — TELEPHONE ENCOUNTER
I advised pts daughter Samaria that Dr. Oconnor would like for her to take pt to a lab as soon as possible to have blood work drawn as she is concerned about her potassium levels and kidney function. Samaria will take pt today. She was also advised to decrease pts Torsemide to (2) 10 mg daily and call us back on Thursday with a weight check. Samaria understood and had no concerns or questions.

## 2024-12-30 NOTE — TELEPHONE ENCOUNTER
Pt daughter called with weight check for Dr Oconnor this morning.  Weight was 116.6 lbs    Nurse attempted to obtain blood for lab scripts. Vein was blown.  Unable to do labs.  Please advise - wait until next weeks visit or does daughter need to take her somewhere.  Daughter also asked if Potassium was being checked.    Please advise

## 2025-01-02 ENCOUNTER — APPOINTMENT (OUTPATIENT)
Dept: LAB | Facility: HOSPITAL | Age: 85
End: 2025-01-02
Payer: COMMERCIAL

## 2025-01-02 DIAGNOSIS — I10 ESSENTIAL HYPERTENSION: ICD-10-CM

## 2025-01-02 DIAGNOSIS — E11.51 TYPE II DIABETES MELLITUS WITH PERIPHERAL CIRCULATORY DISORDER (HCC): ICD-10-CM

## 2025-01-02 DIAGNOSIS — I50.33 ACUTE ON CHRONIC HEART FAILURE WITH PRESERVED EJECTION FRACTION (HFPEF) (HCC): ICD-10-CM

## 2025-01-02 LAB
ANION GAP SERPL CALCULATED.3IONS-SCNC: 6 MMOL/L (ref 4–13)
BUN SERPL-MCNC: 22 MG/DL (ref 5–25)
CALCIUM SERPL-MCNC: 9.9 MG/DL (ref 8.4–10.2)
CHLORIDE SERPL-SCNC: 104 MMOL/L (ref 96–108)
CO2 SERPL-SCNC: 27 MMOL/L (ref 21–32)
CREAT SERPL-MCNC: 1.54 MG/DL (ref 0.6–1.3)
GFR SERPL CREATININE-BSD FRML MDRD: 30 ML/MIN/1.73SQ M
GLUCOSE SERPL-MCNC: 107 MG/DL (ref 65–140)
POTASSIUM SERPL-SCNC: 4.6 MMOL/L (ref 3.5–5.3)
SODIUM SERPL-SCNC: 137 MMOL/L (ref 135–147)

## 2025-01-02 PROCEDURE — 36415 COLL VENOUS BLD VENIPUNCTURE: CPT

## 2025-01-02 PROCEDURE — 80048 BASIC METABOLIC PNL TOTAL CA: CPT

## 2025-01-03 NOTE — TELEPHONE ENCOUNTER
Daughter called back - her weight 121.2.lbs  States she still feels a little dizzy.    Swelling in legs went down.  Belly still looks bigger

## 2025-01-03 NOTE — TELEPHONE ENCOUNTER
Zoila RN with [facility] called wanting to make sure that the provider knows that patient is still having dizziness.  Zoila believes that patient should be evaluated in the office for possible electrolyte imbalance. Please advise.

## 2025-01-03 NOTE — TELEPHONE ENCOUNTER
Called patient's daughter phone went straight to voicemail [facility] to call us back. Will try again later.

## 2025-01-03 NOTE — TELEPHONE ENCOUNTER
Return call from Samaria.  Message relayed to her from Dr. Oconnor.  She said the swelling is decreased, but she has not been able to weigh her yet today because she c/o dizziness.  Patient has on-going issues with dizziness but states it has been worse lately.  She will call in or send [employer] message with weight once she weighs her.

## 2025-01-07 ENCOUNTER — TELEPHONE (OUTPATIENT)
Age: 85
End: 2025-01-07

## 2025-01-07 DIAGNOSIS — I50.33 ACUTE ON CHRONIC HEART FAILURE WITH PRESERVED EJECTION FRACTION (HFPEF) (HCC): ICD-10-CM

## 2025-01-07 DIAGNOSIS — I10 ESSENTIAL HYPERTENSION: ICD-10-CM

## 2025-01-07 RX ORDER — TORSEMIDE 10 MG/1
TABLET ORAL
Start: 2025-01-07

## 2025-01-07 NOTE — TELEPHONE ENCOUNTER
Katie a occupational therapist with [facility] called in regards to patients increase in her weight. Katie stated that patient usually  averages around 114-116 lbs and today patient was 128.2 lbs.

## 2025-01-09 NOTE — TELEPHONE ENCOUNTER
Received call from MARGE Jung with [facility].  She is seeing patient today.  Weight is up again to 129 lbs  today.  Patient is still having dizziness and has pitting edema to BLE.  Reviewed current torsemide orders with RN.  She is concerned about patient's condition and trying to keep her out of the hospital.   Please advise.    Fabiana can be reached at: [phone]

## 2025-01-13 ENCOUNTER — OFFICE VISIT (OUTPATIENT)
Dept: FAMILY MEDICINE CLINIC | Facility: CLINIC | Age: 85
End: 2025-01-13
Payer: COMMERCIAL

## 2025-01-13 VITALS
SYSTOLIC BLOOD PRESSURE: 110 MMHG | DIASTOLIC BLOOD PRESSURE: 66 MMHG | HEIGHT: 59 IN | HEART RATE: 91 BPM | TEMPERATURE: 97.5 F | WEIGHT: 124.5 LBS | OXYGEN SATURATION: 97 % | BODY MASS INDEX: 25.1 KG/M2

## 2025-01-13 DIAGNOSIS — I50.32 CHRONIC DIASTOLIC HEART FAILURE (HCC): Primary | ICD-10-CM

## 2025-01-13 DIAGNOSIS — D61.818 PANCYTOPENIA (HCC): ICD-10-CM

## 2025-01-13 DIAGNOSIS — I50.33 ACUTE ON CHRONIC HEART FAILURE WITH PRESERVED EJECTION FRACTION (HFPEF) (HCC): ICD-10-CM

## 2025-01-13 DIAGNOSIS — I48.0 PAROXYSMAL ATRIAL FIBRILLATION (HCC): ICD-10-CM

## 2025-01-13 DIAGNOSIS — E11.51 TYPE II DIABETES MELLITUS WITH PERIPHERAL CIRCULATORY DISORDER (HCC): ICD-10-CM

## 2025-01-13 DIAGNOSIS — N18.32 STAGE 3B CHRONIC KIDNEY DISEASE (HCC): ICD-10-CM

## 2025-01-13 DIAGNOSIS — N25.81 SECONDARY HYPERPARATHYROIDISM OF RENAL ORIGIN (HCC): ICD-10-CM

## 2025-01-13 DIAGNOSIS — D69.6 PLATELETS DECREASED (HCC): ICD-10-CM

## 2025-01-13 DIAGNOSIS — R60.0 BILATERAL EDEMA OF LOWER EXTREMITY: ICD-10-CM

## 2025-01-13 PROBLEM — Z99.2 DIALYSIS PATIENT (HCC): Status: RESOLVED | Noted: 2023-08-25 | Resolved: 2025-01-13

## 2025-01-13 PROCEDURE — 99214 OFFICE O/P EST MOD 30 MIN: CPT | Performed by: FAMILY MEDICINE

## 2025-01-13 RX ORDER — SPIRONOLACTONE 25 MG/1
25 TABLET ORAL DAILY
Qty: 30 TABLET | Refills: 0 | Status: SHIPPED | OUTPATIENT
Start: 2025-01-13

## 2025-01-13 NOTE — ASSESSMENT & PLAN NOTE
Patient is on no meds right now.  Lab Results   Component Value Date    HGBA1C 6.2 (H) 09/10/2024

## 2025-01-13 NOTE — PROGRESS NOTES
Name: Sarah Holland      : 1940      MRN: [id number]  Encounter Provider: Corina Oconnor DO  Encounter Date: 2025   Encounter department: [facility]  :  Assessment & Plan  Chronic diastolic heart failure (HCC)  Wt Readings from Last 3 Encounters:   25 56.5 kg (124 lb 8 oz)   24 54 kg (119 lb)   24 46.4 kg (102 lb 3.2 oz)             Orders:  •  B-Type Natriuretic Peptide(BNP); Future  •  Comprehensive metabolic panel; Future  •  CBC and differential; Future  •  spironolactone (ALDACTONE) 25 mg tablet; Take 1 tablet (25 mg total) by mouth daily    Stage 3b chronic kidney disease (HCC)  Lab Results   Component Value Date    EGFR 30 2025    EGFR 41 (L) 10/19/2024    EGFR 39 (L) 10/18/2024    CREATININE 1.54 (H) 2025    CREATININE 1.24 (H) 2024    CREATININE 1.39 (H) 2024     Orders:  •  B-Type Natriuretic Peptide(BNP); Future  •  Comprehensive metabolic panel; Future  •  CBC and differential; Future  •  spironolactone (ALDACTONE) 25 mg tablet; Take 1 tablet (25 mg total) by mouth daily    Bilateral edema of lower extremity  Edema has increased. Weight is up since last visit. Has been up and down as I have changed Torsemide dose, but I am concerned about kidney function. Checking CMP and BNP.  Orders:  •  B-Type Natriuretic Peptide(BNP); Future  •  Comprehensive metabolic panel; Future  •  CBC and differential; Future    Pancytopenia (HCC)  Last CBC done n November. Will recheck.       Acute on chronic heart failure with preserved ejection fraction (HFpEF) (HCC)  Wt Readings from Last 3 Encounters:   25 56.5 kg (124 lb 8 oz)   24 54 kg (119 lb)   24 46.4 kg (102 lb 3.2 oz)                  Paroxysmal atrial fibrillation (HCC)         Secondary hyperparathyroidism of renal origin (HCC)         Platelets decreased (HCC)         Type II diabetes mellitus with peripheral circulatory disorder (HCC)  Patient is on no meds right  "now.  Lab Results   Component Value Date    HGBA1C 6.2 (H) 09/10/2024            After I receive blood work results, I will call Dr. Matthews (patient's cardiologist at [facility]). She does have an appointment later this week.     History of Present Illness     Patient is here with daughter - patient has increased edema, lightheadedness and full abdomen. But she is less confused. Patient wants to get better to be able to go out and about. Patient is brought to the office by her daughter, Samaria. I have been talking with her in the past few days regarding patient's edema.      Review of Systems   Constitutional:  Negative for chills and fever.   Respiratory:  Positive for shortness of breath.    Cardiovascular:  Positive for leg swelling.   Gastrointestinal:  Positive for abdominal distention.       Objective   /66 (BP Location: Left arm, Patient Position: Sitting, Cuff Size: Adult)   Pulse 91   Temp 97.5 °F (36.4 °C)   Ht 4' 11\" (1.499 m)   Wt 56.5 kg (124 lb 8 oz)   SpO2 97%   BMI 25.15 kg/m²      Physical Exam  Vitals and nursing note reviewed.   Constitutional:       General: She is not in acute distress.  HENT:      Head: Normocephalic.   Neck:      Thyroid: No thyromegaly.   Cardiovascular:      Rate and Rhythm: Normal rate and regular rhythm.      Heart sounds: Normal heart sounds.   Pulmonary:      Effort: Pulmonary effort is normal.      Breath sounds: Normal breath sounds.   Abdominal:      General: There is distension.   Musculoskeletal:      Right lower leg: Edema present.      Left lower leg: Edema present.   Lymphadenopathy:      Cervical: No cervical adenopathy.   Skin:     General: Skin is warm and dry.   Neurological:      Mental Status: She is alert and oriented to person, place, and time.   Psychiatric:         Mood and Affect: Mood normal.         "

## 2025-01-13 NOTE — ASSESSMENT & PLAN NOTE
Wt Readings from Last 3 Encounters:   01/13/25 56.5 kg (124 lb 8 oz)   12/23/24 54 kg (119 lb)   11/20/24 46.4 kg (102 lb 3.2 oz)             Orders:  •  B-Type Natriuretic Peptide(BNP); Future  •  Comprehensive metabolic panel; Future  •  CBC and differential; Future  •  spironolactone (ALDACTONE) 25 mg tablet; Take 1 tablet (25 mg total) by mouth daily

## 2025-01-13 NOTE — ASSESSMENT & PLAN NOTE
Wt Readings from Last 3 Encounters:   01/13/25 56.5 kg (124 lb 8 oz)   12/23/24 54 kg (119 lb)   11/20/24 46.4 kg (102 lb 3.2 oz)

## 2025-01-14 ENCOUNTER — APPOINTMENT (OUTPATIENT)
Dept: LAB | Facility: HOSPITAL | Age: 85
End: 2025-01-14
Payer: COMMERCIAL

## 2025-01-14 DIAGNOSIS — R60.0 BILATERAL EDEMA OF LOWER EXTREMITY: ICD-10-CM

## 2025-01-14 DIAGNOSIS — N18.32 STAGE 3B CHRONIC KIDNEY DISEASE (HCC): ICD-10-CM

## 2025-01-14 DIAGNOSIS — I50.32 CHRONIC DIASTOLIC HEART FAILURE (HCC): ICD-10-CM

## 2025-01-14 LAB
ALBUMIN SERPL BCG-MCNC: 3.8 G/DL (ref 3.5–5)
ALP SERPL-CCNC: 147 U/L (ref 34–104)
ALT SERPL W P-5'-P-CCNC: 14 U/L (ref 7–52)
ANION GAP SERPL CALCULATED.3IONS-SCNC: 3 MMOL/L (ref 4–13)
AST SERPL W P-5'-P-CCNC: 27 U/L (ref 13–39)
BASOPHILS # BLD AUTO: 0.04 THOUSANDS/ΜL (ref 0–0.1)
BASOPHILS NFR BLD AUTO: 1 % (ref 0–1)
BILIRUB SERPL-MCNC: 1.25 MG/DL (ref 0.2–1)
BNP SERPL-MCNC: 1522 PG/ML (ref 0–100)
BUN SERPL-MCNC: 21 MG/DL (ref 5–25)
CALCIUM SERPL-MCNC: 9.7 MG/DL (ref 8.4–10.2)
CHLORIDE SERPL-SCNC: 100 MMOL/L (ref 96–108)
CO2 SERPL-SCNC: 31 MMOL/L (ref 21–32)
CREAT SERPL-MCNC: 1.47 MG/DL (ref 0.6–1.3)
EOSINOPHIL # BLD AUTO: 0.15 THOUSAND/ΜL (ref 0–0.61)
EOSINOPHIL NFR BLD AUTO: 5 % (ref 0–6)
ERYTHROCYTE [DISTWIDTH] IN BLOOD BY AUTOMATED COUNT: 16.9 % (ref 11.6–15.1)
GFR SERPL CREATININE-BSD FRML MDRD: 32 ML/MIN/1.73SQ M
GLUCOSE P FAST SERPL-MCNC: 113 MG/DL (ref 65–99)
HCT VFR BLD AUTO: 27.9 % (ref 34.8–46.1)
HGB BLD-MCNC: 8.8 G/DL (ref 11.5–15.4)
IMM GRANULOCYTES # BLD AUTO: 0.01 THOUSAND/UL (ref 0–0.2)
IMM GRANULOCYTES NFR BLD AUTO: 0 % (ref 0–2)
LYMPHOCYTES # BLD AUTO: 0.62 THOUSANDS/ΜL (ref 0.6–4.47)
LYMPHOCYTES NFR BLD AUTO: 22 % (ref 14–44)
MCH RBC QN AUTO: 29.6 PG (ref 26.8–34.3)
MCHC RBC AUTO-ENTMCNC: 31.5 G/DL (ref 31.4–37.4)
MCV RBC AUTO: 94 FL (ref 82–98)
MONOCYTES # BLD AUTO: 0.54 THOUSAND/ΜL (ref 0.17–1.22)
MONOCYTES NFR BLD AUTO: 19 % (ref 4–12)
NEUTROPHILS # BLD AUTO: 1.53 THOUSANDS/ΜL (ref 1.85–7.62)
NEUTS SEG NFR BLD AUTO: 53 % (ref 43–75)
NRBC BLD AUTO-RTO: 0 /100 WBCS
PLATELET # BLD AUTO: 169 THOUSANDS/UL (ref 149–390)
PMV BLD AUTO: 9.4 FL (ref 8.9–12.7)
POTASSIUM SERPL-SCNC: 4.8 MMOL/L (ref 3.5–5.3)
PROT SERPL-MCNC: 8 G/DL (ref 6.4–8.4)
RBC # BLD AUTO: 2.97 MILLION/UL (ref 3.81–5.12)
SODIUM SERPL-SCNC: 134 MMOL/L (ref 135–147)
WBC # BLD AUTO: 2.89 THOUSAND/UL (ref 4.31–10.16)

## 2025-01-14 PROCEDURE — 83880 ASSAY OF NATRIURETIC PEPTIDE: CPT

## 2025-01-14 PROCEDURE — 36415 COLL VENOUS BLD VENIPUNCTURE: CPT

## 2025-01-14 PROCEDURE — 85025 COMPLETE CBC W/AUTO DIFF WBC: CPT

## 2025-01-14 PROCEDURE — 80053 COMPREHEN METABOLIC PANEL: CPT

## 2025-01-23 ENCOUNTER — TELEPHONE (OUTPATIENT)
Age: 85
End: 2025-01-23

## 2025-01-23 DIAGNOSIS — I50.32 CHRONIC DIASTOLIC HEART FAILURE (HCC): Primary | ICD-10-CM

## 2025-01-23 DIAGNOSIS — D64.9 ANEMIA, UNSPECIFIED TYPE: ICD-10-CM

## 2025-01-23 NOTE — TELEPHONE ENCOUNTER
Maricarmen, [facility] [place] lab called, Pt is currently at the lab to have lab work done, no active orders at this time.    Pt states [facility]  made a home visit yesterday 1/22, per the direction of Dr. Oconnor, tried to repeat labs, due to abnormal results from 1/14.  [facility] was unable to do blood draw, advised pt to go to outpatient lab today 1/23, to have blood drawn.    No active orders, unable to find note re: having labs repeated, or results reviewed by PCP on 1/14?  Warm TSF to Guillermo went unanswered, tried 3x.     Please call [facility]'s lab re: orders as soon as possible.  Pt is currently at the lab waiting.  Phone# [phone]

## 2025-01-23 NOTE — TELEPHONE ENCOUNTER
Pt daughter, Samaria called, is on communication consent.    Relayed provider's message that lab orders were placed are visible.  BMP is fasting, 8 hours or longer recommended.  Pt daughter stated she will take her mom tomorrow 1/24 to have labs drawn.  She thanked PCP for ordering the labs.  No call back needed     no

## 2025-01-24 ENCOUNTER — APPOINTMENT (OUTPATIENT)
Dept: LAB | Facility: HOSPITAL | Age: 85
End: 2025-01-24
Payer: COMMERCIAL

## 2025-01-24 DIAGNOSIS — I50.32 CHRONIC DIASTOLIC HEART FAILURE (HCC): ICD-10-CM

## 2025-01-24 DIAGNOSIS — D64.9 ANEMIA, UNSPECIFIED TYPE: ICD-10-CM

## 2025-01-24 LAB
ANION GAP SERPL CALCULATED.3IONS-SCNC: 8 MMOL/L (ref 4–13)
BASOPHILS # BLD AUTO: 0.05 THOUSANDS/ΜL (ref 0–0.1)
BASOPHILS NFR BLD AUTO: 2 % (ref 0–1)
BNP SERPL-MCNC: 1482 PG/ML (ref 0–100)
BUN SERPL-MCNC: 22 MG/DL (ref 5–25)
CALCIUM SERPL-MCNC: 9.8 MG/DL (ref 8.4–10.2)
CHLORIDE SERPL-SCNC: 100 MMOL/L (ref 96–108)
CO2 SERPL-SCNC: 27 MMOL/L (ref 21–32)
CREAT SERPL-MCNC: 1.41 MG/DL (ref 0.6–1.3)
EOSINOPHIL # BLD AUTO: 0.13 THOUSAND/ΜL (ref 0–0.61)
EOSINOPHIL NFR BLD AUTO: 4 % (ref 0–6)
ERYTHROCYTE [DISTWIDTH] IN BLOOD BY AUTOMATED COUNT: 17.4 % (ref 11.6–15.1)
GFR SERPL CREATININE-BSD FRML MDRD: 34 ML/MIN/1.73SQ M
GLUCOSE SERPL-MCNC: 102 MG/DL (ref 65–140)
HCT VFR BLD AUTO: 28.1 % (ref 34.8–46.1)
HGB BLD-MCNC: 9.1 G/DL (ref 11.5–15.4)
IMM GRANULOCYTES # BLD AUTO: 0.01 THOUSAND/UL (ref 0–0.2)
IMM GRANULOCYTES NFR BLD AUTO: 0 % (ref 0–2)
LYMPHOCYTES # BLD AUTO: 0.73 THOUSANDS/ΜL (ref 0.6–4.47)
LYMPHOCYTES NFR BLD AUTO: 23 % (ref 14–44)
MCH RBC QN AUTO: 29.7 PG (ref 26.8–34.3)
MCHC RBC AUTO-ENTMCNC: 32.4 G/DL (ref 31.4–37.4)
MCV RBC AUTO: 92 FL (ref 82–98)
MONOCYTES # BLD AUTO: 0.42 THOUSAND/ΜL (ref 0.17–1.22)
MONOCYTES NFR BLD AUTO: 13 % (ref 4–12)
NEUTROPHILS # BLD AUTO: 1.79 THOUSANDS/ΜL (ref 1.85–7.62)
NEUTS SEG NFR BLD AUTO: 58 % (ref 43–75)
NRBC BLD AUTO-RTO: 0 /100 WBCS
PLATELET # BLD AUTO: 176 THOUSANDS/UL (ref 149–390)
PMV BLD AUTO: 9.4 FL (ref 8.9–12.7)
POTASSIUM SERPL-SCNC: 4.1 MMOL/L (ref 3.5–5.3)
RBC # BLD AUTO: 3.06 MILLION/UL (ref 3.81–5.12)
SODIUM SERPL-SCNC: 135 MMOL/L (ref 135–147)
WBC # BLD AUTO: 3.13 THOUSAND/UL (ref 4.31–10.16)

## 2025-01-24 PROCEDURE — 36415 COLL VENOUS BLD VENIPUNCTURE: CPT

## 2025-01-24 PROCEDURE — 85025 COMPLETE CBC W/AUTO DIFF WBC: CPT

## 2025-01-24 PROCEDURE — 80048 BASIC METABOLIC PNL TOTAL CA: CPT

## 2025-01-24 PROCEDURE — 83880 ASSAY OF NATRIURETIC PEPTIDE: CPT

## 2025-02-17 ENCOUNTER — TELEPHONE (OUTPATIENT)
Age: 85
End: 2025-02-17

## 2025-02-17 NOTE — TELEPHONE ENCOUNTER
Received call from Brook from [facility]  In home doing routine physical therapy visit   Calling to report 6 lb weight gain since last week.  Unsure if weight accurate   Patient fully dressed and weight taken in afternoon  Per Brook she educated family on how to properly weigh and they were instructed to call PCP office with additional weight gain.  Patient currently asymptomatic.    Please review.

## 2025-05-02 ENCOUNTER — TRANSITIONAL CARE MANAGEMENT (OUTPATIENT)
Dept: FAMILY MEDICINE CLINIC | Facility: CLINIC | Age: 85
End: 2025-05-02

## 2025-05-07 ENCOUNTER — RA CDI HCC (OUTPATIENT)
Dept: OTHER | Facility: HOSPITAL | Age: 85
End: 2025-05-07

## 2025-05-07 NOTE — PROGRESS NOTES
HCC coding opportunities          Chart Reviewed number of suggestions sent to Provider: 3    I49.5  I13.0  I27.20     Patients Insurance     Medicare Insurance: [employer]

## 2025-05-13 ENCOUNTER — OFFICE VISIT (OUTPATIENT)
Dept: FAMILY MEDICINE CLINIC | Facility: CLINIC | Age: 85
End: 2025-05-13
Payer: COMMERCIAL

## 2025-05-13 VITALS
HEART RATE: 85 BPM | WEIGHT: 98.6 LBS | TEMPERATURE: 98.5 F | BODY MASS INDEX: 19.91 KG/M2 | SYSTOLIC BLOOD PRESSURE: 134 MMHG | DIASTOLIC BLOOD PRESSURE: 74 MMHG | OXYGEN SATURATION: 99 %

## 2025-05-13 DIAGNOSIS — Z79.01 LONG TERM CURRENT USE OF ANTICOAGULANT THERAPY: ICD-10-CM

## 2025-05-13 DIAGNOSIS — I07.1 SEVERE TRICUSPID REGURGITATION: ICD-10-CM

## 2025-05-13 DIAGNOSIS — Z90.49 S/P SMALL BOWEL RESECTION: Primary | ICD-10-CM

## 2025-05-13 DIAGNOSIS — I50.32 CHRONIC DIASTOLIC HEART FAILURE (HCC): Chronic | ICD-10-CM

## 2025-05-13 DIAGNOSIS — I50.33 ACUTE ON CHRONIC HEART FAILURE WITH PRESERVED EJECTION FRACTION (HFPEF) (HCC): ICD-10-CM

## 2025-05-13 DIAGNOSIS — N18.32 TYPE 2 DIABETES MELLITUS WITH STAGE 3B CHRONIC KIDNEY DISEASE, WITHOUT LONG-TERM CURRENT USE OF INSULIN (HCC): ICD-10-CM

## 2025-05-13 DIAGNOSIS — E11.22 TYPE 2 DIABETES MELLITUS WITH STAGE 3B CHRONIC KIDNEY DISEASE, WITHOUT LONG-TERM CURRENT USE OF INSULIN (HCC): ICD-10-CM

## 2025-05-13 DIAGNOSIS — I13.0 HYPERTENSIVE HEART AND RENAL DISEASE WITH CONGESTIVE HEART FAILURE (HCC): ICD-10-CM

## 2025-05-13 DIAGNOSIS — E11.51 TYPE II DIABETES MELLITUS WITH PERIPHERAL CIRCULATORY DISORDER (HCC): ICD-10-CM

## 2025-05-13 DIAGNOSIS — E03.9 ACQUIRED HYPOTHYROIDISM: ICD-10-CM

## 2025-05-13 PROCEDURE — 99495 TRANSJ CARE MGMT MOD F2F 14D: CPT | Performed by: FAMILY MEDICINE

## 2025-05-13 RX ORDER — MIDODRINE HYDROCHLORIDE 5 MG/1
5 TABLET ORAL 3 TIMES DAILY
COMMUNITY
Start: 2025-04-30 | End: 2025-05-15

## 2025-05-13 RX ORDER — LACTULOSE 10 G/15ML
SOLUTION ORAL; RECTAL
COMMUNITY
Start: 2025-04-30

## 2025-05-13 NOTE — PROGRESS NOTES
Transition of Care Visit:  Name: Sarah Holland      : 1940      MRN: [id number]  Encounter Provider: Corina Oconnor DO  Encounter Date: 2025   Encounter department: [facility]    Assessment & Plan  S/P small bowel resection  Patient developed small bowel obstruction. Required surgery. Long hospital course 2025       Type 2 diabetes mellitus with stage 3b chronic kidney disease, without long-term current use of insulin (HCC)    Lab Results   Component Value Date    HGBA1C 6.2 (H) 09/10/2024     Will check A1C at next visit.       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 Recommended she continue to hold doses - does not appear that she needs med.               Acute on chronic heart failure with preserved ejection fraction (HFpEF) ([facility])  Wt Readings from Last 3 Encounters:   25 44.7 kg (98 lb 9.6 oz)   25 56.5 kg (124 lb 8 oz)   24 54 kg (119 lb)     Patient continues on Torsemide and Spironolactone. Seems to be doing well.               Long term current use of anticoagulant therapy         Severe tricuspid regurgitation  Patient has had valve surgery at [place]       Chronic diastolic heart failure (HCC)  Wt Readings from Last 3 Encounters:   25 44.7 kg (98 lb 9.6 oz)   25 56.5 kg (124 lb 8 oz)   24 54 kg (119 lb)                    Acquired hypothyroidism  Patient continues on Levothyroxine.          She will return in August for AWV and check an A1C then      History of Present Illness     Transitional Care Management  Review:   Sarah oHlland is a 84 y.o. female here for TCM follow up.     During the TCM phone call patient stated:  TCM Call (since 5/1/2025)     Date and time call was made  5/2/2025  8:44 AM    Hospital care reviewed  Records reviewed    Patient was hospitialized at  [facility]; Other (comment)    Comment  [facility] 4/3-5/2/25    Date of Admission  03/03/25    Date of discharge  05/02/25    Diagnosis  SBO    Disposition  Home    Were the patients medications reviewed and updated  No      TCM Call (since 5/1/2025)     Post hospital issues  Reduced activity    Scheduled for follow up?  Yes    Did you obtain your prescribed medications  Yes    Do you need help managing your prescriptions or medications  Yes    Is transportation to your appointment needed  Yes    I have advised the patient to call PCP with any new or worsening symptoms  Kath Elizondo MA        Patient is 84-year-old that need by her daughter to visit.  She has a history of CHF, valve disease she was hospitalized from third to April 2 for abdominal pain she had a small bowel obstruction.  She did have a small bowel resection on 3/10/2025.  Her postoperative course was complicated by shock, GI bleed requiring pressors and blood transfusion.  From the hospital she went to [facility] for rehab.  Discharged from [facility] on May 2.  Appetite is increased since she is home.      Review of Systems   Constitutional:  Positive for appetite change and fatigue. Negative for chills and fever.   HENT:  Negative for congestion and sore throat.    Respiratory:  Negative for chest tightness.    Cardiovascular:  Negative for chest pain and palpitations.   Gastrointestinal:  Negative for abdominal pain, constipation, diarrhea and nausea.   Genitourinary:  Negative for difficulty urinating.   Skin: Negative.    Neurological:  Negative for dizziness and headaches.   Psychiatric/Behavioral: Negative.       Objective   /74 (BP Location: Left  arm, Patient Position: Sitting, Cuff Size: Adult)   Pulse 85   Temp 98.5 °F (36.9 °C) (Temporal)   Wt 44.7 kg (98 lb 9.6 oz)   SpO2 99%   BMI 19.91 kg/m²     Physical Exam  Vitals and nursing note reviewed.   Constitutional:       General: She is not in acute distress.     Appearance: She is ill-appearing.   HENT:      Head: Normocephalic.   Neck:      Thyroid: No thyromegaly.     Cardiovascular:      Rate and Rhythm: Normal rate and regular rhythm.      Heart sounds: Normal heart sounds.   Pulmonary:      Effort: Pulmonary effort is normal.      Breath sounds: Normal breath sounds.   Abdominal:      Palpations: Abdomen is soft.     Musculoskeletal:      Right lower leg: No edema.      Left lower leg: No edema.      Comments: Patient in wheelchair   Lymphadenopathy:      Cervical: No cervical adenopathy.     Skin:     General: Skin is warm and dry.     Neurological:      Mental Status: She is alert and oriented to person, place, and time.     Psychiatric:         Mood and Affect: Mood normal.       Medications have been reviewed by provider in current encounter

## 2025-05-15 PROBLEM — I07.1 SEVERE TRICUSPID REGURGITATION: Status: ACTIVE | Noted: 2020-09-17

## 2025-05-15 PROBLEM — Z90.49 S/P SMALL BOWEL RESECTION: Status: ACTIVE | Noted: 2025-04-04

## 2025-05-15 RX ORDER — MIDODRINE HYDROCHLORIDE 5 MG/1
5 TABLET ORAL 3 TIMES DAILY PRN
Status: SHIPPED
Start: 2025-05-15 | End: 2025-06-14

## 2025-05-15 NOTE — ASSESSMENT & PLAN NOTE
Patient developed small bowel obstruction. Required surgery. Long hospital course March/April 2025

## 2025-05-15 NOTE — ASSESSMENT & PLAN NOTE
Wt Readings from Last 3 Encounters:   05/13/25 44.7 kg (98 lb 9.6 oz)   01/13/25 56.5 kg (124 lb 8 oz)   12/23/24 54 kg (119 lb)     Patient continues on Torsemide and Spironolactone. Seems to be doing well.

## 2025-05-15 NOTE — ASSESSMENT & PLAN NOTE
Wt Readings from Last 3 Encounters:   05/13/25 44.7 kg (98 lb 9.6 oz)   01/13/25 56.5 kg (124 lb 8 oz)   12/23/24 54 kg (119 lb)

## 2025-05-15 NOTE — ASSESSMENT & PLAN NOTE
Wt Readings from Last 3 Encounters:   05/13/25 44.7 kg (98 lb 9.6 oz)   01/13/25 56.5 kg (124 lb 8 oz)   12/23/24 54 kg (119 lb)     Patient was discharged with Midodrine three times a day - daughter monitors BP - she has been holding doses. Recommended she continue to hold doses - does not appear that she needs med.

## 2025-05-15 NOTE — ASSESSMENT & PLAN NOTE
Lab Results   Component Value Date    HGBA1C 6.2 (H) 09/10/2024     Will check A1C at next visit.      GENERAL SURGICAL CONSULTATION    10/31/2022     Referring Physician: Kelley Mclaughlin MD    Chief Complaint:   Chief Complaint   Patient presents with   • Consultation     Headaches and double vision.       History Of Present Illness  Maryam is a 79 year old female presenting with headache for several weeks and double vision mostly in the right eye.  There was a concern by her primary care physician and her optometrist/ophthalmologist that temporal arteritis might be a diagnosis and I was asked to see her for consideration of a right-sided temporal artery biopsy.  The patient was started on steroids last Friday.  She is currently taking 20 mg of prednisone daily.    Past Medical History  Past Medical History:   Diagnosis Date   • Cataract     right eye   • COPD (chronic obstructive pulmonary disease) (CMS/HCC)    • Essential (primary) hypertension    • Heart murmur    • Malignant neoplasm (CMS/HCC)     breast and  BCC   • Parkinson's disease (CMS/HCC)    • PONV (postoperative nausea and vomiting)         Surgical History  Past Surgical History:   Procedure Laterality Date   • Breast lumpectomy Left    • Tonsillectomy          Social History  Social History     Tobacco Use   • Smoking status: Never Smoker   • Smokeless tobacco: Never Used   Vaping Use   • Vaping Use: never used   Substance Use Topics   • Alcohol use: Not Currently   • Drug use: Never       Social History Narrative    Occupation: Retired               Family History  Family History   Problem Relation Age of Onset   • Heart disease Father    • Coronary Artery Disease Neg Hx         Allergies  ALLERGIES:  Patient has no known allergies.    Medications  (Not in a hospital admission)      Review of Systems    Review of Systems   Constitutional: Negative for chills, fever and weight loss.   HENT: Negative for hearing loss and sinus pain.    Eyes: Negative for blurred vision.   Respiratory: Negative for cough, shortness of breath and wheezing.     Cardiovascular: Negative for chest pain and leg swelling.   Gastrointestinal: Negative for heartburn, nausea and vomiting.   Genitourinary: Negative for dysuria.   Musculoskeletal: Negative for back pain, joint pain and myalgias.   Neurological: Negative for dizziness and focal weakness.        Recently diagnosed Parkinson's disease   Endo/Heme/Allergies: Does not bruise/bleed easily.   Psychiatric/Behavioral: Negative for depression and substance abuse.         Last Recorded Vitals  Blood pressure (!) 143/86, pulse 79, height 5' 2\" (1.575 m), weight 48 kg (105 lb 13.1 oz), SpO2 99 %.Body mass index is 19.35 kg/m².       Physical Exam  Physical Exam  Constitutional:       General: She is not in acute distress.     Appearance: She is well-developed. She is not ill-appearing.   HENT:      Head: Normocephalic and atraumatic.      Comments: Palpable temporal artery on the right and left side     Neck: Normal range of motion and neck supple.   Eyes:      Pupils: Pupils are equal, round, and reactive to light.      Comments: Wearing glasses   Cardiovascular:      Rate and Rhythm: Normal rate and regular rhythm.   Pulmonary:      Effort: Pulmonary effort is normal.      Breath sounds: Normal breath sounds.   Abdominal:      General: Bowel sounds are normal.      Palpations: Abdomen is soft.   Musculoskeletal:         General: Normal range of motion.   Skin:     General: Skin is warm and dry.      Capillary Refill: Capillary refill takes less than 2 seconds.   Neurological:      Mental Status: She is alert and oriented to person, place, and time.      Comments: Resting tremor   Psychiatric:         Mood and Affect: Mood normal.         Behavior: Behavior normal.            Labs  Lab Services on 10/31/2022   Component Date Value Ref Range Status   • Sodium 10/31/2022 134 (A) 135 - 145 mmol/L Final   • Potassium 10/31/2022 3.6  3.4 - 5.1 mmol/L Final   • Chloride 10/31/2022 102  97 - 110 mmol/L Final   • Carbon Dioxide  10/31/2022 28  21 - 32 mmol/L Final   • Anion Gap 10/31/2022 8  7 - 19 mmol/L Final   • Glucose 10/31/2022 90  70 - 99 mg/dL Final   • BUN 10/31/2022 18  6 - 20 mg/dL Final   • Creatinine 10/31/2022 0.67  0.51 - 0.95 mg/dL Final   • Glomerular Filtration Rate 10/31/2022 89  >=60 Final   • BUN/ Creatinine Ratio 10/31/2022 27 (A) 7 - 25 Final   • Calcium 10/31/2022 9.2  8.4 - 10.2 mg/dL Final   • Bilirubin, Total 10/31/2022 0.4  0.2 - 1.0 mg/dL Final   • GOT/AST 10/31/2022 35  <=37 Units/L Final   • GPT/ALT 10/31/2022 58  <64 Units/L Final   • Alkaline Phosphatase 10/31/2022 123 (A) 45 - 117 Units/L Final   • Albumin 10/31/2022 3.0 (A) 3.6 - 5.1 g/dL Final   • Protein, Total 10/31/2022 8.9 (A) 6.4 - 8.2 g/dL Final   • Globulin 10/31/2022 5.9 (A) 2.0 - 4.0 g/dL Final   • A/G Ratio 10/31/2022 0.5 (A) 1.0 - 2.4 Final   • Cholesterol 10/31/2022 247 (A) <=199 mg/dL Final   • Triglycerides 10/31/2022 132  <=149 mg/dL Final   • HDL 10/31/2022 88  >=50 mg/dL Final   • LDL 10/31/2022 133 (A) <=129 mg/dL Final   • Non-HDL Cholesterol 10/31/2022 159  mg/dL Final   • Cholesterol/ HDL Ratio 10/31/2022 2.8  <=4.4 Final   • RBC Sedimentation Rate 10/31/2022 25 (A) 0 - 20 mm/hr Final   • C-Reactive Protein 10/31/2022 <0.3  <=1.0 mg/dL Final   • TSH 10/31/2022 2.140  0.350 - 5.000 mcUnits/mL Final   • T4, Free 10/31/2022 0.9  0.8 - 1.5 ng/dL Final   • WBC 10/31/2022 8.4  4.2 - 11.0 K/mcL Final   • RBC 10/31/2022 3.65 (A) 4.00 - 5.20 mil/mcL Final   • HGB 10/31/2022 11.7 (A) 12.0 - 15.5 g/dL Final   • HCT 10/31/2022 36.2  36.0 - 46.5 % Final   • MCV 10/31/2022 99.2  78.0 - 100.0 fl Final   • MCH 10/31/2022 32.1  26.0 - 34.0 pg Final   • MCHC 10/31/2022 32.3  32.0 - 36.5 g/dL Final   • RDW-CV 10/31/2022 13.6  11.0 - 15.0 % Final   • RDW-SD 10/31/2022 49.2  39.0 - 50.0 fL Final   • PLT 10/31/2022 339  140 - 450 K/mcL Final   • NRBC 10/31/2022 0  <=0 /100 WBC Final   • Neutrophil, Percent 10/31/2022 68  % Final   • Lymphocytes, Percent  10/31/2022 22  % Final   • Mono, Percent 10/31/2022 8  % Final   • Eosinophils, Percent 10/31/2022 1  % Final   • Basophils, Percent 10/31/2022 0  % Final   • Immature Granulocytes 10/31/2022 1  % Final   • Absolute Neutrophils 10/31/2022 5.7  1.8 - 7.7 K/mcL Final   • Absolute Lymphocytes 10/31/2022 1.8  1.0 - 4.0 K/mcL Final   • Absolute Monocytes 10/31/2022 0.7  0.3 - 0.9 K/mcL Final   • Absolute Eosinophils  10/31/2022 0.1  0.0 - 0.5 K/mcL Final   • Absolute Basophils 10/31/2022 0.0  0.0 - 0.3 K/mcL Final   • Absolute Immmature Granulocytes 10/31/2022 0.1  0.0 - 0.2 K/mcL Final   Lab Requisition on 10/25/2022   Component Date Value Ref Range Status   • Fasting Status 10/25/2022 0  0 - 999 Hours Final   • Sodium 10/25/2022 134 (A) 135 - 145 mmol/L Final   • Potassium 10/25/2022 4.2  3.4 - 5.1 mmol/L Final   • Chloride 10/25/2022 101  97 - 110 mmol/L Final   • Carbon Dioxide 10/25/2022 28  21 - 32 mmol/L Final   • Anion Gap 10/25/2022 9  7 - 19 mmol/L Final   • Glucose 10/25/2022 101 (A) 70 - 99 mg/dL Final   • BUN 10/25/2022 24 (A) 6 - 20 mg/dL Final   • Creatinine 10/25/2022 0.73  0.51 - 0.95 mg/dL Final   • Glomerular Filtration Rate 10/25/2022 84  >=60 Final   • BUN/ Creatinine Ratio 10/25/2022 33 (A) 7 - 25 Final   • Calcium 10/25/2022 9.3  8.4 - 10.2 mg/dL Final   Lab Requisition on 10/25/2022   Component Date Value Ref Range Status   • RBC Sedimentation Rate 10/25/2022 30 (A) 0 - 20 mm/hr Final   • C-Reactive Protein 10/25/2022 1.4 (A) <=1.0 mg/dL Final   Lab Requisition on 10/20/2022   Component Date Value Ref Range Status   • Fasting Status 10/20/2022 10  0 - 999 Hours Final   • Sodium 10/20/2022 129 (A) 135 - 145 mmol/L Final   • Potassium 10/20/2022 3.5  3.4 - 5.1 mmol/L Final   • Chloride 10/20/2022 93 (A) 97 - 110 mmol/L Final   • Carbon Dioxide 10/20/2022 27  21 - 32 mmol/L Final   • Anion Gap 10/20/2022 13  7 - 19 mmol/L Final   • Glucose 10/20/2022 100 (A) 70 - 99 mg/dL Final   • BUN 10/20/2022 22  (A) 6 - 20 mg/dL Final   • Creatinine 10/20/2022 0.84  0.51 - 0.95 mg/dL Final   • Glomerular Filtration Rate 10/20/2022 71  >=60 Final   • BUN/ Creatinine Ratio 10/20/2022 26 (A) 7 - 25 Final   • Calcium 10/20/2022 8.9  8.4 - 10.2 mg/dL Final   • Bilirubin, Total 10/20/2022 0.4  0.2 - 1.0 mg/dL Final   • GOT/AST 10/20/2022 25  <=37 Units/L Final   • GPT/ALT 10/20/2022 16  <64 Units/L Final   • Alkaline Phosphatase 10/20/2022 163 (A) 45 - 117 Units/L Final   • Albumin 10/20/2022 2.8 (A) 3.6 - 5.1 g/dL Final   • Protein, Total 10/20/2022 8.7 (A) 6.4 - 8.2 g/dL Final   • Globulin 10/20/2022 5.9 (A) 2.0 - 4.0 g/dL Final   • A/G Ratio 10/20/2022 0.5 (A) 1.0 - 2.4 Final   • T4, Free 10/20/2022 1.3  0.8 - 1.5 ng/dL Final   • TSH 10/20/2022 1.147  0.350 - 5.000 mcUnits/mL Final   • Vitamin B12 10/20/2022 1,605 (A) 211 - 911 pg/mL Final   • WBC 10/20/2022 7.9  4.2 - 11.0 K/mcL Final   • RBC 10/20/2022 3.63 (A) 4.00 - 5.20 mil/mcL Final   • HGB 10/20/2022 11.7 (A) 12.0 - 15.5 g/dL Final   • HCT 10/20/2022 37.2  36.0 - 46.5 % Final   • MCV 10/20/2022 102.5 (A) 78.0 - 100.0 fl Final   • MCH 10/20/2022 32.2  26.0 - 34.0 pg Final   • MCHC 10/20/2022 31.5 (A) 32.0 - 36.5 g/dL Final   • RDW-CV 10/20/2022 13.2  11.0 - 15.0 % Final   • RDW-SD 10/20/2022 49.4  39.0 - 50.0 fL Final   • PLT 10/20/2022 696 (A) 140 - 450 K/mcL Final   • NRBC 10/20/2022 0  <=0 /100 WBC Final   • Neutrophil, Percent 10/20/2022 68  % Final   • Lymphocytes, Percent 10/20/2022 20  % Final   • Mono, Percent 10/20/2022 8  % Final   • Eosinophils, Percent 10/20/2022 2  % Final   • Basophils, Percent 10/20/2022 1  % Final   • Immature Granulocytes 10/20/2022 1  % Final   • Absolute Neutrophils 10/20/2022 5.4  1.8 - 7.7 K/mcL Final   • Absolute Lymphocytes 10/20/2022 1.6  1.0 - 4.0 K/mcL Final   • Absolute Monocytes 10/20/2022 0.6  0.3 - 0.9 K/mcL Final   • Absolute Eosinophils  10/20/2022 0.1  0.0 - 0.5 K/mcL Final   • Absolute Basophils 10/20/2022 0.1  0.0 - 0.3  K/mcL Final   • Absolute Immmature Granulocytes 10/20/2022 0.0  0.0 - 0.2 K/mcL Final   • Extra Tube 10/20/2022 Hold for Add Ons   Final        Radiology  TRANSTHORACIC ECHO(TTE) COMPLETE W/ W/O IMAGING AGENT  *[facility]*  [address] [place] [id number]  [phone]  Transthoracic Echocardiogram (TTE)    Patient: Maryam Aburto    Study Date/Time:    2021 1:24PM  MRN:     [id number]                FIN#:               [id number]  :     1943             Ht/Wt:              165.1cm 50.5kg  Age:     78                     BSA/BMI:            1.51m^2 18.5kg/m^2  Gender:  F                      Baseline BP:        136 / 85  Ordering Physician:   Sherman Jara DO, FACC, FSCAI     Referring Physician:  Sherman Jara     Performing Physician: Sherman Jara DO, FACC, FSCAI  Diagnostic Physician: Sherman Jara DO, FACC, FSCAI  Sonographer:          CAROLANN Pathak     --------------------------------------------------------------------------  STUDY CONCLUSIONS  SUMMARY:    1. Left ventricle: The cavity size is normal. Wall thickness is normal.     The ejection fraction was measured by visual estimation. Doppler     parameters are consistent with abnormal left ventricular relaxation     (grade 1 diastolic dysfunction). The ejection fraction is 60%.  2. Aortic valve: Mild regurgitation.  3. Pericardium, extracardiac: A trivial pericardial effusion is     identified. There is no evidence of hemodynamic compromise.  Impressions:   No pulmonary hypertension.    --------------------------------------------------------------------------  STUDY DATA:  [place]  Procedure:  Transthoracic echocardiography was  performed. Image quality was good.  M-mode, complete 2D, complete spectral  Doppler, and color Doppler.  Study status:  Routine.  Study completion:  There were no complications.    FINDINGS    LEFT VENTRICLE:  The cavity size is normal. Wall thickness is  normal.  Systolic function is normal.    The ejection fraction was measured by  visual estimation. The ejection fraction is 60%. Doppler parameters are  consistent with abnormal left ventricular relaxation (grade 1 diastolic  dysfunction).    AORTIC VALVE:  The annulus is mildly calcified. The valve is trileaflet.  Cusp separation is normal.  Doppler:  Transvalvular velocity is within the  normal range. There is no stenosis.  Mild regurgitation.    AORTA:  Aortic root: The aortic root is normal in size.    MITRAL VALVE:   Structurally normal valve.   Leaflet separation is normal.   Doppler:  Transvalvular velocity is within the normal range. There is no  evidence for stenosis.  Mild to moderate regurgitation.    ATRIAL SEPTUM:  The septum is normal.    LEFT ATRIUM:  The atrium is normal in size.    RIGHT VENTRICLE:  The cavity size is normal. Wall thickness is normal.  Systolic function is normal. The estimated peak pressure is 39mm Hg.    VENTRICULAR SEPTUM:   Thickness is normal.  Normal septal motion.  Normal contour.    There is no evidence of a ventricular septal defect.    PULMONIC VALVE:   Well visualized.    TRICUSPID VALVE:   Structurally normal valve.   Leaflet separation is  normal.  Doppler:  Transvalvular velocity is within the normal range.  There is no evidence for stenosis.  Trivial regurgitation.    PULMONARY ARTERY:   The main pulmonary artery is normal-sized.    RIGHT ATRIUM:  Not well visualized.    PERICARDIUM:  A trivial pericardial effusion is identified. There is no  evidence of hemodynamic compromise.    SYSTEMIC VEINS:  Inferior vena cava: The vessel is normal in size. The respirophasic  diameter changes are in the normal range (greater than or equal to 50%).    BASELINE ECG:   Normal sinus rhythm.    --------------------------------------------------------------------------  Measurements     Left ventricle                 Value        Ref        Right ventricle            Value         Ref   KELLEY, LAX chord        (L)      3.5   cm     3.8 - 5.2  KELLEY, LAX                   2.9   cm     ---------   ESD, LAX chord        (L)      1.9   cm     2.2 - 3.5  Pressure, S                31    mm Hg  ---------   KELLEY/bsa, LAX chord             2.3   cm/m^2 2.3 - 3.1   ESD/bsa, LAX chord    (L)      1.2   cm/m^2 1.3 - 2.1  Left atrium                Value        Ref   PW, ED, LAX           (H)      1.0   cm     0.6 - 0.9  SI dim, A4C                2.1   cm     ---------   PW thickening, LAX             36    %      ---------  Area ES, A4C               11    cm^2   <=20   KELLEY major ax, A4C              7.2   cm     ---------  Vol, S               (L)   20    ml     22 - 52   ESD major ax, A4C              5.9   cm     ---------  Vol/bsa, S           (L)   13    ml/m^2 16 - 34   FS major axis, A4C             18    %      ---------  Vol, ES, 1-p A4C     (L)   20    ml     22 - 52   KELLEY/bsa major ax, A4C          4.7   cm/m^2 ---------  Vol/bsa, ES, 1-p A4C       13    ml/m^2 11 - 40   ESD/bsa major ax, A4C          3.9   cm/m^2 ---------  AP dim, ES MM        (L)   2.1   cm     2.7 - 3.8   RANJEET, A4C                       18.8  cm^2   ---------  AP dim index, ES MM  (L)   1.4   cm/m^2 1.5 - 2.3   TYLER, A4C                       10.2  cm^2   ---------   FAC, A4C                       46    %      ---------  Aortic valve               Value        Ref   PW, ED                (H)      1.0   cm     0.6 - 0.9  Leaflet sep, MM            2.1   cm     ---------   PW, ES                         1.4   cm     ---------  AR ED v                    4.8   m/s    ---------   PW thickening                  36    %      ---------  AR decel                   199   cm/s^2 ---------   IVS/PW, ED                     0.78         ---------  AR PHT                     517   ms     ---------   EDV                   (L)      41    ml     46 - 106   AR ED grad                 92    mm Hg  ---------   ESV                   (L)      6      ml     14 - 42   EF                             60    %      54 - 74    Mitral valve               Value        Ref   SV                             39    ml     ---------  E-septal separation        0.7   cm     ---------   EDV/bsa               (L)      27    ml/m^2 29 - 61    Decel time                 320   ms     ---------   ESV/bsa               (L)      4     ml/m^2 8 - 24     Peak E/A ratio             0.6          ---------   SV/bsa                         26    ml/m^2 ---------   SV, 1-p A4C                    26    ml     ---------  Tricuspid valve            Value        Ref   SV/bsa, 1-p A4C                17    ml/m^2 ---------  TR peak v                  2.4   m/sec  <=2.8   ESV, 2-p                       15    ml     14 - 42    Peak RV-RA grad, S         23    mm Hg  ---------   ESV/bsa, 2-p                   10    ml/m^2 8 - 24     Max TR jannette                 2.39  m/sec  ---------   E', lat nadir, TDI      (L)      7.12  cm/sec >=10   E', med nadir, TDI      (L)      6.82  cm/sec >=7        Aortic root                Value        Ref   E', avg, TDI                   6.97  cm/sec ---------  Root diam, ED              3.6   cm     <3.8      Ventricular septum             Value        Ref        Pulmonary artery           Value        Ref   IVS, ED                        0.8   cm     0.6 - 0.9  Pressure, S                31    mm Hg  ---------   IVS, ES                        1.2   cm     ---------   IVS thickening                 47    %      ---------  Systemic veins             Value        Ref                                                          Estimated CVP              8     mm Hg  ---------  Legend:  (L)  and  (H)  damir values outside specified reference range.    Prepared and electronically signed by  Sherman Jara DO, [facility], [facility]  07/09/2021 15:11       ASSESMENT AND PLAN:    79-year-old female with question of right-sided temporal arteritis.    The patient is accompanied by her daughter.  I  explained to them that I am not necessarily an expert with temporal arteritis but obviously do the biopsies.  I would not be able to recommend any therapeutic options if it is diagnosed or other treatment options if it is not.  They understood.  I would defer to Dr. Mclaughlin and their ophthalmologist in regards to treatment plans.  She is scheduled for surgery tomorrow.    We discussed the risks of surgery including bleeding, infection, the need for me to shave some hair in the wound that will result on the right temple area.  In regards to the biopsy itself we discussed lack of diagnosis and that the fact that this is simply diagnostic and not therapeutic.  They understood the risks of surgery and consent was obtained.  We will plan on proceeding to the operating room tomorrow for temporal artery biopsy under MAC.  The patient was discussed with Dr. Mclaughlin who feels that she will tolerate surgery well.    Code Status    Code Status: Not on file

## 2025-05-28 ENCOUNTER — DOCUMENTATION (OUTPATIENT)
Dept: ADMINISTRATIVE | Facility: OTHER | Age: 85
End: 2025-05-28

## 2025-05-28 NOTE — PROGRESS NOTES
05/28/25 8:17 AM    Annual Wellness Visit outreach is not required, the practice has scheduled the AWV.    Thank you.  Augustus Mckeon MA  [facility]

## 2025-06-10 ENCOUNTER — NURSE TRIAGE (OUTPATIENT)
Age: 85
End: 2025-06-10

## 2025-06-10 ENCOUNTER — TELEPHONE (OUTPATIENT)
Dept: FAMILY MEDICINE CLINIC | Facility: CLINIC | Age: 85
End: 2025-06-10

## 2025-06-10 ENCOUNTER — OFFICE VISIT (OUTPATIENT)
Dept: FAMILY MEDICINE CLINIC | Facility: CLINIC | Age: 85
End: 2025-06-10
Payer: COMMERCIAL

## 2025-06-10 VITALS
TEMPERATURE: 98.4 F | SYSTOLIC BLOOD PRESSURE: 104 MMHG | BODY MASS INDEX: 19.07 KG/M2 | DIASTOLIC BLOOD PRESSURE: 56 MMHG | OXYGEN SATURATION: 99 % | WEIGHT: 94.4 LBS | HEART RATE: 70 BPM

## 2025-06-10 DIAGNOSIS — I48.0 PAROXYSMAL ATRIAL FIBRILLATION (HCC): ICD-10-CM

## 2025-06-10 DIAGNOSIS — I49.5 SICK SINUS SYNDROME (HCC): ICD-10-CM

## 2025-06-10 DIAGNOSIS — N18.4 TYPE 2 DIABETES MELLITUS WITH STAGE 4 CHRONIC KIDNEY DISEASE, WITHOUT LONG-TERM CURRENT USE OF INSULIN (HCC): Primary | ICD-10-CM

## 2025-06-10 DIAGNOSIS — E11.22 TYPE 2 DIABETES MELLITUS WITH STAGE 4 CHRONIC KIDNEY DISEASE, WITHOUT LONG-TERM CURRENT USE OF INSULIN (HCC): Primary | ICD-10-CM

## 2025-06-10 DIAGNOSIS — R00.9 ABNORMAL HEART RATE: ICD-10-CM

## 2025-06-10 LAB — SL AMB POCT HEMOGLOBIN AIC: 5.5 (ref ?–6.5)

## 2025-06-10 PROCEDURE — 83036 HEMOGLOBIN GLYCOSYLATED A1C: CPT | Performed by: FAMILY MEDICINE

## 2025-06-10 PROCEDURE — 99214 OFFICE O/P EST MOD 30 MIN: CPT | Performed by: FAMILY MEDICINE

## 2025-06-10 PROCEDURE — 93000 ELECTROCARDIOGRAM COMPLETE: CPT | Performed by: FAMILY MEDICINE

## 2025-06-10 PROCEDURE — G2211 COMPLEX E/M VISIT ADD ON: HCPCS | Performed by: FAMILY MEDICINE

## 2025-06-10 NOTE — TELEPHONE ENCOUNTER
Called to notify patient of Dr. Coelho's recommendation.     EKG is unchanged   Do bloodwork   And keep appointment with Cardiology     Understood.

## 2025-06-10 NOTE — PROGRESS NOTES
Name: Sarah Holland      : 1940      MRN: [id number]  Encounter Provider: Shawanda Coelho DO  Encounter Date: 6/10/2025   Encounter department: [facility]  :  Assessment & Plan  Abnormal heart rate  Patient appears clinically stable today.  At this time, she does have a extensive cardiac history of A-fib as well as sick sinus syndrome.  She does follow with cardiology regularly.  Her exam today appeared relatively benign.  Her EKG was reviewed with her cardiologist at [facility].  Her EKG did not appear to show any significant abnormalities.  At this time, we will check CBC, TSH, BNP.  Patient was advised to keep her regular scheduled appointment with cardiology.  If any symptoms should significant worsen, she must go directly to the ED.  Orders:    POCT ECG    CBC and Platelet; Future    TSH, 3rd generation with Free T4 reflex; Future    B-Type Natriuretic Peptide(BNP); Future    Sick sinus syndrome (HCC)         Paroxysmal atrial fibrillation (HCC)         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 disease, without long-term current use of insulin (HCC)    Lab Results   Component Value Date    HGBA1C 5.5 06/10/2025       Orders:    POCT hemoglobin A1c           History of Present Illness   Patient is a 84-year-old female presents today with her daughter.  She is presents today due to fluctuations in her heart rate.  Patient's daughter states that the patient has been having large elevations in her blood pressure to the 120s beat per minute.  She has been following with cardiology regularly.  She has been taking medications regularly as well.  She is scheduled next week to see the cardiologist.  She denies any chest pain however she has noticed palpitations.  Hyperlipidemia  Pertinent negatives include no chest pain, myalgias or shortness of breath.   Fatigue  Associated symptoms include fatigue. Pertinent negatives include no abdominal pain, arthralgias, chest  pain, chills, congestion, coughing, fever, headaches, myalgias, nausea, numbness or sore throat.     Review of Systems   Constitutional:  Positive for fatigue. Negative for activity change, chills and fever.   HENT:  Negative for congestion, ear pain, sinus pressure and sore throat.    Eyes:  Negative for redness, itching and visual disturbance.   Respiratory:  Negative for cough and shortness of breath.    Cardiovascular:  Negative for chest pain and palpitations.   Gastrointestinal:  Negative for abdominal pain, diarrhea and nausea.   Endocrine: Negative for cold intolerance and heat intolerance.   Genitourinary:  Negative for dysuria, flank pain and frequency.   Musculoskeletal:  Negative for arthralgias, back pain, gait problem and myalgias.   Skin:  Negative for color change.   Allergic/Immunologic: Negative for environmental allergies.   Neurological:  Negative for dizziness, numbness and headaches.   Psychiatric/Behavioral:  Negative for behavioral problems and sleep disturbance.        Objective   /56 (BP Location: Left arm, Patient Position: Sitting, Cuff Size: Adult)   Pulse 70   Temp 98.4 °F (36.9 °C)   Wt 42.8 kg (94 lb 6.4 oz)   SpO2 99%   BMI 19.07 kg/m²      Physical Exam  Vitals reviewed.   Constitutional:       General: She is not in acute distress.     Appearance: Normal appearance. She is well-developed.   HENT:      Head: Normocephalic and atraumatic.      Right Ear: Tympanic membrane, ear canal and external ear normal. There is no impacted cerumen.      Left Ear: Tympanic membrane, ear canal and external ear normal. There is no impacted cerumen.      Nose: Nose normal. No congestion or rhinorrhea.      Mouth/Throat:      Mouth: Mucous membranes are moist.      Pharynx: No oropharyngeal exudate or posterior oropharyngeal erythema.     Eyes:      General: No scleral icterus.        Right eye: No discharge.         Left eye: No discharge.      Extraocular Movements: Extraocular movements  intact.      Conjunctiva/sclera: Conjunctivae normal.      Pupils: Pupils are equal, round, and reactive to light.     Neck:      Trachea: No tracheal deviation.     Cardiovascular:      Rate and Rhythm: Normal rate. Rhythm irregular.      Pulses: Normal pulses.           Dorsalis pedis pulses are 2+ on the right side and 2+ on the left side.        Posterior tibial pulses are 2+ on the right side and 2+ on the left side.      Heart sounds: Normal heart sounds. No murmur heard.     No friction rub. No gallop.   Pulmonary:      Effort: Pulmonary effort is normal. No respiratory distress.      Breath sounds: Normal breath sounds. No wheezing, rhonchi or rales.   Abdominal:      General: Bowel sounds are normal. There is no distension.      Palpations: Abdomen is soft.      Tenderness: There is no abdominal tenderness. There is no guarding or rebound.     Musculoskeletal:         General: Normal range of motion.      Cervical back: Normal range of motion and neck supple.      Right lower leg: No edema.      Left lower leg: No edema.   Lymphadenopathy:      Head:      Right side of head: No submental or submandibular adenopathy.      Left side of head: No submental or submandibular adenopathy.      Cervical: No cervical adenopathy.      Right cervical: No superficial, deep or posterior cervical adenopathy.     Left cervical: No superficial, deep or posterior cervical adenopathy.     Skin:     General: Skin is warm and dry.      Findings: No erythema.     Neurological:      General: No focal deficit present.      Mental Status: She is alert and oriented to person, place, and time.      Cranial Nerves: No cranial nerve deficit.      Sensory: Sensation is intact. No sensory deficit.      Motor: Motor function is intact.     Psychiatric:         Attention and Perception: Attention and perception normal.         Mood and Affect: Mood is not anxious or depressed.         Speech: Speech normal.         Behavior: Behavior  normal.         Thought Content: Thought content normal.         Judgment: Judgment normal.

## 2025-06-10 NOTE — TELEPHONE ENCOUNTER
"REASON FOR CONVERSATION: Rcvd call from [facility], daughter reports that pateint's HR is elevated.     SYMPTOMS: BP 98/54, -121 at rest, has not gotten out of bed yet. Is very fatigued. No other symptoms.     OTHER HEALTH INFORMATION: Hx of afib, aflutter    PROTOCOL DISPOSITION: See Today in Office, OV scheduled. Daughter tried to reach cardiology but was unable to reach anyone.     CARE ADVICE PROVIDED: Advised if patient gets out of bed and HR rises and she becomes symptomatic; chest pain, SOB, lightheaded she should take patient to the ED.     PRACTICE FOLLOW-UP: Ov scheduled for 2pm, if provider prefers her to go to the ED, please call daughter, Samaria # [phone].        Reason for Disposition   History of heart disease (i.e., heart attack, bypass surgery, angina, angioplasty)  (Exception: Brief heartbeat symptoms that went away and now feels well.)    Answer Assessment - Initial Assessment Questions  1. DESCRIPTION: \"Please describe your heart rate or heartbeat that you are having\" (e.g., fast/slow, regular/irregular, skipped or extra beats, \"palpitations\")      HR is currently 118-121 at rest. BP 98/54  2. ONSET: \"When did it start?\" (e.g., minutes, hours, days)       This am  3. DURATION: \"How long does it last\" (e.g., seconds, minutes, hours)      Remains elevated  4. PATTERN \"Does it come and go, or has it been constant since it started?\"  \"Does it get worse with exertion?\"   \"Are you feeling it now?\"      Constant. asymptomatic  7. RECURRENT SYMPTOM: \"Have you ever had this before?\" If Yes, ask: \"When was the last time?\" and \"What happened that time?\"       HX of afib and aflutter  8. CAUSE: \"What do you think is causing the palpitations?\"      Patient is asymptomatic  9. CARDIAC HISTORY: \"Do you have any history of heart disease?\" (e.g., heart attack, angina, bypass surgery, angioplasty, arrhythmia)       yes  10. OTHER SYMPTOMS: \"Do you have any other symptoms?\" (e.g., dizziness, chest pain, " sweating, difficulty breathing)        No other symptoms but patient has not gotten out of bed yet.    Protocols used: Heart Rate and Heartbeat Questions-Adult-[place]

## 2025-06-10 NOTE — ASSESSMENT & PLAN NOTE
Lab Results   Component Value Date    HGBA1C 5.5 06/10/2025       Orders:    POCT hemoglobin A1c

## 2025-06-11 ENCOUNTER — TELEPHONE (OUTPATIENT)
Age: 85
End: 2025-06-11

## 2025-06-11 ENCOUNTER — HOSPITAL ENCOUNTER (OUTPATIENT)
Facility: HOSPITAL | Age: 85
Setting detail: OBSERVATION
Discharge: HOME | End: 2025-06-12
Attending: EMERGENCY MEDICINE | Admitting: EMERGENCY MEDICINE
Payer: COMMERCIAL

## 2025-06-11 ENCOUNTER — APPOINTMENT (OUTPATIENT)
Dept: LAB | Facility: HOSPITAL | Age: 85
End: 2025-06-11
Payer: COMMERCIAL

## 2025-06-11 DIAGNOSIS — I07.1 SEVERE TRICUSPID REGURGITATION: ICD-10-CM

## 2025-06-11 DIAGNOSIS — D64.9 ANEMIA: ICD-10-CM

## 2025-06-11 DIAGNOSIS — R00.9 ABNORMAL HEART RATE: ICD-10-CM

## 2025-06-11 DIAGNOSIS — I50.32 CHRONIC DIASTOLIC CHF (CONGESTIVE HEART FAILURE) (HCC): ICD-10-CM

## 2025-06-11 DIAGNOSIS — D64.9 SYMPTOMATIC ANEMIA: Primary | ICD-10-CM

## 2025-06-11 DIAGNOSIS — E87.5 HYPERKALEMIA: ICD-10-CM

## 2025-06-11 DIAGNOSIS — N18.32 STAGE 3B CHRONIC KIDNEY DISEASE (HCC): ICD-10-CM

## 2025-06-11 LAB
ABO GROUP BLD: NORMAL
ABO GROUP BLD: NORMAL
ALBUMIN SERPL BCG-MCNC: 4.1 G/DL (ref 3.5–5)
ALP SERPL-CCNC: 96 U/L (ref 34–104)
ALT SERPL W P-5'-P-CCNC: 13 U/L (ref 7–52)
ANION GAP SERPL CALCULATED.3IONS-SCNC: 6 MMOL/L (ref 4–13)
ANION GAP SERPL CALCULATED.3IONS-SCNC: 7 MMOL/L (ref 4–13)
AST SERPL W P-5'-P-CCNC: 21 U/L (ref 13–39)
BASOPHILS # BLD AUTO: 0.05 THOUSANDS/ÂΜL (ref 0–0.1)
BASOPHILS NFR BLD AUTO: 1 % (ref 0–1)
BILIRUB SERPL-MCNC: 0.62 MG/DL (ref 0.2–1)
BLD GP AB SCN SERPL QL: NEGATIVE
BNP SERPL-MCNC: 1354 PG/ML (ref 0–100)
BUN SERPL-MCNC: 42 MG/DL (ref 5–25)
BUN SERPL-MCNC: 42 MG/DL (ref 5–25)
CALCIUM SERPL-MCNC: 9.7 MG/DL (ref 8.4–10.2)
CALCIUM SERPL-MCNC: 9.9 MG/DL (ref 8.4–10.2)
CHLORIDE SERPL-SCNC: 102 MMOL/L (ref 96–108)
CHLORIDE SERPL-SCNC: 103 MMOL/L (ref 96–108)
CO2 SERPL-SCNC: 23 MMOL/L (ref 21–32)
CO2 SERPL-SCNC: 23 MMOL/L (ref 21–32)
CREAT SERPL-MCNC: 1.68 MG/DL (ref 0.6–1.3)
CREAT SERPL-MCNC: 1.78 MG/DL (ref 0.6–1.3)
EOSINOPHIL # BLD AUTO: 0.15 THOUSAND/ÂΜL (ref 0–0.61)
EOSINOPHIL NFR BLD AUTO: 4 % (ref 0–6)
ERYTHROCYTE [DISTWIDTH] IN BLOOD BY AUTOMATED COUNT: 18 % (ref 11.6–15.1)
ERYTHROCYTE [DISTWIDTH] IN BLOOD BY AUTOMATED COUNT: 18.3 % (ref 11.6–15.1)
GFR SERPL CREATININE-BSD FRML MDRD: 25 ML/MIN/1.73SQ M
GFR SERPL CREATININE-BSD FRML MDRD: 27 ML/MIN/1.73SQ M
GLUCOSE SERPL-MCNC: 126 MG/DL (ref 65–140)
GLUCOSE SERPL-MCNC: 147 MG/DL (ref 65–140)
GLUCOSE SERPL-MCNC: 153 MG/DL (ref 65–140)
HCT VFR BLD AUTO: 18.6 % (ref 34.8–46.1)
HCT VFR BLD AUTO: 19.1 % (ref 34.8–46.1)
HGB BLD-MCNC: 5.7 G/DL (ref 11.5–15.4)
HGB BLD-MCNC: 5.7 G/DL (ref 11.5–15.4)
IMM GRANULOCYTES # BLD AUTO: 0.01 THOUSAND/UL (ref 0–0.2)
IMM GRANULOCYTES NFR BLD AUTO: 0 % (ref 0–2)
LYMPHOCYTES # BLD AUTO: 0.69 THOUSANDS/ÂΜL (ref 0.6–4.47)
LYMPHOCYTES NFR BLD AUTO: 19 % (ref 14–44)
MCH RBC QN AUTO: 26.3 PG (ref 26.8–34.3)
MCH RBC QN AUTO: 26.6 PG (ref 26.8–34.3)
MCHC RBC AUTO-ENTMCNC: 29.8 G/DL (ref 31.4–37.4)
MCHC RBC AUTO-ENTMCNC: 30.6 G/DL (ref 31.4–37.4)
MCV RBC AUTO: 86 FL (ref 82–98)
MCV RBC AUTO: 89 FL (ref 82–98)
MONOCYTES # BLD AUTO: 0.57 THOUSAND/ÂΜL (ref 0.17–1.22)
MONOCYTES NFR BLD AUTO: 16 % (ref 4–12)
NEUTROPHILS # BLD AUTO: 2.16 THOUSANDS/ÂΜL (ref 1.85–7.62)
NEUTS SEG NFR BLD AUTO: 60 % (ref 43–75)
NRBC BLD AUTO-RTO: 0 /100 WBCS
PLATELET # BLD AUTO: 276 THOUSANDS/UL (ref 149–390)
PLATELET # BLD AUTO: 284 THOUSANDS/UL (ref 149–390)
PMV BLD AUTO: 9.1 FL (ref 8.9–12.7)
PMV BLD AUTO: 9.2 FL (ref 8.9–12.7)
POTASSIUM SERPL-SCNC: 5.4 MMOL/L (ref 3.5–5.3)
POTASSIUM SERPL-SCNC: 6.2 MMOL/L (ref 3.5–5.3)
PROT SERPL-MCNC: 8.1 G/DL (ref 6.4–8.4)
RBC # BLD AUTO: 2.14 MILLION/UL (ref 3.81–5.12)
RBC # BLD AUTO: 2.17 MILLION/UL (ref 3.81–5.12)
RH BLD: POSITIVE
RH BLD: POSITIVE
SODIUM SERPL-SCNC: 131 MMOL/L (ref 135–147)
SODIUM SERPL-SCNC: 133 MMOL/L (ref 135–147)
SPECIMEN EXPIRATION DATE: NORMAL
TSH SERPL DL<=0.05 MIU/L-ACNC: 0.95 UIU/ML (ref 0.45–4.5)
WBC # BLD AUTO: 3.63 THOUSAND/UL (ref 4.31–10.16)
WBC # BLD AUTO: 3.69 THOUSAND/UL (ref 4.31–10.16)

## 2025-06-11 PROCEDURE — 36430 TRANSFUSION BLD/BLD COMPNT: CPT

## 2025-06-11 PROCEDURE — 80048 BASIC METABOLIC PNL TOTAL CA: CPT

## 2025-06-11 PROCEDURE — 36415 COLL VENOUS BLD VENIPUNCTURE: CPT

## 2025-06-11 PROCEDURE — 86923 COMPATIBILITY TEST ELECTRIC: CPT

## 2025-06-11 PROCEDURE — 85025 COMPLETE CBC W/AUTO DIFF WBC: CPT | Performed by: EMERGENCY MEDICINE

## 2025-06-11 PROCEDURE — 86901 BLOOD TYPING SEROLOGIC RH(D): CPT | Performed by: EMERGENCY MEDICINE

## 2025-06-11 PROCEDURE — 99291 CRITICAL CARE FIRST HOUR: CPT | Performed by: EMERGENCY MEDICINE

## 2025-06-11 PROCEDURE — 84443 ASSAY THYROID STIM HORMONE: CPT

## 2025-06-11 PROCEDURE — 85027 COMPLETE CBC AUTOMATED: CPT

## 2025-06-11 PROCEDURE — 83880 ASSAY OF NATRIURETIC PEPTIDE: CPT

## 2025-06-11 PROCEDURE — 86850 RBC ANTIBODY SCREEN: CPT | Performed by: EMERGENCY MEDICINE

## 2025-06-11 PROCEDURE — 80053 COMPREHEN METABOLIC PANEL: CPT | Performed by: EMERGENCY MEDICINE

## 2025-06-11 PROCEDURE — 96375 TX/PRO/DX INJ NEW DRUG ADDON: CPT

## 2025-06-11 PROCEDURE — 94644 CONT INHLJ TX 1ST HOUR: CPT

## 2025-06-11 PROCEDURE — 86900 BLOOD TYPING SEROLOGIC ABO: CPT | Performed by: EMERGENCY MEDICINE

## 2025-06-11 PROCEDURE — 93005 ELECTROCARDIOGRAM TRACING: CPT

## 2025-06-11 PROCEDURE — P9016 RBC LEUKOCYTES REDUCED: HCPCS

## 2025-06-11 PROCEDURE — 99223 1ST HOSP IP/OBS HIGH 75: CPT | Performed by: INTERNAL MEDICINE

## 2025-06-11 PROCEDURE — 96374 THER/PROPH/DIAG INJ IV PUSH: CPT

## 2025-06-11 PROCEDURE — 99285 EMERGENCY DEPT VISIT HI MDM: CPT

## 2025-06-11 PROCEDURE — 82948 REAGENT STRIP/BLOOD GLUCOSE: CPT

## 2025-06-11 RX ORDER — TORSEMIDE 20 MG/1
10 TABLET ORAL DAILY
Status: DISCONTINUED | OUTPATIENT
Start: 2025-06-12 | End: 2025-06-12 | Stop reason: HOSPADM

## 2025-06-11 RX ORDER — SENNA AND DOCUSATE SODIUM 50; 8.6 MG/1; MG/1
1 TABLET, FILM COATED ORAL DAILY
COMMUNITY

## 2025-06-11 RX ORDER — AMOXICILLIN 250 MG
1 CAPSULE ORAL DAILY
Status: DISCONTINUED | OUTPATIENT
Start: 2025-06-12 | End: 2025-06-12 | Stop reason: HOSPADM

## 2025-06-11 RX ORDER — ACETAMINOPHEN 325 MG/1
650 TABLET ORAL EVERY 4 HOURS PRN
Status: DISCONTINUED | OUTPATIENT
Start: 2025-06-11 | End: 2025-06-12 | Stop reason: HOSPADM

## 2025-06-11 RX ORDER — PANTOPRAZOLE SODIUM 40 MG/1
40 TABLET, DELAYED RELEASE ORAL
Status: DISCONTINUED | OUTPATIENT
Start: 2025-06-12 | End: 2025-06-12 | Stop reason: HOSPADM

## 2025-06-11 RX ORDER — LANOLIN ALCOHOL/MO/W.PET/CERES
100 CREAM (GRAM) TOPICAL DAILY
Status: DISCONTINUED | OUTPATIENT
Start: 2025-06-12 | End: 2025-06-12 | Stop reason: HOSPADM

## 2025-06-11 RX ORDER — CALCIUM GLUCONATE 20 MG/ML
1 INJECTION, SOLUTION INTRAVENOUS ONCE
Status: COMPLETED | OUTPATIENT
Start: 2025-06-11 | End: 2025-06-11

## 2025-06-11 RX ORDER — ASCORBIC ACID, THIAMINE MONONITRATE,RIBOFLAVIN, NIACINAMIDE, PYRIDOXINE HYDROCHLORIDE, FOLIC ACID, CYANOCOBALAMIN, BIOTIN, CALCIUM PANTOTHENATE, 100; 1.5; 1.7; 20; 10; 1; 6000; 150000; 5 MG/1; MG/1; MG/1; MG/1; MG/1; MG/1; UG/1; UG/1; MG/1
1 CAPSULE, LIQUID FILLED ORAL
Status: DISCONTINUED | OUTPATIENT
Start: 2025-06-11 | End: 2025-06-12 | Stop reason: HOSPADM

## 2025-06-11 RX ORDER — DEXTROSE MONOHYDRATE 25 G/50ML
25 INJECTION, SOLUTION INTRAVENOUS ONCE
Status: COMPLETED | OUTPATIENT
Start: 2025-06-11 | End: 2025-06-11

## 2025-06-11 RX ORDER — POTASSIUM CHLORIDE 750 MG/1
20 TABLET, EXTENDED RELEASE ORAL DAILY
Status: DISCONTINUED | OUTPATIENT
Start: 2025-06-11 | End: 2025-06-12

## 2025-06-11 RX ORDER — LEVOTHYROXINE SODIUM 25 UG/1
25 TABLET ORAL
Status: DISCONTINUED | OUTPATIENT
Start: 2025-06-12 | End: 2025-06-12 | Stop reason: HOSPADM

## 2025-06-11 RX ORDER — ONDANSETRON 2 MG/ML
4 INJECTION INTRAMUSCULAR; INTRAVENOUS EVERY 4 HOURS PRN
Status: DISCONTINUED | OUTPATIENT
Start: 2025-06-11 | End: 2025-06-12 | Stop reason: HOSPADM

## 2025-06-11 RX ORDER — ALBUTEROL SULFATE 5 MG/ML
10 SOLUTION RESPIRATORY (INHALATION) ONCE
Status: COMPLETED | OUTPATIENT
Start: 2025-06-11 | End: 2025-06-11

## 2025-06-11 RX ORDER — POTASSIUM CHLORIDE 1500 MG/1
20 TABLET, EXTENDED RELEASE ORAL DAILY
COMMUNITY
Start: 2025-05-01 | End: 2025-06-12

## 2025-06-11 RX ORDER — SPIRONOLACTONE 25 MG/1
25 TABLET ORAL DAILY
Status: DISCONTINUED | OUTPATIENT
Start: 2025-06-12 | End: 2025-06-12 | Stop reason: HOSPADM

## 2025-06-11 RX ORDER — TORSEMIDE 10 MG/1
10 TABLET ORAL DAILY
COMMUNITY
Start: 2025-01-23

## 2025-06-11 RX ORDER — LANOLIN ALCOHOL/MO/W.PET/CERES
400 CREAM (GRAM) TOPICAL DAILY
Status: DISCONTINUED | OUTPATIENT
Start: 2025-06-12 | End: 2025-06-12 | Stop reason: HOSPADM

## 2025-06-11 RX ORDER — LACTULOSE 10 G/15ML
20 SOLUTION ORAL 3 TIMES DAILY
Status: DISCONTINUED | OUTPATIENT
Start: 2025-06-11 | End: 2025-06-12 | Stop reason: HOSPADM

## 2025-06-11 RX ADMIN — ALBUTEROL SULFATE 10 MG: 2.5 SOLUTION RESPIRATORY (INHALATION) at 16:39

## 2025-06-11 RX ADMIN — LACTULOSE 20 G: 20 SOLUTION ORAL at 21:19

## 2025-06-11 RX ADMIN — DEXTROSE MONOHYDRATE 25 ML: 25 INJECTION, SOLUTION INTRAVENOUS at 16:41

## 2025-06-11 RX ADMIN — INSULIN HUMAN 5 UNITS: 100 INJECTION, SOLUTION PARENTERAL at 16:44

## 2025-06-11 RX ADMIN — CALCIUM GLUCONATE 1 G: 20 INJECTION, SOLUTION INTRAVENOUS at 16:51

## 2025-06-11 RX ADMIN — ASCORBIC ACID, THIAMINE MONONITRATE,RIBOFLAVIN, NIACINAMIDE, PYRIDOXINE HYDROCHLORIDE, FOLIC ACID, CYANOCOBALAMIN, BIOTIN, CALCIUM PANTOTHENATE, 1 CAPSULE: 100; 1.5; 1.7; 20; 10; 1; 6000; 150000; 5 CAPSULE, LIQUID FILLED ORAL at 18:43

## 2025-06-11 NOTE — ASSESSMENT & PLAN NOTE
Renal function stable/at baseline 1.4-1.8    Results from last 7 days   Lab Units 06/11/25  1501 06/11/25  1116   BUN mg/dL 42* 42*   CREATININE mg/dL 1.78* 1.68*   EGFR ml/min/1.73sq m 25 27

## 2025-06-11 NOTE — ASSESSMENT & PLAN NOTE
Hyperkalemia upon arrival  Prior to admission on potassium 20 mEq daily with spironolactone.  Both will be held  Given calcium gluconate albuterol and insulin/dextrose in the ED  Recheck in a.m.    Results from last 7 days   Lab Units 06/11/25  1501 06/11/25  1116   POTASSIUM mmol/L 6.2* 5.4*

## 2025-06-11 NOTE — H&P
H&P - Hospitalist   Name: Sarah Holland 84 y.o. female I MRN: [id number]  Unit/Bed#: ED-18 I Date of Admission: 6/11/2025   Date of Service: 6/11/2025 I Hospital Day: 0     Assessment & Plan  Symptomatic anemia  History of CKD 4, HFpEF, severe TR status post TTVR, orthostatic hypotension, atrial fibrillation with sick sinus syndrome status post pacer and recent hospitalization at [facility] 3/3/2025 into 4/3/2025 for SBO who was sent in for anemia  Discharged home from rehab and daughter noted patient more tachycardic and tired  Outpatient blood work demonstrated low hemoglobin  Being transfused 2 unit PRBC.  Holding apixaban.  Check stool occult as patient recently had small bowel resection    Results from last 7 days   Lab Units 06/11/25  1501 06/11/25  1116   HEMOGLOBIN g/dL 5.7* 5.7*     Hyperkalemia  Hyperkalemia upon arrival  Prior to admission on potassium 20 mEq daily with spironolactone.  Both will be held  Given calcium gluconate albuterol and insulin/dextrose in the ED  Recheck in a.m.    Results from last 7 days   Lab Units 06/11/25  1501 06/11/25  1116   POTASSIUM mmol/L 6.2* 5.4*     Severe tricuspid regurgitation  History of severe TR with a history of TTVR [facility] 2024  Chronic diastolic heart failure (HCC)  Wt Readings from Last 3 Encounters:   06/10/25 42.8 kg (94 lb 6.4 oz)   05/13/25 44.7 kg (98 lb 9.6 oz)   01/13/25 56.5 kg (124 lb 8 oz)     Appears compensated.  Recent extensive hospitalization at [facility] 3/3 - 4/3  Continue torsemide 10 mg daily  Need to hold spironolactone and potassium supplements due to hyperkalemia    Acquired hypothyroidism  Continue levothyroxine  Paroxysmal atrial fibrillation (HCC)  Paroxysmal atrial fibrillation with sick sinus syndrome status post pacer  Holding apixaban given anemia now  Stage 4 chronic kidney disease (HCC)  Renal function stable/at baseline 1.4-1.8    Results from last 7 days   Lab Units 06/11/25  1501 06/11/25  1116   BUN mg/dL 42* 42*   CREATININE  mg/dL 1.78* 1.68*   EGFR ml/min/1.73sq m 25 27     S/P small bowel resection  Recent extensive hospitalization at [facility] 3/3/2025 into 4/3/2025 requiring small bowel resection  Patient was rehabbing but now at home.  Monitor appetite    VTE Pharmacologic Prophylaxis: VTE Score: 3 Moderate Risk (Score 3-4) - Pharmacological DVT Prophylaxis Contraindicated. Sequential Compression Devices Ordered.  Code Status: Level 1 - Full Code   Discussion with family: Updated  (daughter) via phone.    Anticipated Length of Stay: Patient will be admitted on an observation basis with an anticipated length of stay of less than 2 midnights secondary to anemia and hyperkalemia.    Chief Complaint:     Abnormal Lab (Pt had labs completed today after feeling unwell. Pt noted with Hgb 5.7 and advised by PCP to go to ED. )    History of Present Illness  Sarah Holland is a 84 y.o. female with a PMH of CKD 4, severe TR status post TTVR, atrial fibrillation with sick sinus syndrome status post pacer, HFpEF who presents with symptomatic anemia.  The patient recently had a prolonged hospitalization at [facility] for small bowel obstruction status post resection.  She was discharged to rehab.  Upon returning home daughter noted she was tachycardic at times and fatigue.  She went to see the PCP and outpatient blood work demonstrated low hemoglobin she was sent to the ED.  The patient herself denies any hematuria or hematochezia.  She is being transfused blood products but was noted to be hyperkalemic as well prompting admission.  She denies any chest pain or shortness of breath.    Review of Systems   Constitutional:  Positive for appetite change and fatigue. Negative for chills and fever.   HENT:  Negative for facial swelling and trouble swallowing.    Eyes:  Negative for visual disturbance.   Respiratory:  Negative for shortness of breath.    Cardiovascular:  Negative for chest pain and palpitations.   Gastrointestinal:  Negative for  abdominal distention, abdominal pain, blood in stool and vomiting.   Genitourinary:  Negative for hematuria.   Musculoskeletal:  Negative for myalgias.   Skin:  Negative for rash.   Neurological:  Negative for facial asymmetry and speech difficulty.   Psychiatric/Behavioral:  The patient is not nervous/anxious.    All other systems reviewed and are negative.      Past Medical and Surgical History:   Past Medical History[1]  Past Surgical History[2]  Meds/Allergies:  Allergies: Allergies[3]  Prior to Admission Medications   Prescriptions Last Dose Informant Patient Reported? Taking?   Calcium Citrate 250 MG TABS   No Yes   Sig: Take 1 tablet (250 mg total) by mouth 2 (two) times a day   Cholecalciferol (VITAMIN D-3 PO)  Self Yes Yes   Sig: Take 1,000 Units by mouth in the morning.   Enulose 10 GM/15ML   Yes Yes   Lancets (FREESTYLE) lancets  Self Yes Yes   Sig: Use in the morning.   Magnesium 250 MG TABS   No Yes   Sig: Take 1 tablet (250 mg total) by mouth daily   Multiple Vitamins-Minerals (CENTRUM SILVER PO)  Self Yes Yes   Sig: Take 1 tablet by mouth in the morning.   Potassium Chloride ER 20 MEQ TBCR   No Yes   Sig: Take 2 tablets (40 mEq total) by mouth daily   apixaban (ELIQUIS) 2.5 mg   No Yes   Sig: Take 1 tablet (2.5 mg total) by mouth 2 (two) times a day   b complex-C-folic acid (NEPHRO-RASHEL) 0.8 mg tablet   Yes Yes   Sig: Take by mouth daily with dinner   benzocaine-menthol (CEPACOL) 15-3.6 mg per lozenge   Yes Yes   Si lozenge by Transmucosal route every 2 (two) hours as needed   clindamycin (CLEOCIN) 150 mg capsule   Yes Yes   glucose blood test strip  Self Yes Yes   Sig: Test in the morning and at noon and in the evening and before bedtime.   glucose monitoring kit (FREESTYLE) monitoring kit  Self No Yes   Sig: E11.9 Patient is type 2 diabetic. Test once daily   lactulose 20 g/30 mL   No Yes   Sig: Take 30 mL (20 g total) by mouth 3 (three) times a day   levothyroxine 25 mcg tablet   No Yes   Sig:  Take 1 tablet (25 mcg total) by mouth daily   midodrine (PROAMATINE) 5 mg tablet   No Yes   Sig: Take 1 tablet (5 mg total) by mouth 3 (three) times a day as needed (low BP)   omeprazole (PriLOSEC) 20 mg delayed release capsule   No Yes   Sig: Take 1 capsule (20 mg total) by mouth daily   senna-docusate sodium (SENOKOT-S) 8.6-50 mg per tablet   Yes Yes   Sig: Take 1 tablet by mouth daily   spironolactone (ALDACTONE) 25 mg tablet   No Yes   Sig: Take 1 tablet (25 mg total) by mouth daily   thiamine 100 MG tablet   Yes Yes   Sig: Take 100 mg by mouth in the morning.   torsemide (DEMADEX) 10 mg tablet   No Yes   Sig: Alternate one tab, two tabs every other day      Facility-Administered Medications: None     Social History:     Social History     Socioeconomic History    Marital status:      Spouse name: Not on file    Number of children: 4    Years of education: Not on file    Highest education level: Not on file   Occupational History    Not on file   Tobacco Use    Smoking status: Never     Passive exposure: Never    Smokeless tobacco: Never   Vaping Use    Vaping status: Never Used   Substance and Sexual Activity    Alcohol use: Not Currently     Comment: occas none recently; social drinker - per [employer]    Drug use: No    Sexual activity: Not on file   Other Topics Concern    Not on file   Social History Narrative    Caffeine use    Quaker    Uses safety equipment - Seatbelts         Social Drivers of Health     Financial Resource Strain: Not At Risk (3/4/2025)    Received from [facility]    Financial Insecurity     In the last 12 months did you skip medications to save money?: No     In the last 12 months was there a time when you needed to see a doctor but could not because of cost?: No   Food Insecurity: No Food Insecurity (3/4/2025)    Received from [facility]    Food Insecurity     In the last 12 months did you ever eat less than you felt you should because  there wasn't enough money for food?: No   Transportation Needs: No Transportation Needs (3/4/2025)    Received from [facility]    Transportation Needs     In the last 12 months have you ever had to go without healthcare because you didn't have a way to get there?: No   Physical Activity: Not on file   Stress: Low Risk (2/19/2024)    Received from [facility]    Stress     Over the last 2 weeks, how often have you been bothered by the following problems: feeling nervous, anxious, on edge?: Several days     Over the last 2 weeks, how often have you been bothered by the following problems: Not being able to stop or control worrying?: Several days   Social Connections: Socially Integrated (3/4/2025)    Received from [facility]    Social Connection     Do you often feel lonely?: No   Intimate Partner Violence: Patient Unable To Answer (10/16/2024)    Received from [facility]    Humiliation, Afraid, Rape, and Kick questionnaire     Within the last year, have you been afraid of your partner or ex-partner?: Patient unable to answer     Within the last year, have you been humiliated or emotionally abused in other ways by your partner or ex-partner?: Patient unable to answer     Within the last year, have you been kicked, hit, slapped, or otherwise physically hurt by your partner or ex-partner?: Patient unable to answer     Within the last year, have you been raped or forced to have any kind of sexual activity by your partner or ex-partner?: Patient unable to answer   Housing Stability: Not At Risk (3/4/2025)    Received from [facility]    Housing Stability     Are you worried that in the next 2 months you may not have stable housing?: No     Patient Pre-hospital Living Situation: Home  Patient Pre-hospital Level of Mobility: walks  Patient Pre-hospital Diet Restrictions:     Objective   Vitals:   Blood Pressure: 111/52 (06/11/25  1706)  Pulse: 96 (06/11/25 1706)  Temperature: 98.1 °F (36.7 °C) (06/11/25 1420)  Temp Source: Oral (06/11/25 1420)  Respirations: 16 (06/11/25 1706)  SpO2: 100 % (06/11/25 1706)    Physical Exam  Vitals reviewed.   Constitutional:       General: She is not in acute distress.     Appearance: She is underweight.   HENT:      Head: Atraumatic.     Eyes:      General: No scleral icterus.     Extraocular Movements: Extraocular movements intact.       Cardiovascular:      Rate and Rhythm: Regular rhythm.      Heart sounds: Murmur heard.      No gallop.   Pulmonary:      Effort: No respiratory distress.      Breath sounds: Decreased breath sounds present. No wheezing.   Abdominal:      General: Bowel sounds are normal. There is distension.      Palpations: Abdomen is soft.      Tenderness: There is no abdominal tenderness. There is no guarding.     Musculoskeletal:         General: No tenderness or deformity.     Skin:     General: Skin is warm.      Coloration: Skin is not jaundiced or pale.     Neurological:      General: No focal deficit present.      Mental Status: She is alert.      Motor: No weakness.     Psychiatric:         Mood and Affect: Mood normal.         Additional Data:   Lab Results: I have reviewed the following results:  Results from last 7 days   Lab Units 06/11/25  1501   WBC Thousand/uL 3.63*   HEMOGLOBIN g/dL 5.7*   HEMATOCRIT % 18.6*   PLATELETS Thousands/uL 276   SEGS PCT % 60   LYMPHO PCT % 19   MONO PCT % 16*   EOS PCT % 4     Results from last 7 days   Lab Units 06/11/25  1501 06/11/25  1116   SODIUM mmol/L 133* 131*   POTASSIUM mmol/L 6.2* 5.4*   CHLORIDE mmol/L 103 102   CO2 mmol/L 23 23   ANION GAP mmol/L 7 6   BUN mg/dL 42* 42*   CREATININE mg/dL 1.78* 1.68*   CALCIUM mg/dL 9.9 9.7   ALBUMIN g/dL 4.1  --    TOTAL BILIRUBIN mg/dL 0.62  --    ALK PHOS U/L 96  --    ALT U/L 13  --    AST U/L 21  --    EGFR ml/min/1.73sq m 25 27   GLUCOSE RANDOM mg/dL 126 147*                                               Lines/Drains  Invasive Devices       Peripheral Intravenous Line  Duration             Peripheral IV 06/11/25 Left Forearm <1 day    Peripheral IV 06/11/25 Right Antecubital <1 day                    Imaging:   Personally reviewed the following image studies in PACS and associated radiology reports:  No results found.    EKG, Pathology, and Other Studies Reviewed on Admission:   EKG  Result Date: 06/11/25  Personally reviewed strips with impression of: Paced rhythm 71 bpm    Administrative Statements   Reviewed [place] medicine records  Reviewed [facility] cardiology notes  Reviewed outpatient PCP records  Reviewed last [facility] hospitalization    ** Please Note: This note has been constructed using a voice recognition system. **         [1]   Past Medical History:  Diagnosis Date    Allergic rhinitis     last assessed - 05Jan2013    Arthritis     lower back    Atrial flutter (HCC)     last assessed - 16Dec2013    Diabetes mellitus (HCC)     Type II    Fatigue     last assessed - 01Oct2013    Heart murmur     tricupid reguritation, SSS    Hyperlipidemia     Hypertension     Insomnia     Irregular heart beat     Cardioversion, pacemaker, severe tricuspid regurgitation, atrial septal defect.    Lower leg edema     last assessed - 09Nov2015    Macular degeneration     b/l    Osteoporosis     Osteoporosis     Shortness of breath     prior to heart surgery    Sick sinus syndrome (HCC)     last assessed - 05Apr2017    Small bowel obstruction (HCC)     Sting from hornet, wasp, or bee     last assessed - 20Aug2013    Subconjunctival hemorrhage     unspecified laterality; last assessed - 09Jan2014   [2]   Past Surgical History:  Procedure Laterality Date    ASD REPAIR, SECUNDUM      BREAST BIOPSY Right     benign    CARDIAC PACEMAKER PLACEMENT      CARDIAC SURGERY      Atrial septal defect repaired, pacemaker    COLONOSCOPY N/A 04/21/2016    Procedure: COLONOSCOPY;  Surgeon: London Olivares MD;  Location: [facility];  Service:      COLONOSCOPY W/ BIOPSIES AND POLYPECTOMY      ESOPHAGOGASTRODUODENOSCOPY      EYE SURGERY      lid lift    HYSTERECTOMY      at age 37 or 38    MAMMO STEREOTACTIC BREAST BIOPSY RIGHT (ALL INC) Right 07/14/2021    OOPHORECTOMY Left     SC COLONOSCOPY FLX DX W/COLLJ SPEC WHEN PFRMD N/A 05/10/2019    Procedure: COLONOSCOPY with polypectomy;  Surgeon: London Olivares MD;  Location: [facility];  Service: Gastroenterology    TOTAL ABDOMINAL HYSTERECTOMY      TRICUSPID VALVE REPLACEMENT      HUP    VEIN SURGERY Left     vericose vein left leg    WISDOM TOOTH EXTRACTION     [3]   Allergies  Allergen Reactions    Penicillins Anaphylaxis    Shellfish Allergy - Food Allergy Anaphylaxis and Hives

## 2025-06-11 NOTE — TELEPHONE ENCOUNTER
Spoke with Dr. Coelho. Called patients daughter. Patient needs to go to ED immediately. Daughter will take her.

## 2025-06-11 NOTE — ED PROVIDER NOTES
Pt Name: Sarah Holland  MRN: [id number]  Birthdate 1940  Age/Sex: 84 y.o. female  Date of evaluation: 6/11/2025  PCP: Corina Oconnor DO        FINAL IMPRESSION    Final diagnoses:   Symptomatic anemia   Hyperkalemia         DISPOSITION/PLAN      Time reflects when diagnosis was documented in both MDM as applicable and the Disposition within this note       Time User Action Codes Description Comment    6/11/2025  5:01 PM Yamilka Becerra Add [D64.9] Symptomatic anemia     6/11/2025  5:01 PM Yamilka Becerra Add [E87.5] Hyperkalemia     6/12/2025 10:38 AM Favio Allen Add [D64.9] Anemia           ED Disposition       ED Disposition   Admit    Condition   Stable    Date/Time   Wed Jun 11, 2025  5:01 PM    Comment   Case was discussed with NASIM and the patient's admission status was agreed to be Admission Status: observation status to the service of Dr. Jameson .               Follow-up Information    None           PATIENT REFERRED TO:    No follow-up provider specified.    DISCHARGE MEDICATIONS:    Discharge Medication List as of 6/12/2025 12:01 PM        CONTINUE these medications which have NOT CHANGED    Details   apixaban (ELIQUIS) 2.5 mg Take 1 tablet (2.5 mg total) by mouth 2 (two) times a day, Starting Wed 12/11/2024, Until Thu 12/11/2025, Normal      b complex-C-folic acid (NEPHRO-RASHEL) 0.8 mg tablet Take by mouth daily with dinner, Starting Thu 8/24/2023, Historical Med      benzocaine-menthol (CEPACOL) 15-3.6 mg per lozenge 1 lozenge by Transmucosal route every 2 (two) hours as needed, Starting Sat 10/19/2024, Historical Med      Calcium Citrate 250 MG TABS Take 1 tablet (250 mg total) by mouth 2 (two) times a day, Starting Wed 12/11/2024, Normal      Cholecalciferol (VITAMIN D-3 PO) Take 1,000 Units by mouth in the morning., Historical Med      glucose blood test strip Test in the morning and at noon and in the evening and before bedtime., Starting Fri 5/30/2014, Historical Med       glucose monitoring kit (FREESTYLE) monitoring kit E11.9 Patient is type 2 diabetic. Test once daily, Normal      lactulose 20 g/30 mL Take 30 mL (20 g total) by mouth 3 (three) times a day, Starting Wed 12/11/2024, Normal      Lancets (FREESTYLE) lancets Use in the morning., Starting Fri 5/30/2014, Historical Med      levothyroxine 25 mcg tablet Take 1 tablet (25 mcg total) by mouth daily, Starting Wed 12/11/2024, Normal      Magnesium 250 MG TABS Take 1 tablet (250 mg total) by mouth daily, Starting Wed 12/11/2024, Normal      midodrine (PROAMATINE) 5 mg tablet Take 1 tablet (5 mg total) by mouth 3 (three) times a day as needed (low BP), Starting Thu 5/15/2025, Until Sat 6/14/2025 at 2359, No Print      Multiple Vitamins-Minerals (CENTRUM SILVER PO) Take 1 tablet by mouth in the morning., Historical Med      omeprazole (PriLOSEC) 20 mg delayed release capsule Take 1 capsule (20 mg total) by mouth daily, Starting Wed 12/11/2024, Normal      senna-docusate sodium (SENOKOT-S) 8.6-50 mg per tablet Take 1 tablet by mouth daily, Historical Med      thiamine 100 MG tablet Take 100 mg by mouth in the morning., Historical Med      torsemide (DEMADEX) 10 mg tablet Take 10 mg by mouth daily, Starting Thu 1/23/2025, Historical Med           STOP taking these medications       Potassium Chloride ER 20 MEQ TBCR Comments:   Reason for Stopping:         spironolactone (ALDACTONE) 25 mg tablet Comments:   Reason for Stopping:               Outpatient Discharge Orders   CBC and differential   Standing Status: Future Standing Exp. Date: 06/12/26     Basic metabolic panel   Standing Status: Future Standing Exp. Date: 08/12/26     Discharge Diet     Activity as tolerated             CHIEF COMPLAINT    Chief Complaint   Patient presents with    Abnormal Lab     Pt had labs completed today after feeling unwell. Pt noted with Hgb 5.7 and advised by PCP to go to ED.          NIKKI Smith presents to the Emergency Department who presents  after outpatient labs dictated that she should have a blood transfusion.  Her daughter took her to have labs done because she was tachycardic with no other explanation. .      HPI      Past Medical and Surgical History    Past Medical History[1]    Past Surgical History[2]    Family History[3]    Social History[4]      .    Allergies    Allergies[5]    Home Medications    Prior to Admission medications    Medication Sig Start Date End Date Taking? Authorizing Provider   apixaban (ELIQUIS) 2.5 mg Take 1 tablet (2.5 mg total) by mouth 2 (two) times a day 12/11/24 12/11/25  Guanakito Perry DO   b complex-C-folic acid (NEPHRO-RASHEL) 0.8 mg tablet Take by mouth daily with dinner 8/24/23   Historical Provider, MD   benzocaine-menthol (CEPACOL) 15-3.6 mg per lozenge 1 lozenge by Transmucosal route every 2 (two) hours as needed 10/19/24   Historical Provider, MD   Calcium Citrate 250 MG TABS Take 1 tablet (250 mg total) by mouth 2 (two) times a day 12/11/24   Guanakito Perry DO   Cholecalciferol (VITAMIN D-3 PO) Take 1,000 Units by mouth in the morning.    Historical Provider, MD   clindamycin (CLEOCIN) 150 mg capsule  12/26/23   Historical Provider, MD   Enulose 10 GM/15ML  4/30/25   Historical Provider, MD   glucose blood test strip Test in the morning and at noon and in the evening and before bedtime. 5/30/14   Historical Provider, MD   glucose monitoring kit (FREESTYLE) monitoring kit E11.9 Patient is type 2 diabetic. Test once daily 5/15/23   Corina Oconnor DO   lactulose 20 g/30 mL Take 30 mL (20 g total) by mouth 3 (three) times a day 12/11/24   Guanakito Perry DO   Lancets (FREESTYLE) lancets Use in the morning. 5/30/14   Historical Provider, MD   levothyroxine 25 mcg tablet Take 1 tablet (25 mcg total) by mouth daily 12/11/24   Guanakito Perry DO   Magnesium 250 MG TABS Take 1 tablet (250 mg total) by mouth daily 12/11/24   Guanakito Perry DO   midodrine (PROAMATINE) 5 mg tablet Take 1 tablet (5 mg total) by mouth 3 (three) times a  day as needed (low BP) 5/15/25 6/14/25  Corina Oconnor DO   Multiple Vitamins-Minerals (CENTRUM SILVER PO) Take 1 tablet by mouth in the morning.    Historical Provider, MD   omeprazole (PriLOSEC) 20 mg delayed release capsule Take 1 capsule (20 mg total) by mouth daily 12/11/24   Guanakito Perry DO   Potassium Chloride ER 20 MEQ TBCR Take 2 tablets (40 mEq total) by mouth daily 12/11/24   Guanakito Perry DO   spironolactone (ALDACTONE) 25 mg tablet Take 1 tablet (25 mg total) by mouth daily 1/13/25   Corina Oconnor DO   thiamine 100 MG tablet Take 100 mg by mouth in the morning.    Historical Provider, MD   torsemide (DEMADEX) 10 mg tablet Alternate one tab, two tabs every other day 1/7/25   Corina Oconnor DO           Review of Systems    Review of Systems   Constitutional:  Negative for chills and fever.   HENT:  Negative for ear pain and sore throat.    Eyes:  Negative for pain and visual disturbance.   Respiratory:  Negative for cough and shortness of breath.    Cardiovascular:  Positive for palpitations. Negative for chest pain.   Gastrointestinal:  Negative for abdominal pain and vomiting.   Genitourinary:  Negative for dysuria and hematuria.   Musculoskeletal:  Negative for arthralgias and back pain.   Skin:  Negative for color change and rash.   Neurological:  Negative for seizures and syncope.   All other systems reviewed and are negative.      Physical Exam      ED Triage Vitals   Temperature Pulse Respirations Blood Pressure SpO2   06/11/25 1420 06/11/25 1420 06/11/25 1420 06/11/25 1420 06/11/25 1420   98.1 °F (36.7 °C) 92 14 112/53 99 %      Temp Source Heart Rate Source Patient Position - Orthostatic VS BP Location FiO2 (%)   06/11/25 1420 06/11/25 1706 06/11/25 1420 06/11/25 1420 --   Oral Monitor Sitting Right arm       Pain Score       06/11/25 1706       No Pain               Physical Exam  Vitals and nursing note reviewed.   Constitutional:       General: She is not in acute distress.     Appearance: She is  well-developed.   HENT:      Head: Normocephalic and atraumatic.     Eyes:      Conjunctiva/sclera: Conjunctivae normal.       Cardiovascular:      Rate and Rhythm: Normal rate and regular rhythm.      Heart sounds: No murmur heard.  Pulmonary:      Effort: Pulmonary effort is normal. No respiratory distress.      Breath sounds: Normal breath sounds.   Abdominal:      Palpations: Abdomen is soft.      Tenderness: There is no abdominal tenderness.     Musculoskeletal:         General: No swelling.      Cervical back: Neck supple.     Skin:     General: Skin is warm and dry.      Capillary Refill: Capillary refill takes less than 2 seconds.     Neurological:      Mental Status: She is alert.     Psychiatric:         Mood and Affect: Mood normal.                Assessment and Plan    Sarah Holland is a 84 y.o. female who presents with symptomatic anemia. Physical examination unremarkable. Differential diagnosis (not completely inclusive) includes anemia, electrolyte abnormality, etc. Plan will be to perform diagnostic testing and treat symptomatically.      MDM      Diagnostic Results    EKG INTERPRETATION  EKG Interpretation  Paced/ 94, no prior is immediately available  Interpretation by Yamilka Becerra DO  EKG reviewed and interpreted independently.    Labs:    Results for orders placed or performed during the hospital encounter of 06/11/25   CBC and differential   Result Value Ref Range    WBC 3.63 (L) 4.31 - 10.16 Thousand/uL    RBC 2.17 (L) 3.81 - 5.12 Million/uL    Hemoglobin 5.7 (LL) 11.5 - 15.4 g/dL    Hematocrit 18.6 (L) 34.8 - 46.1 %    MCV 86 82 - 98 fL    MCH 26.3 (L) 26.8 - 34.3 pg    MCHC 30.6 (L) 31.4 - 37.4 g/dL    RDW 18.0 (H) 11.6 - 15.1 %    MPV 9.2 8.9 - 12.7 fL    Platelets 276 149 - 390 Thousands/uL    nRBC 0 /100 WBCs    Segmented % 60 43 - 75 %    Immature Grans % 0 0 - 2 %    Lymphocytes % 19 14 - 44 %    Monocytes % 16 (H) 4 - 12 %    Eosinophils Relative 4 0 - 6 %    Basophils  Relative 1 0 - 1 %    Absolute Neutrophils 2.16 1.85 - 7.62 Thousands/µL    Absolute Immature Grans 0.01 0.00 - 0.20 Thousand/uL    Absolute Lymphocytes 0.69 0.60 - 4.47 Thousands/µL    Absolute Monocytes 0.57 0.17 - 1.22 Thousand/µL    Eosinophils Absolute 0.15 0.00 - 0.61 Thousand/µL    Basophils Absolute 0.05 0.00 - 0.10 Thousands/µL   Comprehensive metabolic panel   Result Value Ref Range    Sodium 133 (L) 135 - 147 mmol/L    Potassium 6.2 (H) 3.5 - 5.3 mmol/L    Chloride 103 96 - 108 mmol/L    CO2 23 21 - 32 mmol/L    ANION GAP 7 4 - 13 mmol/L    BUN 42 (H) 5 - 25 mg/dL    Creatinine 1.78 (H) 0.60 - 1.30 mg/dL    Glucose 126 65 - 140 mg/dL    Calcium 9.9 8.4 - 10.2 mg/dL    AST 21 13 - 39 U/L    ALT 13 7 - 52 U/L    Alkaline Phosphatase 96 34 - 104 U/L    Total Protein 8.1 6.4 - 8.4 g/dL    Albumin 4.1 3.5 - 5.0 g/dL    Total Bilirubin 0.62 0.20 - 1.00 mg/dL    eGFR 25 ml/min/1.73sq m   Comprehensive metabolic panel   Result Value Ref Range    Sodium 136 135 - 147 mmol/L    Potassium 4.4 3.5 - 5.3 mmol/L    Chloride 105 96 - 108 mmol/L    CO2 25 21 - 32 mmol/L    ANION GAP 6 4 - 13 mmol/L    BUN 41 (H) 5 - 25 mg/dL    Creatinine 1.62 (H) 0.60 - 1.30 mg/dL    Glucose 102 65 - 140 mg/dL    Calcium 9.3 8.4 - 10.2 mg/dL    AST 18 13 - 39 U/L    ALT 11 7 - 52 U/L    Alkaline Phosphatase 82 34 - 104 U/L    Total Protein 6.8 6.4 - 8.4 g/dL    Albumin 3.5 3.5 - 5.0 g/dL    Total Bilirubin 1.36 (H) 0.20 - 1.00 mg/dL    eGFR 28 ml/min/1.73sq m   CBC (With Platelets)   Result Value Ref Range    WBC 3.93 (L) 4.31 - 10.16 Thousand/uL    RBC 2.62 (L) 3.81 - 5.12 Million/uL    Hemoglobin 7.1 (L) 11.5 - 15.4 g/dL    Hematocrit 22.7 (L) 34.8 - 46.1 %    MCV 87 82 - 98 fL    MCH 27.1 26.8 - 34.3 pg    MCHC 31.3 (L) 31.4 - 37.4 g/dL    RDW 17.0 (H) 11.6 - 15.1 %    Platelets 211 149 - 390 Thousands/uL    MPV 9.2 8.9 - 12.7 fL   Folate   Result Value Ref Range    Folate >22.3 >5.9 ng/mL   TIBC Panel (incl. Iron, TIBC, % Iron  Saturation)   Result Value Ref Range    Iron Saturation 30 15 - 50 %    TIBC 467.6 (H) 250 - 450 ug/dL    Iron 141 50 - 212 ug/dL    Transferrin 334 203 - 362 mg/dL    UIBC 327 155 - 355 ug/dL   Vitamin B12   Result Value Ref Range    Vitamin B-12 279 180 - 914 pg/mL   ECG 12 lead   Result Value Ref Range    Ventricular Rate 94 BPM    Atrial Rate 55 BPM    CT Interval  ms    QRSD Interval 164 ms    QT Interval 432 ms    QTC Interval 540 ms    P Mounds 107 degrees    QRS Axis -73 degrees    T Wave Axis 105 degrees   ECG 12 lead   Result Value Ref Range    Ventricular Rate 71 BPM    Atrial Rate 78 BPM    CT Interval  ms    QRSD Interval 160 ms    QT Interval 436 ms    QTC Interval 473 ms    P Axis  degrees    QRS Axis -69 degrees    T Wave Axis 107 degrees   Type and screen   Result Value Ref Range    ABO Grouping O     Rh Factor Positive     Antibody Screen Negative     Specimen Expiration Date 20250614    ABORh Recheck - Contact Blood Bank Prior to Collection   Result Value Ref Range    ABO Grouping O     Rh Factor Positive    Prepare Leukoreduced RBC: 2 Units   Result Value Ref Range    Unit Product Code L1617H00     Unit Number [id number]     Unit ABO O     Unit RH POS     Crossmatch Compatible     Unit Dispense Status Presumed Trans     Unit Product Volume 350 ml    Unit Product Code G4527U60     Unit Number [id number]     Unit ABO O     Unit RH POS     Crossmatch Compatible     Unit Dispense Status Presumed Trans     Unit Product Volume 350 ml   Fingerstick Glucose (POCT)   Result Value Ref Range    POC Glucose 153 (H) 65 - 140 mg/dl   Fingerstick Glucose (POCT)   Result Value Ref Range    POC Glucose 109 65 - 140 mg/dl   Fingerstick Glucose (POCT)   Result Value Ref Range    POC Glucose 105 65 - 140 mg/dl     *Note: Due to a large number of results and/or encounters for the requested time period, some results have not been displayed. A complete set of results can be found in Results Review.       All  labs reviewed and utilized in the medical decision making process    Radiology:    No orders to display       All radiology studies independently viewed by me and interpreted by the radiologist.    Procedure    Procedures      ED Course of Care and Re-Assessments    Discussed with SLIM for admission.   Blood ordered and consent signed.       Medications   calcium gluconate 1 g in sodium chloride 0.9% 50 mL (premix) (0 g Intravenous Stopped 6/11/25 1721)   albuterol inhalation solution 10 mg (10 mg Nebulization Given 6/11/25 1639)   insulin regular (HumuLIN R,NovoLIN R) injection 5 Units (5 Units Intravenous Given 6/11/25 1644)   dextrose 50 % IV solution 25 mL (25 mL Intravenous Given 6/11/25 1641)       Critical Care Time Statement: Upon my evaluation, this patient had a high probability of imminent or life-threatening deterioration due to anemia and hyperkalemia, which required my direct attention, intervention, and personal management.  I spent a total of 45 minutes directly providing critical care services, including interpretation of complex medical databases, evaluating for the presence of life-threatening injuries or illnesses, management of organ system failure(s) , complex medical decision making (to support/prevent further life-threatening deterioration)., and blood transfusion/  hyperkalemia treatment. This time is exclusive of procedures, teaching, treating other patients, family meetings, and any prior time recorded by providers other than myself.               Yamilka Becerra DO         [1]   Past Medical History:  Diagnosis Date    Allergic rhinitis     last assessed - 05Jan2013    Arthritis     lower back    Atrial flutter (HCC)     last assessed - 16Dec2013    Diabetes mellitus (HCC)     Type II    Fatigue     last assessed - 01Oct2013    Heart murmur     tricupid reguritation, SSS    Hyperlipidemia     Hypertension     Insomnia     Irregular heart beat     Cardioversion, pacemaker, severe  tricuspid regurgitation, atrial septal defect.    Lower leg edema     last assessed - 09Nov2015    Macular degeneration     b/l    Osteoporosis     Osteoporosis     Shortness of breath     prior to heart surgery    Sick sinus syndrome (HCC)     last assessed - 05Apr2017    Small bowel obstruction (HCC)     Sting from hornet, wasp, or bee     last assessed - 20Aug2013    Subconjunctival hemorrhage     unspecified laterality; last assessed - 09Jan2014   [2]   Past Surgical History:  Procedure Laterality Date    ASD REPAIR, SECUNDUM      BREAST BIOPSY Right     benign    CARDIAC PACEMAKER PLACEMENT      CARDIAC SURGERY      Atrial septal defect repaired, pacemaker    COLONOSCOPY N/A 04/21/2016    Procedure: COLONOSCOPY;  Surgeon: London Olivares MD;  Location: [facility];  Service:     COLONOSCOPY W/ BIOPSIES AND POLYPECTOMY      ESOPHAGOGASTRODUODENOSCOPY      EYE SURGERY      lid lift    HYSTERECTOMY      at age 37 or 38    MAMMO STEREOTACTIC BREAST BIOPSY RIGHT (ALL INC) Right 07/14/2021    OOPHORECTOMY Left     MN COLONOSCOPY FLX DX W/COLLJ SPEC WHEN PFRMD N/A 05/10/2019    Procedure: COLONOSCOPY with polypectomy;  Surgeon: London Olivares MD;  Location: [facility];  Service: Gastroenterology    TOTAL ABDOMINAL HYSTERECTOMY      TRICUSPID VALVE REPLACEMENT      HUP    VEIN SURGERY Left     vericose vein left leg    WISDOM TOOTH EXTRACTION     [3]   Family History  Problem Relation Name Age of Onset    No Known Problems Mother      Gout Brother      No Known Problems Father      No Known Problems Sister      No Known Problems Daughter      No Known Problems Maternal Grandmother      No Known Problems Maternal Grandfather      No Known Problems Paternal Grandmother      No Known Problems Paternal Grandfather      Alcohol abuse Neg Hx      Substance Abuse Neg Hx     [4]   Social History  Tobacco Use    Smoking status: Never     Passive exposure: Never    Smokeless tobacco: Never   Vaping Use    Vaping status: Never Used    Substance Use Topics    Alcohol use: Not Currently     Comment: occas none recently; social drinker - per [employer]    Drug use: No   [5]   Allergies  Allergen Reactions    Penicillins Anaphylaxis    Shellfish Allergy - Food Allergy Anaphylaxis and Hives        Yamilka Becerra DO  06/12/25 0431

## 2025-06-11 NOTE — ED NOTES
Pt daughter, Samaria, leaving bedside at this time due to  requirements. Samaria to be contacted via mobile number in chart for updates and pick-up upon potential discharge.      Gwen Currie RN  06/11/25 [id number]

## 2025-06-11 NOTE — ASSESSMENT & PLAN NOTE
Mary CPower drawing [facility]     Recent extensive hospitalization at [facility] 3/3/2025 into 4/3/2025 requiring small bowel resection  Patient was rehabbing but now at home.  Monitor appetite

## 2025-06-11 NOTE — ASSESSMENT & PLAN NOTE
History of CKD 4, HFpEF, severe TR status post TTVR, orthostatic hypotension, atrial fibrillation with sick sinus syndrome status post pacer and recent hospitalization at [facility] 3/3/2025 into 4/3/2025 for SBO who was sent in for anemia  Discharged home from rehab and daughter noted patient more tachycardic and tired  Outpatient blood work demonstrated low hemoglobin  Being transfused 2 unit PRBC.  Holding apixaban.  Check stool occult as patient recently had small bowel resection    Results from last 7 days   Lab Units 06/11/25  1501 06/11/25  1116   HEMOGLOBIN g/dL 5.7* 5.7*

## 2025-06-11 NOTE — ASSESSMENT & PLAN NOTE
Wt Readings from Last 3 Encounters:   06/10/25 42.8 kg (94 lb 6.4 oz)   05/13/25 44.7 kg (98 lb 9.6 oz)   01/13/25 56.5 kg (124 lb 8 oz)     Appears compensated.  Recent extensive hospitalization at [facility] 3/3 - 4/3  Continue torsemide 10 mg daily  Need to hold spironolactone and potassium supplements due to hyperkalemia

## 2025-06-11 NOTE — ASSESSMENT & PLAN NOTE
Paroxysmal atrial fibrillation with sick sinus syndrome status post pacer  Holding apixaban given anemia now

## 2025-06-11 NOTE — TELEPHONE ENCOUNTER
Nir calling from [facility] with critical findings:  Critical low HGB 5.7  Patient normally 9.5  Results available in chart.

## 2025-06-12 ENCOUNTER — TRANSITIONAL CARE MANAGEMENT (OUTPATIENT)
Dept: FAMILY MEDICINE CLINIC | Facility: CLINIC | Age: 85
End: 2025-06-12

## 2025-06-12 VITALS
HEART RATE: 70 BPM | SYSTOLIC BLOOD PRESSURE: 118 MMHG | OXYGEN SATURATION: 100 % | DIASTOLIC BLOOD PRESSURE: 68 MMHG | TEMPERATURE: 97.7 F | RESPIRATION RATE: 18 BRPM

## 2025-06-12 LAB
ABO GROUP BLD BPU: NORMAL
ABO GROUP BLD BPU: NORMAL
ALBUMIN SERPL BCG-MCNC: 3.5 G/DL (ref 3.5–5)
ALP SERPL-CCNC: 82 U/L (ref 34–104)
ALT SERPL W P-5'-P-CCNC: 11 U/L (ref 7–52)
ANION GAP SERPL CALCULATED.3IONS-SCNC: 6 MMOL/L (ref 4–13)
AST SERPL W P-5'-P-CCNC: 18 U/L (ref 13–39)
ATRIAL RATE: 55 BPM
ATRIAL RATE: 78 BPM
BILIRUB SERPL-MCNC: 1.36 MG/DL (ref 0.2–1)
BPU ID: NORMAL
BPU ID: NORMAL
BUN SERPL-MCNC: 41 MG/DL (ref 5–25)
CALCIUM SERPL-MCNC: 9.3 MG/DL (ref 8.4–10.2)
CHLORIDE SERPL-SCNC: 105 MMOL/L (ref 96–108)
CO2 SERPL-SCNC: 25 MMOL/L (ref 21–32)
CREAT SERPL-MCNC: 1.62 MG/DL (ref 0.6–1.3)
CROSSMATCH: NORMAL
CROSSMATCH: NORMAL
ERYTHROCYTE [DISTWIDTH] IN BLOOD BY AUTOMATED COUNT: 17 % (ref 11.6–15.1)
FOLATE SERPL-MCNC: >22.3 NG/ML
GFR SERPL CREATININE-BSD FRML MDRD: 28 ML/MIN/1.73SQ M
GLUCOSE SERPL-MCNC: 102 MG/DL (ref 65–140)
GLUCOSE SERPL-MCNC: 105 MG/DL (ref 65–140)
GLUCOSE SERPL-MCNC: 109 MG/DL (ref 65–140)
HCT VFR BLD AUTO: 22.7 % (ref 34.8–46.1)
HGB BLD-MCNC: 7.1 G/DL (ref 11.5–15.4)
IRON SATN MFR SERPL: 30 % (ref 15–50)
IRON SERPL-MCNC: 141 UG/DL (ref 50–212)
MCH RBC QN AUTO: 27.1 PG (ref 26.8–34.3)
MCHC RBC AUTO-ENTMCNC: 31.3 G/DL (ref 31.4–37.4)
MCV RBC AUTO: 87 FL (ref 82–98)
P AXIS: 107 DEGREES
PLATELET # BLD AUTO: 211 THOUSANDS/UL (ref 149–390)
PMV BLD AUTO: 9.2 FL (ref 8.9–12.7)
POTASSIUM SERPL-SCNC: 4.4 MMOL/L (ref 3.5–5.3)
PROT SERPL-MCNC: 6.8 G/DL (ref 6.4–8.4)
QRS AXIS: -69 DEGREES
QRS AXIS: -73 DEGREES
QRSD INTERVAL: 160 MS
QRSD INTERVAL: 164 MS
QT INTERVAL: 432 MS
QT INTERVAL: 436 MS
QTC INTERVAL: 473 MS
QTC INTERVAL: 540 MS
RBC # BLD AUTO: 2.62 MILLION/UL (ref 3.81–5.12)
SODIUM SERPL-SCNC: 136 MMOL/L (ref 135–147)
T WAVE AXIS: 105 DEGREES
T WAVE AXIS: 107 DEGREES
TIBC SERPL-MCNC: 467.6 UG/DL (ref 250–450)
TRANSFERRIN SERPL-MCNC: 334 MG/DL (ref 203–362)
UIBC SERPL-MCNC: 327 UG/DL (ref 155–355)
UNIT DISPENSE STATUS: NORMAL
UNIT DISPENSE STATUS: NORMAL
UNIT PRODUCT CODE: NORMAL
UNIT PRODUCT CODE: NORMAL
UNIT PRODUCT VOLUME: 350 ML
UNIT PRODUCT VOLUME: 350 ML
UNIT RH: NORMAL
UNIT RH: NORMAL
VENTRICULAR RATE: 71 BPM
VENTRICULAR RATE: 94 BPM
VIT B12 SERPL-MCNC: 279 PG/ML (ref 180–914)
WBC # BLD AUTO: 3.93 THOUSAND/UL (ref 4.31–10.16)

## 2025-06-12 PROCEDURE — 82607 VITAMIN B-12: CPT | Performed by: INTERNAL MEDICINE

## 2025-06-12 PROCEDURE — 99239 HOSP IP/OBS DSCHRG MGMT >30: CPT | Performed by: INTERNAL MEDICINE

## 2025-06-12 PROCEDURE — 97167 OT EVAL HIGH COMPLEX 60 MIN: CPT

## 2025-06-12 PROCEDURE — 80053 COMPREHEN METABOLIC PANEL: CPT | Performed by: INTERNAL MEDICINE

## 2025-06-12 PROCEDURE — 83540 ASSAY OF IRON: CPT | Performed by: INTERNAL MEDICINE

## 2025-06-12 PROCEDURE — 82746 ASSAY OF FOLIC ACID SERUM: CPT | Performed by: INTERNAL MEDICINE

## 2025-06-12 PROCEDURE — 85027 COMPLETE CBC AUTOMATED: CPT | Performed by: INTERNAL MEDICINE

## 2025-06-12 PROCEDURE — 82948 REAGENT STRIP/BLOOD GLUCOSE: CPT

## 2025-06-12 PROCEDURE — 97163 PT EVAL HIGH COMPLEX 45 MIN: CPT

## 2025-06-12 PROCEDURE — 83550 IRON BINDING TEST: CPT | Performed by: INTERNAL MEDICINE

## 2025-06-12 PROCEDURE — 93010 ELECTROCARDIOGRAM REPORT: CPT | Performed by: INTERNAL MEDICINE

## 2025-06-12 RX ORDER — MIRTAZAPINE 7.5 MG/1
7.5 TABLET, FILM COATED ORAL
COMMUNITY
Start: 2025-04-30

## 2025-06-12 RX ADMIN — SENNOSIDES AND DOCUSATE SODIUM 1 TABLET: 50; 8.6 TABLET ORAL at 08:43

## 2025-06-12 RX ADMIN — THIAMINE HCL TAB 100 MG 100 MG: 100 TAB at 08:43

## 2025-06-12 RX ADMIN — Medication 1000 UNITS: at 08:43

## 2025-06-12 RX ADMIN — LEVOTHYROXINE SODIUM 25 MCG: 0.03 TABLET ORAL at 05:41

## 2025-06-12 RX ADMIN — PANTOPRAZOLE SODIUM 40 MG: 40 TABLET, DELAYED RELEASE ORAL at 05:41

## 2025-06-12 RX ADMIN — Medication 400 MG: at 08:43

## 2025-06-12 RX ADMIN — LACTULOSE 20 G: 20 SOLUTION ORAL at 08:43

## 2025-06-12 NOTE — ASSESSMENT & PLAN NOTE
History of CKD 4, HFpEF, severe TR status post TTVR, orthostatic hypotension, atrial fibrillation with sick sinus syndrome status post pacer and recent hospitalization at [facility] 3/3/2025 into 4/3/2025 for SBO who was sent in for anemia  Discharged home from rehab and daughter noted patient more tachycardic and tired  Appropriate response to 2 units of packed red blood cells  No evidence of additional bleeding    Results from last 7 days   Lab Units 06/12/25  0456 06/11/25  1501 06/11/25  1116   HEMOGLOBIN g/dL 7.1* 5.7* 5.7*

## 2025-06-12 NOTE — PLAN OF CARE
Problem: PHYSICAL THERAPY ADULT  Goal: Performs mobility at highest level of function for planned discharge setting.  See evaluation for individualized goals.  Description: Treatment/Interventions: Functional transfer training, LE strengthening/ROM, Elevations, Therapeutic exercise, Endurance training, Patient/family training, Bed mobility, Gait training, Spoke to nursing, OT, Spoke to MD  Equipment Recommended: Walker (pt has)       See flowsheet documentation for full assessment, interventions and recommendations.  6/12/2025 1005 by Kristopher Campbell PT  Note: Prognosis: Good  Problem List: Decreased strength, Decreased endurance, Decreased mobility, Impaired balance, Impaired judgement  Assessment: Pt. 84 y.o.female admitted for Symptomatic anemia. S/p blood transfusion. Current hgb 7.1. Pt referred to PT for mobility assessment & D/C planning. Please see above for information re: home set-up & PLOF as well as objective findings during PT assessment. On eval, pt functioning below baseline hence will continue skilled PT to improve function & safety. Pt require S for most functional mobility w/ RW + cues for techniques & safety. Gait deviations as above but no gross LOB noted.  The patient's AM-PAC Basic Mobility Inpatient Short Form Raw Score is 19. A Raw score of greater than 16 suggests the patient may benefit from discharge to home. Please also refer to the recommendation of the Physical Therapist for safe discharge planning. From PT standpoint, will recommend Level III (minimum resource intensity) rehab services and inc family support at D/C. No SOB & dizziness reported t/o session. BP during session 115/57. Nsg staff most recent vital signs as follows: /55 (BP Location: Right arm)   Pulse 74   Temp 97.6 °F (36.4 °C) (Temporal)   Resp 16   SpO2 100% . At end of session, pt remain OOB in chair in stable condition, call bell & phone in reach, chair alarm activated. Fall precautions reinforced w/ good  understanding. CM to follow. Nsg staff to continue to mobilized pt (OOB in chair for all meals & ambulate in room/unit) as tolerated to prevent further decline in function. Will also recommend Restorative for daily amb &/or daily OOB in chair as appropriate. Nsg notified. Co-eval was necessary to complete this PT eval for the pt's best interest given pt's medical acuity & complexity.    Barriers to Discharge: None     Rehab Resource Intensity Level, PT: III (Minimum Resource Intensity)    See flowsheet documentation for full assessment.

## 2025-06-12 NOTE — DISCHARGE INSTR - AVS FIRST PAGE
Dear Sarah Holland,     It was our pleasure to care for you here at [facility].  It is our hope that we were always able to exceed the expected standards for your care during your stay.  You were hospitalized due to anemia and high potassium levels.  You were cared for on the fourth floor by Favio Allen DO with the [facility] who covers for your primary care physician (PCP), Corina Oconnor DO, while you were hospitalized.  If you have any questions or concerns related to this hospitalization, you may contact us at .  For follow up as well as any medication refills, we recommend that you follow up with your primary care physician.  A registered nurse will reach out to you by phone within a few days after your discharge to answer any additional questions that you may have after going home.  However, at this time we provide for you here, the most important instructions / recommendations at discharge:     Notable Medication Adjustments -   Discontinue spironolactone until labs are rechecked by your PCP in 1 week  Discontinue potassium supplementation  Recommend a low potassium diet, okay to continue torsemide  Testing Required after Discharge -   Repeat CBC which is a blood count in 1 week  Repeat BMP in 1 week with PCP which is a level to check your potassium  You do have a cardiology appointment in 1 week and can follow-up your labs with cardiology when it is appropriate to restart her Aldactone  Important follow up information -   PCP follow-up in 1 week  Cardiology follow-up as scheduled    Other Instructions -   None  Please review this entire after visit summary as additional general instructions including medication list, appointments, activity, diet, any pertinent wound care, and other additional recommendations from your care team that may be provided for you.      Sincerely,     Favio Allen DO and Nurse Dozier

## 2025-06-12 NOTE — PLAN OF CARE
Problem: Potential for Falls  Goal: Patient will remain free of falls  Description: INTERVENTIONS:  - Educate patient/family on patient safety including physical limitations  - Instruct patient to call for assistance with activity   - Consider consulting OT/PT to assist with strengthening/mobility based on AM PAC & JH-HLM score  - Consult OT/PT to assist with strengthening/mobility   - Keep Call bell within reach  - Keep bed low and locked with side rails adjusted as appropriate  - Keep care items and personal belongings within reach  - Initiate and maintain comfort rounds  - Make Fall Risk Sign visible to staff  - Offer Toileting every 2 Hours, in advance of need  - Initiate/Maintain bed alarm  - Obtain necessary fall risk management equipment: alarms  - Apply yellow socks and bracelet for high fall risk patients  - Consider moving patient to room near nurses station  Outcome: Progressing     Problem: PAIN - ADULT  Goal: Verbalizes/displays adequate comfort level or baseline comfort level  Description: Interventions:  - Encourage patient to monitor pain and request assistance  - Assess pain using appropriate pain scale  - Administer analgesics as ordered based on type and severity of pain and evaluate response  - Implement non-pharmacological measures as appropriate and evaluate response  - Consider cultural and social influences on pain and pain management  - Notify physician/advanced practitioner if interventions unsuccessful or patient reports new pain  - Educate patient/family on pain management process including their role and importance of  reporting pain   - Provide non-pharmacologic/complimentary pain relief interventions  Outcome: Progressing     Problem: INFECTION - ADULT  Goal: Absence or prevention of progression during hospitalization  Description: INTERVENTIONS:  - Assess and monitor for signs and symptoms of infection  - Monitor lab/diagnostic results  - Monitor all insertion sites, i.e. indwelling  lines, tubes, and drains  - Monitor endotracheal if appropriate and nasal secretions for changes in amount and color  - Sherrill appropriate cooling/warming therapies per order  - Administer medications as ordered  - Instruct and encourage patient and family to use good hand hygiene technique  - Identify and instruct in appropriate isolation precautions for identified infection/condition  Outcome: Progressing  Goal: Absence of fever/infection during neutropenic period  Description: INTERVENTIONS:  - Monitor WBC  - Perform strict hand hygiene  - Limit to healthy visitors only  - No plants, dried, fresh or silk flowers with espinosa in patient room  Outcome: Progressing     Problem: SAFETY ADULT  Goal: Patient will remain free of falls  Description: INTERVENTIONS:  - Educate patient/family on patient safety including physical limitations  - Instruct patient to call for assistance with activity   - Consider consulting OT/PT to assist with strengthening/mobility based on AM PAC & JH-HLM score  - Consult OT/PT to assist with strengthening/mobility   - Keep Call bell within reach  - Keep bed low and locked with side rails adjusted as appropriate  - Keep care items and personal belongings within reach  - Initiate and maintain comfort rounds  - Make Fall Risk Sign visible to staff  - Offer Toileting every 2 Hours, in advance of need  - Initiate/Maintain bed alarm  - Obtain necessary fall risk management equipment: alarms  - Apply yellow socks and bracelet for high fall risk patients  - Consider moving patient to room near nurses station  Outcome: Progressing  Goal: Maintain or return to baseline ADL function  Description: INTERVENTIONS:  -  Assess patient's ability to carry out ADLs; assess patient's baseline for ADL function and identify physical deficits which impact ability to perform ADLs (bathing, care of mouth/teeth, toileting, grooming, dressing, etc.)  - Assess/evaluate cause of self-care deficits   - Assess range of  motion  - Assess patient's mobility; develop plan if impaired  - Assess patient's need for assistive devices and provide as appropriate  - Encourage maximum independence but intervene and supervise when necessary  - Involve family in performance of ADLs  - Assess for home care needs following discharge   - Consider OT consult to assist with ADL evaluation and planning for discharge  - Provide patient education as appropriate  - Monitor functional capacity and physical performance, use of AM PAC & JH-HLM   - Monitor gait, balance and fatigue with ambulation    Outcome: Progressing  Goal: Maintains/Returns to pre admission functional level  Description: INTERVENTIONS:  - Perform AM-PAC 6 Click Basic Mobility/ Daily Activity assessment daily.  - Set and communicate daily mobility goal to care team and patient/family/caregiver.   - Collaborate with rehabilitation services on mobility goals if consulted  - Perform Range of Motion 3 times a day.  - Reposition patient every 2 hours.  - Dangle patient 3 times a day  - Stand patient 3 times a day  - Ambulate patient 3 times a day  - Out of bed to chair 3 times a day   - Out of bed for meals 3 times a day  - Out of bed for toileting  - Record patient progress and toleration of activity level   Outcome: Progressing     Problem: DISCHARGE PLANNING  Goal: Discharge to home or other facility with appropriate resources  Description: INTERVENTIONS:  - Identify barriers to discharge w/patient and caregiver  - Arrange for needed discharge resources and transportation as appropriate  - Identify discharge learning needs (meds, wound care, etc.)  - Arrange for interpretive services to assist at discharge as needed  - Refer to Case Management Department for coordinating discharge planning if the patient needs post-hospital services based on physician/advanced practitioner order or complex needs related to functional status, cognitive ability, or social support system  Outcome:  Progressing     Problem: Knowledge Deficit  Goal: Patient/family/caregiver demonstrates understanding of disease process, treatment plan, medications, and discharge instructions  Description: Complete learning assessment and assess knowledge base.  Interventions:  - Provide teaching at level of understanding  - Provide teaching via preferred learning methods  Outcome: Progressing     Problem: HEMATOLOGIC - ADULT  Goal: Maintains hematologic stability  Description: INTERVENTIONS  - Assess for signs and symptoms of bleeding or hemorrhage  - Monitor labs  - Administer supportive blood products/factors as ordered and appropriate  Outcome: Progressing

## 2025-06-12 NOTE — PHYSICAL THERAPY NOTE
PT EVALUATION    Pt. Name: Sarah Holland  Pt. Age: 84 y.o.  MRN: [id number]  LENGTH OF STAY: 0      Admitting Diagnoses:   Hyperkalemia [E87.5]  Abnormal laboratory test [R89.9]  Symptomatic anemia [D64.9]    Past Medical History[1]    Past Surgical History[2]    Imaging Studies:  No orders to display         06/12/25 0908   PT Last Visit   PT Visit Date 06/12/25   Note Type   Note type Evaluation   Pain Assessment   Pain Score No Pain   Restrictions/Precautions   Weight Bearing Precautions Per Order No   Home Living   Type of Home House   Home Layout Two level;Performs ADLs on one level;Able to live on main level with bedroom/bathroom;Stairs to enter with rails;Other (Comment)  (5STE; pt has 1st floor set up w/ bed/bathroom access)   Bathroom Shower/Tub Walk-in shower   Bathroom Toilet Standard   Bathroom Equipment Grab bars in shower;Shower chair   Home Equipment Walker;Wheelchair-manual   Prior Function   Level of [place] Independent with functional mobility;Needs assistance with ADLs;Needs assistance with IADLS  (ambulates w/ RW at baselines; requires assistance w/ stair management, ADLs & IADL's at baseline)   Lives With Son   Receives Help From Family  (daughter stays with pt during the day)   Falls in the last 6 months 0   Vocational Retired   Comments (+) ; pt receiving HHPT/OT PTA   General   Additional Pertinent History Pt was at [facility] 3/3/25-4/3/25 2* to SBO s/p bowel resection. Pt went to [facility] then D/C home.   Family/Caregiver Present No   Cognition   Overall Cognitive Status WFL   Arousal/Participation Alert   Orientation Level Oriented to person;Oriented to place;Oriented to time   Following Commands Follows one step commands without difficulty   Comments pleasant & cooperative   Subjective   Subjective Pt agreeable to PT/OT evals.   RUE Assessment   RUE Assessment   (refer to OT)   LUE Assessment   LUE Assessment   (refer to OT)   RLE Assessment   RLE Assessment WFL  (4-/5 grossly  except hip 3+/5)   LLE Assessment   LLE Assessment WFL  (4-/5 grossly except hip 3+/5)   Coordination   Movements are Fluid and Coordinated 1   Sensation WFL   Bed Mobility   Supine to Sit Unable to assess   Sit to Supine Unable to assess   Additional Comments pt OOB in chair pre & post-session   Transfers   Sit to Stand 5  Supervision   Additional items Armrests;Increased time required;Verbal cues   Stand to Sit 5  Supervision   Additional items Armrests;Increased time required;Verbal cues   Additional Comments w/ RW; cues for techniques & safety   Ambulation/Elevation   Gait pattern Forward Flexion;Wide INOCENCIA;Decreased foot clearance;Excessively slow;Short stride   Gait Assistance 5  Supervision   Additional items Verbal cues   Assistive Device Rolling walker   Distance ~250'   Ambulation/Elevation Additional Comments no gross LOB noted; pt require cues to stay within RW INOCENCIA; (+) fatigue towards end of amb   Balance   Static Sitting Good   Dynamic Sitting Fair +   Static Standing Fair  (w/ RW)   Dynamic Standing Fair -  (w/ RW)   Ambulatory Fair -  (w/ RW)   Activity Tolerance   Activity Tolerance Patient limited by fatigue;Treatment limited secondary to medical complications (Comment)   Medical Staff Made Aware OTR Dmitry   Nurse Made Aware yes, Adriel   Assessment   Prognosis Good   Problem List Decreased strength;Decreased endurance;Decreased mobility;Impaired balance;Impaired judgement   Assessment Pt. 84 y.o.female admitted for Symptomatic anemia. S/p blood transfusion. Current hgb 7.1. Pt referred to PT for mobility assessment & D/C planning. Please see above for information re: home set-up & PLOF as well as objective findings during PT assessment. On eval, pt functioning below baseline hence will continue skilled PT to improve function & safety. Pt require S for most functional mobility w/ RW + cues for techniques & safety. Gait deviations as above but no gross LOB noted.  The patient's AM-PAC Basic Mobility  Inpatient Short Form Raw Score is 19. A Raw score of greater than 16 suggests the patient may benefit from discharge to home. Please also refer to the recommendation of the Physical Therapist for safe discharge planning. From PT standpoint, will recommend Level III (minimum resource intensity) rehab services and inc family support at D/C. No SOB & dizziness reported t/o session. BP during session 115/57. Nsg staff most recent vital signs as follows: /55 (BP Location: Right arm)   Pulse 74   Temp 97.6 °F (36.4 °C) (Temporal)   Resp 16   SpO2 100% . At end of session, pt remain OOB in chair in stable condition, call bell & phone in reach, chair alarm activated. Fall precautions reinforced w/ good understanding. CM to follow. Nsg staff to continue to mobilized pt (OOB in chair for all meals & ambulate in room/unit) as tolerated to prevent further decline in function. Will also recommend Restorative for daily amb &/or daily OOB in chair as appropriate. Nsg notified. Co-eval was necessary to complete this PT eval for the pt's best interest given pt's medical acuity & complexity.   Barriers to Discharge None   Goals   Patient Goals to go home   STG Expiration Date 06/19/25   Short Term Goal #1 Goals to be met in 7 days; pt will be able to: 1) inc strength & balance by 1/2 grade to improve overall functional mobility & dec fall risk; 2) inc bed mobility to I for pt to be able to get in/OOB safely w/ proper techniques 100% of the time, to dec caregiver burden & safely function at home; 3) inc transfers to modified I for pt to transition safely from one surface to another w/o % of the time, to dec caregiver burden & safely function at home; 4) inc amb w/ RW approx. >350' w/ modified I for pt to ambulate community distances w/o any % of the time, to dec caregiver burden & safely function at home; 5) negotiate stairs w/ modified I for inc safety during stair mgt inside/outside of home & dec caregiver  burden; 6) pt/caregiver ed   PT Treatment Day 0   Plan   Treatment/Interventions Functional transfer training;LE strengthening/ROM;Elevations;Therapeutic exercise;Endurance training;Patient/family training;Bed mobility;Gait training;Spoke to nursing;OT;Spoke to MD   PT Frequency 2-3x/wk   Discharge Recommendation   Rehab Resource Intensity Level, PT III (Minimum Resource Intensity)   Equipment Recommended Walker  (pt has)   Additional Comments restorative for daily amb   AM-PAC Basic Mobility Inpatient   Turning in Flat Bed Without Bedrails 4   Lying on Back to Sitting on Edge of Flat Bed Without Bedrails 3   Moving Bed to Chair 3   Standing Up From Chair Using Arms 3   Walk in Room 3   Climb 3-5 Stairs With Railing 3   Basic Mobility Inpatient Raw Score 19   Basic Mobility Standardized Score 42.48   [facility] Highest Level Of Mobility   -HLM Goal 6: Walk 10 steps or more   -HLM Achieved 8: Walk 250 feet ot more   End of Consult   Patient Position at End of Consult Bedside chair;Bed/Chair alarm activated;All needs within reach   End of Consult Comments Pt in stable condition. All needs in reach. Chair alarm activated.   Hx/personal factors: co-morbidities, inaccessible home, advanced age, telemetry, use of AD, fall risk, and assist w/ ADL's  Examination: dec mobility, dec balance, dec endurance, dec amb, risk for falls  Clinical: unpredictable (ongoing medical status, abnormal lab values, and risk for falls)  Complexity: high    Kristopher Shannan               [1]   Past Medical History:  Diagnosis Date    Allergic rhinitis     last assessed - 05Jan2013    Arthritis     lower back    Atrial flutter (HCC)     last assessed - 16Dec2013    Diabetes mellitus (HCC)     Type II    Fatigue     last assessed - 01Oct2013    Heart murmur     tricupid reguritation, SSS    Hyperlipidemia     Hypertension     Insomnia     Irregular heart beat     Cardioversion, pacemaker, severe tricuspid regurgitation, atrial septal defect.     Lower leg edema     last assessed - 09Nov2015    Macular degeneration     b/l    Osteoporosis     Osteoporosis     Shortness of breath     prior to heart surgery    Sick sinus syndrome (HCC)     last assessed - 05Apr2017    Small bowel obstruction (HCC)     Sting from hornet, wasp, or bee     last assessed - 20Aug2013    Subconjunctival hemorrhage     unspecified laterality; last assessed - 09Jan2014   [2]   Past Surgical History:  Procedure Laterality Date    ASD REPAIR, SECUNDUM      BREAST BIOPSY Right     benign    CARDIAC PACEMAKER PLACEMENT      CARDIAC SURGERY      Atrial septal defect repaired, pacemaker    COLONOSCOPY N/A 04/21/2016    Procedure: COLONOSCOPY;  Surgeon: London Olivares MD;  Location: [facility];  Service:     COLONOSCOPY W/ BIOPSIES AND POLYPECTOMY      ESOPHAGOGASTRODUODENOSCOPY      EYE SURGERY      lid lift    HYSTERECTOMY      at age 37 or 38    MAMMO STEREOTACTIC BREAST BIOPSY RIGHT (ALL INC) Right 07/14/2021    OOPHORECTOMY Left     NC COLONOSCOPY FLX DX W/COLLJ SPEC WHEN PFRMD N/A 05/10/2019    Procedure: COLONOSCOPY with polypectomy;  Surgeon: London Olivares MD;  Location: [facility];  Service: Gastroenterology    TOTAL ABDOMINAL HYSTERECTOMY      TRICUSPID VALVE REPLACEMENT      HUP    VEIN SURGERY Left     vericose vein left leg    WISDOM TOOTH EXTRACTION

## 2025-06-12 NOTE — ASSESSMENT & PLAN NOTE
Renal function stable/at baseline 1.4-1.8    Results from last 7 days   Lab Units 06/12/25  0456 06/11/25  1501 06/11/25  1116   BUN mg/dL 41* 42* 42*   CREATININE mg/dL 1.62* 1.78* 1.68*   EGFR ml/min/1.73sq m 28 25 27

## 2025-06-12 NOTE — ASSESSMENT & PLAN NOTE
Hyperkalemia upon arrival  Prior to admission on potassium 20 mEq daily with spironolactone.  Both will be held  Given calcium gluconate albuterol and insulin/dextrose in the ED  Resolved, continue low potassium diet    Results from last 7 days   Lab Units 06/12/25  0456 06/11/25  1501 06/11/25  1116   POTASSIUM mmol/L 4.4 6.2* 5.4*

## 2025-06-12 NOTE — OCCUPATIONAL THERAPY NOTE
"    Occupational Therapy Evaluation     Patient Name: Sarah Holland  Today's Date: 6/12/2025  Problem List  Principal Problem:    Symptomatic anemia  Active Problems:    Severe tricuspid regurgitation    Chronic diastolic heart failure (HCC)    Acquired hypothyroidism    Paroxysmal atrial fibrillation (HCC)    Stage 4 chronic kidney disease (HCC)    S/P small bowel resection    Hyperkalemia    Past Medical History  Past Medical History[1]  Past Surgical History  Past Surgical History[2]      06/12/25 5734   Note Type   Note type Evaluation   Pain Assessment   Pain Assessment Tool 0-10   Pain Score No Pain   Restrictions/Precautions   Weight Bearing Precautions Per Order No   Other Precautions Bed Alarm;Chair Alarm;Fall Risk   Home Living   Type of Home House   Home Layout Two level;Able to live on main level with bedroom/bathroom  (5 jyoti with rail)   Bathroom Shower/Tub Walk-in shower   Bathroom Toilet Standard   Bathroom Equipment Grab bars in shower;Shower chair   Home Equipment Walker;Wheelchair-manual   Prior Function   Level of [place] Independent with functional mobility;Needs assistance with ADLs   Lives With Son   Falls in the last 6 months 0   Lifestyle   Autonomy PTA pt states that she had some assistance with her LE ADLs; states independence with transfers/ambulation--with RW; neg falls, neg home alone   Reciprocal Relationships 3 children   Service to Others homemaker   Intrinsic Gratification spending time with family   Subjective   Subjective \"My daughter helps me at home.\"   ADL   Where Assessed Chair   Eating Assistance 6  Modified independent   Grooming Assistance 6  Modified Independent   UB Bathing Assistance 5  Supervision/Setup   LB Bathing Assistance 4  Minimal Assistance   UB Dressing Assistance 5  Supervision/Setup   LB Dressing Assistance 4  Minimal Assistance   Toileting Assistance  5  Supervision/Setup   Transfers   Sit to Stand 5  Supervision   Additional items Increased time " required;Verbal cues   Stand to Sit 5  Supervision   Additional items Increased time required;Verbal cues   Additional Comments bp's=115/57(sitting in chair after ambulation)   Functional Mobility   Functional Mobility 5  Supervision   Additional items Rolling walker   Balance   Static Sitting Good   Dynamic Sitting Fair +   Static Standing Fair   Dynamic Standing Fair -   Activity Tolerance   Activity Tolerance Patient limited by fatigue   Medical Staff Made Aware LUX springer MD   RUE Assessment   RUE Assessment WFL   RUE Strength   RUE Overall Strength Within Functional Limits - able to perform ADL tasks with strength  (4/5 throughout)   LUE Assessment   LUE Assessment WFL   LUE Strength   LUE Overall Strength Within Functional Limits - able to perform ADL tasks with strength  (4/5 throughout)   Hand Function   Gross Motor Coordination Functional   Fine Motor Coordination Functional   Sensation   Light Touch No apparent deficits   Proprioception   Proprioception No apparent deficits   Vision-Basic Assessment   Current Vision   (glasses)   Vision - Complex Assessment   Acuity Able to read clock/calendar on wall without difficulty   Psychosocial   Psychosocial (WDL) WDL   Perception   Inattention/Neglect Appears intact   Cognition   Overall Cognitive Status WFL   Arousal/Participation Alert   Attention Within functional limits   Orientation Level Oriented X4   Memory Within functional limits   Following Commands Follows one step commands without difficulty   Assessment   Limitation Decreased ADL status;Decreased UE strength;Decreased Safe judgement during ADL;Decreased endurance;Decreased high-level ADLs   Prognosis Good   Assessment Pt is a 85y/o female admitted to the hospital 2* symptoms of anemia, tachycardia. Pt noted with symptomatic anemia. Pt with PMH A-flutter, DM, HTN, OP, recent(last month)SBO-s/p resection with prolong hospitalization, cardiac sx, and tricuspid valve replacement. PTA pt states that she  "had some assistance with her LE ADLs; states independence with transfers/ambulation--with RW; neg falls, neg home alone. During initial eval, pt demonstrated slight deficits with her functional balance, functional mobility, ADL status, transfer safety, b/l UE strength, and activity tolerance(currently fair=15-20mins). Pt would benefit from continued OT tx for the above deficits. 2-5xwk/1-2wks. The patient's raw score on the AM-PAC Daily Activity Inpatient Short Form is 21. A raw score of greater than or equal to 19 suggests the patient may benefit from discharge to home. Please refer to the recommendation of the Occupational Therapist for safe discharge planning.   Goals   Patient Goals \"to go home\"   STG Time Frame   (1-5 days)   Short Term Goal #1 Pt will demonstrate improved activity tolerance to good(20-30mins) and standing tolerance to 3-5mins to assist with ADLs.   Short Term Goal #2 Pt will demonstrate proper walker/transfer safety 100% of the time.   Short Term Goal  Pt will independently demonstrate knowledge and application of proper energy conservation techniques 100% of the time.   LTG Time Frame   (5-10 days)   Long Term Goal #1 Pt will demonstrate g/g- balance with all functional activities.   Long Term Goal #2 Pt will demonstrate mod I with their UE and LE bathing/dresssing.   Long Term Goal Pt will demonstrate improved b/l UE strength by 1/2 MM grade to assist with ADLs/transfers.   Plan   Treatment Interventions ADL retraining;Functional transfer training;UE strengthening/ROM;Endurance training;Patient/family training;Equipment evaluation/education;Compensatory technique education;Energy conservation   Goal Expiration Date 06/23/25   OT Treatment Day 0   OT Frequency   (2-5xwk/1-2wks)   Discharge Recommendation   Rehab Resource Intensity Level, OT III (Minimum Resource Intensity)   AM-PAC Daily Activity Inpatient   Lower Body Dressing 3   Bathing 3   Toileting 3   Upper Body Dressing 4   Grooming 4 "   Eating 4   Daily Activity Raw Score 21   Daily Activity Standardized Score (Calc for Raw Score >=11) 44.27   AM-PAC Applied Cognition Inpatient   Following a Speech/Presentation 4   Understanding Ordinary Conversation 4   Taking Medications 3   Remembering Where Things Are Placed or Put Away 3   Remembering List of 4-5 Errands 3   Taking Care of Complicated Tasks 3   Applied Cognition Raw Score 20   Applied Cognition Standardized Score 41.76   Dmitry Padilla         [1]   Past Medical History:  Diagnosis Date    Allergic rhinitis     last assessed - 05Jan2013    Arthritis     lower back    Atrial flutter (HCC)     last assessed - 16Dec2013    Diabetes mellitus (HCC)     Type II    Fatigue     last assessed - 01Oct2013    Heart murmur     tricupid reguritation, SSS    Hyperlipidemia     Hypertension     Insomnia     Irregular heart beat     Cardioversion, pacemaker, severe tricuspid regurgitation, atrial septal defect.    Lower leg edema     last assessed - 09Nov2015    Macular degeneration     b/l    Osteoporosis     Osteoporosis     Shortness of breath     prior to heart surgery    Sick sinus syndrome (HCC)     last assessed - 05Apr2017    Small bowel obstruction (HCC)     Sting from hornet, wasp, or bee     last assessed - 20Aug2013    Subconjunctival hemorrhage     unspecified laterality; last assessed - 09Jan2014   [2]   Past Surgical History:  Procedure Laterality Date    ASD REPAIR, SECUNDUM      BREAST BIOPSY Right     benign    CARDIAC PACEMAKER PLACEMENT      CARDIAC SURGERY      Atrial septal defect repaired, pacemaker    COLONOSCOPY N/A 04/21/2016    Procedure: COLONOSCOPY;  Surgeon: London Olivares MD;  Location: [facility];  Service:     COLONOSCOPY W/ BIOPSIES AND POLYPECTOMY      ESOPHAGOGASTRODUODENOSCOPY      EYE SURGERY      lid lift    HYSTERECTOMY      at age 37 or 38    MAMMO STEREOTACTIC BREAST BIOPSY RIGHT (ALL INC) Right 07/14/2021    OOPHORECTOMY Left     MI COLONOSCOPY FLX DX W/COLLJ SPEC  WHEN PFRMD N/A 05/10/2019    Procedure: COLONOSCOPY with polypectomy;  Surgeon: London Olivares MD;  Location: [facility];  Service: Gastroenterology    TOTAL ABDOMINAL HYSTERECTOMY      TRICUSPID VALVE REPLACEMENT      HUP    VEIN SURGERY Left     vericose vein left leg    WISDOM TOOTH EXTRACTION

## 2025-06-12 NOTE — ASSESSMENT & PLAN NOTE
Recent extensive hospitalization at [facility] 3/3/2025 into 4/3/2025 requiring small bowel resection  Patient was rehabbing but now at home.  Monitor appetite

## 2025-06-12 NOTE — ASSESSMENT & PLAN NOTE
Paroxysmal atrial fibrillation with sick sinus syndrome status post pacer  Resume Eliquis upon discharge

## 2025-06-12 NOTE — ASSESSMENT & PLAN NOTE
Wt Readings from Last 3 Encounters:   06/10/25 42.8 kg (94 lb 6.4 oz)   05/13/25 44.7 kg (98 lb 9.6 oz)   01/13/25 56.5 kg (124 lb 8 oz)     Appears compensated.  Recent extensive hospitalization at [facility] 3/3 - 4/3  Continue torsemide 10 mg daily  Need to hold spironolactone and potassium supplements due to hyperkalemia-will need outpatient labs and reevaluation  Would hold for now given this had to be reversed

## 2025-06-12 NOTE — PLAN OF CARE
Problem: OCCUPATIONAL THERAPY ADULT  Goal: Performs self-care activities at highest level of function for planned discharge setting.  See evaluation for individualized goals.  Description: Treatment Interventions: ADL retraining, Functional transfer training, UE strengthening/ROM, Endurance training, Patient/family training, Equipment evaluation/education, Compensatory technique education, Energy conservation          See flowsheet documentation for full assessment, interventions and recommendations.   Note: Limitation: Decreased ADL status, Decreased UE strength, Decreased Safe judgement during ADL, Decreased endurance, Decreased high-level ADLs  Prognosis: Good  Assessment: Pt is a 85y/o female admitted to the hospital 2* symptoms of anemia, tachycardia. Pt noted with symptomatic anemia. Pt with PMH A-flutter, DM, HTN, OP, recent(last month)SBO-s/p resection with prolong hospitalization, cardiac sx, and tricuspid valve replacement. PTA pt states that she had some assistance with her LE ADLs; states independence with transfers/ambulation--with RW; neg falls, neg home alone. During initial eval, pt demonstrated slight deficits with her functional balance, functional mobility, ADL status, transfer safety, b/l UE strength, and activity tolerance(currently fair=15-20mins). Pt would benefit from continued OT tx for the above deficits. 2-5xwk/1-2wks. The patient's raw score on the AM-PAC Daily Activity Inpatient Short Form is 21. A raw score of greater than or equal to 19 suggests the patient may benefit from discharge to home. Please refer to the recommendation of the Occupational Therapist for safe discharge planning.     Rehab Resource Intensity Level, OT: III (Minimum Resource Intensity)

## 2025-06-12 NOTE — DISCHARGE SUMMARY
Discharge Summary - Hospitalist   Name: Sarah Holland 84 y.o. female I MRN: [id number]  Unit/Bed#: E4 -01 I Date of Admission: 6/11/2025   Date of Service: 6/12/2025 I Hospital Day: 0         Admitting Provider:  Benedicto Jameson DO  Discharge Provider:  Favio Allen DO  Admission Date: 6/11/2025       Discharge Date: 06/12/25   LOS: 0  Primary Care Physician at Discharge: Corina Oconnor [phone]    HOSPITAL COURSE:  Sarah Holland is a 84 y.o. female who presented on 6/11/2023 with a chief complaint of feeling unwell.  The patient has a past medical history of chronic kidney disease stage IV, severe tricuspid regurg status post TVR at [facility], A-fib with sick sinus syndrome on Eliquis as well as heart failure with preserved ejection fraction.  She had a prolonged hospitalization at [facility] for small bowel obstruction and underwent resection.  She was subsequently discharged to rehab, however upon returning home she was noted to be tachycardic and fatigued.  The patient subsequently went to PCP and outpatient blood work demonstrated low hemoglobin.  The patient himself denied any black stools and medic easier hematuria.  She was initially planned for blood transfusion but was noted to be hyperkalemic.    Patient was found to be compensated on physical examination, her potassium was urgently treated with insulin and dextrose.  She was discontinued Aldactone and potassium as these were likely contributing to her hyperkalemia.  She responded appropriately to 2 units of packed red blood cells and was evaluated by physical therapy who recommended discharge home.    At the time of discharge the patient was tolerating oral diet they were without acute complaint and they were medically cleared for discharge.  All questions were answered the patient's satisfaction and they were in agreement with the discharge plan.    DISCHARGE DIAGNOSES  Hyperkalemia  Assessment &  Plan  Hyperkalemia upon arrival  Prior to admission on potassium 20 mEq daily with spironolactone.  Both will be held  Given calcium gluconate albuterol and insulin/dextrose in the ED  Resolved, continue low potassium diet    Results from last 7 days   Lab Units 06/12/25  0456 06/11/25  1501 06/11/25  1116   POTASSIUM mmol/L 4.4 6.2* 5.4*         S/P small bowel resection  Assessment & Plan  Recent extensive hospitalization at [facility] 3/3/2025 into 4/3/2025 requiring small bowel resection  Patient was rehabbing but now at home.  Monitor appetite    Stage 4 chronic kidney disease (HCC)  Assessment & Plan  Renal function stable/at baseline 1.4-1.8    Results from last 7 days   Lab Units 06/12/25  0456 06/11/25  1501 06/11/25  1116   BUN mg/dL 41* 42* 42*   CREATININE mg/dL 1.62* 1.78* 1.68*   EGFR ml/min/1.73sq m 28 25 27         Paroxysmal atrial fibrillation (HCC)  Assessment & Plan  Paroxysmal atrial fibrillation with sick sinus syndrome status post pacer  Resume Eliquis upon discharge    Acquired hypothyroidism  Assessment & Plan  Continue levothyroxine    Chronic diastolic heart failure (HCC)  Assessment & Plan  Wt Readings from Last 3 Encounters:   06/10/25 42.8 kg (94 lb 6.4 oz)   05/13/25 44.7 kg (98 lb 9.6 oz)   01/13/25 56.5 kg (124 lb 8 oz)     Appears compensated.  Recent extensive hospitalization at [facility] 3/3 - 4/3  Continue torsemide 10 mg daily  Need to hold spironolactone and potassium supplements due to hyperkalemia-will need outpatient labs and reevaluation  Would hold for now given this had to be reversed      Severe tricuspid regurgitation  Assessment & Plan  History of severe TR with a history of TTVR [facility] 2024    * Symptomatic anemia  Assessment & Plan  History of CKD 4, HFpEF, severe TR status post TTVR, orthostatic hypotension, atrial fibrillation with sick sinus syndrome status post pacer and recent hospitalization at [facility] 3/3/2025 into 4/3/2025 for SBO who was sent in for  "anemia  Discharged home from rehab and daughter noted patient more tachycardic and tired  Appropriate response to 2 units of packed red blood cells  No evidence of additional bleeding    Results from last 7 days   Lab Units 06/12/25  0456 06/11/25  1501 06/11/25  1116   HEMOGLOBIN g/dL 7.1* 5.7* 5.7*           CONSULTING PROVIDERS   None    PROCEDURES PERFORMED  * No surgery found *    RADIOLOGY RESULTS  No results found.    LABS  Results from last 7 days   Lab Units 06/12/25  0456 06/11/25  1501 06/11/25  1116   WBC Thousand/uL 3.93* 3.63* 3.69*   HEMOGLOBIN g/dL 7.1* 5.7* 5.7*   HEMATOCRIT % 22.7* 18.6* 19.1*   MCV fL 87 86 89   PLATELETS Thousands/uL 211 276 284     Results from last 7 days   Lab Units 06/12/25  0456 06/11/25  1501 06/11/25  1116   SODIUM mmol/L 136 133* 131*   POTASSIUM mmol/L 4.4 6.2* 5.4*   CHLORIDE mmol/L 105 103 102   CO2 mmol/L 25 23 23   BUN mg/dL 41* 42* 42*   CREATININE mg/dL 1.62* 1.78* 1.68*   CALCIUM mg/dL 9.3 9.9 9.7   ALBUMIN g/dL 3.5 4.1  --    TOTAL BILIRUBIN mg/dL 1.36* 0.62  --    ALK PHOS U/L 82 96  --    ALT U/L 11 13  --    AST U/L 18 21  --    EGFR ml/min/1.73sq m 28 25 27   GLUCOSE RANDOM mg/dL 102 126 147*                  Results from last 7 days   Lab Units 06/12/25  1119 06/12/25  0726 06/11/25  2116   POC GLUCOSE mg/dl 105 109 153*     Results from last 7 days   Lab Units 06/10/25  1426   HEMOGLOBIN A1C  5.5     Results from last 7 days   Lab Units 06/11/25  1116   TSH 3RD GENERATON uIU/mL 0.954               Cultures:         Invalid input(s): \"URIBILINOGEN\"                    PHYSICAL EXAM:  Vitals:   Blood Pressure: 118/68 (06/12/25 1100)  Pulse: 70 (06/12/25 1100)  Temperature: 97.7 °F (36.5 °C) (06/12/25 1100)  Temp Source: Temporal (06/12/25 1100)  Respirations: 18 (06/12/25 1100)  SpO2: 100 % (06/12/25 1100)      General: well appearing, no acute distress  HEENT: atraumatic, PERRLA, moist mucosa, normal pharynx, normal tonsils and adenoids, normal tongue, no " fluid in sinuses  Neck: Trachea midline, no carotid bruit, no masses  Respiratory: normal chest wall expansion, CTA B  Cardiovascular: RRR, no m/r/g, Normal S1 and S2  Abdomen: Soft, non-tender, non-distended, normal bowel sounds in all quadrants, no hepatosplenomegaly, no tympany  Rectal: deferred  Musculoskeletal: Moves all  Integumentary: warm, dry, and pink, with no visible rash, purpura, or petechia  Heme/Lymph: no lymphadenopathy, no bruises  Neurological: Cranial Nerves II-XII grossly intact  Psychiatric: cooperative with normal mood, affect, and cognition       Discharge Disposition: Home/Self Care    AM-PAC Basic Mobility:  Basic Mobility Inpatient Raw Score: 19    JH-HLM Achieved: 8: Walk 250 feet ot more  JH-HLM Goal: 6: Walk 10 steps or more    HLM Goal listed above. Continue with ongoing physical therapy and encourage appropriate mobility to improve upon HLM goals.      Test Results Pending at Discharge:   Pending Labs       Order Current Status    TIBC Panel (incl. Iron, TIBC, % Iron Saturation) In process                Medications   Summary of Medication Adjustments made as a result of this hospitalization: See discharge summary and AVS for medication changes  Medication Dosing Tapers - Please refer to Discharge Medication List for details on any medication dosing tapers (if applicable to patient).  Discharge Medication List: See after visit summary for reconciled discharge medications.     Diet restrictions:         Diet Orders   (From admission, onward)                 Start     Ordered    06/12/25 0804  Diet Renal; Potassium 2 GM; Yes; Fluid Restriction 2000 ML; No  Diet effective now        References:    Adult Nutrition Support Algorithm    RD Therapeutic Diet Order Protocol   Question Answer Comment   Diet Type Renal    Renal Potassium 2 GM    Should patient have a fluid restriction? Yes    Fluid Restriction Fluid Restriction 2000 ML    Should patient have a protein modifier? No    Allow  Registered Dietitian to adjust diet order per protocol Yes        06/12/25 0803                  Activity restrictions: No strenuous activity  Discharge Condition: good    Outpatient Follow-Up and Discharge Instructions  See after visit summary section titled Discharge Instructions for information provided to patient and family.      Code Status: Level 1 - Full Code  Discharge Statement   I spent 86 minutes discharging the patient. This time was spent on the day of discharge. Greater than 50% of total time was spent with the patient and / or family counseling and / or coordination of care.    ** Please Note: This note was completed in part utilizing [employer] Dragon [employer] One Software.  Grammatical errors, random word insertions, spelling mistakes, and incomplete sentences may be an occasional consequence of this system secondary to software limitations, ambient noise, and hardware issues.  If you have any questions or concerns about the content, text, or information contained within the body of this dictation, please contact the provider for clarification.**

## 2025-06-17 ENCOUNTER — APPOINTMENT (OUTPATIENT)
Dept: LAB | Facility: CLINIC | Age: 85
End: 2025-06-17
Payer: COMMERCIAL

## 2025-06-17 ENCOUNTER — OFFICE VISIT (OUTPATIENT)
Dept: FAMILY MEDICINE CLINIC | Facility: CLINIC | Age: 85
End: 2025-06-17
Payer: COMMERCIAL

## 2025-06-17 VITALS
OXYGEN SATURATION: 99 % | SYSTOLIC BLOOD PRESSURE: 120 MMHG | BODY MASS INDEX: 19.48 KG/M2 | WEIGHT: 96.6 LBS | HEART RATE: 77 BPM | DIASTOLIC BLOOD PRESSURE: 62 MMHG | HEIGHT: 59 IN

## 2025-06-17 DIAGNOSIS — I48.0 PAROXYSMAL ATRIAL FIBRILLATION (HCC): ICD-10-CM

## 2025-06-17 DIAGNOSIS — E11.22 TYPE 2 DIABETES MELLITUS WITH STAGE 4 CHRONIC KIDNEY DISEASE, WITHOUT LONG-TERM CURRENT USE OF INSULIN (HCC): ICD-10-CM

## 2025-06-17 DIAGNOSIS — E03.9 ACQUIRED HYPOTHYROIDISM: ICD-10-CM

## 2025-06-17 DIAGNOSIS — D64.9 SYMPTOMATIC ANEMIA: Primary | ICD-10-CM

## 2025-06-17 DIAGNOSIS — N18.4 TYPE 2 DIABETES MELLITUS WITH STAGE 4 CHRONIC KIDNEY DISEASE, WITHOUT LONG-TERM CURRENT USE OF INSULIN (HCC): ICD-10-CM

## 2025-06-17 DIAGNOSIS — D64.9 SYMPTOMATIC ANEMIA: ICD-10-CM

## 2025-06-17 DIAGNOSIS — I50.32 CHRONIC DIASTOLIC HEART FAILURE (HCC): Chronic | ICD-10-CM

## 2025-06-17 DIAGNOSIS — I07.1 SEVERE TRICUSPID REGURGITATION: ICD-10-CM

## 2025-06-17 PROCEDURE — 99496 TRANSJ CARE MGMT HIGH F2F 7D: CPT | Performed by: FAMILY MEDICINE

## 2025-06-17 NOTE — PROGRESS NOTES
Transition of Care Visit:  Name: Sarah Holland      : 1940      MRN: [id number]  Encounter Provider: Corina Oconnor DO  Encounter Date: 2025   Encounter department: [facility]    Assessment & Plan  Symptomatic anemia  Daughter says that at home patient is walking on her own and that her appetite is good. On discharge, her hemoglobin was 7.1 after two units of PRBC's.  Orders:  •  CBC and differential; Future    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 disease, without long-term current use of insulin (HCC)  Last A1C  showed good control with diet. Patient has not been on diabetic medication for years.  Lab Results   Component Value Date    HGBA1C 5.5 06/10/2025       Orders:  •  Basic metabolic panel; Future    Acquired hypothyroidism  TSH in hospital was in range. Continue same dose of levothyroxine.       Chronic diastolic heart failure (HCC)  Wt Readings from Last 3 Encounters:   25 43.8 kg (96 lb 9.6 oz)   06/10/25 42.8 kg (94 lb 6.4 oz)   25 44.7 kg (98 lb 9.6 oz)     No pedal edema or abdominal distention today.        Orders:  •  CBC and differential; Future  •  Basic metabolic panel; Future    Severe tricuspid regurgitation  Patient did have TVR procedure at [place]. Follow with cardiology at [facility] also.       Paroxysmal atrial fibrillation (HCC)  Patient is taking Eliquis. Follow with cardiology.         [facility] Plan of Care: not completed because patient not ambulatory, bed ridden, immobile, confined to chair, or wheelchair bound. Patient in wheelchair at this time.      Will call daughter with CBC and BMP.    History of Present Illness     Transitional Care Management Review:   Sarah Holland is a 84 y.o. female here for TCM follow up.     During the TCM phone call patient stated:  TCM Call (since 6/3/2025)     Date and time call was made  2025  4:39 PM    Hospital care reviewed  Records reviewed    Patient was hospitialized at  [facility]  "[place]    Date of Admission  06/11/25    Date of discharge  06/12/25    Diagnosis  Symptomatic anemia    Disposition  Home      TCM Call (since 6/3/2025)     Post hospital issues  Reduced activity    Scheduled for follow up?  Yes    Do you need help managing your prescriptions or medications  Yes    Why type of assitance do you need  Family is helping    Is transportation to your appointment needed  Yes    Specify why  Daughter will bring her    I have advised the patient to call PCP with any new or worsening symptoms  Kath Elizondo MA    Living Arrangements  Family members    Are you recieving home care services  No        Patient is seen for post hospital visit. Admitted on 6/11 with anemia - Hgb 5.7. She was given 2 nits of packed RBC's and sent home. She also had elevated potassium and this was treated also.      Review of Systems   Constitutional:  Positive for fatigue. Negative for appetite change, chills and fever.   HENT:  Negative for congestion and sore throat.    Respiratory:  Positive for shortness of breath. Negative for chest tightness.    Cardiovascular:  Negative for chest pain and palpitations.   Gastrointestinal:  Negative for abdominal pain, constipation, diarrhea and nausea.   Genitourinary:  Negative for difficulty urinating.   Skin: Negative.    Neurological:  Negative for dizziness and headaches.   Psychiatric/Behavioral: Negative.       Objective   /62 (BP Location: Left arm, Patient Position: Sitting, Cuff Size: Standard)   Pulse 77   Ht 4' 11\" (1.499 m)   Wt 43.8 kg (96 lb 9.6 oz)   SpO2 99%   BMI 19.51 kg/m²     Physical Exam  Vitals and nursing note reviewed.   Constitutional:       General: She is not in acute distress.  HENT:      Head: Normocephalic.   Neck:      Thyroid: No thyromegaly.     Cardiovascular:      Rate and Rhythm: Normal rate and regular rhythm.      Heart sounds: Normal heart sounds.      Comments: Regular at 72 beats per minute. At first when I listened " there appeared to be some ectopy.  Pulmonary:      Effort: Pulmonary effort is normal.      Breath sounds: Normal breath sounds.     Musculoskeletal:      Right lower leg: No edema.      Left lower leg: No edema.   Lymphadenopathy:      Cervical: No cervical adenopathy.     Skin:     General: Skin is warm and dry.     Neurological:      Mental Status: She is alert and oriented to person, place, and time.     Psychiatric:         Mood and Affect: Mood normal.       Medications have been reviewed by provider in current encounter

## 2025-06-17 NOTE — ASSESSMENT & PLAN NOTE
Wt Readings from Last 3 Encounters:   06/17/25 43.8 kg (96 lb 9.6 oz)   06/10/25 42.8 kg (94 lb 6.4 oz)   05/13/25 44.7 kg (98 lb 9.6 oz)     No pedal edema or abdominal distention today.        Orders:  •  CBC and differential; Future  •  Basic metabolic panel; Future

## 2025-06-17 NOTE — ASSESSMENT & PLAN NOTE
Patient did have TVR procedure at [place]. Follow with cardiology at [facility] also.

## 2025-06-17 NOTE — ASSESSMENT & PLAN NOTE
Last A1C  showed good control with diet. Patient has not been on diabetic medication for years.  Lab Results   Component Value Date    HGBA1C 5.5 06/10/2025       Orders:  •  Basic metabolic panel; Future

## 2025-06-17 NOTE — ASSESSMENT & PLAN NOTE
Daughter says that at home patient is walking on her own and that her appetite is good. On discharge, her hemoglobin was 7.1 after two units of PRBC's.  Orders:  •  CBC and differential; Future

## 2025-06-18 ENCOUNTER — APPOINTMENT (OUTPATIENT)
Dept: LAB | Facility: HOSPITAL | Age: 85
End: 2025-06-18
Payer: COMMERCIAL

## 2025-06-18 LAB
ANION GAP SERPL CALCULATED.3IONS-SCNC: 6 MMOL/L (ref 4–13)
BASOPHILS # BLD AUTO: 0.03 THOUSANDS/ÂΜL (ref 0–0.1)
BASOPHILS NFR BLD AUTO: 1 % (ref 0–1)
BUN SERPL-MCNC: 39 MG/DL (ref 5–25)
CALCIUM SERPL-MCNC: 9.1 MG/DL (ref 8.4–10.2)
CHLORIDE SERPL-SCNC: 103 MMOL/L (ref 96–108)
CO2 SERPL-SCNC: 25 MMOL/L (ref 21–32)
CREAT SERPL-MCNC: 1.4 MG/DL (ref 0.6–1.3)
EOSINOPHIL # BLD AUTO: 0.23 THOUSAND/ÂΜL (ref 0–0.61)
EOSINOPHIL NFR BLD AUTO: 6 % (ref 0–6)
ERYTHROCYTE [DISTWIDTH] IN BLOOD BY AUTOMATED COUNT: 18.5 % (ref 11.6–15.1)
GFR SERPL CREATININE-BSD FRML MDRD: 34 ML/MIN/1.73SQ M
GLUCOSE SERPL-MCNC: 130 MG/DL (ref 65–140)
HCT VFR BLD AUTO: 19.8 % (ref 34.8–46.1)
HGB BLD-MCNC: 6.2 G/DL (ref 11.5–15.4)
IMM GRANULOCYTES # BLD AUTO: 0.01 THOUSAND/UL (ref 0–0.2)
IMM GRANULOCYTES NFR BLD AUTO: 0 % (ref 0–2)
LYMPHOCYTES # BLD AUTO: 0.64 THOUSANDS/ÂΜL (ref 0.6–4.47)
LYMPHOCYTES NFR BLD AUTO: 17 % (ref 14–44)
MCH RBC QN AUTO: 27.4 PG (ref 26.8–34.3)
MCHC RBC AUTO-ENTMCNC: 31.3 G/DL (ref 31.4–37.4)
MCV RBC AUTO: 88 FL (ref 82–98)
MONOCYTES # BLD AUTO: 0.59 THOUSAND/ÂΜL (ref 0.17–1.22)
MONOCYTES NFR BLD AUTO: 16 % (ref 4–12)
NEUTROPHILS # BLD AUTO: 2.18 THOUSANDS/ÂΜL (ref 1.85–7.62)
NEUTS SEG NFR BLD AUTO: 60 % (ref 43–75)
NRBC BLD AUTO-RTO: 0 /100 WBCS
PLATELET # BLD AUTO: 151 THOUSANDS/UL (ref 149–390)
PMV BLD AUTO: 10 FL (ref 8.9–12.7)
POTASSIUM SERPL-SCNC: 3.9 MMOL/L (ref 3.5–5.3)
RBC # BLD AUTO: 2.26 MILLION/UL (ref 3.81–5.12)
SODIUM SERPL-SCNC: 134 MMOL/L (ref 135–147)
WBC # BLD AUTO: 3.68 THOUSAND/UL (ref 4.31–10.16)

## 2025-06-18 PROCEDURE — 85025 COMPLETE CBC W/AUTO DIFF WBC: CPT

## 2025-06-18 PROCEDURE — 80048 BASIC METABOLIC PNL TOTAL CA: CPT

## 2025-06-18 PROCEDURE — 36415 COLL VENOUS BLD VENIPUNCTURE: CPT

## 2025-06-19 ENCOUNTER — RESULTS FOLLOW-UP (OUTPATIENT)
Dept: FAMILY MEDICINE CLINIC | Facility: CLINIC | Age: 85
End: 2025-06-19

## 2025-06-30 ENCOUNTER — APPOINTMENT (OUTPATIENT)
Dept: LAB | Facility: HOSPITAL | Age: 85
End: 2025-06-30
Payer: COMMERCIAL

## 2025-06-30 DIAGNOSIS — I50.32 CHRONIC DIASTOLIC HEART FAILURE (HCC): ICD-10-CM

## 2025-06-30 DIAGNOSIS — D62 ANEMIA DUE TO ACUTE BLOOD LOSS: ICD-10-CM

## 2025-06-30 DIAGNOSIS — N18.32 CHRONIC KIDNEY DISEASE (CKD) STAGE G3B/A1, MODERATELY DECREASED GLOMERULAR FILTRATION RATE (GFR) BETWEEN 30-44 ML/MIN/1.73 SQUARE METER AND ALBUMINURIA CREATININE RATIO LESS THAN 30 MG/G (HCC): ICD-10-CM

## 2025-06-30 LAB
ALBUMIN SERPL BCG-MCNC: 3.6 G/DL (ref 3.5–5)
ALP SERPL-CCNC: 101 U/L (ref 34–104)
ALT SERPL W P-5'-P-CCNC: 17 U/L (ref 7–52)
ANION GAP SERPL CALCULATED.3IONS-SCNC: 6 MMOL/L (ref 4–13)
AST SERPL W P-5'-P-CCNC: 30 U/L (ref 13–39)
BILIRUB SERPL-MCNC: 0.67 MG/DL (ref 0.2–1)
BUN SERPL-MCNC: 43 MG/DL (ref 5–25)
CALCIUM SERPL-MCNC: 9.6 MG/DL (ref 8.4–10.2)
CHLORIDE SERPL-SCNC: 98 MMOL/L (ref 96–108)
CO2 SERPL-SCNC: 31 MMOL/L (ref 21–32)
CREAT SERPL-MCNC: 1.5 MG/DL (ref 0.6–1.3)
ERYTHROCYTE [DISTWIDTH] IN BLOOD BY AUTOMATED COUNT: 18.7 % (ref 11.6–15.1)
GFR SERPL CREATININE-BSD FRML MDRD: 31 ML/MIN/1.73SQ M
GLUCOSE SERPL-MCNC: 98 MG/DL (ref 65–140)
HCT VFR BLD AUTO: 24 % (ref 34.8–46.1)
HGB BLD-MCNC: 7.4 G/DL (ref 11.5–15.4)
MCH RBC QN AUTO: 27.4 PG (ref 26.8–34.3)
MCHC RBC AUTO-ENTMCNC: 30.8 G/DL (ref 31.4–37.4)
MCV RBC AUTO: 89 FL (ref 82–98)
PLATELET # BLD AUTO: 226 THOUSANDS/UL (ref 149–390)
PMV BLD AUTO: 9.1 FL (ref 8.9–12.7)
POTASSIUM SERPL-SCNC: 4.1 MMOL/L (ref 3.5–5.3)
PROT SERPL-MCNC: 7.2 G/DL (ref 6.4–8.4)
RBC # BLD AUTO: 2.7 MILLION/UL (ref 3.81–5.12)
SODIUM SERPL-SCNC: 135 MMOL/L (ref 135–147)
WBC # BLD AUTO: 4.26 THOUSAND/UL (ref 4.31–10.16)

## 2025-06-30 PROCEDURE — 36415 COLL VENOUS BLD VENIPUNCTURE: CPT

## 2025-06-30 PROCEDURE — 85027 COMPLETE CBC AUTOMATED: CPT

## 2025-06-30 PROCEDURE — 80053 COMPREHEN METABOLIC PANEL: CPT

## 2025-07-17 ENCOUNTER — APPOINTMENT (OUTPATIENT)
Dept: LAB | Facility: HOSPITAL | Age: 85
End: 2025-07-17
Payer: COMMERCIAL

## 2025-07-17 DIAGNOSIS — Z79.01 CHRONIC ANTICOAGULATION: ICD-10-CM

## 2025-07-17 DIAGNOSIS — D62 ACUTE BLOOD LOSS ANEMIA: ICD-10-CM

## 2025-07-17 LAB
BASOPHILS # BLD AUTO: 0.05 THOUSANDS/ÂΜL (ref 0–0.1)
BASOPHILS NFR BLD AUTO: 1 % (ref 0–1)
EOSINOPHIL # BLD AUTO: 0.23 THOUSAND/ÂΜL (ref 0–0.61)
EOSINOPHIL NFR BLD AUTO: 7 % (ref 0–6)
ERYTHROCYTE [DISTWIDTH] IN BLOOD BY AUTOMATED COUNT: 18.2 % (ref 11.6–15.1)
HCT VFR BLD AUTO: 21.4 % (ref 34.8–46.1)
HGB BLD-MCNC: 6.7 G/DL (ref 11.5–15.4)
IMM GRANULOCYTES # BLD AUTO: 0.01 THOUSAND/UL (ref 0–0.2)
IMM GRANULOCYTES NFR BLD AUTO: 0 % (ref 0–2)
LYMPHOCYTES # BLD AUTO: 0.74 THOUSANDS/ÂΜL (ref 0.6–4.47)
LYMPHOCYTES NFR BLD AUTO: 21 % (ref 14–44)
MCH RBC QN AUTO: 26.2 PG (ref 26.8–34.3)
MCHC RBC AUTO-ENTMCNC: 31.3 G/DL (ref 31.4–37.4)
MCV RBC AUTO: 84 FL (ref 82–98)
MONOCYTES # BLD AUTO: 0.53 THOUSAND/ÂΜL (ref 0.17–1.22)
MONOCYTES NFR BLD AUTO: 15 % (ref 4–12)
NEUTROPHILS # BLD AUTO: 1.96 THOUSANDS/ÂΜL (ref 1.85–7.62)
NEUTS SEG NFR BLD AUTO: 56 % (ref 43–75)
NRBC BLD AUTO-RTO: 0 /100 WBCS
PLATELET # BLD AUTO: 226 THOUSANDS/UL (ref 149–390)
PMV BLD AUTO: 8.9 FL (ref 8.9–12.7)
RBC # BLD AUTO: 2.56 MILLION/UL (ref 3.81–5.12)
WBC # BLD AUTO: 3.52 THOUSAND/UL (ref 4.31–10.16)

## 2025-07-17 PROCEDURE — 85025 COMPLETE CBC W/AUTO DIFF WBC: CPT

## 2025-07-17 PROCEDURE — 36415 COLL VENOUS BLD VENIPUNCTURE: CPT

## 2025-07-22 ENCOUNTER — APPOINTMENT (OUTPATIENT)
Dept: LAB | Facility: HOSPITAL | Age: 85
End: 2025-07-22
Payer: COMMERCIAL

## 2025-07-22 DIAGNOSIS — D62 ACUTE POSTHEMORRHAGIC ANEMIA: ICD-10-CM

## 2025-07-22 DIAGNOSIS — N18.32 CHRONIC KIDNEY DISEASE (CKD) STAGE G3B/A1, MODERATELY DECREASED GLOMERULAR FILTRATION RATE (GFR) BETWEEN 30-44 ML/MIN/1.73 SQUARE METER AND ALBUMINURIA CREATININE RATIO LESS THAN 30 MG/G (HCC): ICD-10-CM

## 2025-07-22 DIAGNOSIS — I50.32 CHRONIC DIASTOLIC HEART FAILURE (HCC): ICD-10-CM

## 2025-07-22 LAB
ALBUMIN SERPL BCG-MCNC: 3.8 G/DL (ref 3.5–5)
ALP SERPL-CCNC: 100 U/L (ref 34–104)
ALT SERPL W P-5'-P-CCNC: 14 U/L (ref 7–52)
ANION GAP SERPL CALCULATED.3IONS-SCNC: 7 MMOL/L (ref 4–13)
AST SERPL W P-5'-P-CCNC: 28 U/L (ref 13–39)
BILIRUB SERPL-MCNC: 1.37 MG/DL (ref 0.2–1)
BUN SERPL-MCNC: 37 MG/DL (ref 5–25)
CALCIUM SERPL-MCNC: 9.8 MG/DL (ref 8.4–10.2)
CHLORIDE SERPL-SCNC: 101 MMOL/L (ref 96–108)
CO2 SERPL-SCNC: 29 MMOL/L (ref 21–32)
CREAT SERPL-MCNC: 1.4 MG/DL (ref 0.6–1.3)
ERYTHROCYTE [DISTWIDTH] IN BLOOD BY AUTOMATED COUNT: 16.8 % (ref 11.6–15.1)
GFR SERPL CREATININE-BSD FRML MDRD: 34 ML/MIN/1.73SQ M
GLUCOSE SERPL-MCNC: 95 MG/DL (ref 65–140)
HCT VFR BLD AUTO: 29.1 % (ref 34.8–46.1)
HGB BLD-MCNC: 9.4 G/DL (ref 11.5–15.4)
IRON SATN MFR SERPL: 25 % (ref 15–50)
IRON SERPL-MCNC: 136 UG/DL (ref 50–212)
MCH RBC QN AUTO: 27.1 PG (ref 26.8–34.3)
MCHC RBC AUTO-ENTMCNC: 32.3 G/DL (ref 31.4–37.4)
MCV RBC AUTO: 84 FL (ref 82–98)
PLATELET # BLD AUTO: 193 THOUSANDS/UL (ref 149–390)
PMV BLD AUTO: 8.8 FL (ref 8.9–12.7)
POTASSIUM SERPL-SCNC: 3.6 MMOL/L (ref 3.5–5.3)
PROT SERPL-MCNC: 7.7 G/DL (ref 6.4–8.4)
RBC # BLD AUTO: 3.47 MILLION/UL (ref 3.81–5.12)
SODIUM SERPL-SCNC: 137 MMOL/L (ref 135–147)
TIBC SERPL-MCNC: 540.4 UG/DL (ref 250–450)
TRANSFERRIN SERPL-MCNC: 386 MG/DL (ref 203–362)
UIBC SERPL-MCNC: 404 UG/DL (ref 155–355)
WBC # BLD AUTO: 3.69 THOUSAND/UL (ref 4.31–10.16)

## 2025-07-22 PROCEDURE — 80053 COMPREHEN METABOLIC PANEL: CPT

## 2025-07-22 PROCEDURE — 36415 COLL VENOUS BLD VENIPUNCTURE: CPT

## 2025-07-22 PROCEDURE — 85027 COMPLETE CBC AUTOMATED: CPT

## 2025-07-22 PROCEDURE — 83550 IRON BINDING TEST: CPT

## 2025-07-22 PROCEDURE — 83540 ASSAY OF IRON: CPT

## 2025-07-24 ENCOUNTER — DOCUMENTATION (OUTPATIENT)
Dept: ADMINISTRATIVE | Facility: OTHER | Age: 85
End: 2025-07-24

## 2025-07-24 NOTE — PROGRESS NOTES
07/24/25 1:34 PM    Annual Wellness Visit outreach is not required, patient is on hospice.     Thank you.  Renee Nunez  [facility]

## 2025-08-06 ENCOUNTER — APPOINTMENT (OUTPATIENT)
Dept: LAB | Facility: HOSPITAL | Age: 85
End: 2025-08-06
Payer: COMMERCIAL

## 2025-08-27 PROBLEM — K76.82 ENCEPHALOPATHY, PORTAL SYSTEMIC (HCC): Status: ACTIVE | Noted: 2025-08-27

## (undated) DEVICE — SINGLE-USE BIOPSY FORCEPS: Brand: RADIAL JAW 4